# Patient Record
Sex: MALE | Race: WHITE | NOT HISPANIC OR LATINO | Employment: OTHER | URBAN - NONMETROPOLITAN AREA
[De-identification: names, ages, dates, MRNs, and addresses within clinical notes are randomized per-mention and may not be internally consistent; named-entity substitution may affect disease eponyms.]

---

## 2021-05-20 ENCOUNTER — TRANSCRIBE ORDERS (OUTPATIENT)
Dept: GENERAL RADIOLOGY | Facility: HOSPITAL | Age: 41
End: 2021-05-20

## 2021-05-20 ENCOUNTER — HOSPITAL ENCOUNTER (OUTPATIENT)
Dept: GENERAL RADIOLOGY | Facility: HOSPITAL | Age: 41
Discharge: HOME | End: 2021-05-20
Admitting: INTERNAL MEDICINE

## 2021-05-20 DIAGNOSIS — M25.559 HIP PAIN: Primary | ICD-10-CM

## 2021-05-20 DIAGNOSIS — M25.559 HIP PAIN: ICD-10-CM

## 2021-05-20 PROCEDURE — 73522 X-RAY EXAM HIPS BI 3-4 VIEWS: CPT

## 2021-06-14 ENCOUNTER — TRANSCRIBE ORDERS (OUTPATIENT)
Dept: ADMINISTRATIVE | Facility: HOSPITAL | Age: 41
End: 2021-06-14

## 2021-06-14 DIAGNOSIS — R10.9 ACUTE ABDOMINAL PAIN: Primary | ICD-10-CM

## 2021-06-14 DIAGNOSIS — R11.2 NAUSEA AND VOMITING, INTRACTABILITY OF VOMITING NOT SPECIFIED, UNSPECIFIED VOMITING TYPE: ICD-10-CM

## 2021-06-23 ENCOUNTER — HOSPITAL ENCOUNTER (OUTPATIENT)
Dept: ULTRASOUND IMAGING | Facility: HOSPITAL | Age: 41
Discharge: HOME | End: 2021-06-23
Admitting: INTERNAL MEDICINE

## 2021-06-23 DIAGNOSIS — R10.9 ACUTE ABDOMINAL PAIN: ICD-10-CM

## 2021-06-23 DIAGNOSIS — R11.2 NAUSEA AND VOMITING, INTRACTABILITY OF VOMITING NOT SPECIFIED, UNSPECIFIED VOMITING TYPE: ICD-10-CM

## 2021-06-23 PROCEDURE — 76700 US EXAM ABDOM COMPLETE: CPT

## 2021-07-13 NOTE — PROGRESS NOTES
Patient: Aroldo Cai    YOB: 1980    Date: 07/15/2021    Primary Care Provider: Gerald Dobson MD    Chief Complaint   Patient presents with   • Vomiting   • Nausea       SUBJECTIVE:    History of present illness: Patient with a 1 month history of of abdominal pain.  He complains of postprandial right upper quadrant sharp pain.  Associated nausea and vomiting.    The following portions of the patient's history were reviewed and updated as appropriate: allergies, current medications, past family history, past medical history, past social history, past surgical history and problem list.    Review of Systems   Constitutional: Positive for unexpected weight change (10 pounds in 2 weeks ). Negative for chills and fever.   HENT: Positive for trouble swallowing. Negative for voice change.    Eyes: Negative for visual disturbance.   Respiratory: Positive for cough (smoking). Negative for apnea, chest tightness, shortness of breath and wheezing.    Cardiovascular: Negative for chest pain, palpitations and leg swelling.   Gastrointestinal: Positive for abdominal pain, nausea and vomiting. Negative for abdominal distention, anal bleeding, blood in stool, constipation, diarrhea and rectal pain.   Endocrine: Negative for cold intolerance and heat intolerance.   Genitourinary: Negative for difficulty urinating, dysuria, flank pain, scrotal swelling and testicular pain.   Musculoskeletal: Negative for back pain, gait problem and joint swelling.   Skin: Negative for color change, rash and wound.   Neurological: Negative for dizziness, syncope, speech difficulty, weakness, numbness and headaches.   Hematological: Negative for adenopathy. Does not bruise/bleed easily.   Psychiatric/Behavioral: Negative for confusion. The patient is not nervous/anxious.        History:  History reviewed. No pertinent past medical history.    Past Surgical History:   Procedure Laterality Date   • BRAIN SURGERY     • HIP SURGERY  "Bilateral        Family History   Problem Relation Age of Onset   • Diabetes Mother    • Cancer Father         skin   • Diabetes Father    • Hypertension Father        Social History     Tobacco Use   • Smoking status: Current Every Day Smoker     Packs/day: 0.50     Types: Cigarettes   • Smokeless tobacco: Never Used   Vaping Use   • Vaping Use: Never used   Substance Use Topics   • Alcohol use: Never   • Drug use: Never       Allergies:  Allergies   Allergen Reactions   • Penicillins Anaphylaxis   • Dilantin [Phenytoin] Seizure   • Tegretol [Carbamazepine] Seizure   • Latex Rash       Medications:    Current Outpatient Medications:   •  diclofenac (VOLTAREN) 75 MG EC tablet, Take 75 mg by mouth 2 (Two) Times a Day., Disp: , Rfl:   •  levETIRAcetam (KEPPRA) 500 MG tablet, Take 500 mg by mouth Every Evening., Disp: , Rfl:   •  metoclopramide (REGLAN) 10 MG tablet, Take 10 mg by mouth 3 (Three) Times a Day As Needed., Disp: , Rfl:   •  OLANZapine (zyPREXA) 5 MG tablet, , Disp: , Rfl:   •  omeprazole (priLOSEC) 40 MG capsule, Take 40 mg by mouth Daily., Disp: , Rfl:   •  ondansetron ODT (ZOFRAN-ODT) 8 MG disintegrating tablet, DISSOLVE 1 TABLET IN MOUTH THREE TIMES DAILY, Disp: , Rfl:   •  oxyCODONE-acetaminophen (PERCOCET) 5-325 MG per tablet, Take 1 tablet by mouth Every 6 (Six) Hours As Needed. for pain, Disp: , Rfl:   •  promethazine (PHENERGAN) 12.5 MG tablet, Take 12.5 mg by mouth 3 (Three) Times a Day As Needed., Disp: , Rfl:   •  tiZANidine (ZANAFLEX) 4 MG tablet, Take 4 mg by mouth 2 (Two) Times a Day., Disp: , Rfl:   •  venlafaxine (EFFEXOR) 100 MG tablet, Take 100 mg by mouth Daily., Disp: , Rfl:     OBJECTIVE:    Vital Signs:   Vitals:    07/15/21 1408   BP: 140/80   Pulse: 98   Temp: 97.8 °F (36.6 °C)   TempSrc: Temporal   SpO2: 99%   Weight: 104 kg (229 lb 6.4 oz)   Height: 165.1 cm (65\")       Physical Exam:   General Appearance:    Alert, cooperative, in no acute distress   Head:    Normocephalic, " without obvious abnormality, atraumatic   Eyes:            Normal.  No scleral icterus.  PERRLA    Lungs:     Clear to auscultation,respirations regular, even and                  unlabored    Heart:    Regular rhythm and normal rate, normal S1 and S2, no            murmur   Abdomen:     Normal bowel sounds, no masses, no organomegaly, soft        mild right upper quadrant tenderness to deeper palpation, non-distended, no guarding,    Extremities:   Moves all extremities well, no edema, no cyanosis, no             redness   Skin:   No bleeding, bruising or rash   Neurologic:   Normal without gross deficits.   Psychiatric: No evidence of depression or anxiety        Results Review:   I reviewed the patient's new clinical results.  Ultrasound was reviewed.    Review of Systems was reviewed and confirmed as accurate as documented by the MA.    ASSESSMENT/PLAN:    1. Calculus of gallbladder without cholecystitis without obstruction      I offered the patient a laparoscopic cholecystectomy versus observation with low-fat diet however given his multiple symptoms I recommend a laparoscopic cholecystectomy.  I explained this procedure to them in detail and they understand the procedure.  They also understand the risks of bleeding, infection, bile leak, ductal injury, bowel injury, the possibility of converting to an open procedure etc. They understand all of these risks and I have answered all of their questions and they wish to proceed.    Electronically signed by Hardik Blancas MD  07/15/21 15:26 EDT

## 2021-07-15 ENCOUNTER — OFFICE VISIT (OUTPATIENT)
Dept: SURGERY | Facility: CLINIC | Age: 41
End: 2021-07-15

## 2021-07-15 VITALS
HEIGHT: 65 IN | BODY MASS INDEX: 38.22 KG/M2 | OXYGEN SATURATION: 99 % | WEIGHT: 229.4 LBS | TEMPERATURE: 97.8 F | SYSTOLIC BLOOD PRESSURE: 140 MMHG | DIASTOLIC BLOOD PRESSURE: 80 MMHG | HEART RATE: 98 BPM

## 2021-07-15 DIAGNOSIS — K80.20 CALCULUS OF GALLBLADDER WITHOUT CHOLECYSTITIS WITHOUT OBSTRUCTION: Primary | ICD-10-CM

## 2021-07-15 PROCEDURE — 99204 OFFICE O/P NEW MOD 45 MIN: CPT | Performed by: SURGERY

## 2021-07-15 RX ORDER — ONDANSETRON 8 MG/1
TABLET, ORALLY DISINTEGRATING ORAL EVERY 8 HOURS PRN
COMMUNITY
Start: 2021-06-30

## 2021-07-15 RX ORDER — OLANZAPINE 5 MG/1
5 TABLET ORAL DAILY
COMMUNITY
Start: 2021-07-14 | End: 2021-12-02 | Stop reason: SDUPTHER

## 2021-07-15 RX ORDER — METOCLOPRAMIDE 10 MG/1
10 TABLET ORAL 3 TIMES DAILY PRN
COMMUNITY
Start: 2021-07-12 | End: 2022-03-17

## 2021-07-15 RX ORDER — DICLOFENAC SODIUM 75 MG/1
75 TABLET, DELAYED RELEASE ORAL 2 TIMES DAILY
COMMUNITY
Start: 2021-05-27 | End: 2021-07-26

## 2021-07-15 RX ORDER — PROMETHAZINE HYDROCHLORIDE 12.5 MG/1
12.5 TABLET ORAL 3 TIMES DAILY PRN
COMMUNITY
Start: 2021-07-10 | End: 2022-04-18

## 2021-07-15 RX ORDER — VENLAFAXINE 100 MG/1
100 TABLET ORAL DAILY
COMMUNITY
Start: 2021-06-17 | End: 2021-12-02

## 2021-07-15 RX ORDER — OMEPRAZOLE 40 MG/1
40 CAPSULE, DELAYED RELEASE ORAL DAILY
COMMUNITY
Start: 2021-06-17 | End: 2022-03-17

## 2021-07-15 RX ORDER — SODIUM CHLORIDE 9 MG/ML
100 INJECTION, SOLUTION INTRAVENOUS CONTINUOUS
Status: CANCELLED | OUTPATIENT
Start: 2021-07-15

## 2021-07-15 RX ORDER — OXYCODONE HYDROCHLORIDE AND ACETAMINOPHEN 5; 325 MG/1; MG/1
1 TABLET ORAL EVERY 6 HOURS PRN
COMMUNITY
Start: 2021-07-09 | End: 2021-09-10 | Stop reason: DRUGHIGH

## 2021-07-15 RX ORDER — TIZANIDINE 4 MG/1
4 TABLET ORAL 2 TIMES DAILY
COMMUNITY
Start: 2021-06-26 | End: 2021-07-26

## 2021-07-15 RX ORDER — LEVETIRACETAM 500 MG/1
500 TABLET ORAL EVERY EVENING
COMMUNITY
Start: 2021-05-27 | End: 2021-11-29

## 2021-07-16 DIAGNOSIS — Z01.818 PREOP TESTING: Primary | ICD-10-CM

## 2021-07-16 PROBLEM — K80.20 CALCULUS OF GALLBLADDER WITHOUT CHOLECYSTITIS WITHOUT OBSTRUCTION: Status: ACTIVE | Noted: 2021-07-16

## 2021-07-26 ENCOUNTER — PRE-ADMISSION TESTING (OUTPATIENT)
Dept: PREADMISSION TESTING | Facility: HOSPITAL | Age: 41
End: 2021-07-26

## 2021-07-26 VITALS — WEIGHT: 231 LBS | BODY MASS INDEX: 38.49 KG/M2 | HEIGHT: 65 IN

## 2021-07-26 LAB
ANION GAP SERPL CALCULATED.3IONS-SCNC: 11.8 MMOL/L (ref 5–15)
BUN SERPL-MCNC: 8 MG/DL (ref 6–20)
BUN/CREAT SERPL: 9.6 (ref 7–25)
CALCIUM SPEC-SCNC: 9.6 MG/DL (ref 8.6–10.5)
CHLORIDE SERPL-SCNC: 104 MMOL/L (ref 98–107)
CO2 SERPL-SCNC: 26.2 MMOL/L (ref 22–29)
CREAT SERPL-MCNC: 0.83 MG/DL (ref 0.76–1.27)
DEPRECATED RDW RBC AUTO: 44.7 FL (ref 37–54)
ERYTHROCYTE [DISTWIDTH] IN BLOOD BY AUTOMATED COUNT: 13.3 % (ref 12.3–15.4)
GFR SERPL CREATININE-BSD FRML MDRD: 102 ML/MIN/1.73
GLUCOSE SERPL-MCNC: 78 MG/DL (ref 65–99)
HCT VFR BLD AUTO: 50.9 % (ref 37.5–51)
HGB BLD-MCNC: 16.9 G/DL (ref 13–17.7)
MCH RBC QN AUTO: 30.1 PG (ref 26.6–33)
MCHC RBC AUTO-ENTMCNC: 33.2 G/DL (ref 31.5–35.7)
MCV RBC AUTO: 90.7 FL (ref 79–97)
PLATELET # BLD AUTO: 236 10*3/MM3 (ref 140–450)
PMV BLD AUTO: 10.3 FL (ref 6–12)
POTASSIUM SERPL-SCNC: 3.9 MMOL/L (ref 3.5–5.2)
RBC # BLD AUTO: 5.61 10*6/MM3 (ref 4.14–5.8)
SARS-COV-2 RNA PNL SPEC NAA+PROBE: NOT DETECTED
SODIUM SERPL-SCNC: 142 MMOL/L (ref 136–145)
WBC # BLD AUTO: 7.99 10*3/MM3 (ref 3.4–10.8)

## 2021-07-26 PROCEDURE — 36415 COLL VENOUS BLD VENIPUNCTURE: CPT

## 2021-07-26 PROCEDURE — 85027 COMPLETE CBC AUTOMATED: CPT

## 2021-07-26 PROCEDURE — C9803 HOPD COVID-19 SPEC COLLECT: HCPCS

## 2021-07-26 PROCEDURE — 87635 SARS-COV-2 COVID-19 AMP PRB: CPT

## 2021-07-26 PROCEDURE — 80048 BASIC METABOLIC PNL TOTAL CA: CPT

## 2021-07-26 NOTE — DISCHARGE INSTRUCTIONS
Order placed for iron labs to be drawn. Patient prefers to have labs drawn with dialysis ([facility]).      Plan will be to repeat capsule endoscopy:    If iron normal - them may need 2 U packed red blood cells 5-7 days before capsule.     If low iron - can just do IV iron infusion 10-14 days prior to capsule.       Leandro Odell PA-C  Division of Gastroenterology, Hepatology and Nutrition  [facility]        PAT PASS GIVEN/REVIEWED WITH PT.  VERBALIZED UNDERSTANDING OF THE FOLLOWING:  DO NOT EAT, DRINK, SMOKE, USE SMOKELESS TOBACCO OR CHEW GUM AFTER MIDNIGHT THE NIGHT BEFORE SURGERY.  THIS ALSO INCLUDES HARD CANDIES AND MINTS.    DO NOT SHAVE THE AREA TO BE OPERATED ON AT LEAST 48 HOURS PRIOR TO THE PROCEDURE.  DO NOT WEAR MAKE UP OR NAIL POLISH.  DO NOT LEAVE IN ANY PIERCING OR WEAR JEWELRY THE DAY OF SURGERY.      DO NOT USE ADHESIVES IF YOU WEAR DENTURES.    DO NOT WEAR EYE CONTACTS; BRING IN YOUR GLASSES.    ONLY TAKE MEDICATION THE MORNING OF YOUR PROCEDURE IF INSTRUCTED BY YOUR SURGEON WITH ENOUGH WATER TO SWALLOW THE MEDICATION.  IF YOUR SURGEON DID NOT SPECIFY WHICH MEDICATIONS TO TAKE, YOU WILL NEED TO CALL THEIR OFFICE FOR FURTHER INSTRUCTIONS AND DO AS THEY INSTRUCT.    LEAVE ANYTHING YOU CONSIDER VALUABLE AT HOME.    YOU WILL NEED TO ARRANGE FOR SOMEONE TO DRIVE YOU HOME AFTER SURGERY.  IT IS RECOMMENDED THAT YOU DO NOT DRIVE, WORK, DRINK ALCOHOL OR MAKE MAJOR DECISIONS FOR AT LEAST 24 HOURS AFTER YOUR PROCEDURE IS COMPLETE.      THE DAY OF YOUR PROCEDURE, BRING IN THE FOLLOWING IF APPLICABLE:   PICTURE ID AND INSURANCE/MEDICARE OR MEDICAID CARDS/ANY CO-PAY THAT MAY BE DUE   COPY OF ADVANCED DIRECTIVE/LIVING WILL/POWER OR    CPAP/BIPAP/INHALERS   SKIN PREP SHEET   YOUR PREADMISSION TESTING PASS (IF NOT A PHONE HISTORY)        Chlorhexidine wipes along with instruction/verification sheet given to pt.  Instructed pt to date, time, and initial the verification sheet once skin prep has been  completed, and to return [facility] the day of the procedure.  Pt. Verbalizes understanding.      COVID self-quarantine instructions reviewed with the pt.  Verbalized understanding.

## 2021-07-27 ENCOUNTER — APPOINTMENT (OUTPATIENT)
Dept: GENERAL RADIOLOGY | Facility: HOSPITAL | Age: 41
End: 2021-07-27

## 2021-07-27 ENCOUNTER — ANESTHESIA EVENT (OUTPATIENT)
Dept: PERIOP | Facility: HOSPITAL | Age: 41
End: 2021-07-27

## 2021-07-27 ENCOUNTER — ANESTHESIA (OUTPATIENT)
Dept: PERIOP | Facility: HOSPITAL | Age: 41
End: 2021-07-27

## 2021-07-27 ENCOUNTER — HOSPITAL ENCOUNTER (OUTPATIENT)
Facility: HOSPITAL | Age: 41
Setting detail: HOSPITAL OUTPATIENT SURGERY
Discharge: HOME | End: 2021-07-27
Attending: SURGERY | Admitting: SURGERY

## 2021-07-27 VITALS
RESPIRATION RATE: 18 BRPM | SYSTOLIC BLOOD PRESSURE: 133 MMHG | DIASTOLIC BLOOD PRESSURE: 80 MMHG | TEMPERATURE: 98 F | HEART RATE: 75 BPM | OXYGEN SATURATION: 94 %

## 2021-07-27 DIAGNOSIS — K80.20 CALCULUS OF GALLBLADDER WITHOUT CHOLECYSTITIS WITHOUT OBSTRUCTION: ICD-10-CM

## 2021-07-27 PROCEDURE — 25010000002 DEXAMETHASONE PER 1 MG: Performed by: NURSE ANESTHETIST, CERTIFIED REGISTERED

## 2021-07-27 PROCEDURE — 74300 X-RAY BILE DUCTS/PANCREAS: CPT

## 2021-07-27 PROCEDURE — 25010000002 METOCLOPRAMIDE PER 10 MG: Performed by: NURSE ANESTHETIST, CERTIFIED REGISTERED

## 2021-07-27 PROCEDURE — C1889 IMPLANT/INSERT DEVICE, NOC: HCPCS | Performed by: SURGERY

## 2021-07-27 PROCEDURE — 25010000002 HYDROMORPHONE 1 MG/ML SOLUTION: Performed by: NURSE ANESTHETIST, CERTIFIED REGISTERED

## 2021-07-27 PROCEDURE — 25010000002 ONDANSETRON PER 1 MG: Performed by: NURSE ANESTHETIST, CERTIFIED REGISTERED

## 2021-07-27 PROCEDURE — 25010000002 IOPAMIDOL 61 % SOLUTION: Performed by: SURGERY

## 2021-07-27 PROCEDURE — 47563 LAPARO CHOLECYSTECTOMY/GRAPH: CPT | Performed by: SURGERY

## 2021-07-27 PROCEDURE — 25010000002 LEVOFLOXACIN PER 250 MG: Performed by: SURGERY

## 2021-07-27 PROCEDURE — 25010000002 MIDAZOLAM PER 1MG: Performed by: NURSE ANESTHETIST, CERTIFIED REGISTERED

## 2021-07-27 PROCEDURE — 94799 UNLISTED PULMONARY SVC/PX: CPT

## 2021-07-27 PROCEDURE — 25010000002 KETOROLAC TROMETHAMINE PER 15 MG: Performed by: NURSE ANESTHETIST, CERTIFIED REGISTERED

## 2021-07-27 PROCEDURE — 25010000002 PROPOFOL 200 MG/20ML EMULSION: Performed by: NURSE ANESTHETIST, CERTIFIED REGISTERED

## 2021-07-27 PROCEDURE — 25010000002 FENTANYL CITRATE (PF) 100 MCG/2ML SOLUTION: Performed by: NURSE ANESTHETIST, CERTIFIED REGISTERED

## 2021-07-27 DEVICE — LIGAMAX 5 MM ENDOSCOPIC MULTIPLE CLIP APPLIER
Type: IMPLANTABLE DEVICE | Site: ABDOMEN | Status: FUNCTIONAL
Brand: LIGAMAX

## 2021-07-27 RX ORDER — FENTANYL CITRATE 50 UG/ML
INJECTION, SOLUTION INTRAMUSCULAR; INTRAVENOUS AS NEEDED
Status: DISCONTINUED | OUTPATIENT
Start: 2021-07-27 | End: 2021-07-27 | Stop reason: SURG

## 2021-07-27 RX ORDER — KETOROLAC TROMETHAMINE 30 MG/ML
INJECTION, SOLUTION INTRAMUSCULAR; INTRAVENOUS AS NEEDED
Status: DISCONTINUED | OUTPATIENT
Start: 2021-07-27 | End: 2021-07-27 | Stop reason: SURG

## 2021-07-27 RX ORDER — IBUPROFEN 800 MG/1
800 TABLET ORAL EVERY 6 HOURS PRN
Qty: 20 TABLET | Refills: 0 | Status: SHIPPED | OUTPATIENT
Start: 2021-07-27 | End: 2021-12-02

## 2021-07-27 RX ORDER — MEPERIDINE HYDROCHLORIDE 25 MG/ML
50 INJECTION INTRAMUSCULAR; INTRAVENOUS; SUBCUTANEOUS
Status: DISCONTINUED | OUTPATIENT
Start: 2021-07-27 | End: 2021-07-27 | Stop reason: HOSPADM

## 2021-07-27 RX ORDER — ONDANSETRON 2 MG/ML
4 INJECTION INTRAMUSCULAR; INTRAVENOUS ONCE AS NEEDED
Status: COMPLETED | OUTPATIENT
Start: 2021-07-27 | End: 2021-07-27

## 2021-07-27 RX ORDER — PROPOFOL 10 MG/ML
INJECTION, EMULSION INTRAVENOUS AS NEEDED
Status: DISCONTINUED | OUTPATIENT
Start: 2021-07-27 | End: 2021-07-27 | Stop reason: SURG

## 2021-07-27 RX ORDER — MIDAZOLAM HYDROCHLORIDE 2 MG/2ML
INJECTION, SOLUTION INTRAMUSCULAR; INTRAVENOUS AS NEEDED
Status: DISCONTINUED | OUTPATIENT
Start: 2021-07-27 | End: 2021-07-27 | Stop reason: SURG

## 2021-07-27 RX ORDER — LEVOFLOXACIN 5 MG/ML
500 INJECTION, SOLUTION INTRAVENOUS
Status: DISCONTINUED | OUTPATIENT
Start: 2021-07-27 | End: 2021-07-27 | Stop reason: HOSPADM

## 2021-07-27 RX ORDER — SODIUM CHLORIDE 9 MG/ML
100 INJECTION, SOLUTION INTRAVENOUS CONTINUOUS
Status: DISCONTINUED | OUTPATIENT
Start: 2021-07-27 | End: 2021-07-27 | Stop reason: HOSPADM

## 2021-07-27 RX ORDER — LIDOCAINE HYDROCHLORIDE 20 MG/ML
INJECTION, SOLUTION INTRAVENOUS AS NEEDED
Status: DISCONTINUED | OUTPATIENT
Start: 2021-07-27 | End: 2021-07-27 | Stop reason: SURG

## 2021-07-27 RX ORDER — LORAZEPAM 2 MG/ML
1 INJECTION INTRAMUSCULAR EVERY 4 HOURS PRN
Status: DISCONTINUED | OUTPATIENT
Start: 2021-07-27 | End: 2021-07-27 | Stop reason: HOSPADM

## 2021-07-27 RX ORDER — KETAMINE HCL IN NACL, ISO-OSM 100MG/10ML
SYRINGE (ML) INJECTION AS NEEDED
Status: DISCONTINUED | OUTPATIENT
Start: 2021-07-27 | End: 2021-07-27 | Stop reason: SURG

## 2021-07-27 RX ORDER — ROCURONIUM BROMIDE 10 MG/ML
INJECTION, SOLUTION INTRAVENOUS AS NEEDED
Status: DISCONTINUED | OUTPATIENT
Start: 2021-07-27 | End: 2021-07-27 | Stop reason: SURG

## 2021-07-27 RX ORDER — DEXAMETHASONE SODIUM PHOSPHATE 4 MG/ML
INJECTION, SOLUTION INTRA-ARTICULAR; INTRALESIONAL; INTRAMUSCULAR; INTRAVENOUS; SOFT TISSUE AS NEEDED
Status: DISCONTINUED | OUTPATIENT
Start: 2021-07-27 | End: 2021-07-27 | Stop reason: SURG

## 2021-07-27 RX ORDER — BUPIVACAINE HYDROCHLORIDE AND EPINEPHRINE 2.5; 5 MG/ML; UG/ML
INJECTION, SOLUTION EPIDURAL; INFILTRATION; INTRACAUDAL; PERINEURAL AS NEEDED
Status: DISCONTINUED | OUTPATIENT
Start: 2021-07-27 | End: 2021-07-27 | Stop reason: HOSPADM

## 2021-07-27 RX ORDER — ONDANSETRON 2 MG/ML
INJECTION INTRAMUSCULAR; INTRAVENOUS AS NEEDED
Status: DISCONTINUED | OUTPATIENT
Start: 2021-07-27 | End: 2021-07-27 | Stop reason: SURG

## 2021-07-27 RX ORDER — METOCLOPRAMIDE HYDROCHLORIDE 5 MG/ML
INJECTION INTRAMUSCULAR; INTRAVENOUS AS NEEDED
Status: DISCONTINUED | OUTPATIENT
Start: 2021-07-27 | End: 2021-07-27 | Stop reason: SURG

## 2021-07-27 RX ORDER — IPRATROPIUM BROMIDE AND ALBUTEROL SULFATE 2.5; .5 MG/3ML; MG/3ML
3 SOLUTION RESPIRATORY (INHALATION) ONCE
Status: DISCONTINUED | OUTPATIENT
Start: 2021-07-27 | End: 2021-07-27 | Stop reason: HOSPADM

## 2021-07-27 RX ORDER — CLINDAMYCIN PHOSPHATE 900 MG/50ML
900 INJECTION, SOLUTION INTRAVENOUS
Status: DISCONTINUED | OUTPATIENT
Start: 2021-07-27 | End: 2021-07-27 | Stop reason: HOSPADM

## 2021-07-27 RX ADMIN — Medication 50 MG: at 13:26

## 2021-07-27 RX ADMIN — ROCURONIUM BROMIDE 50 MG: 10 INJECTION INTRAVENOUS at 13:26

## 2021-07-27 RX ADMIN — HYDROMORPHONE HYDROCHLORIDE 0.5 MG: 1 INJECTION, SOLUTION INTRAMUSCULAR; INTRAVENOUS; SUBCUTANEOUS at 15:49

## 2021-07-27 RX ADMIN — DEXAMETHASONE SODIUM PHOSPHATE 10 MG: 4 INJECTION, SOLUTION INTRAMUSCULAR; INTRAVENOUS at 13:26

## 2021-07-27 RX ADMIN — PROPOFOL 200 MG: 10 INJECTION, EMULSION INTRAVENOUS at 13:26

## 2021-07-27 RX ADMIN — SODIUM CHLORIDE 100 ML/HR: 9 INJECTION, SOLUTION INTRAVENOUS at 12:13

## 2021-07-27 RX ADMIN — CLINDAMYCIN PHOSPHATE 900 MG: 900 INJECTION, SOLUTION INTRAVENOUS at 13:20

## 2021-07-27 RX ADMIN — LEVOFLOXACIN 500 MG: 5 INJECTION, SOLUTION INTRAVENOUS at 13:30

## 2021-07-27 RX ADMIN — HYDROMORPHONE HYDROCHLORIDE 0.5 MG: 1 INJECTION, SOLUTION INTRAMUSCULAR; INTRAVENOUS; SUBCUTANEOUS at 15:07

## 2021-07-27 RX ADMIN — ONDANSETRON 4 MG: 2 INJECTION INTRAMUSCULAR; INTRAVENOUS at 15:20

## 2021-07-27 RX ADMIN — LIDOCAINE HYDROCHLORIDE 100 MG: 20 INJECTION, SOLUTION INTRAVENOUS at 13:26

## 2021-07-27 RX ADMIN — METOCLOPRAMIDE 10 MG: 5 INJECTION, SOLUTION INTRAMUSCULAR; INTRAVENOUS at 13:26

## 2021-07-27 RX ADMIN — KETOROLAC TROMETHAMINE 30 MG: 30 INJECTION, SOLUTION INTRAMUSCULAR at 13:26

## 2021-07-27 RX ADMIN — ONDANSETRON 4 MG: 2 INJECTION INTRAMUSCULAR; INTRAVENOUS at 13:26

## 2021-07-27 RX ADMIN — FENTANYL CITRATE 100 MCG: 50 INJECTION INTRAMUSCULAR; INTRAVENOUS at 13:26

## 2021-07-27 RX ADMIN — HYDROMORPHONE HYDROCHLORIDE 0.5 MG: 1 INJECTION, SOLUTION INTRAMUSCULAR; INTRAVENOUS; SUBCUTANEOUS at 15:20

## 2021-07-27 RX ADMIN — SODIUM CHLORIDE: 9 INJECTION, SOLUTION INTRAVENOUS at 13:52

## 2021-07-27 RX ADMIN — MIDAZOLAM HYDROCHLORIDE 2 MG: 1 INJECTION, SOLUTION INTRAMUSCULAR; INTRAVENOUS at 13:26

## 2021-07-27 NOTE — ANESTHESIA PROCEDURE NOTES
Airway  Urgency: elective    Date/Time: 7/27/2021 1:27 PM  Airway not difficult    General Information and Staff    Patient location during procedure: OR  CRNA: Kodi Noel CRNA    Indications and Patient Condition  Indications for airway management: airway protection    Preoxygenated: yes  Mask difficulty assessment: 1 - vent by mask    Final Airway Details  Final airway type: endotracheal airway      Successful airway: ETT  Cuffed: yes   Successful intubation technique: direct laryngoscopy  Facilitating devices/methods: intubating stylet  Endotracheal tube insertion site: oral  Blade: Bonilla  Blade size: 4  ETT size (mm): 7.5  Cormack-Lehane Classification: grade I - full view of glottis  Placement verified by: chest auscultation and capnometry   Measured from: lips  ETT/EBT  to lips (cm): 22  Number of attempts at approach: 1  Assessment: lips, teeth, and gum same as pre-op and atraumatic intubation    Additional Comments  Dentition and Lips as preoperative assessment. Airway placed without complication. ETT cuff inflated to minimal occlusive pressure.

## 2021-07-27 NOTE — ANESTHESIA PREPROCEDURE EVALUATION
Anesthesia Evaluation     Patient summary reviewed and Nursing notes reviewed   no history of anesthetic complications:  NPO Solid Status: > 8 hours  NPO Liquid Status: > 8 hours           Airway   Mallampati: II  TM distance: >3 FB  Neck ROM: full  no difficulty expected  Dental - normal exam     Pulmonary - normal exam   (+) pneumonia resolved , a smoker Current Smoked day of surgery,   Cardiovascular - negative cardio ROS and normal exam    Rhythm: regular  Rate: normal        Neuro/Psych  (+) seizures, psychiatric history Anxiety, Depression and Bipolar,     GI/Hepatic/Renal/Endo    (+)   hepatitis C, liver disease fatty liver disease, renal disease stones,     Musculoskeletal     Abdominal    Substance History - negative use     OB/GYN negative ob/gyn ROS         Other   arthritis,                      Anesthesia Plan    ASA 3     general   (Risks and benefits discussed including risk of aspiration, recall and dental damage. All patient questions answered.    Patient told that a breathing tube will be used to manage the airway.    Will continue with plan of care.)  intravenous induction     Anesthetic plan, all risks, benefits, and alternatives have been provided, discussed and informed consent has been obtained with: patient.

## 2021-07-27 NOTE — ANESTHESIA POSTPROCEDURE EVALUATION
Patient: Aroldo Cai    Procedure Summary     Date: 07/27/21 Room / Location: [facility] OR  /  [facility]    Anesthesia Start: 1320 Anesthesia Stop: 1450    Procedure: CHOLECYSTECTOMY LAPAROSCOPIC INTRAOPERATIVE CHOLANGIOGRAPHY (N/A Abdomen) Diagnosis:       Calculus of gallbladder without cholecystitis without obstruction      (Calculus of gallbladder without cholecystitis without obstruction [K80.20])    Surgeons: Hardik Blancas MD Provider: Kodi Noel CRNA    Anesthesia Type: general ASA Status: 3          Anesthesia Type: general    Vitals  Vitals Value Taken Time   /81 07/27/21 1622   Temp 97.5 °F (36.4 °C) 07/27/21 1622   Pulse 88 07/27/21 1628   Resp 18 07/27/21 1622   SpO2 95 % 07/27/21 1629   Vitals shown include unvalidated device data.          Post Anesthesia Care and Evaluation    Patient location during evaluation: PACU  Patient participation: complete - patient participated  Level of consciousness: awake  Pain score: 3  Pain management: adequate  Airway patency: patent  Anesthetic complications: No anesthetic complications  PONV Status: controlled  Cardiovascular status: acceptable and stable  Respiratory status: acceptable and face mask  Hydration status: acceptable

## 2021-07-31 LAB
LAB AP CASE REPORT: NORMAL
PATH REPORT.FINAL DX SPEC: NORMAL

## 2021-08-05 ENCOUNTER — OFFICE VISIT (OUTPATIENT)
Dept: SURGERY | Facility: CLINIC | Age: 41
End: 2021-08-05

## 2021-08-05 VITALS — HEIGHT: 65 IN | BODY MASS INDEX: 38.49 KG/M2 | WEIGHT: 231 LBS

## 2021-08-05 DIAGNOSIS — Z48.89 POSTOPERATIVE VISIT: Primary | ICD-10-CM

## 2021-08-05 PROCEDURE — 99024 POSTOP FOLLOW-UP VISIT: CPT | Performed by: SURGERY

## 2021-09-09 NOTE — PROGRESS NOTES
"Patient: Aroldo Cai    YOB: 1980    Date: 09/10/2021    Primary Care Provider: Gerald Dobson MD    Chief Complaint   Patient presents with   • Abdominal Pain     with nausea and vomiting.       History of present illness:  I saw the patient in the office today as a followup from their laparoscopic cholecystectomy done on 7-27-21.  They state that they have had right upper quadrant area pain with bouts of  intermittent vomiting and abdominal pain since the surgery.  These are the same symptoms that he had prior to surgery for 5 or 6 months.  His symptoms are escalating.    Vital Signs:   Vitals:    09/10/21 0909   BP: 120/82   Pulse: 120   Resp: 18   Temp: 97.8 °F (36.6 °C)   TempSrc: Temporal   SpO2: 98%   Weight: 99.8 kg (220 lb)   Height: 165.1 cm (65\")       Physical Exam:   General Appearance:    Alert, cooperative, in no acute distress   Abdomen:     no masses, no organomegaly, soft non-tender, non-distended, no guarding, wounds are well healed  Eyes: Sclera are anicteric  Heart: Regular rate and rhythm     Assessment / Plan :    1. Postoperative visit    2. Right upper quadrant abdominal pain    3. Projectile vomiting with nausea        Interesting that he continues to have the same symptoms as he had preoperatively despite a cholecystectomy secondary to gallstones.  I would like to repeat his laboratory studies.  As well I have recommended CT scan.  If these are normal and upper endoscopy would be indicated.  I will make any further recommendations based on the labs and CT.      Addendum: I spoke to the patient today on 10/7/2021 regarding his CT scan.  CT scan was normal.  I recommend an EGD.  He understands procedure as well as the risk of bleeding and infection and he wishes to proceed.  We will call him to schedule.    Electronically signed by Hardik Blancas MD  09/10/21                  "

## 2021-09-10 ENCOUNTER — LAB (OUTPATIENT)
Dept: LAB | Facility: HOSPITAL | Age: 41
End: 2021-09-10

## 2021-09-10 ENCOUNTER — OFFICE VISIT (OUTPATIENT)
Dept: SURGERY | Facility: CLINIC | Age: 41
End: 2021-09-10

## 2021-09-10 VITALS
BODY MASS INDEX: 36.65 KG/M2 | HEART RATE: 120 BPM | HEIGHT: 65 IN | DIASTOLIC BLOOD PRESSURE: 82 MMHG | OXYGEN SATURATION: 98 % | SYSTOLIC BLOOD PRESSURE: 120 MMHG | WEIGHT: 220 LBS | RESPIRATION RATE: 18 BRPM | TEMPERATURE: 97.8 F

## 2021-09-10 DIAGNOSIS — R11.12 PROJECTILE VOMITING WITH NAUSEA: ICD-10-CM

## 2021-09-10 DIAGNOSIS — R10.11 RIGHT UPPER QUADRANT ABDOMINAL PAIN: ICD-10-CM

## 2021-09-10 DIAGNOSIS — Z48.89 POSTOPERATIVE VISIT: Primary | ICD-10-CM

## 2021-09-10 LAB
ALBUMIN SERPL-MCNC: 4.5 G/DL (ref 3.5–5.2)
ALBUMIN/GLOB SERPL: 1.7 G/DL
ALP SERPL-CCNC: 116 U/L (ref 39–117)
ALT SERPL W P-5'-P-CCNC: 17 U/L (ref 1–41)
ANION GAP SERPL CALCULATED.3IONS-SCNC: 13.5 MMOL/L (ref 5–15)
AST SERPL-CCNC: 19 U/L (ref 1–40)
BILIRUB SERPL-MCNC: 0.4 MG/DL (ref 0–1.2)
BUN SERPL-MCNC: 11 MG/DL (ref 6–20)
BUN/CREAT SERPL: 10.8 (ref 7–25)
CALCIUM SPEC-SCNC: 9.6 MG/DL (ref 8.6–10.5)
CHLORIDE SERPL-SCNC: 103 MMOL/L (ref 98–107)
CO2 SERPL-SCNC: 23.5 MMOL/L (ref 22–29)
CREAT SERPL-MCNC: 1.02 MG/DL (ref 0.76–1.27)
DEPRECATED RDW RBC AUTO: 42.7 FL (ref 37–54)
ERYTHROCYTE [DISTWIDTH] IN BLOOD BY AUTOMATED COUNT: 13.2 % (ref 12.3–15.4)
GFR SERPL CREATININE-BSD FRML MDRD: 80 ML/MIN/1.73
GLOBULIN UR ELPH-MCNC: 2.7 GM/DL
GLUCOSE SERPL-MCNC: 116 MG/DL (ref 65–99)
HCT VFR BLD AUTO: 49.6 % (ref 37.5–51)
HGB BLD-MCNC: 17.1 G/DL (ref 13–17.7)
LIPASE SERPL-CCNC: 27 U/L (ref 13–60)
MCH RBC QN AUTO: 30.5 PG (ref 26.6–33)
MCHC RBC AUTO-ENTMCNC: 34.5 G/DL (ref 31.5–35.7)
MCV RBC AUTO: 88.4 FL (ref 79–97)
PLATELET # BLD AUTO: 217 10*3/MM3 (ref 140–450)
PMV BLD AUTO: 10.1 FL (ref 6–12)
POTASSIUM SERPL-SCNC: 3.5 MMOL/L (ref 3.5–5.2)
PROT SERPL-MCNC: 7.2 G/DL (ref 6–8.5)
RBC # BLD AUTO: 5.61 10*6/MM3 (ref 4.14–5.8)
SODIUM SERPL-SCNC: 140 MMOL/L (ref 136–145)
WBC # BLD AUTO: 8.48 10*3/MM3 (ref 3.4–10.8)

## 2021-09-10 PROCEDURE — 80053 COMPREHEN METABOLIC PANEL: CPT

## 2021-09-10 PROCEDURE — 36415 COLL VENOUS BLD VENIPUNCTURE: CPT

## 2021-09-10 PROCEDURE — 83690 ASSAY OF LIPASE: CPT

## 2021-09-10 PROCEDURE — 99024 POSTOP FOLLOW-UP VISIT: CPT | Performed by: SURGERY

## 2021-09-10 PROCEDURE — 85027 COMPLETE CBC AUTOMATED: CPT

## 2021-09-10 RX ORDER — TIZANIDINE 4 MG/1
4 TABLET ORAL 2 TIMES DAILY
COMMUNITY
Start: 2021-08-22 | End: 2021-11-29

## 2021-09-10 RX ORDER — ONDANSETRON 4 MG/1
TABLET, ORALLY DISINTEGRATING ORAL
COMMUNITY
Start: 2021-09-08 | End: 2021-09-10 | Stop reason: SDUPTHER

## 2021-09-10 RX ORDER — OXYCODONE AND ACETAMINOPHEN 7.5; 325 MG/1; MG/1
1 TABLET ORAL 2 TIMES DAILY PRN
COMMUNITY
Start: 2021-09-05 | End: 2021-10-22 | Stop reason: HOSPADM

## 2021-09-21 ENCOUNTER — HOSPITAL ENCOUNTER (OUTPATIENT)
Dept: CT IMAGING | Facility: HOSPITAL | Age: 41
Discharge: HOME | End: 2021-09-21

## 2021-09-21 DIAGNOSIS — R11.12 PROJECTILE VOMITING WITH NAUSEA: ICD-10-CM

## 2021-09-21 DIAGNOSIS — R10.11 RIGHT UPPER QUADRANT ABDOMINAL PAIN: ICD-10-CM

## 2021-09-22 ENCOUNTER — HOSPITAL ENCOUNTER (OUTPATIENT)
Dept: CT IMAGING | Facility: HOSPITAL | Age: 41
End: 2021-09-22

## 2021-09-23 ENCOUNTER — HOSPITAL ENCOUNTER (OUTPATIENT)
Dept: CT IMAGING | Facility: HOSPITAL | Age: 41
Discharge: HOME | End: 2021-09-23

## 2021-09-28 ENCOUNTER — APPOINTMENT (OUTPATIENT)
Dept: CT IMAGING | Facility: HOSPITAL | Age: 41
End: 2021-09-28

## 2021-10-04 ENCOUNTER — HOSPITAL ENCOUNTER (OUTPATIENT)
Dept: CT IMAGING | Facility: HOSPITAL | Age: 41
Discharge: HOME | End: 2021-10-04
Admitting: SURGERY

## 2021-10-04 PROCEDURE — 25010000002 IOPAMIDOL 61 % SOLUTION: Performed by: SURGERY

## 2021-10-04 PROCEDURE — 74177 CT ABD & PELVIS W/CONTRAST: CPT

## 2021-10-04 RX ADMIN — IOPAMIDOL 100 ML: 612 INJECTION, SOLUTION INTRAVENOUS at 14:50

## 2021-10-08 ENCOUNTER — PREP FOR SURGERY (OUTPATIENT)
Dept: OTHER | Facility: HOSPITAL | Age: 41
End: 2021-10-08

## 2021-10-08 DIAGNOSIS — R10.11 RIGHT UPPER QUADRANT ABDOMINAL PAIN: Primary | ICD-10-CM

## 2021-10-08 DIAGNOSIS — R11.2 NAUSEA AND VOMITING, INTRACTABILITY OF VOMITING NOT SPECIFIED, UNSPECIFIED VOMITING TYPE: ICD-10-CM

## 2021-10-08 DIAGNOSIS — Z01.818 PREOP TESTING: Primary | ICD-10-CM

## 2021-10-11 PROBLEM — R11.2 NAUSEA AND VOMITING: Status: ACTIVE | Noted: 2021-10-11

## 2021-10-11 PROBLEM — R10.11 RIGHT UPPER QUADRANT ABDOMINAL PAIN: Status: ACTIVE | Noted: 2021-10-11

## 2021-10-19 ENCOUNTER — TELEPHONE (OUTPATIENT)
Dept: SURGERY | Facility: CLINIC | Age: 41
End: 2021-10-19

## 2021-10-19 NOTE — TELEPHONE ENCOUNTER
Called patient, advised that he would have to have a Covid test, patient verbalized understanding. Will be going on 10/20/21. Patient aware that the hospital will call him on 10/21/21 for arrival time.

## 2021-10-20 ENCOUNTER — LAB (OUTPATIENT)
Dept: LAB | Facility: HOSPITAL | Age: 41
End: 2021-10-20

## 2021-10-20 DIAGNOSIS — Z01.818 PREOP TESTING: ICD-10-CM

## 2021-10-20 LAB — SARS-COV-2 RNA NOSE QL NAA+PROBE: NOT DETECTED

## 2021-10-20 PROCEDURE — C9803 HOPD COVID-19 SPEC COLLECT: HCPCS

## 2021-10-20 PROCEDURE — U0004 COV-19 TEST NON-CDC HGH THRU: HCPCS

## 2021-10-21 NOTE — PRE-PROCEDURE INSTRUCTIONS
PAT phone history completed with pt for upcoming procedure on 10/22/21, with Dr. Blancas.     PAT PASS GIVEN/REVIEWED WITH PT.  VERBALIZED UNDERSTANDING OF THE FOLLOWING:  DO NOT EAT, DRINK, SMOKE, USE SMOKELESS TOBACCO OR CHEW GUM AFTER MIDNIGHT THE NIGHT BEFORE SURGERY.  THIS ALSO INCLUDES HARD CANDIES AND MINTS.    DO NOT SHAVE THE AREA TO BE OPERATED ON AT LEAST 48 HOURS PRIOR TO THE PROCEDURE.  DO NOT WEAR MAKE UP OR NAIL POLISH.  DO NOT LEAVE IN ANY PIERCING OR WEAR JEWELRY THE DAY OF SURGERY.      DO NOT USE ADHESIVES IF YOU WEAR DENTURES.    DO NOT WEAR EYE CONTACTS; BRING IN YOUR GLASSES.    ONLY TAKE MEDICATION THE MORNING OF YOUR PROCEDURE IF INSTRUCTED BY YOUR SURGEON WITH ENOUGH WATER TO SWALLOW THE MEDICATION.  IF YOUR SURGEON DID NOT SPECIFY WHICH MEDICATIONS TO TAKE, YOU WILL NEED TO CALL THEIR OFFICE FOR FURTHER INSTRUCTIONS AND DO AS THEY INSTRUCT.    LEAVE ANYTHING YOU CONSIDER VALUABLE AT HOME.    YOU WILL NEED TO ARRANGE FOR SOMEONE TO DRIVE YOU HOME AFTER SURGERY.  IT IS RECOMMENDED THAT YOU DO NOT DRIVE, WORK, DRINK ALCOHOL OR MAKE MAJOR DECISIONS FOR AT LEAST 24 HOURS AFTER YOUR PROCEDURE IS COMPLETE.      THE DAY OF YOUR PROCEDURE, BRING IN THE FOLLOWING IF APPLICABLE:   PICTURE ID AND INSURANCE/MEDICARE OR MEDICAID CARDS/ANY CO-PAY THAT MAY BE DUE   COPY OF ADVANCED DIRECTIVE/LIVING WILL/POWER OR    CPAP/BIPAP/INHALERS   SKIN PREP SHEET   YOUR PREADMISSION TESTING PASS (IF NOT A PHONE HISTORY)           COVID self-quarantine instructions reviewed with the pt.  Verbalized understanding.

## 2021-10-22 ENCOUNTER — HOSPITAL ENCOUNTER (OUTPATIENT)
Facility: HOSPITAL | Age: 41
Setting detail: HOSPITAL OUTPATIENT SURGERY
Discharge: HOME | End: 2021-10-22
Attending: SURGERY | Admitting: SURGERY

## 2021-10-22 ENCOUNTER — ANESTHESIA EVENT (OUTPATIENT)
Dept: GASTROENTEROLOGY | Facility: HOSPITAL | Age: 41
End: 2021-10-22

## 2021-10-22 ENCOUNTER — ANESTHESIA (OUTPATIENT)
Dept: GASTROENTEROLOGY | Facility: HOSPITAL | Age: 41
End: 2021-10-22

## 2021-10-22 VITALS
HEIGHT: 65 IN | WEIGHT: 221 LBS | BODY MASS INDEX: 36.82 KG/M2 | OXYGEN SATURATION: 92 % | DIASTOLIC BLOOD PRESSURE: 87 MMHG | RESPIRATION RATE: 16 BRPM | TEMPERATURE: 97 F | HEART RATE: 86 BPM | SYSTOLIC BLOOD PRESSURE: 139 MMHG

## 2021-10-22 DIAGNOSIS — R11.2 NAUSEA AND VOMITING, INTRACTABILITY OF VOMITING NOT SPECIFIED, UNSPECIFIED VOMITING TYPE: ICD-10-CM

## 2021-10-22 DIAGNOSIS — R10.11 RIGHT UPPER QUADRANT ABDOMINAL PAIN: ICD-10-CM

## 2021-10-22 PROCEDURE — S0260 H&P FOR SURGERY: HCPCS | Performed by: SURGERY

## 2021-10-22 PROCEDURE — 25010000002 PROPOFOL 200 MG/20ML EMULSION: Performed by: NURSE ANESTHETIST, CERTIFIED REGISTERED

## 2021-10-22 PROCEDURE — 25010000002 ONDANSETRON PER 1 MG: Performed by: NURSE ANESTHETIST, CERTIFIED REGISTERED

## 2021-10-22 PROCEDURE — 43239 EGD BIOPSY SINGLE/MULTIPLE: CPT | Performed by: SURGERY

## 2021-10-22 RX ORDER — SODIUM CHLORIDE 0.9 % (FLUSH) 0.9 %
10 SYRINGE (ML) INJECTION AS NEEDED
Status: DISCONTINUED | OUTPATIENT
Start: 2021-10-22 | End: 2021-10-22 | Stop reason: HOSPADM

## 2021-10-22 RX ORDER — LIDOCAINE HYDROCHLORIDE 20 MG/ML
INJECTION, SOLUTION INTRAVENOUS AS NEEDED
Status: DISCONTINUED | OUTPATIENT
Start: 2021-10-22 | End: 2021-10-22 | Stop reason: SURG

## 2021-10-22 RX ORDER — SODIUM CHLORIDE, SODIUM LACTATE, POTASSIUM CHLORIDE, CALCIUM CHLORIDE 600; 310; 30; 20 MG/100ML; MG/100ML; MG/100ML; MG/100ML
1000 INJECTION, SOLUTION INTRAVENOUS CONTINUOUS
Status: DISCONTINUED | OUTPATIENT
Start: 2021-10-22 | End: 2021-10-22 | Stop reason: HOSPADM

## 2021-10-22 RX ORDER — ONDANSETRON 2 MG/ML
INJECTION INTRAMUSCULAR; INTRAVENOUS AS NEEDED
Status: DISCONTINUED | OUTPATIENT
Start: 2021-10-22 | End: 2021-10-22 | Stop reason: SURG

## 2021-10-22 RX ORDER — PROPOFOL 10 MG/ML
INJECTION, EMULSION INTRAVENOUS AS NEEDED
Status: DISCONTINUED | OUTPATIENT
Start: 2021-10-22 | End: 2021-10-22 | Stop reason: SURG

## 2021-10-22 RX ADMIN — ONDANSETRON 4 MG: 2 INJECTION INTRAMUSCULAR; INTRAVENOUS at 14:14

## 2021-10-22 RX ADMIN — LIDOCAINE HYDROCHLORIDE 60 MG: 20 INJECTION, SOLUTION INTRAVENOUS at 14:06

## 2021-10-22 RX ADMIN — PROPOFOL 100 MG: 10 INJECTION, EMULSION INTRAVENOUS at 14:06

## 2021-10-22 RX ADMIN — PROPOFOL 100 MG: 10 INJECTION, EMULSION INTRAVENOUS at 14:09

## 2021-10-22 RX ADMIN — SODIUM CHLORIDE, POTASSIUM CHLORIDE, SODIUM LACTATE AND CALCIUM CHLORIDE 1000 ML: 600; 310; 30; 20 INJECTION, SOLUTION INTRAVENOUS at 12:29

## 2021-10-22 NOTE — ANESTHESIA POSTPROCEDURE EVALUATION
Patient: Aroldo Cai    Procedure Summary     Date: 10/22/21 Room / Location: [facility] 2 / [facility]    Anesthesia Start: 1402 Anesthesia Stop: 1418    Procedure: ESOPHAGOGASTRODUODENOSCOPY WITH BIOPSY (N/A Esophagus) Diagnosis:       Right upper quadrant abdominal pain      Nausea and vomiting, intractability of vomiting not specified, unspecified vomiting type      (Right upper quadrant abdominal pain [R10.11])      (Nausea and vomiting, intractability of vomiting not specified, unspecified vomiting type [R11.2])    Surgeons: Hardik Blancas MD Provider: Sameera Love CRNA    Anesthesia Type: MAC ASA Status: 3          Anesthesia Type: MAC    Vitals  Vitals Value Taken Time   /87 10/22/21 1420   Temp 97 °F (36.1 °C) 10/22/21 1420   Pulse 100 10/22/21 1420   Resp 18 10/22/21 1420   SpO2 95 % 10/22/21 1420           Post Anesthesia Care and Evaluation    Patient location during evaluation: PHASE II  Patient participation: complete - patient participated  Level of consciousness: awake and alert  Pain score: 0  Pain management: satisfactory to patient  Airway patency: patent  Anesthetic complications: No anesthetic complications  PONV Status: none  Cardiovascular status: acceptable and stable  Respiratory status: acceptable  Hydration status: acceptable

## 2021-10-22 NOTE — H&P
[facility]   HISTORY AND PHYSICAL      Name:  Aroldo Cai   Age:  41 y.o.  Sex:  male  :  1980  MRN:  [id number]   Visit Number:  [id number]  Admission Date:  10/22/2021  Date Of Service:  10/22/21  Primary Care Physician:  Gerald Dobson MD    Chief Complaint:     I have vomiting    History Of Presenting Illness:      Patient here for an EGD due to his history of vomiting.  No new complaints    Review Of Systems:     General ROS: Patient denies any fevers, chills or loss of consciousness.  No complaints of generalized weakness  Psychological ROS: Denies any hallucinations and delusions.  Respiratory ROS: Denies cough or shortness of breath.   Cardiovascular ROS: Denies chest pain or palpitations. No history of exertional chest pain.   Gastrointestinal ROS: As per HPI.   Genito-Urinary ROS: Denies dysuria or hematuria.  Neurological ROS: Denies any focal weakness. No loss of consciousness. Denies any numbness.   Dermatological ROS: Denies any redness or pruritis.     Past Medical History:    Past Medical History:   Diagnosis Date   • Anesthesia complication     pt reports he is hard to wake after anesthesia   • Anxiety    • Arthritis    • Bipolar 1 disorder (HCC)    • COVID-19 2021   • Depression    • Dysphagia     food and liquids.   • Extensive tattoos    • Gout    • Hepatitis C     Patient took medication    • History of echocardiogram    • Kidney stones    • Pneumonia    • PTSD (post-traumatic stress disorder)    • Seizure (HCC)     last seizure years ago.   • Short-term memory loss    • Wears contact lenses    • Wears glasses        Past Surgical history:    Past Surgical History:   Procedure Laterality Date   • ABDOMINAL SURGERY     • BRAIN SURGERY      craniotomy,  right frontal lobe AVM   • CHOLECYSTECTOMY WITH INTRAOPERATIVE CHOLANGIOGRAM N/A 2021    Procedure: CHOLECYSTECTOMY LAPAROSCOPIC INTRAOPERATIVE CHOLANGIOGRAPHY;  Surgeon: Yonny  MD Hardik;  Location: [facility];  Service: General;  Laterality: N/A;   • COLONOSCOPY     • HERNIA REPAIR Right     inguinal   • HIP SURGERY Bilateral        Social History:    Social History     Socioeconomic History   • Marital status:    Tobacco Use   • Smoking status: Current Every Day Smoker     Packs/day: 1.00     Years: 30.00     Pack years: 30.00     Types: Cigarettes   • Smokeless tobacco: Never Used   Vaping Use   • Vaping Use: Never used   Substance and Sexual Activity   • Alcohol use: Never   • Drug use: Not Currently     Types: Cocaine(coke)     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   • Sexual activity: Defer       Family History:    Family History   Problem Relation Age of Onset   • Diabetes Mother    • Cancer Father         skin   • Diabetes Father    • Hypertension Father        Allergies:      Dilantin [phenytoin], Penicillins, Tegretol [carbamazepine], and Latex    Home Medications:    Prior to Admission Medications     Prescriptions Last Dose Informant Patient Reported? Taking?    ibuprofen (ADVIL,MOTRIN) 800 MG tablet Past Week Self No Yes    Take 1 tablet by mouth Every 6 (Six) Hours As Needed for Moderate Pain .    levETIRAcetam (KEPPRA) 500 MG tablet 10/21/2021 Self Yes Yes    Take 500 mg by mouth Every Evening.    metoclopramide (REGLAN) 10 MG tablet 10/21/2021 Self Yes Yes    Take 10 mg by mouth 3 (Three) Times a Day As Needed.    OLANZapine (zyPREXA) 5 MG tablet 10/21/2021 Self Yes Yes    Take 5 mg by mouth Daily.    omeprazole (priLOSEC) 40 MG capsule 10/21/2021 Self Yes Yes    Take 40 mg by mouth Daily.    ondansetron ODT (ZOFRAN-ODT) 8 MG disintegrating tablet 10/21/2021 Self Yes Yes    Every 8 (Eight) Hours As Needed.    oxyCODONE-acetaminophen (PERCOCET) 7.5-325 MG per tablet   Yes No    Take 1 tablet by mouth 2 (Two) Times a Day As Needed.    promethazine (PHENERGAN) 12.5 MG tablet 10/21/2021 Self Yes Yes    Take 12.5 mg by mouth 3 (Three) Times a Day As Needed.     tiZANidine (ZANAFLEX) 4 MG tablet 10/21/2021 Self Yes Yes    Take 4 mg by mouth 2 (Two) Times a Day. Takes 1 time a day    venlafaxine (EFFEXOR) 100 MG tablet 10/21/2021 Self Yes Yes    Take 100 mg by mouth Daily.             ED Medications:    Medications   sodium chloride 0.9 % flush 10 mL (has no administration in time range)   lactated ringers infusion 1,000 mL (1,000 mL Intravenous New Bag 10/22/21 1229)       Vital Signs:    Temp:  [98.4 °F (36.9 °C)] 98.4 °F (36.9 °C)  Heart Rate:  [90] 90  Resp:  [18] 18  BP: (150)/(98) 150/98        10/21/21  1016   Weight: 100 kg (221 lb)       Body mass index is 36.78 kg/m².    Physical Exam:      General Appearance:  Alert and cooperative, not in any acute distress.   Head:  Atraumatic and normocephalic, without obvious abnormality.   Eyes:          PERRLA, conjunctivae and sclerae normal, no Icterus. No pallor. Extra-occular movements are within normal limits.   Ears:  Ears appear intact with no abnormalities noted.   Respiratory/Lungs:   Breath sounds heard bilaterally equally.  No crackles or wheezing. No Pleural rub or bronchial breathing. Normal respiratory effort.    Cardiovascular/Heart:  Normal S1 and S2,    GI/Abdomen:   No masses, no hepatosplenomegaly. Soft, non-tender, non-distended, no hernia                 Musculoskeletal/ Extremities:   Moves all extremities well   Skin: No bleeding, bruising or rash, no induration   Psychiatric : Alert and oriented ×3.  No depression or anxiety    Neurologic: Cranial nerves 2 - 12 grossly intact, sensation intact, Motor power is normal and equal bilaterally.       EKG:          Labs:    Lab Results (last 24 hours)     ** No results found for the last 24 hours. **          Radiology:    Imaging Results (Last 72 Hours)     ** No results found for the last 72 hours. **          Assessment:    Vomiting    Plan:     I recommend an EGD to the patient.  The patient understands the procedure and the reason for the procedure.   The patient also understands the risks which include but are not limited to bleeding and perforation and they understand the ramifications of these potential complications including operative intervention and wish to proceed.    Hardik Blancas MD  10/22/21  13:59 EDT

## 2021-10-22 NOTE — ANESTHESIA PREPROCEDURE EVALUATION
Anesthesia Evaluation     Patient summary reviewed and Nursing notes reviewed   no history of anesthetic complications:  NPO Solid Status: > 8 hours  NPO Liquid Status: > 8 hours           Airway   Mallampati: II  TM distance: >3 FB  Neck ROM: full  no difficulty expected  Dental - normal exam     Pulmonary - normal exam   (+) pneumonia resolved , a smoker Current Smoked day of surgery,   Cardiovascular - negative cardio ROS and normal exam    Rhythm: regular  Rate: normal        Neuro/Psych  (+) seizures, psychiatric history Anxiety, Depression and Bipolar,     GI/Hepatic/Renal/Endo    (+)   hepatitis C, liver disease fatty liver disease, renal disease stones,     Musculoskeletal     Abdominal    Substance History - negative use     OB/GYN negative ob/gyn ROS         Other   arthritis,      ROS/Med Hx Other: 17.1/49.6  K 3.5                    Anesthesia Plan    ASA 3     MAC   (Risks and benefits discussed including risk of aspiration, recall and dental damage. All patient questions answered. Will continue with POC.)  intravenous induction     Anesthetic plan, all risks, benefits, and alternatives have been provided, discussed and informed consent has been obtained with: patient.

## 2021-10-22 NOTE — DISCHARGE INSTRUCTIONS
Rest today  No pushing,pulling,tugging,heavy lifting, or strenuous activity   No major decision making,driving,or drinking alcoholic beverages for 24 hours due to the sedation you received  Always use good hand hygiene/washing technique  No driving on pain medication.    -To assist you in voiding:  Drink plenty of fluids  Listen to running water while attempting to void.    If you are unable to urinate and you have an uncomfortable urge to void or it has been   6 hours since you were discharged, return to the Emergency Room.

## 2021-10-27 ENCOUNTER — TRANSCRIBE ORDERS (OUTPATIENT)
Dept: ADMINISTRATIVE | Facility: HOSPITAL | Age: 41
End: 2021-10-27

## 2021-10-27 DIAGNOSIS — C71.9 MALIGNANT NEOPLASM OF BRAIN, UNSPECIFIED LOCATION (HCC): Primary | ICD-10-CM

## 2021-10-27 LAB
LAB AP CASE REPORT: NORMAL
PATH REPORT.FINAL DX SPEC: NORMAL

## 2021-11-17 ENCOUNTER — HOSPITAL ENCOUNTER (OUTPATIENT)
Dept: MRI IMAGING | Facility: HOSPITAL | Age: 41
Discharge: HOME | End: 2021-11-17
Admitting: INTERNAL MEDICINE

## 2021-11-17 DIAGNOSIS — C71.9 MALIGNANT NEOPLASM OF BRAIN, UNSPECIFIED LOCATION (HCC): ICD-10-CM

## 2021-11-17 PROCEDURE — 70551 MRI BRAIN STEM W/O DYE: CPT

## 2021-11-23 ENCOUNTER — TELEPHONE (OUTPATIENT)
Dept: SURGERY | Facility: CLINIC | Age: 41
End: 2021-11-23

## 2021-11-23 NOTE — TELEPHONE ENCOUNTER
----- Message from Hardik Blancas MD sent at 11/19/2021  2:32 PM EST -----  If he is not improved he should follow-up with me.  No explanation on EGD to explain cause of vomiting.  ----- Message -----  From: Adele Davey LPN  Sent: 11/19/2021   1:10 PM EST  To: Hardik Blancas MD    Patient was not seen for follow up after EGD would you like to see him in office or would you rather we just call him with results???

## 2021-11-23 NOTE — TELEPHONE ENCOUNTER
Called patient, he advised that he has had no issue since surgery, he stated that he would follow up if he has any further questions he will follow up with our office if he has any issues.

## 2021-11-29 ENCOUNTER — OFFICE VISIT (OUTPATIENT)
Dept: NEUROLOGY | Facility: CLINIC | Age: 41
End: 2021-11-29

## 2021-11-29 ENCOUNTER — TELEPHONE (OUTPATIENT)
Dept: NEUROLOGY | Facility: CLINIC | Age: 41
End: 2021-11-29

## 2021-11-29 VITALS
SYSTOLIC BLOOD PRESSURE: 140 MMHG | BODY MASS INDEX: 38.79 KG/M2 | HEART RATE: 96 BPM | DIASTOLIC BLOOD PRESSURE: 90 MMHG | OXYGEN SATURATION: 96 % | WEIGHT: 232.8 LBS | TEMPERATURE: 97.3 F | HEIGHT: 65 IN

## 2021-11-29 DIAGNOSIS — G89.29 CHRONIC INTRACTABLE HEADACHE, UNSPECIFIED HEADACHE TYPE: Primary | ICD-10-CM

## 2021-11-29 DIAGNOSIS — Z98.890 HISTORY OF CRANIOTOMY: ICD-10-CM

## 2021-11-29 DIAGNOSIS — R51.9 CHRONIC INTRACTABLE HEADACHE, UNSPECIFIED HEADACHE TYPE: Primary | ICD-10-CM

## 2021-11-29 DIAGNOSIS — F39 MOOD DISORDER (HCC): ICD-10-CM

## 2021-11-29 DIAGNOSIS — G40.909 SEIZURE DISORDER (HCC): ICD-10-CM

## 2021-11-29 PROCEDURE — 99243 OFF/OP CNSLTJ NEW/EST LOW 30: CPT | Performed by: NURSE PRACTITIONER

## 2021-11-29 RX ORDER — PROPRANOLOL HYDROCHLORIDE 20 MG/1
20 TABLET ORAL 2 TIMES DAILY
Qty: 60 TABLET | Refills: 3 | Status: SHIPPED | OUTPATIENT
Start: 2021-11-29 | End: 2022-03-10

## 2021-11-29 RX ORDER — GABAPENTIN 400 MG/1
400 CAPSULE ORAL 2 TIMES DAILY
COMMUNITY
Start: 2021-11-12 | End: 2022-04-18

## 2021-11-29 RX ORDER — TRAZODONE HYDROCHLORIDE 100 MG/1
100 TABLET ORAL
COMMUNITY
Start: 2021-10-25 | End: 2021-12-02 | Stop reason: SDUPTHER

## 2021-11-29 RX ORDER — LEVETIRACETAM 500 MG/1
500 TABLET ORAL 2 TIMES DAILY
Qty: 60 TABLET | Refills: 1 | Status: SHIPPED | OUTPATIENT
Start: 2021-11-29 | End: 2022-05-31 | Stop reason: ALTCHOICE

## 2021-11-29 NOTE — PATIENT INSTRUCTIONS
Migraine Headache  A migraine headache is a very strong throbbing pain on one side or both sides of your head. This type of headache can also cause other symptoms. It can last from 4 hours to 3 days. Talk with your doctor about what things may bring on (trigger) this condition.  What are the causes?  The exact cause of this condition is not known. This condition may be triggered or caused by:  · Drinking alcohol.  · Smoking.  · Taking medicines, such as:  ? Medicine used to treat chest pain (nitroglycerin).  ? Birth control pills.  ? Estrogen.  ? Some blood pressure medicines.  · Eating or drinking certain products.  · Doing physical activity.  Other things that may trigger a migraine headache include:  · Having a menstrual period.  · Pregnancy.  · Hunger.  · Stress.  · Not getting enough sleep or getting too much sleep.  · Weather changes.  · Tiredness (fatigue).  What increases the risk?  · Being 25-55 years old.  · Being female.  · Having a family history of migraine headaches.  · Being .  · Having depression or anxiety.  · Being very overweight.  What are the signs or symptoms?  · A throbbing pain. This pain may:  ? Happen in any area of the head, such as on one side or both sides.  ? Make it hard to do daily activities.  ? Get worse with physical activity.  ? Get worse around bright lights or loud noises.  · Other symptoms may include:  ? Feeling sick to your stomach (nauseous).  ? Vomiting.  ? Dizziness.  ? Being sensitive to bright lights, loud noises, or smells.  · Before you get a migraine headache, you may get warning signs (an aura). An aura may include:  ? Seeing flashing lights or having blind spots.  ? Seeing bright spots, halos, or zigzag lines.  ? Having tunnel vision or blurred vision.  ? Having numbness or a tingling feeling.  ? Having trouble talking.  ? Having weak muscles.  · Some people have symptoms after a migraine headache (postdromal phase), such as:  ? Tiredness.  ? Trouble  thinking (concentrating).  How is this treated?  · Taking medicines that:  ? Relieve pain.  ? Relieve the feeling of being sick to your stomach.  ? Prevent migraine headaches.  · Treatment may also include:  ? Having acupuncture.  ? Avoiding foods that bring on migraine headaches.  ? Learning ways to control your body functions (biofeedback).  ? Therapy to help you know and deal with negative thoughts (cognitive behavioral therapy).  Follow these instructions at home:  Medicines  · Take over-the-counter and prescription medicines only as told by your doctor.  · Ask your doctor if the medicine prescribed to you:  ? Requires you to avoid driving or using heavy machinery.  ? Can cause trouble pooping (constipation). You may need to take these steps to prevent or treat trouble pooping:  § Drink enough fluid to keep your pee (urine) pale yellow.  § Take over-the-counter or prescription medicines.  § Eat foods that are high in fiber. These include beans, whole grains, and fresh fruits and vegetables.  § Limit foods that are high in fat and sugar. These include fried or sweet foods.  Lifestyle  · Do not drink alcohol.  · Do not use any products that contain nicotine or tobacco, such as cigarettes, e-cigarettes, and chewing tobacco. If you need help quitting, ask your doctor.  · Get at least 8 hours of sleep every night.  · Limit and deal with stress.  General instructions         · Keep a journal to find out what may bring on your migraine headaches. For example, write down:  ? What you eat and drink.  ? How much sleep you get.  ? Any change in what you eat or drink.  ? Any change in your medicines.  · If you have a migraine headache:  ? Avoid things that make your symptoms worse, such as bright lights.  ? It may help to lie down in a dark, quiet room.  ? Do not drive or use heavy machinery.  ? Ask your doctor what activities are safe for you.  · Keep all follow-up visits as told by your doctor. This is important.  Contact  a doctor if:  · You get a migraine headache that is different or worse than others you have had.  · You have more than 15 headache days in one month.  Get help right away if:  · Your migraine headache gets very bad.  · Your migraine headache lasts longer than 72 hours.  · You have a fever.  · You have a stiff neck.  · You have trouble seeing.  · Your muscles feel weak or like you cannot control them.  · You start to lose your balance a lot.  · You start to have trouble walking.  · You pass out (faint).  · You have a seizure.  Summary  · A migraine headache is a very strong throbbing pain on one side or both sides of your head. These headaches can also cause other symptoms.  · This condition may be treated with medicines and changes to your lifestyle.  · Keep a journal to find out what may bring on your migraine headaches.  · Contact a doctor if you get a migraine headache that is different or worse than others you have had.  · Contact your doctor if you have more than 15 headache days in a month.  This information is not intended to replace advice given to you by your health care provider. Make sure you discuss any questions you have with your health care provider.  Document Revised: 04/10/2020 Document Reviewed: 01/30/2020  [employer] Patient Education © 2021 [employer]    Sleep Apnea  Sleep apnea affects breathing during sleep. It causes breathing to stop for a short time or to become shallow. It can also increase the risk of:  Heart attack.  Stroke.  Being very overweight (obese).  Diabetes.  Heart failure.  Irregular heartbeat.  The goal of treatment is to help you breathe normally again.  What are the causes?  There are three kinds of sleep apnea:  Obstructive sleep apnea. This is caused by a blocked or collapsed airway.  Central sleep apnea. This happens when the brain does not send the right signals to the muscles that control breathing.  Mixed sleep apnea. This is a combination of obstructive and central  sleep apnea.  The most common cause of this condition is a collapsed or blocked airway. This can happen if:  Your throat muscles are too relaxed.  Your tongue and tonsils are too large.  You are overweight.  Your airway is too small.  What increases the risk?  Being overweight.  Smoking.  Having a small airway.  Being older.  Being male.  Drinking alcohol.  Taking medicines to calm yourself (sedatives or tranquilizers).  Having family members with the condition.  What are the signs or symptoms?  Trouble staying asleep.  Being sleepy or tired during the day.  Getting angry a lot.  Loud snoring.  Headaches in the morning.  Not being able to focus your mind (concentrate).  Forgetting things.  Less interest in sex.  Mood swings.  Personality changes.  Feelings of sadness (depression).  Waking up a lot during the night to pee (urinate).  Dry mouth.  Sore throat.  How is this diagnosed?  Your medical history.  A physical exam.  A test that is done when you are sleeping (sleep study). The test is most often done in a sleep lab but may also be done at home.  How is this treated?    Sleeping on your side.  Using a medicine to get rid of mucus in your nose (decongestant).  Avoiding the use of alcohol, medicines to help you relax, or certain pain medicines (narcotics).  Losing weight, if needed.  Changing your diet.  Not smoking.  Using a machine to open your airway while you sleep, such as:  An oral appliance. This is a mouthpiece that shifts your lower jaw forward.  A CPAP device. This device blows air through a mask when you breathe out (exhale).  An EPAP device. This has valves that you put in each nostril.  A BPAP device. This device blows air through a mask when you breathe in (inhale) and breathe out.  Having surgery if other treatments do not work.  It is important to get treatment for sleep apnea. Without treatment, it can lead to:  High blood pressure.  Coronary artery disease.  In men, not being able to have an  erection (impotence).  Reduced thinking ability.  Follow these instructions at home:  Lifestyle  Make changes that your doctor recommends.  Eat a healthy diet.  Lose weight if needed.  Avoid alcohol, medicines to help you relax, and some pain medicines.  Do not use any products that contain nicotine or tobacco, such as cigarettes, e-cigarettes, and chewing tobacco. If you need help quitting, ask your doctor.  General instructions  Take over-the-counter and prescription medicines only as told by your doctor.  If you were given a machine to use while you sleep, use it only as told by your doctor.  If you are having surgery, make sure to tell your doctor you have sleep apnea. You may need to bring your device with you.  Keep all follow-up visits as told by your doctor. This is important.  Contact a doctor if:  The machine that you were given to use during sleep bothers you or does not seem to be working.  You do not get better.  You get worse.  Get help right away if:  Your chest hurts.  You have trouble breathing in enough air.  You have an uncomfortable feeling in your back, arms, or stomach.  You have trouble talking.  One side of your body feels weak.  A part of your face is hanging down.  These symptoms may be an emergency. Do not wait to see if the symptoms will go away. Get medical help right away. Call your local emergency services (911 in the [place] Do not drive yourself to the hospital.  Summary  This condition affects breathing during sleep.  The most common cause is a collapsed or blocked airway.  The goal of treatment is to help you breathe normally while you sleep.  This information is not intended to replace advice given to you by your health care provider. Make sure you discuss any questions you have with your health care provider.  Document Revised: 10/04/2019 Document Reviewed: 08/13/2019  [employer] Patient Education © 2021 [employer]  Propranolol Tablets  What is this medicine?  PROPRANOLOL (proe  PRAN oh freddyle) is a beta blocker. It decreases the amount of work your heart has to do and helps your heart beat regularly. It treats high blood pressure and/or prevent chest pain (also called angina). It is also used after a heart attack to prevent a second one.  This medicine may be used for other purposes; ask your health care provider or pharmacist if you have questions.  COMMON BRAND NAME(S): Inderal  What should I tell my health care provider before I take this medicine?  They need to know if you have any of these conditions:  · circulation problems or blood vessel disease  · diabetes  · history of heart attack or heart disease, vasospastic angina  · kidney disease  · liver disease  · lung or breathing disease, like asthma or emphysema  · pheochromocytoma  · slow heart rate  · thyroid disease  · an unusual or allergic reaction to propranolol, other beta-blockers, medicines, foods, dyes, or preservatives  · pregnant or trying to get pregnant  · breast-feeding  How should I use this medicine?  Take this drug by mouth. Take it as directed on the prescription label at the same time every day. Keep taking it unless your health care provider tells you to stop.  Talk to your health care provider about the use of this drug in children. Special care may be needed.  Overdosage: If you think you have taken too much of this medicine contact a poison control center or emergency room at once.  NOTE: This medicine is only for you. Do not share this medicine with others.  What if I miss a dose?  If you miss a dose, take it as soon as you can. If it is almost time for your next dose, take only that dose. Do not take double or extra doses.  What may interact with this medicine?  Do not take this medicine with any of the following medications:  · feverfew  · phenothiazines like chlorpromazine, mesoridazine, prochlorperazine, thioridazine  This medicine may also interact with the following medications:  · aluminum hydroxide  gel  · antipyrine  · antiviral medicines for HIV or AIDS  · barbiturates like phenobarbital  · certain medicines for blood pressure, heart disease, irregular heart beat  · cimetidine  · ciprofloxacin  · diazepam  · fluconazole  · haloperidol  · isoniazid  · medicines for cholesterol like cholestyramine or colestipol  · medicines for mental depression  · medicines for migraine headache like almotriptan, eletriptan, frovatriptan, naratriptan, rizatriptan, sumatriptan, zolmitriptan  · NSAIDs, medicines for pain and inflammation, like ibuprofen or naproxen  · phenytoin  · rifampin  · teniposide  · theophylline  · thyroid medicines  · tolbutamide  · warfarin  · zileuton  This list may not describe all possible interactions. Give your health care provider a list of all the medicines, herbs, non-prescription drugs, or dietary supplements you use. Also tell them if you smoke, drink alcohol, or use illegal drugs. Some items may interact with your medicine.  What should I watch for while using this medicine?  Visit your doctor or health care professional for regular check ups. Check your blood pressure and pulse rate regularly. Ask your health care professional what your blood pressure and pulse rate should be, and when you should contact them.  You may get drowsy or dizzy. Do not drive, use machinery, or do anything that needs mental alertness until you know how this drug affects you. Do not stand or sit up quickly, especially if you are an older patient. This reduces the risk of dizzy or fainting spells. Alcohol can make you more drowsy and dizzy. Avoid alcoholic drinks.  This medicine may increase blood sugar. Ask your healthcare provider if changes in diet or medicines are needed if you have diabetes.  Do not treat yourself for coughs, colds, or pain while you are taking this medicine without asking your doctor or health care professional for advice. Some ingredients may increase your blood pressure.  What side effects  may I notice from receiving this medicine?  Side effects that you should report to your doctor or health care professional as soon as possible:  · allergic reactions like skin rash, itching or hives, swelling of the face, lips, or tongue  · breathing problems  · cold hands or feet  · difficulty sleeping, nightmares  · dry peeling skin  · hallucinations  · muscle cramps or weakness  · signs and symptoms of high blood sugar such as being more thirsty or hungry or having to urinate more than normal. You may also feel very tired or have blurry vision.  · slow heart rate  · swelling of the legs and ankles  · vomiting  Side effects that usually do not require medical attention (report to your doctor or health care professional if they continue or are bothersome):  · change in sex drive or performance  · diarrhea  · dry sore eyes  · hair loss  · nausea  · weak or tired  This list may not describe all possible side effects. Call your doctor for medical advice about side effects. You may report side effects to FDA at 1-881-EJN-2919.  Where should I keep my medicine?  Keep out of the reach of children and pets.  Store at room temperature between 20 and 25 degrees C (68 and 77 degrees F). Protect from light. Throw away any unused drug after the expiration date.  NOTE: This sheet is a summary. It may not cover all possible information. If you have questions about this medicine, talk to your doctor, pharmacist, or health care provider.  © 2021 [employer] (2020-07-24 19:25:51)

## 2021-11-29 NOTE — TELEPHONE ENCOUNTER
Provider: SARAH LANDA  Caller: PATIENT  Relationship to Patient: SELF  Pharmacy: NA  Phone Number: [phone]  Reason for Call: PATIENT IS ASKING IF HE WILL BE SEDATED FOR HIS EEG. HE IS ALSO ASKING IF A REFERRAL CAN BE PLACED TO  NEUROSURGERY AS THAT IS WHO HE USED TO SEE AND HE HAS MOVED TO [place]. PLEASE ADVISE.   When was the patient last seen: TODAY

## 2021-11-29 NOTE — PROGRESS NOTES
"     New Patient Office Visit      Patient Name: Aroldo Cai  : 1980   MRN: [id number]     Referring Physician: Gerald Dobson MD    Chief Complaint:    Chief Complaint   Patient presents with   • Consult     NP, in office to establish care for headaches.        History of Present Illness: Aroldo Cai is a 41 y.o. male who is here today to establish care with Neurology for abnormal MRI of the brain.  However, he mentions he is \"still having seizures\" despite being on medication and is having a daily headache.    Daily headaches- Frontal area.  Described as aching; accompanied by vomiting at times.  He is taking Iburofen and Tylenol PRN and feels they don't help.  He has never taken a preventative medication for his headaches.  He does not know when he last saw neurosurgery but was seen at  as a child.  Denies blurry vision.  Denies any severe, intractable headache.   Seizure disorder-   Taking Keppra 500mg daily and reports good toleration and compliance-has been on this medication for at least the past 5 years.  He feels he is having frequent seizures-wakes up during the night very disoriented, sweating, confused, and has an urge to urinate.  He denies any tongue bites but has had urine incontinence a couple of times.  He says he was diagnosed with seizure disorder after his brain surgery in .  Additional risk factors- BMI 38, history of craniotomy for AVM repair at 13 years old (), mood disorder, GERD, seizure disorder, taking Gabapentin for \"hip pain\"-says he is having hip surgery in 2022.     *MRI brain without contrast on 2021 showed area of encephalomalacia in the right frontal lobe with no acute intracranial abnormality.   *He has an appointment later this week with [facility] psychiatry.  He was recently released from [facility] and is having a lot of anxiety about integrating back into society.     Subjective      Review of Systems:   Review of Systems "   Constitutional: Negative for fatigue, fever, unexpected weight gain and unexpected weight loss.   HENT: Negative for hearing loss, sore throat, swollen glands, tinnitus and trouble swallowing.    Eyes: Negative for blurred vision, double vision, photophobia and visual disturbance.   Respiratory: Negative for cough, chest tightness and shortness of breath.    Cardiovascular: Negative for chest pain, palpitations and leg swelling.   Gastrointestinal: Negative for constipation, diarrhea and nausea.   Endocrine: Negative for cold intolerance and heat intolerance.   Musculoskeletal: Negative for gait problem, neck pain and neck stiffness.   Skin: Negative for color change and rash.   Allergic/Immunologic: Negative for environmental allergies and food allergies.   Neurological: Positive for seizures and headache. Negative for dizziness, syncope, facial asymmetry, speech difficulty, weakness, memory problem and confusion.   Psychiatric/Behavioral: Negative for agitation, behavioral problems and depressed mood. The patient is nervous/anxious.        Past Medical History:   Past Medical History:   Diagnosis Date   • Anesthesia complication     pt reports he is hard to wake after anesthesia   • Anxiety    • Arthritis    • Bipolar 1 disorder (HCC)    • COVID-19 02/2021   • Depression    • Dysphagia     food and liquids.   • Extensive tattoos    • Gout    • Hepatitis C 2018    Patient took medication    • History of echocardiogram    • Kidney stones    • Pneumonia    • PTSD (post-traumatic stress disorder)    • Seizure (HCC)     last seizure years ago.   • Seizures (HCC)    • Short-term memory loss    • Wears contact lenses    • Wears glasses        Past Surgical History:   Past Surgical History:   Procedure Laterality Date   • ABDOMINAL SURGERY     • BRAIN SURGERY  1994    craniotomy,  right frontal lobe AVM   • CHOLECYSTECTOMY WITH INTRAOPERATIVE CHOLANGIOGRAM N/A 7/27/2021    Procedure: CHOLECYSTECTOMY LAPAROSCOPIC  INTRAOPERATIVE CHOLANGIOGRAPHY;  Surgeon: Hardik Blancas MD;  Location: [facility] OR;  Service: General;  Laterality: N/A;   • COLONOSCOPY     • ENDOSCOPY N/A 10/22/2021    Procedure: ESOPHAGOGASTRODUODENOSCOPY WITH BIOPSY;  Surgeon: Hardik Blancas MD;  Location: [facility] ENDOSCOPY;  Service: Gastroenterology;  Laterality: N/A;   • HERNIA REPAIR Right     inguinal   • HIP SURGERY Bilateral        Family History:   Family History   Problem Relation Age of Onset   • Diabetes Mother    • Diabetes insipidus Mother    • Heart disease Mother    • Cancer Father         skin   • Diabetes Father    • Hypertension Father    • Arthritis Father    • Diabetes insipidus Father        Social History:   Social History     Socioeconomic History   • Marital status:    Tobacco Use   • Smoking status: Current Every Day Smoker     Packs/day: 1.00     Years: 30.00     Pack years: 30.00     Types: Cigarettes   • Smokeless tobacco: Never Used   Vaping Use   • Vaping Use: Never used   Substance and Sexual Activity   • Alcohol use: Never   • Drug use: Not Currently     Types: Cocaine(coke)     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   • Sexual activity: Defer       Medications:     Current Outpatient Medications:   •  gabapentin (NEURONTIN) 400 MG capsule, , Disp: , Rfl:   •  ibuprofen (ADVIL,MOTRIN) 800 MG tablet, Take 1 tablet by mouth Every 6 (Six) Hours As Needed for Moderate Pain ., Disp: 20 tablet, Rfl: 0  •  levETIRAcetam (KEPPRA) 500 MG tablet, Take 1 tablet by mouth 2 (Two) Times a Day., Disp: 60 tablet, Rfl: 1  •  metoclopramide (REGLAN) 10 MG tablet, Take 10 mg by mouth 3 (Three) Times a Day As Needed., Disp: , Rfl:   •  OLANZapine (zyPREXA) 5 MG tablet, Take 5 mg by mouth Daily., Disp: , Rfl:   •  omeprazole (priLOSEC) 40 MG capsule, Take 40 mg by mouth Daily., Disp: , Rfl:   •  ondansetron ODT (ZOFRAN-ODT) 8 MG disintegrating tablet, Every 8 (Eight) Hours As Needed., Disp: , Rfl:   •  promethazine (PHENERGAN) 12.5  "MG tablet, Take 12.5 mg by mouth 3 (Three) Times a Day As Needed., Disp: , Rfl:   •  traZODone (DESYREL) 100 MG tablet, 100 mg., Disp: , Rfl:   •  venlafaxine (EFFEXOR) 100 MG tablet, Take 100 mg by mouth Daily., Disp: , Rfl:   •  propranolol (INDERAL) 20 MG tablet, Take 1 tablet by mouth 2 (Two) Times a Day., Disp: 60 tablet, Rfl: 3    Allergies:   Allergies   Allergen Reactions   • Dilantin [Phenytoin] Seizure   • Penicillins Anaphylaxis   • Tegretol [Carbamazepine] Seizure   • Latex Rash       Objective     Physical Exam:  Vital Signs:   Vitals:    11/29/21 1005   BP: 140/90   BP Location: Left arm   Patient Position: Sitting   Cuff Size: Adult   Pulse: 96   Temp: 97.3 °F (36.3 °C)   SpO2: 96%   Weight: 106 kg (232 lb 12.8 oz)   Height: 165.1 cm (65\")   PainSc: 0-No pain     Body mass index is 38.74 kg/m².     Neck circumference- 21 inches    Physical Exam  Vitals and nursing note reviewed.   Constitutional:       General: He is not in acute distress.     Appearance: He is well-developed. He is obese. He is not diaphoretic.   HENT:      Head: Normocephalic and atraumatic.      Comments: Mallampati 4  Eyes:      Conjunctiva/sclera: Conjunctivae normal.      Pupils: Pupils are equal, round, and reactive to light.   Cardiovascular:      Rate and Rhythm: Normal rate and regular rhythm.      Heart sounds: Normal heart sounds. No murmur heard.  No friction rub. No gallop.    Pulmonary:      Effort: Pulmonary effort is normal. No respiratory distress.      Breath sounds: Normal breath sounds. No wheezing or rales.   Musculoskeletal:         General: Normal range of motion.   Skin:     General: Skin is warm and dry.      Findings: No rash.   Neurological:      General: No focal deficit present.      Mental Status: He is alert and oriented to person, place, and time.      Coordination: Finger-Nose-Finger Test normal.      Gait: Gait is intact. Tandem walk normal.   Psychiatric:         Mood and Affect: Mood normal.        "  Speech: Speech normal.         Behavior: Behavior normal.         Thought Content: Thought content normal.         Judgment: Judgment normal.         Neurologic Exam     Mental Status   Oriented to person, place, and time.   Attention: normal. Concentration: normal.   Speech: speech is normal   Level of consciousness: alert  Knowledge: good.     Cranial Nerves   Cranial nerves II through XII intact.     CN II   Visual fields full to confrontation.     CN III, IV, VI   Pupils are equal, round, and reactive to light.  Right pupil: Size: 3 mm. Shape: regular. Reactivity: brisk.   Left pupil: Size: 3 mm. Shape: regular. Reactivity: brisk.   CN III: no CN III palsy  CN VI: no CN VI palsy  Nystagmus: none     CN V   Facial sensation intact.     CN VII   Facial expression full, symmetric.     CN VIII   CN VIII normal.     CN IX, X   CN IX normal.   CN X normal.     CN XI   CN XI normal.     CN XII   CN XII normal.     Motor Exam   Muscle bulk: normal  Overall muscle tone: normal    Strength   Right biceps: 5/5  Left biceps: 5/5  Right triceps: 5/5  Left triceps: 5/5  Right quadriceps: 5/5  Left quadriceps: 5/5  Right hamstrin/5  Left hamstrin/5    Sensory Exam   Light touch normal.     Gait, Coordination, and Reflexes     Gait  Gait: normal    Coordination   Finger to nose coordination: normal  Tandem walking coordination: normal    Tremor   Resting tremor: absent  Intention tremor: absent  Action tremor: absent    Reflexes   Reflexes 2+ except as noted.       Assessment / Plan      Assessment/Plan:   Diagnoses and all orders for this visit:    1. Chronic intractable headache, unspecified headache type (Primary)  -     propranolol (INDERAL) 20 MG tablet; Take 1 tablet by mouth 2 (Two) Times a Day.  Dispense: 60 tablet; Refill: 3    2. Seizure disorder (HCC)  -     levETIRAcetam (KEPPRA) 500 MG tablet; Take 1 tablet by mouth 2 (Two) Times a Day.  Dispense: 60 tablet; Refill: 1  -     Ambulatory Referral to  Neurology  -     EEG; Future    3. History of craniotomy  -     Ambulatory Referral to Neurosurgery  -     levETIRAcetam (KEPPRA) 500 MG tablet; Take 1 tablet by mouth 2 (Two) Times a Day.  Dispense: 60 tablet; Refill: 1  -     Ambulatory Referral to Neurology    4. Mood disorder (HCC)    5. BMI 38.0-38.9,adult    *Patient requests [facility] referral.   *Indications and possible SEs of Propranolol discussed.   *Advised patient per [place] laws no driving for 90 days following a seizure.   *Education on migraines, MARTHA and seizures provided today. I have advised patient that he may need a sleep study to evaluate for MARTHA, as he is at increased risk due to large neck circumference, Mallampati 4 and obesity.     Follow Up:   Return in 2 months (on 1/29/2022) for Recheck. Will be following up with [facility].     PRISCILLA Garza, FNP-C  [facility]       Please note that portions of this note may have been completed with a voice recognition program. Efforts were made to edit the dictations, but occasionally words are mistranscribed.

## 2021-11-30 NOTE — TELEPHONE ENCOUNTER
CALLED PT, DR LAYTON IS NO LONGER IN [place]. HE IS GOING TO CHECK AND SEE IF THERE WAS ANYBODY ELSE THERE HE WANTS TO SEE.

## 2021-12-02 ENCOUNTER — OFFICE VISIT (OUTPATIENT)
Dept: PSYCHIATRY | Facility: CLINIC | Age: 41
End: 2021-12-02

## 2021-12-02 VITALS
WEIGHT: 228 LBS | SYSTOLIC BLOOD PRESSURE: 138 MMHG | HEIGHT: 65 IN | HEART RATE: 94 BPM | DIASTOLIC BLOOD PRESSURE: 88 MMHG | BODY MASS INDEX: 37.99 KG/M2

## 2021-12-02 DIAGNOSIS — F31.81 BIPOLAR II DISORDER (HCC): Primary | Chronic | ICD-10-CM

## 2021-12-02 DIAGNOSIS — F41.1 GENERALIZED ANXIETY DISORDER: Chronic | ICD-10-CM

## 2021-12-02 DIAGNOSIS — G47.00 INSOMNIA, UNSPECIFIED TYPE: Chronic | ICD-10-CM

## 2021-12-02 PROCEDURE — 90792 PSYCH DIAG EVAL W/MED SRVCS: CPT | Performed by: NURSE PRACTITIONER

## 2021-12-02 RX ORDER — TRAZODONE HYDROCHLORIDE 100 MG/1
100 TABLET ORAL NIGHTLY
Qty: 30 TABLET | Refills: 2 | Status: SHIPPED | OUTPATIENT
Start: 2021-12-02 | End: 2021-12-30

## 2021-12-02 RX ORDER — VENLAFAXINE 75 MG/1
75 TABLET ORAL 2 TIMES DAILY
Qty: 60 TABLET | Refills: 2 | Status: SHIPPED | OUTPATIENT
Start: 2021-12-02 | End: 2022-03-17 | Stop reason: SDUPTHER

## 2021-12-02 RX ORDER — OLANZAPINE 7.5 MG/1
7.5 TABLET ORAL DAILY
Qty: 30 TABLET | Refills: 2 | Status: SHIPPED | OUTPATIENT
Start: 2021-12-02 | End: 2022-03-17 | Stop reason: SDUPTHER
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
## 2021-12-10 ENCOUNTER — HOSPITAL ENCOUNTER (EMERGENCY)
Facility: HOSPITAL | Age: 41
Discharge: HOME | End: 2021-12-10
Attending: EMERGENCY MEDICINE | Admitting: FAMILY MEDICINE

## 2021-12-10 VITALS
HEART RATE: 98 BPM | HEIGHT: 65 IN | SYSTOLIC BLOOD PRESSURE: 141 MMHG | TEMPERATURE: 98.7 F | WEIGHT: 231 LBS | OXYGEN SATURATION: 95 % | DIASTOLIC BLOOD PRESSURE: 82 MMHG | BODY MASS INDEX: 38.49 KG/M2 | RESPIRATION RATE: 16 BRPM

## 2021-12-10 DIAGNOSIS — T50.901A ACCIDENTAL DRUG OVERDOSE, INITIAL ENCOUNTER: Primary | ICD-10-CM

## 2021-12-10 LAB
AMPHET+METHAMPHET UR QL: NEGATIVE
AMPHETAMINES UR QL: NEGATIVE
BARBITURATES UR QL SCN: NEGATIVE
BENZODIAZ UR QL SCN: NEGATIVE
BUPRENORPHINE SERPL-MCNC: NEGATIVE NG/ML
CANNABINOIDS SERPL QL: NEGATIVE
COCAINE UR QL: NEGATIVE
METHADONE UR QL SCN: NEGATIVE
OPIATES UR QL: POSITIVE
OXYCODONE UR QL SCN: NEGATIVE
PCP UR QL SCN: NEGATIVE
PROPOXYPH UR QL: NEGATIVE
TRICYCLICS UR QL SCN: NEGATIVE

## 2021-12-10 PROCEDURE — 99283 EMERGENCY DEPT VISIT LOW MDM: CPT

## 2021-12-10 PROCEDURE — 80306 DRUG TEST PRSMV INSTRMNT: CPT | Performed by: PHYSICIAN ASSISTANT

## 2021-12-10 PROCEDURE — 99284 EMERGENCY DEPT VISIT MOD MDM: CPT

## 2021-12-11 NOTE — ED PROVIDER NOTES
"Subjective   History of Present Illness   Patient is a 41-year-old male presenting to the ER with complaints of syncopal episode at [facility].  EMS reports that the patient was sitting down with his eyes rolled in the back of his head when they got to [facility].  They state that they got him in the ambulance and gave Narcan and the patient responded.  The patient denies alcohol and drug use today.  However, he states that he does still struggle with drug addiction.  He admits to relapses and when asked again if he did any drugs today, he states \"not that I remember\".  Patient denies any symptoms currently.  He states he is ready to be discharged.      Review of Systems   Neurological: Positive for syncope.   All other systems reviewed and are negative.      Past Medical History:   Diagnosis Date   • Anesthesia complication     pt reports he is hard to wake after anesthesia   • Anxiety    • Arthritis    • Bipolar 1 disorder (HCC)    • COVID-19 02/2021   • Depression    • Dysphagia     food and liquids.   • Extensive tattoos    • Gout    • Hepatitis C 2018    Patient took medication    • History of echocardiogram    • Kidney stones    • Pneumonia    • PTSD (post-traumatic stress disorder)    • Seizure (HCC)     last seizure years ago.   • Seizures (HCC)    • Short-term memory loss    • Wears contact lenses    • Wears glasses        Allergies   Allergen Reactions   • Dilantin [Phenytoin] Seizure   • Penicillins Anaphylaxis   • Tegretol [Carbamazepine] Seizure   • Phenobarbital Seizure   • Latex Rash       Past Surgical History:   Procedure Laterality Date   • ABDOMINAL SURGERY     • BRAIN SURGERY  1994    craniotomy,  right frontal lobe AVM   • CHOLECYSTECTOMY WITH INTRAOPERATIVE CHOLANGIOGRAM N/A 7/27/2021    Procedure: CHOLECYSTECTOMY LAPAROSCOPIC INTRAOPERATIVE CHOLANGIOGRAPHY;  Surgeon: Hardik Blancas MD;  Location: [facility];  Service: General;  Laterality: N/A;   • COLONOSCOPY     • ENDOSCOPY N/A 10/22/2021 "    Procedure: ESOPHAGOGASTRODUODENOSCOPY WITH BIOPSY;  Surgeon: Hardik Blancas MD;  Location: [facility] ENDOSCOPY;  Service: Gastroenterology;  Laterality: N/A;   • HERNIA REPAIR Right     inguinal   • HIP SURGERY Bilateral        Family History   Problem Relation Age of Onset   • Diabetes Mother    • Diabetes insipidus Mother    • Heart disease Mother    • Cancer Father         skin   • Diabetes Father    • Hypertension Father    • Arthritis Father    • Diabetes insipidus Father        Social History     Socioeconomic History   • Marital status:    Tobacco Use   • Smoking status: Current Every Day Smoker     Packs/day: 1.00     Years: 30.00     Pack years: 30.00     Types: Cigarettes   • Smokeless tobacco: Never Used   Vaping Use   • Vaping Use: Never used   Substance and Sexual Activity   • Alcohol use: Never   • Drug use: Not Currently     Types: Cocaine(coke)     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   • Sexual activity: Defer           Objective   Physical Exam  Vitals and nursing note reviewed.   Constitutional:       General: He is not in acute distress.     Appearance: He is not toxic-appearing.   HENT:      Head: Normocephalic and atraumatic.      Right Ear: External ear normal.      Left Ear: External ear normal.      Nose: Nose normal.   Eyes:      Extraocular Movements: Extraocular movements intact.      Conjunctiva/sclera: Conjunctivae normal.   Cardiovascular:      Rate and Rhythm: Normal rate.      Heart sounds: Normal heart sounds.   Pulmonary:      Effort: Pulmonary effort is normal.      Breath sounds: Normal breath sounds.   Abdominal:      General: There is no distension.      Palpations: Abdomen is soft.   Musculoskeletal:         General: Normal range of motion.      Cervical back: Normal range of motion and neck supple.   Skin:     General: Skin is warm and dry.   Neurological:      General: No focal deficit present.      Mental Status: He is alert and oriented to person, place,  and time.   Psychiatric:         Mood and Affect: Mood normal.         Behavior: Behavior normal.         Procedures           ED Course                                                 MDM   Patient was evaluated in the ER after a syncopal episode at [facility]. Patient reports he does not remember using opiates but EMS gave the patient Narcan and he responded, became alert and oriented x3.  Upon arrival to the ER, the patient is walking around the room, no focal neurologic deficits on exam. Patient is tolerating oral intake without any difficulty.  He states he is ready for discharge.  He states he feels fine and denies any symptoms.  Patient was advised to follow-up with his PCP for further evaluation.  Precautions were given for return to the ER for any new or worsening symptoms.    Final diagnoses:   Accidental drug overdose, initial encounter       ED Disposition  ED Disposition     ED Disposition Condition Comment    Discharge Stable           Gerald Dobson MD  [address] [place] [address] [place] [id number]  [phone]    Schedule an appointment as soon as possible for a visit       [facility] Emergency Department  [address] [address] [phone]  Go to   As needed, If symptoms worsen         Medication List      No changes were made to your prescriptions during this visit.          Debby Osborne PA-C  12/10/21 2004

## 2021-12-11 NOTE — ED NOTES
PT received discharge instructions and verbalized understanding; Breathing even and non labored with no signs of distress; AOx4; GCS 15; Pt ambulated off unit with steady gait to meet friend\ride.     Jemima Cabrera RN  12/10/21 2008

## 2021-12-14 ENCOUNTER — HOSPITAL ENCOUNTER (OUTPATIENT)
Dept: SLEEP MEDICINE | Facility: HOSPITAL | Age: 41
Discharge: HOME | End: 2021-12-14
Admitting: NURSE PRACTITIONER

## 2021-12-14 DIAGNOSIS — G40.909 SEIZURE DISORDER (HCC): ICD-10-CM

## 2021-12-14 PROCEDURE — 95816 EEG AWAKE AND DROWSY: CPT

## 2021-12-30 ENCOUNTER — OFFICE VISIT (OUTPATIENT)
Dept: PSYCHIATRY | Facility: CLINIC | Age: 41
End: 2021-12-30

## 2021-12-30 VITALS
HEART RATE: 84 BPM | WEIGHT: 231 LBS | BODY MASS INDEX: 38.49 KG/M2 | SYSTOLIC BLOOD PRESSURE: 132 MMHG | HEIGHT: 65 IN | DIASTOLIC BLOOD PRESSURE: 78 MMHG

## 2021-12-30 DIAGNOSIS — F31.81 BIPOLAR II DISORDER (HCC): Primary | Chronic | ICD-10-CM

## 2021-12-30 DIAGNOSIS — F41.1 GENERALIZED ANXIETY DISORDER: Chronic | ICD-10-CM

## 2021-12-30 DIAGNOSIS — G47.00 INSOMNIA, UNSPECIFIED TYPE: Chronic | ICD-10-CM

## 2021-12-30 PROCEDURE — 99214 OFFICE O/P EST MOD 30 MIN: CPT | Performed by: NURSE PRACTITIONER

## 2021-12-30 RX ORDER — TRAZODONE HYDROCHLORIDE 100 MG/1
200 TABLET ORAL NIGHTLY
Qty: 60 TABLET | Refills: 2 | Status: SHIPPED | OUTPATIENT
Start: 2021-12-30 | End: 2022-03-17 | Stop reason: SDUPTHER

## 2021-12-30 RX ORDER — VENLAFAXINE 50 MG/1
50 TABLET ORAL 2 TIMES DAILY
Qty: 60 TABLET | Refills: 2 | Status: SHIPPED | OUTPATIENT
Start: 2021-12-30 | End: 2022-03-17 | Stop reason: SDUPTHER

## 2021-12-30 NOTE — PROGRESS NOTES
"Chief Complaint  Anxiety, Depression, and Sleeping Problem    Subjective          Aroldo Cai presents to [facility] for medication management of his anxiety, depression, sleeping difficulties.    History of Present Illness: Patient presents today for follow-up appointment for last being seen for initial evaluation on 12/02/2021.  Patient reports he is still struggling, especially with his anxiety anytime he has to be around people.  Patient reports he had family around town for the holidays, but still struggled immensely with his anxiety.  \"And it is just my family, people I know.  But it was so hard\".  In addition to continuing struggles with his anxiety, patient endorses continued depressive symptoms.  He has little to no motivation and energy.  He is also still not sleeping well.  Patient reports the trazodone 100 mg has not helped at all with his sleep.  Patient denies any new or significant issues with appetite.  Patient denies any SI/HI, A/V hallucinations.    Current Medications:   Current Outpatient Medications   Medication Sig Dispense Refill   • gabapentin (NEURONTIN) 400 MG capsule      • levETIRAcetam (KEPPRA) 500 MG tablet Take 1 tablet by mouth 2 (Two) Times a Day. 60 tablet 1   • metoclopramide (REGLAN) 10 MG tablet Take 10 mg by mouth 3 (Three) Times a Day As Needed.     • OLANZapine (zyPREXA) 7.5 MG tablet Take 1 tablet by mouth Daily. 30 tablet 2   • omeprazole (priLOSEC) 40 MG capsule Take 40 mg by mouth Daily.     • ondansetron ODT (ZOFRAN-ODT) 8 MG disintegrating tablet Every 8 (Eight) Hours As Needed.     • promethazine (PHENERGAN) 12.5 MG tablet Take 12.5 mg by mouth 3 (Three) Times a Day As Needed.     • propranolol (INDERAL) 20 MG tablet Take 1 tablet by mouth 2 (Two) Times a Day. 60 tablet 3   • traZODone (DESYREL) 100 MG tablet Take 2 tablets by mouth Every Night. 60 tablet 2   • venlafaxine (EFFEXOR) 75 MG tablet Take 1 tablet by mouth 2 (Two) " "Times a Day. 60 tablet 2   • venlafaxine (EFFEXOR) 50 MG tablet Take 1 tablet by mouth 2 (Two) Times a Day. 60 tablet 2     No current facility-administered medications for this visit.       Mental Status Exam:   Hygiene:   good  Cooperation:  Cooperative  Eye Contact:  Good  Psychomotor Behavior:  Restless  Affect:  Appropriate  Mood: anxious  Speech:  Normal  Thought Process:  Goal directed  Thought Content:  Mood congruent  Suicidal:  None  Homicidal:  None  Hallucinations:  None  Delusion:  None  Memory:  Intact  Orientation:  Person, Place, Time and Situation  Reliability:  fair  Insight:  Fair  Judgement:  Fair  Impulse Control:  Fair  Physical/Medical Issues:  Yes Chronic hip pain.       Objective   Vital Signs:   /78   Pulse 84   Ht 165.1 cm (65\")   Wt 105 kg (231 lb)   BMI 38.44 kg/m²     Physical Exam  Neurological:      Mental Status: He is oriented to person, place, and time. Mental status is at baseline.      Coordination: Coordination is intact.      Gait: Gait is intact.   Psychiatric:         Behavior: Behavior is cooperative.        Result Review :     The following data was reviewed by: PRISCILLA Lawson on 12/30/2021:    Data reviewed: Previous note, medication history          Assessment and Plan    Problem List Items Addressed This Visit     None      Visit Diagnoses     Bipolar II disorder (HCC)  (Chronic)   -  Primary    Relevant Medications    traZODone (DESYREL) 100 MG tablet    venlafaxine (EFFEXOR) 50 MG tablet    Generalized anxiety disorder  (Chronic)       Relevant Medications    traZODone (DESYREL) 100 MG tablet    venlafaxine (EFFEXOR) 50 MG tablet    Insomnia, unspecified type  (Chronic)       Relevant Medications    traZODone (DESYREL) 100 MG tablet          PHQ-9 Score:   PHQ-9 Total Score: 21    Depression Screening:  Patient screened positive for depression based on a PHQ-9 score of 21 on 12/30/2021. Follow-up recommendations include: Prescribed antidepressant " "medication treatment and Suicide Risk Assessment performed.      Tobacco Cessation:  Aroldo Cai  reports that he has been smoking cigarettes. He has a 15.00 pack-year smoking history. He has never used smokeless tobacco.. I have educated him on the risk of diseases from using tobacco products such as cancer, COPD and heart disease.     I advised him to quit and he is not willing to quit.    I spent 3  minutes counseling the patient.      Impression/Plan:  -This my first follow-up appoint with patient.  Patient presents today reports he is continued to struggle with his mood, anxiety, and his sleep.  Patient reports he has not been able to tell a significant difference in his ability to manage his anxiety after his Effexor was increased at his last appointment.  Patient also reports he is continued to struggle heavily with sleep.  He says the 100 mg of trazodone has not helped in any significant way.  Discussed possibility of trying mirtazapine, the patient reports he had this when in prison,\" that caused me to get put in the hole.  I do want any part of that medication\".  Patient denies any adverse effects associated with current medication regimen.  -Maintain Zyprexa 7.5 mg daily.  -Increase trazodone to 200 mg nightly.  -Increase Effexor to 125 mg twice daily.  -Encourage patient to make sure he goes to upcoming therapy appointment on 01/20/2021.  -Schedule follow-up for 1 month or as needed.    MEDS ORDERED DURING VISIT:  New Medications Ordered This Visit   Medications   • traZODone (DESYREL) 100 MG tablet     Sig: Take 2 tablets by mouth Every Night.     Dispense:  60 tablet     Refill:  2   • venlafaxine (EFFEXOR) 50 MG tablet     Sig: Take 1 tablet by mouth 2 (Two) Times a Day.     Dispense:  60 tablet     Refill:  2         Follow Up   Return in about 1 month (around 1/30/2022), or if symptoms worsen or fail to improve, for Next scheduled follow up.  Patient was given instructions and counseling " regarding his condition or for health maintenance advice. Please see specific information pulled into the AVS if appropriate.       TREATMENT PLAN/GOALS: Continue supportive psychotherapy efforts and medications as indicated. Treatment and medication options discussed during today's visit. Patient acknowledged and verbally consented to continue with current treatment plan and was educated on the importance of compliance with treatment and follow-up appointments.    MEDICATION ISSUES:  Discussed medication options and treatment plan of prescribed medication as well as the risks, benefits, and side effects including potential falls, possible impaired driving and metabolic adversities among others. Patient is agreeable to call the office with any worsening of symptoms or onset of side effects. Patient is agreeable to call 911 or go to the nearest ER should he/she begin having SI/HI.          This document has been electronically signed by PRISCILLA Raphael, PMHNP-BC  December 30, 2021 14:40 EST      Part of this note may be an electronic transcription/translation of spoken language to printed text using the Dragon Dictation System.

## 2022-01-26 ENCOUNTER — OFFICE VISIT (OUTPATIENT)
Dept: NEUROLOGY | Facility: CLINIC | Age: 42
End: 2022-01-26

## 2022-01-26 VITALS
TEMPERATURE: 96.6 F | DIASTOLIC BLOOD PRESSURE: 80 MMHG | BODY MASS INDEX: 39.79 KG/M2 | WEIGHT: 238.8 LBS | HEART RATE: 75 BPM | OXYGEN SATURATION: 95 % | SYSTOLIC BLOOD PRESSURE: 130 MMHG | HEIGHT: 65 IN

## 2022-01-26 DIAGNOSIS — G40.909 SEIZURE DISORDER: Primary | ICD-10-CM

## 2022-01-26 PROCEDURE — 99213 OFFICE O/P EST LOW 20 MIN: CPT | Performed by: NURSE PRACTITIONER

## 2022-01-26 NOTE — PROGRESS NOTES
"     Follow Up Office Visit      Patient Name: Aroldo Cai  : 1980   MRN: [id number]     Chief Complaint:    Chief Complaint   Patient presents with   • Follow-up     patient in office to follow up on seizures.        History of Present Illness: Aroldo Cai is a 41 y.o. male who is here today to follow up with seizure disorder and was last seen in 2021.    *Summoned to the exam room by medical assistant, Gail Robledo.  Upon entering the room, the patient's eyes are rolled back and he appears to be unconscious; he is sitting in the chair; he is easily aroused to verbal simulation but still appears extremely drowsy with his eyes rolling back frequently.  There is no respiratory distress noted but when the provider was not in the room, another provider noticed snoring respirations but no respiratory distress.  When asked if he was on any illegal drugs he states, \"No, I am just really sleepy, I didn't sleep good last night\".  The patient's behavior is concerning because he had a visit to the ED for an accidental drug overdose on 12/10/2021.      Following taken from previous visit note:   Aroldo Cai is a 41 y.o. male who is here today to establish care with Neurology for abnormal MRI of the brain.  However, he mentions he is \"still having seizures\" despite being on medication and is having a daily headache.    Daily headaches- Frontal area.  Described as aching; accompanied by vomiting at times.  He is taking Iburofen and Tylenol PRN and feels they don't help.  He has never taken a preventative medication for his headaches.  He does not know when he last saw neurosurgery but was seen at  as a child.  Denies blurry vision.  Denies any severe, intractable headache.   Seizure disorder-   Taking Keppra 500mg daily and reports good toleration and compliance-has been on this medication for at least the past 5 years.  He feels he is having frequent seizures-wakes up during the night very " "disoriented, sweating, confused, and has an urge to urinate.  He denies any tongue bites but has had urine incontinence a couple of times.  He says he was diagnosed with seizure disorder after his brain surgery in 1994.  Additional risk factors- BMI 38, history of craniotomy for AVM repair at 13 years old (1994), mood disorder, GERD, seizure disorder, taking Gabapentin for \"hip pain\"-says he is having hip surgery in January 2022.      *MRI brain without contrast on 11/17/2021 showed area of encephalomalacia in the right frontal lobe with no acute intracranial abnormality.   *He has an appointment later this week with [facility] psychiatry.  He was recently released from half-way and is having a lot of anxiety about integrating back into society.     Subjective      Review of Systems:   Review of Systems   Constitutional: Negative for chills, fatigue and fever.   HENT: Negative for facial swelling, hearing loss, sore throat, tinnitus and trouble swallowing.    Eyes: Negative for blurred vision, double vision, photophobia and visual disturbance.   Respiratory: Negative for cough, chest tightness and shortness of breath.    Cardiovascular: Negative for chest pain, palpitations and leg swelling.   Gastrointestinal: Negative for abdominal pain, nausea and vomiting.   Endocrine: Negative for cold intolerance and heat intolerance.   Musculoskeletal: Negative for gait problem, neck pain and neck stiffness.   Skin: Negative for color change and rash.   Allergic/Immunologic: Negative for environmental allergies and food allergies.   Neurological: Positive for seizures and headache. Negative for dizziness, syncope, speech difficulty, weakness, light-headedness, numbness and memory problem.   Psychiatric/Behavioral: Negative for behavioral problems, sleep disturbance and depressed mood. The patient is not nervous/anxious.        I have reviewed and the following portions of the patient's history were updated as " "appropriate: past family history, past medical history, past social history, past surgical history and problem list.    Medications:     Current Outpatient Medications:   •  gabapentin (NEURONTIN) 400 MG capsule, , Disp: , Rfl:   •  levETIRAcetam (KEPPRA) 500 MG tablet, Take 1 tablet by mouth 2 (Two) Times a Day., Disp: 60 tablet, Rfl: 1  •  metoclopramide (REGLAN) 10 MG tablet, Take 10 mg by mouth 3 (Three) Times a Day As Needed., Disp: , Rfl:   •  OLANZapine (zyPREXA) 7.5 MG tablet, Take 1 tablet by mouth Daily., Disp: 30 tablet, Rfl: 2  •  omeprazole (priLOSEC) 40 MG capsule, Take 40 mg by mouth Daily., Disp: , Rfl:   •  ondansetron ODT (ZOFRAN-ODT) 8 MG disintegrating tablet, Every 8 (Eight) Hours As Needed., Disp: , Rfl:   •  promethazine (PHENERGAN) 12.5 MG tablet, Take 12.5 mg by mouth 3 (Three) Times a Day As Needed., Disp: , Rfl:   •  propranolol (INDERAL) 20 MG tablet, Take 1 tablet by mouth 2 (Two) Times a Day., Disp: 60 tablet, Rfl: 3  •  traZODone (DESYREL) 100 MG tablet, Take 2 tablets by mouth Every Night., Disp: 60 tablet, Rfl: 2  •  venlafaxine (EFFEXOR) 50 MG tablet, Take 1 tablet by mouth 2 (Two) Times a Day., Disp: 60 tablet, Rfl: 2  •  venlafaxine (EFFEXOR) 75 MG tablet, Take 1 tablet by mouth 2 (Two) Times a Day., Disp: 60 tablet, Rfl: 2    Allergies:   Allergies   Allergen Reactions   • Dilantin [Phenytoin] Seizure   • Penicillins Anaphylaxis   • Tegretol [Carbamazepine] Seizure   • Phenobarbital Seizure   • Latex Rash       Objective     Physical Exam:  Vital Signs:   Vitals:    01/26/22 1100   BP: 130/80   BP Location: Right arm   Patient Position: Sitting   Cuff Size: Adult   Pulse: 75   Temp: 96.6 °F (35.9 °C)   SpO2: 95%   Weight: 108 kg (238 lb 12.8 oz)   Height: 165.1 cm (65\")   PainSc: 0-No pain     Body mass index is 39.74 kg/m².    Physical Exam  Constitutional:       Appearance: He is obese.      Comments: Extremely drowsy   HENT:      Head: Normocephalic and atraumatic. "   Pulmonary:      Effort: Pulmonary effort is normal. No respiratory distress.   Skin:     General: Skin is warm and dry.   Neurological:      Mental Status: He is oriented to person, place, and time.   Psychiatric:      Comments: Patient is unable to stay awake for more than a few seconds. He remains upright in the chair and has no respiratory distress but his eyes keep rolling back and he stops talking.          Neurologic Exam     Mental Status   Oriented to person, place, and time.        Assessment / Plan      Assessment/Plan:   Diagnoses and all orders for this visit:    1. Seizure disorder (HCC) (Primary)    *I have contacted EMS for further evaluation. I am not comfortable with the patient driving home today. Patient was escorted from the office today ambulatory in the company of 3 local police officers.     Follow Up:   No follow-ups on file.    PRISCILLA Garza, FNP-C  [facility]       Please note that portions of this note may have been completed with a voice recognition program. Efforts were made to edit the dictations, but occasionally words are mistranscribed.

## 2022-03-10 ENCOUNTER — HOSPITAL ENCOUNTER (EMERGENCY)
Facility: HOSPITAL | Age: 42
Discharge: HOME | End: 2022-03-10
Attending: EMERGENCY MEDICINE | Admitting: EMERGENCY MEDICINE

## 2022-03-10 VITALS
RESPIRATION RATE: 20 BRPM | HEART RATE: 115 BPM | WEIGHT: 230 LBS | TEMPERATURE: 98.7 F | OXYGEN SATURATION: 91 % | BODY MASS INDEX: 38.32 KG/M2 | DIASTOLIC BLOOD PRESSURE: 87 MMHG | HEIGHT: 65 IN | SYSTOLIC BLOOD PRESSURE: 120 MMHG

## 2022-03-10 DIAGNOSIS — F11.90 OPIATE USE: ICD-10-CM
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
## 2022-03-10 DIAGNOSIS — R41.89 EPISODE OF UNRESPONSIVENESS: Primary | ICD-10-CM

## 2022-03-10 LAB
ALBUMIN SERPL-MCNC: 4.3 G/DL (ref 3.5–5.2)
ALBUMIN/GLOB SERPL: 1.6 G/DL
ALP SERPL-CCNC: 117 U/L (ref 39–117)
ALT SERPL W P-5'-P-CCNC: 22 U/L (ref 1–41)
AMPHET+METHAMPHET UR QL: NEGATIVE
AMPHETAMINES UR QL: NEGATIVE
ANION GAP SERPL CALCULATED.3IONS-SCNC: 12.4 MMOL/L (ref 5–15)
AST SERPL-CCNC: 23 U/L (ref 1–40)
BARBITURATES UR QL SCN: NEGATIVE
BASOPHILS # BLD AUTO: 0.03 10*3/MM3 (ref 0–0.2)
BASOPHILS NFR BLD AUTO: 0.3 % (ref 0–1.5)
BENZODIAZ UR QL SCN: NEGATIVE
BILIRUB SERPL-MCNC: 0.2 MG/DL (ref 0–1.2)
BUN SERPL-MCNC: 9 MG/DL (ref 6–20)
BUN/CREAT SERPL: 8.4 (ref 7–25)
BUPRENORPHINE SERPL-MCNC: NEGATIVE NG/ML
CALCIUM SPEC-SCNC: 9 MG/DL (ref 8.6–10.5)
CANNABINOIDS SERPL QL: NEGATIVE
CHLORIDE SERPL-SCNC: 101 MMOL/L (ref 98–107)
CO2 SERPL-SCNC: 26.6 MMOL/L (ref 22–29)
COCAINE UR QL: NEGATIVE
CREAT SERPL-MCNC: 1.07 MG/DL (ref 0.76–1.27)
DEPRECATED RDW RBC AUTO: 45.4 FL (ref 37–54)
EGFRCR SERPLBLD CKD-EPI 2021: 88.9 ML/MIN/1.73
EOSINOPHIL # BLD AUTO: 0.34 10*3/MM3 (ref 0–0.4)
EOSINOPHIL NFR BLD AUTO: 3.2 % (ref 0.3–6.2)
ERYTHROCYTE [DISTWIDTH] IN BLOOD BY AUTOMATED COUNT: 13.6 % (ref 12.3–15.4)
ETHANOL BLD-MCNC: <10 MG/DL (ref 0–10)
ETHANOL UR QL: <0.01 %
GLOBULIN UR ELPH-MCNC: 2.7 GM/DL
GLUCOSE SERPL-MCNC: 104 MG/DL (ref 65–99)
HCT VFR BLD AUTO: 45.5 % (ref 37.5–51)
HGB BLD-MCNC: 15.3 G/DL (ref 13–17.7)
IMM GRANULOCYTES # BLD AUTO: 0.04 10*3/MM3 (ref 0–0.05)
IMM GRANULOCYTES NFR BLD AUTO: 0.4 % (ref 0–0.5)
LYMPHOCYTES # BLD AUTO: 2.37 10*3/MM3 (ref 0.7–3.1)
LYMPHOCYTES NFR BLD AUTO: 22.1 % (ref 19.6–45.3)
MCH RBC QN AUTO: 30.4 PG (ref 26.6–33)
MCHC RBC AUTO-ENTMCNC: 33.6 G/DL (ref 31.5–35.7)
MCV RBC AUTO: 90.5 FL (ref 79–97)
METHADONE UR QL SCN: NEGATIVE
MONOCYTES # BLD AUTO: 0.64 10*3/MM3 (ref 0.1–0.9)
MONOCYTES NFR BLD AUTO: 6 % (ref 5–12)
NEUTROPHILS NFR BLD AUTO: 68 % (ref 42.7–76)
NEUTROPHILS NFR BLD AUTO: 7.32 10*3/MM3 (ref 1.7–7)
NRBC BLD AUTO-RTO: 0 /100 WBC (ref 0–0.2)
OPIATES UR QL: POSITIVE
OXYCODONE UR QL SCN: NEGATIVE
PCP UR QL SCN: NEGATIVE
PLATELET # BLD AUTO: 188 10*3/MM3 (ref 140–450)
PMV BLD AUTO: 10.1 FL (ref 6–12)
POTASSIUM SERPL-SCNC: 3.5 MMOL/L (ref 3.5–5.2)
PROPOXYPH UR QL: NEGATIVE
PROT SERPL-MCNC: 7 G/DL (ref 6–8.5)
RBC # BLD AUTO: 5.03 10*6/MM3 (ref 4.14–5.8)
RBC MORPH BLD: NORMAL
SMALL PLATELETS BLD QL SMEAR: ADEQUATE
SODIUM SERPL-SCNC: 140 MMOL/L (ref 136–145)
TRICYCLICS UR QL SCN: NEGATIVE
TROPONIN T SERPL-MCNC: <0.01 NG/ML (ref 0–0.03)
WBC MORPH BLD: NORMAL
WBC NRBC COR # BLD: 10.74 10*3/MM3 (ref 3.4–10.8)

## 2022-03-10 PROCEDURE — 85025 COMPLETE CBC W/AUTO DIFF WBC: CPT | Performed by: PHYSICIAN ASSISTANT

## 2022-03-10 PROCEDURE — 82077 ASSAY SPEC XCP UR&BREATH IA: CPT | Performed by: PHYSICIAN ASSISTANT

## 2022-03-10 PROCEDURE — 80306 DRUG TEST PRSMV INSTRMNT: CPT | Performed by: PHYSICIAN ASSISTANT

## 2022-03-10 PROCEDURE — 80053 COMPREHEN METABOLIC PANEL: CPT | Performed by: PHYSICIAN ASSISTANT

## 2022-03-10 PROCEDURE — 99284 EMERGENCY DEPT VISIT MOD MDM: CPT

## 2022-03-10 PROCEDURE — 93005 ELECTROCARDIOGRAM TRACING: CPT | Performed by: PHYSICIAN ASSISTANT

## 2022-03-10 PROCEDURE — 36415 COLL VENOUS BLD VENIPUNCTURE: CPT

## 2022-03-10 PROCEDURE — 84484 ASSAY OF TROPONIN QUANT: CPT | Performed by: PHYSICIAN ASSISTANT

## 2022-03-10 PROCEDURE — 85007 BL SMEAR W/DIFF WBC COUNT: CPT | Performed by: PHYSICIAN ASSISTANT

## 2022-03-10 NOTE — ED NOTES
Abdoulaye blanca at bedside with rn.  Abdoulaye asked patient if it is ok to talked with him with mother about going through his results. Verbalized consent received.
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
## 2022-03-10 NOTE — ED NOTES
Patient void for lab test at this time   Patient A&Ox4, crying in pain, complaining of pain to bilat knees. Presents with 3.5cm laceration to center of forehead. Patients daughter stated heard patient screaming from her room, found patient on floor, bleeding from head, negative LOC/negative blood thinner use.

## 2022-03-15 ENCOUNTER — OFFICE VISIT (OUTPATIENT)
Dept: PSYCHIATRY | Facility: CLINIC | Age: 42
End: 2022-03-15

## 2022-03-15 DIAGNOSIS — F31.81 BIPOLAR II DISORDER: Primary | ICD-10-CM

## 2022-03-15 DIAGNOSIS — F41.1 GENERALIZED ANXIETY DISORDER: ICD-10-CM

## 2022-03-15 PROCEDURE — 90834 PSYTX W PT 45 MINUTES: CPT | Performed by: SOCIAL WORKER

## 2022-03-15 NOTE — PROGRESS NOTES
"Date: 03/15/2022   Time In: 1100  Time Out: 1140    PROGRESS NOTE  Data:  Aroldo Cai is a 41 y.o. male who presents today for individual therapy session at [facility].     ICD-10-CM ICD-9-CM   1. Bipolar II disorder (HCC)  F31.81 296.89   2. Generalized anxiety disorder  F41.1 300.02     Patient reports concerns about re-orienting back into the community since serving 5 years in care home. Patient reports he was  released 1 year ago. Patient discussed feeling increased anxiety while in public and reports \"I hate people\".  Patient discussed having an opiate addiction since being released from care home but reports being sober for the past 5 days. Patient reports overdosing on 3/10/21 and reports feeling \"overwhelmed by the thought of being dead at that time.\" Patient reports he is having hip replacement surgery next week and reports he will be going to the [facility] for rehab once he is recovered. Patient reports he is attempting to maintain his sobriety by avoiding \"old friends\" and changing his phone number. Patient denies suicidal ideations and self harm behaviors. Patient reports current coping skills include \"breathing\". Goals for therapy \"get my head straight\".     Clinical Maneuvering/Intervention:    (Scales based on 0 - 10 with 10 being the worst)  Depression:   na Anxiety:  na       Assisted patient in processing above session content; acknowledged and normalized patient’s thoughts, feelings, and concerns. Prompted patient to identify triggers associated with patient's substance use. Patient reports \"old friends, being in town and anxiety\" as triggers for relapse. Discussed triggers associated with patient's anxiety.  Also discussed coping skills for patient to implement such as mindfulness meditation. Encouraged patient to follow up with rehab, AA/NA.     Allowed patient to freely discuss issues without interruption or judgment. Provided safe, confidential environment to facilitate " the development of positive therapeutic relationship and encourage open, honest communication. Assisted patient in identifying risk factors which would indicate the need for higher level of care including thoughts to harm self or others and/or self-harming behavior and encouraged patient to contact this office, call 911, or present to the nearest emergency room should any of these events occur. Discussed crisis intervention services and means to access. Patient adamantly and convincingly denies current suicidal or homicidal ideation or perceptual disturbance.    Assessment   Patient appears to maintain relative stability as compared to their baseline.  However, patient continues to struggle with mood instability which continues to cause impairment in important areas of functioning.  As a result, they can be reasonably expected to continue to benefit from treatment and would likely be at increased risk for decompensation otherwise.    Mental Status Exam:   Hygiene:   good  Cooperation:  Cooperative  Eye Contact:  Good  Psychomotor Behavior:  Appropriate  Affect:  Appropriate  Mood: anxious  Speech:  Normal  Thought Process:  Goal directed  Thought Content:  Normal  Suicidal:  None  Homicidal:  None  Hallucinations:  None  Delusion:  None  Memory:  Intact  Orientation:  Grossly intact  Reliability:  good  Insight:  Good  Judgement:  Poor  Impulse Control:  Poor  Physical/Medical Issues:  Yes patient reports hip replacement     Patient's Support Network Includes:  parents    Functional Status: Moderate impairment     Progress toward goal: Not at goal    Prognosis: Good with Ongoing Treatment          Plan     Patient will continue in individual outpatient therapy with focus on improved functioning and coping skills, maintaining stability, and avoiding decompensation and the need for higher level of care.    Patient will adhere to medication regimen as prescribed and report any side effects. Patient will contact this  office, call 911 or present to the nearest emergency room should suicidal or homicidal ideations occur. Provide Cognitive Behavioral Therapy and Solution Focused Therapy to improve functioning, maintain stability, and avoid decompensation and the need for higher level of care.     Return in about Return in about 2 weeks (around 3/29/2022). or earlier if symptoms worsen or fail to improve.            This document has been electronically signed by Leonor Lockett LCSW  March 15, 2022 10:57 EDT      Part of this note may be an electronic transcription/translation of spoken language to printed text using the Dragon Dictation System.

## 2022-03-17 ENCOUNTER — OFFICE VISIT (OUTPATIENT)
Dept: PSYCHIATRY | Facility: CLINIC | Age: 42
End: 2022-03-17

## 2022-03-17 VITALS
DIASTOLIC BLOOD PRESSURE: 88 MMHG | HEIGHT: 65 IN | WEIGHT: 239 LBS | SYSTOLIC BLOOD PRESSURE: 138 MMHG | HEART RATE: 95 BPM | BODY MASS INDEX: 39.82 KG/M2

## 2022-03-17 DIAGNOSIS — F41.1 GENERALIZED ANXIETY DISORDER: Chronic | ICD-10-CM

## 2022-03-17 DIAGNOSIS — F31.81 BIPOLAR II DISORDER: Primary | Chronic | ICD-10-CM

## 2022-03-17 DIAGNOSIS — G47.09 OTHER INSOMNIA: Chronic | ICD-10-CM

## 2022-03-17 DIAGNOSIS — F19.90 SUBSTANCE USE DISORDER: Primary | ICD-10-CM

## 2022-03-17 PROCEDURE — 99214 OFFICE O/P EST MOD 30 MIN: CPT | Performed by: NURSE PRACTITIONER

## 2022-03-17 RX ORDER — VENLAFAXINE 50 MG/1
50 TABLET ORAL 2 TIMES DAILY
Qty: 60 TABLET | Refills: 2 | Status: SHIPPED | OUTPATIENT
Start: 2022-03-17 | End: 2022-06-07 | Stop reason: ALTCHOICE

## 2022-03-17 RX ORDER — TIZANIDINE 4 MG/1
4 TABLET ORAL DAILY
COMMUNITY
Start: 2022-02-11 | End: 2022-04-18

## 2022-03-17 RX ORDER — IBUPROFEN 800 MG/1
800 TABLET ORAL 3 TIMES DAILY PRN
COMMUNITY
Start: 2022-02-23 | End: 2022-04-11 | Stop reason: SDUPTHER

## 2022-03-17 RX ORDER — OLANZAPINE 7.5 MG/1
7.5 TABLET ORAL DAILY
Qty: 30 TABLET | Refills: 2 | Status: SHIPPED | OUTPATIENT
Start: 2022-03-17 | End: 2022-04-28 | Stop reason: SDUPTHER

## 2022-03-17 RX ORDER — VENLAFAXINE 75 MG/1
75 TABLET ORAL 2 TIMES DAILY
Qty: 60 TABLET | Refills: 2 | Status: SHIPPED | OUTPATIENT
Start: 2022-03-17 | End: 2022-06-07 | Stop reason: ALTCHOICE

## 2022-03-17 RX ORDER — TRAZODONE HYDROCHLORIDE 100 MG/1
200 TABLET ORAL NIGHTLY
Qty: 60 TABLET | Refills: 2 | Status: SHIPPED | OUTPATIENT
Start: 2022-03-17 | End: 2022-03-17

## 2022-03-17 RX ORDER — QUETIAPINE FUMARATE 25 MG/1
25-50 TABLET, FILM COATED ORAL NIGHTLY
Qty: 60 TABLET | Refills: 1 | Status: SHIPPED | OUTPATIENT
Start: 2022-03-17 | End: 2022-04-11 | Stop reason: SINTOL

## 2022-03-17 NOTE — PROGRESS NOTES
"Chief Complaint  Anxiety, Depression, Manic Behavior, and Sleeping Problem    Subjective          Aroldo Cai presents to [facility] for medication management of his anxiety, bipolar disorder, sleeping difficulties.    History of Present Illness: Patient presents today for a follow-up appointment after last being seen on 12/30/2021.  Patient says \"it has been a pretty wild week\".  Patient says he is scheduled to have his hip replaced next week and is looking forward to that surgery.  Patient admits today that he has been using heroin \"on and off since have been home from prison\".  Patient reports last week he overdosed and ended up in the hospital.  \"That incident really scared me straight\".  Patient reports today that he would like a referral to [facility] to discuss starting Suboxone.  Patient admits his heroin use has become more of a problem then he realized it was, and he no longer wants to do this.  He is currently been taking Suboxone that is prescribed to his child's mother.  Patient reports he has been taking his medications, but is still struggling heavily with sleep.  He has been taking trazodone 200 mg nightly, but says it has not helped him sleep.  Patient denies any new or significant issues with appetite.  Patient denies any SI/HI, A/V hallucinations.    Current Medications:   Current Outpatient Medications   Medication Sig Dispense Refill   • gabapentin (NEURONTIN) 400 MG capsule      • ibuprofen (ADVIL,MOTRIN) 800 MG tablet Take 800 mg by mouth 3 (Three) Times a Day As Needed.     • levETIRAcetam (KEPPRA) 500 MG tablet Take 1 tablet by mouth 2 (Two) Times a Day. 60 tablet 1   • OLANZapine (zyPREXA) 7.5 MG tablet Take 1 tablet by mouth Daily. 30 tablet 2   • ondansetron ODT (ZOFRAN-ODT) 8 MG disintegrating tablet Every 8 (Eight) Hours As Needed.     • promethazine (PHENERGAN) 12.5 MG tablet Take 12.5 mg by mouth 3 (Three) Times a Day As Needed.     • " "tiZANidine (ZANAFLEX) 4 MG tablet Take 4 mg by mouth 2 (Two) Times a Day.     • venlafaxine (EFFEXOR) 50 MG tablet Take 1 tablet by mouth 2 (Two) Times a Day. 60 tablet 2   • venlafaxine (EFFEXOR) 75 MG tablet Take 1 tablet by mouth 2 (Two) Times a Day. 60 tablet 2   • QUEtiapine (SEROquel) 25 MG tablet Take 1-2 tablets by mouth Every Night. 60 tablet 1     No current facility-administered medications for this visit.       Mental Status Exam:   Hygiene:   good  Cooperation:  Cooperative  Eye Contact:  Good  Psychomotor Behavior:  Appropriate  Affect:  Appropriate  Mood: euthymic  Speech:  Normal  Thought Process:  Goal directed  Thought Content:  Mood congruent  Suicidal:  None  Homicidal:  None  Hallucinations:  None  Delusion:  None  Memory:  Intact  Orientation:  Person, Place, Time and Situation  Reliability:  fair  Insight:  Fair  Judgement:  Fair  Impulse Control:  Fair  Physical/Medical Issues:  Seizure disorder, AV malformation, chronic hip pain       Objective   Vital Signs:   /88   Pulse 95   Ht 165.1 cm (65\")   Wt 108 kg (239 lb)   BMI 39.77 kg/m²     Physical Exam  Neurological:      Mental Status: He is oriented to person, place, and time. Mental status is at baseline.      Coordination: Coordination is intact.      Gait: Gait is intact.   Psychiatric:         Behavior: Behavior is cooperative.        Result Review :     The following data was reviewed by: PRISCILLA Lawson on 03/17/2022:    Data reviewed: Previous note, medication history          Assessment and Plan    Problem List Items Addressed This Visit    None     Visit Diagnoses     Bipolar II disorder (HCC)  (Chronic)   -  Primary    Relevant Medications    OLANZapine (zyPREXA) 7.5 MG tablet    venlafaxine (EFFEXOR) 50 MG tablet    venlafaxine (EFFEXOR) 75 MG tablet    QUEtiapine (SEROquel) 25 MG tablet    Generalized anxiety disorder  (Chronic)       Relevant Medications    OLANZapine (zyPREXA) 7.5 MG tablet    venlafaxine " (EFFEXOR) 50 MG tablet    venlafaxine (EFFEXOR) 75 MG tablet    QUEtiapine (SEROquel) 25 MG tablet    Other insomnia  (Chronic)       Relevant Medications    OLANZapine (zyPREXA) 7.5 MG tablet    QUEtiapine (SEROquel) 25 MG tablet          PHQ-9 Score:   PHQ-9 Total Score: 15      Depression Screening:  Patient screened positive for depression based on a PHQ-9 score of 15 on 3/17/2022. Follow-up recommendations include: Prescribed antidepressant medication treatment and Suicide Risk Assessment performed.      Tobacco Cessation:  Aroldo Cai  reports that he has been smoking cigarettes. He has a 15.00 pack-year smoking history. He has never used smokeless tobacco.. I have educated him on the risk of diseases from using tobacco products such as cancer, COPD and heart disease.     I advised him to quit and he is not willing to quit.    I spent 3  minutes counseling the patient.      Impression/Plan:  -This is a follow-up appointment.  Patient presents today and reports he has been struggling.  He admits to overdosing on heroin last week, and says he would like to see someone about starting Suboxone.  Advised patient we could refer him to [facility] for an MAT evaluation tomorrow.  Patient reports he has been taking his medications as prescribed, but says he is continuing to struggle heavily with his sleep.  -Referral made to [facility].  -Maintain Zyprexa 7.5 mg daily.  -Maintain Effexor 125 mg twice daily.  -Discontinue trazodone 200 mg nightly.  -Start Seroquel 25 to 50 mg nightly.  -Schedule follow-up for 6 weeks or as needed.    MEDS ORDERED DURING VISIT:  New Medications Ordered This Visit   Medications   • OLANZapine (zyPREXA) 7.5 MG tablet     Sig: Take 1 tablet by mouth Daily.     Dispense:  30 tablet     Refill:  2   • venlafaxine (EFFEXOR) 50 MG tablet     Sig: Take 1 tablet by mouth 2 (Two) Times a Day.     Dispense:  60 tablet     Refill:  2   • venlafaxine (EFFEXOR) 75 MG tablet      Sig: Take 1 tablet by mouth 2 (Two) Times a Day.     Dispense:  60 tablet     Refill:  2   • QUEtiapine (SEROquel) 25 MG tablet     Sig: Take 1-2 tablets by mouth Every Night.     Dispense:  60 tablet     Refill:  1         Follow Up   Return in about 6 weeks (around 4/28/2022), or if symptoms worsen or fail to improve, for Next scheduled follow up.  Patient was given instructions and counseling regarding his condition or for health maintenance advice. Please see specific information pulled into the AVS if appropriate.       TREATMENT PLAN/GOALS: Continue supportive psychotherapy efforts and medications as indicated. Treatment and medication options discussed during today's visit. Patient acknowledged and verbally consented to continue with current treatment plan and was educated on the importance of compliance with treatment and follow-up appointments.    MEDICATION ISSUES:  Discussed medication options and treatment plan of prescribed medication as well as the risks, benefits, and side effects including potential falls, possible impaired driving and metabolic adversities among others. Patient is agreeable to call the office with any worsening of symptoms or onset of side effects. Patient is agreeable to call 911 or go to the nearest ER should he/she begin having SI/HI.          This document has been electronically signed by PRISCILLA Raphael, PMHNP-BC  March 17, 2022 16:46 EDT      Part of this note may be an electronic transcription/translation of spoken language to printed text using the Dragon Dictation System.

## 2022-03-17 NOTE — PROGRESS NOTES
"     New Patient Office Visit        Patient Name: Aroldo Cai  : 1980   MRN: [id number]     Referring Provider: Genesis Taylor    Chief Complaint: Substance use    History of Present Illness:   Aroldo Cai is a 41 y.o. male who is here today for initial evaluation with provider related to substance use. Started in  after bilateral hip surgery and was started on opioid pain medications.  Went to pain management for about a year post-op.  Was introduced to cocaine and discharged from pain management due to inappropriate drug screens.  Cocaine use was over a very brief amount of time.  Started buying pain pills off the street and did so for about a year.  In  went to [facility] for detox and transferred to [facility] for rehab.Was 8 months inpatient in 4 months outpatient.  Discharge home and had occasional use for 2 to 3 years.  In  went to custodial and served 5 years.  Released 2021.  Was home for about 3 to 4 weeks and was introduced to heroin by a friend.  Has used almost daily since.  Had overdose last week while driving and his prompted him to seek treatment.  The mother of his youngest child assisted him with home induction onto Suboxone from her personal prescription.  He has been taking buprenorphine/naloxone 8-2 mg 1 tablet daily.  Utilizes split dosing as it is helpful for chronic pain.  Feels\" really good\" on current dosing.  Has had 2-3 cravings over the last week.  Triggered to use by stress.  Reports increase in stressors due to upcoming surgery on hip next week. Increase in use patterns, tolerance, and w/d with cessation reported. Has had health, legal, social issues regarding use. Meets DSM-V criteria for opioid use disorder, severe.    Lives in [place] with his parents and is close friends with the mother of his youngest child. They all provide sober support. Recent OD has motivated him to change. Has 4 children (22, 19, 17, 6) and \"I don't want to die\". Wants " "to be around to see them all grow up. No currently working.     Currently seen by psych in our office for follow up and medication management. Last appt yesterday. Feels mood is \"pretty good\" overall. Does have hip replacement scheduled on 3/23/2022. This has increased anxiety and stress coupled with recent OD. He is meeting with surgeon (Dr. Gann) later today. Has not been forthright in the past about use issues and will discuss this along with current Suboxone use with Dr. Gann today. He has already planned to go back to [facility] after discharge from hospital. His mother has been in contact with them and they have bed available, per pt report.    Triggers: stress, exposure to heroin    Cravings: few per week    Relapse Prevention: Mason PINEDA    Urine Drug Screen (today's visit) discussed: Positive buprenorphine only    UDS Confirmation: 3/10/2022 +opiates    QIANA (PDMP) Reviewed for Current/Active Medications: gabapentin as expected    Past Surgical History:  Past Surgical History:   Procedure Laterality Date   • ABDOMINAL SURGERY     • BRAIN SURGERY  1994    craniotomy,  right frontal lobe AVM   • CHOLECYSTECTOMY WITH INTRAOPERATIVE CHOLANGIOGRAM N/A 7/27/2021    Procedure: CHOLECYSTECTOMY LAPAROSCOPIC INTRAOPERATIVE CHOLANGIOGRAPHY;  Surgeon: Hardik Blancas MD;  Location: [facility] OR;  Service: General;  Laterality: N/A;   • COLONOSCOPY     • ENDOSCOPY N/A 10/22/2021    Procedure: ESOPHAGOGASTRODUODENOSCOPY WITH BIOPSY;  Surgeon: Hardik Blancas MD;  Location: [facility] ENDOSCOPY;  Service: Gastroenterology;  Laterality: N/A;   • HERNIA REPAIR Right     inguinal   • HIP SURGERY Bilateral        Problem List:  Patient Active Problem List   Diagnosis   • Calculus of gallbladder without cholecystitis without obstruction   • Right upper quadrant abdominal pain   • Nausea and vomiting       Allergy:   Allergies   Allergen Reactions   • Dilantin [Phenytoin] Seizure   • Penicillins Anaphylaxis   • " Tegretol [Carbamazepine] Seizure   • Phenobarbital Seizure   • Latex Rash        Current Medications:   Current Outpatient Medications   Medication Sig Dispense Refill   • gabapentin (NEURONTIN) 400 MG capsule      • ibuprofen (ADVIL,MOTRIN) 800 MG tablet Take 800 mg by mouth 3 (Three) Times a Day As Needed.     • levETIRAcetam (KEPPRA) 500 MG tablet Take 1 tablet by mouth 2 (Two) Times a Day. 60 tablet 1   • OLANZapine (zyPREXA) 7.5 MG tablet Take 1 tablet by mouth Daily. 30 tablet 2   • ondansetron ODT (ZOFRAN-ODT) 8 MG disintegrating tablet Every 8 (Eight) Hours As Needed.     • promethazine (PHENERGAN) 12.5 MG tablet Take 12.5 mg by mouth 3 (Three) Times a Day As Needed.     • QUEtiapine (SEROquel) 25 MG tablet Take 1-2 tablets by mouth Every Night. 60 tablet 1   • tiZANidine (ZANAFLEX) 4 MG tablet Take 4 mg by mouth 2 (Two) Times a Day.     • venlafaxine (EFFEXOR) 50 MG tablet Take 1 tablet by mouth 2 (Two) Times a Day. 60 tablet 2   • venlafaxine (EFFEXOR) 75 MG tablet Take 1 tablet by mouth 2 (Two) Times a Day. 60 tablet 2   • buprenorphine-naloxone (SUBOXONE) 8-2 MG per SL tablet Place 1 tablet under the tongue Daily for 5 days. 5 tablet 0     No current facility-administered medications for this visit.       Past Medical History:  Past Medical History:   Diagnosis Date   • Anesthesia complication     pt reports he is hard to wake after anesthesia   • Anxiety    • Arthritis    • Bipolar 1 disorder (HCC)    • COVID-19 02/2021   • Depression    • Dysphagia     food and liquids.   • Extensive tattoos    • Gout    • Hepatitis C 2018    Patient took medication    • History of echocardiogram    • Kidney stones    • Pneumonia    • PTSD (post-traumatic stress disorder)    • Seizure (HCC)     last seizure years ago.   • Seizures (HCC)    • Short-term memory loss    • Wears contact lenses    • Wears glasses        Social History:  Social History     Socioeconomic History   • Marital status:    Tobacco Use   •  Smoking status: Current Every Day Smoker     Packs/day: 0.50     Years: 30.00     Pack years: 15.00     Types: Cigarettes   • Smokeless tobacco: Never Used   Vaping Use   • Vaping Use: Never used   Substance and Sexual Activity   • Alcohol use: Not Currently     Comment: Occcasionally   • Drug use: Not Currently     Types: Cocaine(coke)     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   • Sexual activity: Defer       Family History:  Family History   Problem Relation Age of Onset   • Diabetes Mother    • Diabetes insipidus Mother    • Heart disease Mother    • Cancer Father         skin   • Diabetes Father    • Hypertension Father    • Arthritis Father    • Diabetes insipidus Father          Subjective      Review of Systems:   Review of Systems   Constitutional: Positive for fatigue. Negative for chills and fever.   Respiratory: Negative for shortness of breath.    Cardiovascular: Negative for chest pain.   Gastrointestinal: Negative for abdominal pain.   Musculoskeletal: Positive for arthralgias, back pain and gait problem.   Skin: Negative for skin lesions.   Neurological: Positive for seizures. Negative for confusion.   Psychiatric/Behavioral: Positive for sleep disturbance, depressed mood and stress. Negative for hallucinations and suicidal ideas. The patient is nervous/anxious.        PHQ-9 Score:  15 on 3/17/2022    VINICIUS-7 Score: 15 on 3/17/2022       Patient History:   The following portions of the patient's history were reviewed and updated as appropriate: allergies, current medications, past family history, past medical history, past social history, past surgical history and problem list.     Social:  Social History     Socioeconomic History   • Marital status:    Tobacco Use   • Smoking status: Current Every Day Smoker     Packs/day: 0.50     Years: 30.00     Pack years: 15.00     Types: Cigarettes   • Smokeless tobacco: Never Used   Vaping Use   • Vaping Use: Never used   Substance and Sexual  "Activity   • Alcohol use: Not Currently     Comment: Occcasionally   • Drug use: Not Currently     Types: Cocaine(coke)     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   • Sexual activity: Defer       Medications:     Current Outpatient Medications:   •  gabapentin (NEURONTIN) 400 MG capsule, , Disp: , Rfl:   •  ibuprofen (ADVIL,MOTRIN) 800 MG tablet, Take 800 mg by mouth 3 (Three) Times a Day As Needed., Disp: , Rfl:   •  levETIRAcetam (KEPPRA) 500 MG tablet, Take 1 tablet by mouth 2 (Two) Times a Day., Disp: 60 tablet, Rfl: 1  •  OLANZapine (zyPREXA) 7.5 MG tablet, Take 1 tablet by mouth Daily., Disp: 30 tablet, Rfl: 2  •  ondansetron ODT (ZOFRAN-ODT) 8 MG disintegrating tablet, Every 8 (Eight) Hours As Needed., Disp: , Rfl:   •  promethazine (PHENERGAN) 12.5 MG tablet, Take 12.5 mg by mouth 3 (Three) Times a Day As Needed., Disp: , Rfl:   •  QUEtiapine (SEROquel) 25 MG tablet, Take 1-2 tablets by mouth Every Night., Disp: 60 tablet, Rfl: 1  •  tiZANidine (ZANAFLEX) 4 MG tablet, Take 4 mg by mouth 2 (Two) Times a Day., Disp: , Rfl:   •  venlafaxine (EFFEXOR) 50 MG tablet, Take 1 tablet by mouth 2 (Two) Times a Day., Disp: 60 tablet, Rfl: 2  •  venlafaxine (EFFEXOR) 75 MG tablet, Take 1 tablet by mouth 2 (Two) Times a Day., Disp: 60 tablet, Rfl: 2  •  buprenorphine-naloxone (SUBOXONE) 8-2 MG per SL tablet, Place 1 tablet under the tongue Daily for 5 days., Disp: 5 tablet, Rfl: 0    Objective     Physical Exam:  Physical Exam    Vital Signs:   Vitals:    03/18/22 0755   BP: 142/88   Pulse: 102   Weight: 110 kg (242 lb)   Height: 165.1 cm (65\")     Body mass index is 40.27 kg/m².     Mental Status Exam:   Hygiene:   good  Cooperation:  Cooperative  Eye Contact:  Good  Psychomotor Behavior:  Restless  Affect:  Full range  Mood: anxious  Speech:  Normal  Thought Process:  Goal directed  Thought Content:  Normal  Suicidal:  None  Homicidal:  None  Hallucinations:  None  Delusion:  None  Memory:  Intact  Orientation:  " Person, Place, Time and Situation  Reliability:  good  Insight:  Fair  Judgement:  Fair  Impulse Control:  Poor    Assessment / Plan      Assessment/Plan     Visit Diagnoses     Opioid use disorder, severe, dependence (HCC)    -  Primary    Relevant Medications    -Continue buprenorphine-naloxone (SUBOXONE) 8-2 MG per SL tablet 1 tablet daily  -Had 1/2 tab prior to appt today; will need 1/2 tab for this evening  -Advisement to take part in and remain active in 12 Step Recovery Meetings, IOP, and/or 1:1 therapy/counseling and to establish/maintain an active relationship with a recovery sponsor. -Continued monitoring for illicit substances for patient safety, medication compliance and future care.  -Controlled substance agreement signed  -Will refer for peer support  -Discussed MARCELINO treatment options here (IOP, counseling, MAT options)  -Will send meds until day of surgery. Patient is to discuss plan for MAT with Dr. Gann at appt later today. I have informed patient I can accommodate bridge dosing, if needed. His plan was to transition to opiate pain medications while inpatient and the go to [facility] at discharge. We have discussed treatment options here and let him know we will assist him with anything we can. Patient will call us early next week if we need to be involved in post op plan.  -Given Narcan sample yesterday and OD education provided today.  -He will call to make follow up PRN when he has decided how he wants to proceed with treatment       Chronic pain syndrome             -Keep appt with Dr. Gann.       Tobacco abuse             -Declines intervention today. Will consider at next follow up.      Diagnoses       Codes Comments    Opioid use disorder, severe, dependence (HCC)    -  Primary ICD-10-CM: F11.20  ICD-9-CM: 304.00     Chronic pain syndrome     ICD-10-CM: G89.4  ICD-9-CM: 338.4     Tobacco abuse     ICD-10-CM: Z72.0  ICD-9-CM: 305.1           Visit Diagnoses:    ICD-10-CM ICD-9-CM   1.  Opioid use disorder, severe, dependence (HCC)  F11.20 304.00   2. Chronic pain syndrome  G89.4 338.4   3. Tobacco abuse  Z72.0 305.1       PLAN:  1. Safety: No acute safety concerns  2. Risk Assessment: Risk of self-harm acutely is low. Risk of self-harm chronically is also low, but could be further elevated in the event of treatment noncompliance and/or AODA.    TREATMENT PLAN/GOALS: Continue supportive psychotherapy efforts and medications as indicated. Treatment and medication options discussed during today's visit. Patient acknowledged and verbally consented to continue with current treatment plan and was educated on the importance of compliance with treatment and follow-up appointments.      MEDICATION ISSUES:  QIANA reviewed as expected.  Discussed medication options and treatment plan of prescribed medication as well as the risks, benefits, and side effects including potential falls, possible impaired driving and metabolic adversities among others. Patient is agreeable to call the office with any worsening of symptoms or onset of side effects. Patient is agreeable to call 911 or go to the nearest ER should he/she begin having SI/HI. No medication side effects or related complaints today.       TOBACCO USE:  Current every day smoker 3-10 mintues spent counseling Not agreeable to stopping    I advised Aroldo Cai of the risks of tobacco use.     MEDS ORDERED DURING VISIT:  New Medications Ordered This Visit   Medications   • buprenorphine-naloxone (SUBOXONE) 8-2 MG per SL tablet     Sig: Place 1 tablet under the tongue Daily for 5 days.     Dispense:  5 tablet     Refill:  0       Return in about 1 week (around 3/25/2022), or if symptoms worsen or fail to improve.           This document has been electronically signed by PRISCILLA Wen  March 18, 2022 09:29 EDT      Part of this note may be an electronic transcription/translation of spoken language to printed text using the Dragon Dictation  System.

## 2022-03-18 ENCOUNTER — LAB (OUTPATIENT)
Dept: LAB | Facility: HOSPITAL | Age: 42
End: 2022-03-18

## 2022-03-18 ENCOUNTER — OFFICE VISIT (OUTPATIENT)
Dept: PSYCHIATRY | Facility: CLINIC | Age: 42
End: 2022-03-18

## 2022-03-18 VITALS
SYSTOLIC BLOOD PRESSURE: 142 MMHG | DIASTOLIC BLOOD PRESSURE: 88 MMHG | HEART RATE: 102 BPM | WEIGHT: 242 LBS | BODY MASS INDEX: 40.32 KG/M2 | HEIGHT: 65 IN

## 2022-03-18 DIAGNOSIS — F11.20 OPIOID USE DISORDER, SEVERE, DEPENDENCE: Primary | ICD-10-CM

## 2022-03-18 DIAGNOSIS — F19.90 SUBSTANCE USE DISORDER: ICD-10-CM

## 2022-03-18 DIAGNOSIS — Z72.0 TOBACCO ABUSE: ICD-10-CM

## 2022-03-18 DIAGNOSIS — G89.4 CHRONIC PAIN SYNDROME: ICD-10-CM

## 2022-03-18 LAB
ALBUMIN SERPL-MCNC: 4.4 G/DL (ref 3.5–5.2)
ALBUMIN/GLOB SERPL: 1.9 G/DL
ALP SERPL-CCNC: 108 U/L (ref 39–117)
ALT SERPL W P-5'-P-CCNC: 13 U/L (ref 1–41)
AMPHET+METHAMPHET UR QL: NEGATIVE
AMPHETAMINES UR QL: NEGATIVE
ANION GAP SERPL CALCULATED.3IONS-SCNC: 12.5 MMOL/L (ref 5–15)
AST SERPL-CCNC: 13 U/L (ref 1–40)
BARBITURATES UR QL SCN: NEGATIVE
BASOPHILS # BLD AUTO: 0.03 10*3/MM3 (ref 0–0.2)
BASOPHILS NFR BLD AUTO: 0.3 % (ref 0–1.5)
BENZODIAZ UR QL SCN: NEGATIVE
BILIRUB SERPL-MCNC: 0.4 MG/DL (ref 0–1.2)
BUN SERPL-MCNC: 11 MG/DL (ref 6–20)
BUN/CREAT SERPL: 10.8 (ref 7–25)
BUPRENORPHINE SERPL-MCNC: POSITIVE NG/ML
CALCIUM SPEC-SCNC: 9.5 MG/DL (ref 8.6–10.5)
CANNABINOIDS SERPL QL: NEGATIVE
CHLORIDE SERPL-SCNC: 106 MMOL/L (ref 98–107)
CO2 SERPL-SCNC: 23.5 MMOL/L (ref 22–29)
COCAINE UR QL: NEGATIVE
CREAT SERPL-MCNC: 1.02 MG/DL (ref 0.76–1.27)
DEPRECATED RDW RBC AUTO: 41.5 FL (ref 37–54)
EGFRCR SERPLBLD CKD-EPI 2021: 94.1 ML/MIN/1.73
EOSINOPHIL # BLD AUTO: 0.38 10*3/MM3 (ref 0–0.4)
EOSINOPHIL NFR BLD AUTO: 4.4 % (ref 0.3–6.2)
ERYTHROCYTE [DISTWIDTH] IN BLOOD BY AUTOMATED COUNT: 13 % (ref 12.3–15.4)
GLOBULIN UR ELPH-MCNC: 2.3 GM/DL
GLUCOSE SERPL-MCNC: 121 MG/DL (ref 65–99)
HAV IGM SERPL QL IA: ABNORMAL
HBV CORE IGM SERPL QL IA: ABNORMAL
HBV SURFACE AG SERPL QL IA: ABNORMAL
HCT VFR BLD AUTO: 46.8 % (ref 37.5–51)
HCV AB SER DONR QL: REACTIVE
HGB BLD-MCNC: 15.9 G/DL (ref 13–17.7)
HIV1 P24 AG SER QL: NORMAL
HIV1+2 AB SER QL: NORMAL
IMM GRANULOCYTES # BLD AUTO: 0.03 10*3/MM3 (ref 0–0.05)
IMM GRANULOCYTES NFR BLD AUTO: 0.3 % (ref 0–0.5)
LYMPHOCYTES # BLD AUTO: 3 10*3/MM3 (ref 0.7–3.1)
LYMPHOCYTES NFR BLD AUTO: 34.7 % (ref 19.6–45.3)
MCH RBC QN AUTO: 29.8 PG (ref 26.6–33)
MCHC RBC AUTO-ENTMCNC: 34 G/DL (ref 31.5–35.7)
MCV RBC AUTO: 87.6 FL (ref 79–97)
METHADONE UR QL SCN: NEGATIVE
MONOCYTES # BLD AUTO: 0.56 10*3/MM3 (ref 0.1–0.9)
MONOCYTES NFR BLD AUTO: 6.5 % (ref 5–12)
NEUTROPHILS NFR BLD AUTO: 4.64 10*3/MM3 (ref 1.7–7)
NEUTROPHILS NFR BLD AUTO: 53.8 % (ref 42.7–76)
NRBC BLD AUTO-RTO: 0 /100 WBC (ref 0–0.2)
OPIATES UR QL: NEGATIVE
OXYCODONE UR QL SCN: NEGATIVE
PCP UR QL SCN: NEGATIVE
PLATELET # BLD AUTO: 244 10*3/MM3 (ref 140–450)
PMV BLD AUTO: 10.5 FL (ref 6–12)
POTASSIUM SERPL-SCNC: 3.4 MMOL/L (ref 3.5–5.2)
PROPOXYPH UR QL: NEGATIVE
PROT SERPL-MCNC: 6.7 G/DL (ref 6–8.5)
RBC # BLD AUTO: 5.34 10*6/MM3 (ref 4.14–5.8)
SODIUM SERPL-SCNC: 142 MMOL/L (ref 136–145)
TRICYCLICS UR QL SCN: NEGATIVE
TSH SERPL DL<=0.05 MIU/L-ACNC: 4.22 UIU/ML (ref 0.27–4.2)
WBC NRBC COR # BLD: 8.64 10*3/MM3 (ref 3.4–10.8)

## 2022-03-18 PROCEDURE — 90792 PSYCH DIAG EVAL W/MED SRVCS: CPT | Performed by: NURSE PRACTITIONER

## 2022-03-18 PROCEDURE — 80307 DRUG TEST PRSMV CHEM ANLYZR: CPT

## 2022-03-18 PROCEDURE — 85025 COMPLETE CBC W/AUTO DIFF WBC: CPT

## 2022-03-18 PROCEDURE — 87899 AGENT NOS ASSAY W/OPTIC: CPT

## 2022-03-18 PROCEDURE — 84443 ASSAY THYROID STIM HORMONE: CPT

## 2022-03-18 PROCEDURE — 80074 ACUTE HEPATITIS PANEL: CPT

## 2022-03-18 PROCEDURE — 80306 DRUG TEST PRSMV INSTRMNT: CPT

## 2022-03-18 PROCEDURE — 80053 COMPREHEN METABOLIC PANEL: CPT

## 2022-03-18 PROCEDURE — G0432 EIA HIV-1/HIV-2 SCREEN: HCPCS

## 2022-03-18 PROCEDURE — 36415 COLL VENOUS BLD VENIPUNCTURE: CPT

## 2022-03-18 PROCEDURE — G0480 DRUG TEST DEF 1-7 CLASSES: HCPCS

## 2022-03-18 RX ORDER — BUPRENORPHINE HYDROCHLORIDE AND NALOXONE HYDROCHLORIDE DIHYDRATE 8; 2 MG/1; MG/1
1 TABLET SUBLINGUAL DAILY
Qty: 5 TABLET | Refills: 0 | Status: SHIPPED | OUTPATIENT
Start: 2022-03-18 | End: 2022-03-23 | Stop reason: SDUPTHER

## 2022-03-19 LAB — ETHANOL UR-MCNC: NEGATIVE %

## 2022-03-21 ENCOUNTER — TELEPHONE (OUTPATIENT)
Dept: PSYCHIATRY | Facility: CLINIC | Age: 42
End: 2022-03-21

## 2022-03-21 DIAGNOSIS — F11.20 OPIOID USE DISORDER, SEVERE, DEPENDENCE: ICD-10-CM

## 2022-03-23 LAB
ACCEPTABLE CREAT UR QL: 123.9 MG/DL
NALOXONE UR CFM-MCNC: 275 NG/ML
NORBUP/BUP RATIO: 4.96 RATIO
NORBUPRENORPHINE UR CFM-MCNC: 253 NG/MG CREAT
NORBUPRENORPHINE/CREAT UR: 51 NG/MG CREAT
TRP-LINK: NORMAL

## 2022-03-23 RX ORDER — BUPRENORPHINE HYDROCHLORIDE AND NALOXONE HYDROCHLORIDE DIHYDRATE 8; 2 MG/1; MG/1
1 TABLET SUBLINGUAL DAILY
Qty: 7 TABLET | Refills: 0 | Status: SHIPPED | OUTPATIENT
Start: 2022-03-23 | End: 2022-03-30 | Stop reason: SDUPTHER

## 2022-03-30 ENCOUNTER — TELEMEDICINE (OUTPATIENT)
Dept: PSYCHIATRY | Facility: CLINIC | Age: 42
End: 2022-03-30

## 2022-03-30 DIAGNOSIS — R76.8 HEPATITIS C ANTIBODY POSITIVE IN BLOOD: ICD-10-CM

## 2022-03-30 DIAGNOSIS — K59.03 CONSTIPATION DUE TO OPIOID THERAPY: ICD-10-CM

## 2022-03-30 DIAGNOSIS — Z72.0 TOBACCO ABUSE: ICD-10-CM

## 2022-03-30 DIAGNOSIS — T40.2X5A CONSTIPATION DUE TO OPIOID THERAPY: ICD-10-CM

## 2022-03-30 DIAGNOSIS — F11.20 OPIOID USE DISORDER, SEVERE, DEPENDENCE: Primary | ICD-10-CM

## 2022-03-30 PROCEDURE — 99213 OFFICE O/P EST LOW 20 MIN: CPT | Performed by: NURSE PRACTITIONER

## 2022-03-30 RX ORDER — POLYETHYLENE GLYCOL 3350 17 G/17G
17 POWDER, FOR SOLUTION ORAL DAILY
Qty: 510 G | Refills: 2 | Status: SHIPPED | OUTPATIENT
Start: 2022-03-30 | End: 2022-04-11

## 2022-03-30 RX ORDER — NICOTINE 21 MG/24HR
1 PATCH, TRANSDERMAL 24 HOURS TRANSDERMAL EVERY 24 HOURS
Qty: 30 PATCH | Refills: 2 | Status: SHIPPED | OUTPATIENT
Start: 2022-03-30 | End: 2022-04-11

## 2022-03-30 RX ORDER — BUPRENORPHINE HYDROCHLORIDE AND NALOXONE HYDROCHLORIDE DIHYDRATE 8; 2 MG/1; MG/1
1 TABLET SUBLINGUAL DAILY
Qty: 12 TABLET | Refills: 0 | Status: SHIPPED | OUTPATIENT
Start: 2022-03-30 | End: 2022-04-11 | Stop reason: SDUPTHER

## 2022-03-30 RX ORDER — OXYCODONE HYDROCHLORIDE AND ACETAMINOPHEN 5; 325 MG/1; MG/1
1 TABLET ORAL EVERY 4 HOURS PRN
COMMUNITY
Start: 2022-03-24 | End: 2022-04-28

## 2022-03-30 NOTE — PROGRESS NOTES
Telehealth Visit      This provider is located at [facility], [address] [place] [facility]"" in [place], [place],using a secure [employer] Video Visit through [employer]. Patient is being seen remotely via telehealth at their home address in [place], and stated they are in a secure environment for this session. The patient's condition being diagnosed/treated is appropriate for telemedicine. The provider identified herself as well as her credentials. The patient, and/or patients guardian, consent to be seen remotely, and when consent is given they understand that the consent allows for patient identifiable information to be sent to a third party as needed. They may refuse to be seen remotely at any time. The electronic data is encrypted and password protected, and the patient and/or guardian has been advised of the potential risks to privacy not withstanding such measures.    Patient Name: Aroldo Cai  : 1980   MRN: [id number]       Chief Complaint: Follow Up MOUD    Aroldo Cai is a 41 y.o. male who is here today for follow up on MOUD. Currently taking buprenorphine/naloxone 8-2mg daily. Utilizing split dosing. Cravings well controlled on current dosing. No recurrence of any illicit substance use. Had left hip replacement 3/23/2022 (Dr. Gann). Continued bup/nal throughout hospital stay with approval from Dr. Gann, per pt. Was given Percocet 5/325 mg and ibuprofen 800mg for additional pain control. Has been taking one Percocet 5/325mg tablet about every 4-6 hours. Trying to stretch it out as far as he can between dosing. Mother is dispensing medication. Pain in joint has resolved but has some residual incisional soreness and muscular pain. Doing well with current medication regimen. Agreeable he needs to taper off additional opiates as soon as he can. Having constipation since starting on Subxone. Not taking anything OTC. No other AE noted. Current smoker and did  well with patches while inpatient. Agreeable to try at home. +hx Hep C in the past. Has been treated and had appropriate SVR. Has engaged in high risk behaviors since SVR and is agreeable to retest RNA next time he comes to lab.    History: Started in 2011 after bilateral hip surgery and was started on opioid pain medications.  Went to pain management for about a year post-op. Started buying pain pills off the street and did so for about a year.  Has had several periods of sobriety in the past with rehab, incarceration. Released from shelter 4/2021 and was introduced to heroin by a friend.  Daily use until OD 3/2022 which prompted him to seek treatment.     Triggers: stress, exposure to heroin    Cravings: none recent    Relapse Prevention: MOUD, CBT, peer support    Urine Drug Screen (today's visit) discussed: deferred, telehealth visit    UDS Confirmation: 3/18/2022 +bup    QIANA (PDMP) Reviewed for Current/Active Medications: gabapentin, Suboxone, oxycodone/acetaminophen as expected    Past Surgical History:   Procedure Laterality Date   • ABDOMINAL SURGERY     • BRAIN SURGERY  1994    craniotomy,  right frontal lobe AVM   • CHOLECYSTECTOMY WITH INTRAOPERATIVE CHOLANGIOGRAM N/A 7/27/2021    Procedure: CHOLECYSTECTOMY LAPAROSCOPIC INTRAOPERATIVE CHOLANGIOGRAPHY;  Surgeon: Hardik Blancas MD;  Location: [facility] OR;  Service: General;  Laterality: N/A;   • COLONOSCOPY     • ENDOSCOPY N/A 10/22/2021    Procedure: ESOPHAGOGASTRODUODENOSCOPY WITH BIOPSY;  Surgeon: Hardik Blancas MD;  Location: [facility] ENDOSCOPY;  Service: Gastroenterology;  Laterality: N/A;   • HERNIA REPAIR Right     inguinal   • HIP SURGERY Bilateral        Problem List:  Patient Active Problem List   Diagnosis   • Calculus of gallbladder without cholecystitis without obstruction   • Right upper quadrant abdominal pain   • Nausea and vomiting       Allergy:   Allergies   Allergen Reactions   • Dilantin [Phenytoin] Seizure   • Penicillins  Anaphylaxis   • Tegretol [Carbamazepine] Seizure   • Phenobarbital Seizure   • Latex Rash        Current Medications:   Current Outpatient Medications   Medication Sig Dispense Refill   • buprenorphine-naloxone (SUBOXONE) 8-2 MG per SL tablet Place 1 tablet under the tongue Daily. 12 tablet 0   • tiZANidine (ZANAFLEX) 4 MG tablet Take 4 mg by mouth Daily.     • gabapentin (NEURONTIN) 400 MG capsule Take 400 mg by mouth 2 (Two) Times a Day.     • ibuprofen (ADVIL,MOTRIN) 800 MG tablet Take 800 mg by mouth 3 (Three) Times a Day As Needed.     • levETIRAcetam (KEPPRA) 500 MG tablet Take 1 tablet by mouth 2 (Two) Times a Day. 60 tablet 1   • nicotine (Nicoderm CQ) 21 MG/24HR patch Place 1 patch on the skin as directed by provider Daily. 30 patch 2   • OLANZapine (zyPREXA) 7.5 MG tablet Take 1 tablet by mouth Daily. 30 tablet 2   • ondansetron ODT (ZOFRAN-ODT) 8 MG disintegrating tablet Every 8 (Eight) Hours As Needed.     • oxyCODONE-acetaminophen (PERCOCET) 5-325 MG per tablet Take 1 tablet by mouth Every 4 (Four) Hours As Needed.     • polyethylene glycol (MiraLax) 17 GM/SCOOP powder Take 17 g by mouth Daily. 510 g 2   • promethazine (PHENERGAN) 12.5 MG tablet Take 12.5 mg by mouth 3 (Three) Times a Day As Needed.     • QUEtiapine (SEROquel) 25 MG tablet Take 1-2 tablets by mouth Every Night. 60 tablet 1   • venlafaxine (EFFEXOR) 50 MG tablet Take 1 tablet by mouth 2 (Two) Times a Day. 60 tablet 2   • venlafaxine (EFFEXOR) 75 MG tablet Take 1 tablet by mouth 2 (Two) Times a Day. 60 tablet 2     No current facility-administered medications for this visit.       Past Medical History:  Past Medical History:   Diagnosis Date   • Anesthesia complication     pt reports he is hard to wake after anesthesia   • Anxiety    • Arthritis    • Bipolar 1 disorder (HCC)    • COVID-19 02/2021   • Depression    • Dysphagia     food and liquids.   • Extensive tattoos    • Gout    • Hepatitis C 2018    Patient took medication    •  History of echocardiogram    • Kidney stones    • Pneumonia    • PTSD (post-traumatic stress disorder)    • Seizure (HCC)     last seizure years ago.   • Seizures (HCC)    • Short-term memory loss    • Wears contact lenses    • Wears glasses        Social History:  Social History     Socioeconomic History   • Marital status:    Tobacco Use   • Smoking status: Current Every Day Smoker     Packs/day: 1.00     Years: 30.00     Pack years: 30.00     Types: Cigarettes   • Smokeless tobacco: Never Used   Vaping Use   • Vaping Use: Never used   Substance and Sexual Activity   • Alcohol use: Not Currently     Comment: Occcasionally   • Drug use: Not Currently     Types: Cocaine(coke)     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   • Sexual activity: Defer       Family History:  Family History   Problem Relation Age of Onset   • Diabetes Mother    • Diabetes insipidus Mother    • Heart disease Mother    • Cancer Father         skin   • Diabetes Father    • Hypertension Father    • Arthritis Father    • Diabetes insipidus Father          Subjective      Review of Systems:   Review of Systems   Constitutional: Negative for chills, fatigue and fever.   Respiratory: Negative for shortness of breath.    Cardiovascular: Negative for chest pain.   Gastrointestinal: Positive for constipation. Negative for abdominal pain.   Musculoskeletal: Positive for arthralgias, back pain and gait problem.   Skin: Negative for skin lesions.   Neurological: Negative for seizures and confusion.   Psychiatric/Behavioral: Positive for stress. Negative for hallucinations, sleep disturbance, suicidal ideas and depressed mood. The patient is not nervous/anxious.        PHQ-9 Score:   0    VINICIUS-7 Score: deferred       Patient History:   The following portions of the patient's history were reviewed and updated as appropriate: allergies, current medications, past family history, past medical history, past social history, past surgical history and  problem list.     Social:  Social History     Socioeconomic History   • Marital status:    Tobacco Use   • Smoking status: Current Every Day Smoker     Packs/day: 1.00     Years: 30.00     Pack years: 30.00     Types: Cigarettes   • Smokeless tobacco: Never Used   Vaping Use   • Vaping Use: Never used   Substance and Sexual Activity   • Alcohol use: Not Currently     Comment: Occcasionally   • Drug use: Not Currently     Types: Cocaine(coke)     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   • Sexual activity: Defer       Medications:     Current Outpatient Medications:   •  buprenorphine-naloxone (SUBOXONE) 8-2 MG per SL tablet, Place 1 tablet under the tongue Daily., Disp: 12 tablet, Rfl: 0  •  tiZANidine (ZANAFLEX) 4 MG tablet, Take 4 mg by mouth Daily., Disp: , Rfl:   •  gabapentin (NEURONTIN) 400 MG capsule, Take 400 mg by mouth 2 (Two) Times a Day., Disp: , Rfl:   •  ibuprofen (ADVIL,MOTRIN) 800 MG tablet, Take 800 mg by mouth 3 (Three) Times a Day As Needed., Disp: , Rfl:   •  levETIRAcetam (KEPPRA) 500 MG tablet, Take 1 tablet by mouth 2 (Two) Times a Day., Disp: 60 tablet, Rfl: 1  •  nicotine (Nicoderm CQ) 21 MG/24HR patch, Place 1 patch on the skin as directed by provider Daily., Disp: 30 patch, Rfl: 2  •  OLANZapine (zyPREXA) 7.5 MG tablet, Take 1 tablet by mouth Daily., Disp: 30 tablet, Rfl: 2  •  ondansetron ODT (ZOFRAN-ODT) 8 MG disintegrating tablet, Every 8 (Eight) Hours As Needed., Disp: , Rfl:   •  oxyCODONE-acetaminophen (PERCOCET) 5-325 MG per tablet, Take 1 tablet by mouth Every 4 (Four) Hours As Needed., Disp: , Rfl:   •  polyethylene glycol (MiraLax) 17 GM/SCOOP powder, Take 17 g by mouth Daily., Disp: 510 g, Rfl: 2  •  promethazine (PHENERGAN) 12.5 MG tablet, Take 12.5 mg by mouth 3 (Three) Times a Day As Needed., Disp: , Rfl:   •  QUEtiapine (SEROquel) 25 MG tablet, Take 1-2 tablets by mouth Every Night., Disp: 60 tablet, Rfl: 1  •  venlafaxine (EFFEXOR) 50 MG tablet, Take 1 tablet by  mouth 2 (Two) Times a Day., Disp: 60 tablet, Rfl: 2  •  venlafaxine (EFFEXOR) 75 MG tablet, Take 1 tablet by mouth 2 (Two) Times a Day., Disp: 60 tablet, Rfl: 2    Objective     Physical Exam:  Physical Exam    Vital Signs: There were no vitals filed for this visit.  There is no height or weight on file to calculate BMI.     Mental Status Exam:   Hygiene:   good  Cooperation:  Cooperative  Eye Contact:  Good  Psychomotor Behavior:  Appropriate  Affect:  Full range  Mood: normal  Speech:  Normal  Thought Process:  Goal directed  Thought Content:  Normal  Suicidal:  None  Homicidal:  None  Hallucinations:  None  Delusion:  None  Memory:  Intact  Orientation:  Person, Place, Time and Situation  Reliability:  good  Insight:  Good  Judgement:  Good  Impulse Control:  Good    Assessment / Plan      Assessment/Plan      Visit Diagnoses     Opioid use disorder, severe, dependence (HCC)    -  Primary    Relevant Medications            -Continue buprenorphine/naloxone 8/2mg SL tablet daily           -Will have 1/2 RTC dose at next visit and will need remaining 1/2 dose for that day  -Advisement to take part in and remain active in 12 Step Recovery Meetings, IOP, and/or 1:1 therapy/counseling and to establish/maintain an active relationship with a recovery sponsor. Currently in CBT in our office  -Continued monitoring for illicit substances for patient safety, medication compliance and future care.    Constipation due to opioid therapy        Relevant Medications    -Start polyethylene glycol (MiraLax) 17 GM/SCOOP powder daily  -Adequate fluid and fiber intake encouraged      Tobacco abuse        Relevant Medications    -Start nicotine (Nicoderm CQ) 21 MG/24HR patch per day  -Tips for quitting tobacco made available on portal      Hepatitis C antibody positive in blood        Relevant Orders    HCV RNA By PCR, Qn Rfx Lisa      Diagnoses       Codes Comments    Opioid use disorder, severe, dependence (HCC)    -  Primary  ICD-10-CM: F11.20  ICD-9-CM: 304.00     Constipation due to opioid therapy     ICD-10-CM: K59.03, T40.2X5A  ICD-9-CM: 564.09, E935.2     Tobacco abuse     ICD-10-CM: Z72.0  ICD-9-CM: 305.1     Hepatitis C antibody positive in blood     ICD-10-CM: R76.8  ICD-9-CM: 795.79           Visit Diagnoses:    ICD-10-CM ICD-9-CM   1. Opioid use disorder, severe, dependence (HCC)  F11.20 304.00   2. Constipation due to opioid therapy  K59.03 564.09    T40.2X5A E935.2   3. Tobacco abuse  Z72.0 305.1   4. Hepatitis C antibody positive in blood  R76.8 795.79       PLAN:  1. Safety: No acute safety concerns  2. Risk Assessment: Risk of self-harm acutely is low. Risk of self-harm chronically is also low, but could be further elevated in the event of treatment noncompliance and/or AODA.      TREATMENT PLAN/GOALS: Continue supportive psychotherapy efforts and medications as indicated. Treatment and medication options discussed during today's visit. Patient acknowledged and verbally consented to continue with current treatment plan and was educated on the importance of compliance with treatment and follow-up appointments.      MEDICATION ISSUES:  QIANA reviewed as expected.  Discussed medication options and treatment plan of prescribed medication as well as the risks, benefits, and side effects including potential falls, possible impaired driving and metabolic adversities among others. Patient is agreeable to call the office with any worsening of symptoms or onset of side effects. Patient is agreeable to call 911 or go to the nearest ER should he/she begin having SI/HI. No medication side effects or related complaints today.     MEDS ORDERED DURING VISIT:  New Medications Ordered This Visit   Medications   • buprenorphine-naloxone (SUBOXONE) 8-2 MG per SL tablet     Sig: Place 1 tablet under the tongue Daily.     Dispense:  12 tablet     Refill:  0   • nicotine (Nicoderm CQ) 21 MG/24HR patch     Sig: Place 1 patch on the skin as  directed by provider Daily.     Dispense:  30 patch     Refill:  2   • polyethylene glycol (MiraLax) 17 GM/SCOOP powder     Sig: Take 17 g by mouth Daily.     Dispense:  510 g     Refill:  2       Return in 12 days (on 4/11/2022) for Follow Up Medication.           This document has been electronically signed by PRISCILLA Wen  March 30, 2022 14:14 EDT      Part of this note may be an electronic transcription/translation of spoken language to printed text using the Dragon Dictation System.

## 2022-04-11 ENCOUNTER — LAB (OUTPATIENT)
Dept: LAB | Facility: HOSPITAL | Age: 42
End: 2022-04-11

## 2022-04-11 ENCOUNTER — OFFICE VISIT (OUTPATIENT)
Dept: PSYCHIATRY | Facility: CLINIC | Age: 42
End: 2022-04-11

## 2022-04-11 VITALS
SYSTOLIC BLOOD PRESSURE: 124 MMHG | BODY MASS INDEX: 38.99 KG/M2 | DIASTOLIC BLOOD PRESSURE: 72 MMHG | HEIGHT: 65 IN | WEIGHT: 234 LBS | HEART RATE: 77 BPM

## 2022-04-11 DIAGNOSIS — G89.18 ACUTE POSTOPERATIVE PAIN OF LEFT HIP: ICD-10-CM

## 2022-04-11 DIAGNOSIS — R76.8 HEPATITIS C ANTIBODY POSITIVE IN BLOOD: ICD-10-CM

## 2022-04-11 DIAGNOSIS — F11.20 OPIOID USE DISORDER, SEVERE, DEPENDENCE: Primary | ICD-10-CM

## 2022-04-11 DIAGNOSIS — M25.552 ACUTE POSTOPERATIVE PAIN OF LEFT HIP: ICD-10-CM

## 2022-04-11 PROCEDURE — 87522 HEPATITIS C REVRS TRNSCRPJ: CPT

## 2022-04-11 PROCEDURE — 99213 OFFICE O/P EST LOW 20 MIN: CPT | Performed by: NURSE PRACTITIONER

## 2022-04-11 PROCEDURE — 36415 COLL VENOUS BLD VENIPUNCTURE: CPT

## 2022-04-11 RX ORDER — IBUPROFEN 800 MG/1
800 TABLET ORAL EVERY 8 HOURS PRN
Qty: 45 TABLET | Refills: 0 | Status: SHIPPED | OUTPATIENT
Start: 2022-04-11 | End: 2022-04-26

## 2022-04-11 RX ORDER — BUPRENORPHINE HYDROCHLORIDE AND NALOXONE HYDROCHLORIDE DIHYDRATE 8; 2 MG/1; MG/1
1 TABLET SUBLINGUAL DAILY
Qty: 7 TABLET | Refills: 0 | Status: SHIPPED | OUTPATIENT
Start: 2022-04-11 | End: 2022-04-18 | Stop reason: SDUPTHER

## 2022-04-11 NOTE — PROGRESS NOTES
"     Office  Follow Up Visit      Patient Name: Aroldo Cai  : 1980   MRN: [id number]     Referring Provider: Gerald Dobson MD    Chief Complaint: Substance use    History of Present Illness:   Aroldo Cai is a 41 y.o. male who is here today for follow up on MOUD. Continues taking buprenorphine/naloxone 8-2mg daily. Utilizing split dosing. Had about 2 cravings per week since last appt. Triggered by stress. No recurrence of any illicit substance use. Had left hip replacement 3/23/2022 (Dr. Gann). Continued bup/nal throughout hospital stay with approval from Dr. Gann, per pt. Was given Percocet 5/325 mg and ibuprofen 800mg for additional pain control. Has decreased Percocet use to one 5/325mg tablet daily. Reports he has \"quite a few\" left over from [facility] an discussed it is concerning from an addiction standpoint that he picked up another 60 tabs last week. Mother is dispensing medication. Pain in joint has resolved but has some residual incisional soreness and muscular pain. Was able to mow yard on riding mower this week. Agreeable he needs to taper off additional opiates as soon as he can. Has concerns that pain will increase when he starts PT. Was using heroin for pain mgmt prior to surgery. Would like to return to rehab or other more intense CBT once he no longer needs Percocet. Denies AE of medications. No sedation. Constipation resolved. Not sleeping well and will discuss with psych at upcoming appt. Denies SI/HI.    History: Started in  after bilateral hip surgery and was started on opioid pain medications.  Went to pain management for about a year post-op. Started buying pain pills off the street and did so for about a year.  Has had several periods of sobriety in the past with rehab, incarceration. Released from [facility] 2021 and was introduced to heroin by a friend.  Daily use until OD 3/2022 which prompted him to seek treatment.     Triggers: stress    Cravings: none " recent    Relapse Prevention: MOUD, CBT, peer support    Urine Drug Screen (today's visit) discussed: deferred    UDS Confirmation: 3/18/2022 +bup, +nor-bup    QIANA (PDMP) Reviewed for Current/Active Medications: gabapentin, Suboxone, oxycodone/acetaminophen as expected      Past Surgical History:  Past Surgical History:   Procedure Laterality Date   • ABDOMINAL SURGERY     • BRAIN SURGERY  1994    craniotomy,  right frontal lobe AVM   • CHOLECYSTECTOMY WITH INTRAOPERATIVE CHOLANGIOGRAM N/A 07/27/2021    Procedure: CHOLECYSTECTOMY LAPAROSCOPIC INTRAOPERATIVE CHOLANGIOGRAPHY;  Surgeon: Hardik Blancas MD;  Location: [facility] OR;  Service: General;  Laterality: N/A;   • COLONOSCOPY     • ENDOSCOPY N/A 10/22/2021    Procedure: ESOPHAGOGASTRODUODENOSCOPY WITH BIOPSY;  Surgeon: Hardik Blancas MD;  Location: [facility] ENDOSCOPY;  Service: Gastroenterology;  Laterality: N/A;   • HERNIA REPAIR Right     inguinal   • HIP SURGERY Bilateral    • HIP SURGERY Left        Problem List:  Patient Active Problem List   Diagnosis   • Calculus of gallbladder without cholecystitis without obstruction   • Right upper quadrant abdominal pain   • Nausea and vomiting       Allergy:   Allergies   Allergen Reactions   • Dilantin [Phenytoin] Seizure   • Penicillins Anaphylaxis   • Tegretol [Carbamazepine] Seizure   • Phenobarbital Seizure   • Latex Rash        Current Medications:   Current Outpatient Medications   Medication Sig Dispense Refill   • buprenorphine-naloxone (SUBOXONE) 8-2 MG per SL tablet Place 1 tablet under the tongue Daily for 7 days. 7 tablet 0   • gabapentin (NEURONTIN) 400 MG capsule Take 400 mg by mouth 2 (Two) Times a Day.     • ibuprofen (ADVIL,MOTRIN) 800 MG tablet Take 1 tablet by mouth Every 8 (Eight) Hours As Needed for Moderate Pain  for up to 15 days. 45 tablet 0   • levETIRAcetam (KEPPRA) 500 MG tablet Take 1 tablet by mouth 2 (Two) Times a Day. 60 tablet 1   • OLANZapine (zyPREXA) 7.5 MG tablet Take 1  tablet by mouth Daily. 30 tablet 2   • ondansetron ODT (ZOFRAN-ODT) 8 MG disintegrating tablet Every 8 (Eight) Hours As Needed.     • oxyCODONE-acetaminophen (PERCOCET) 5-325 MG per tablet Take 1 tablet by mouth Every 4 (Four) Hours As Needed.     • promethazine (PHENERGAN) 12.5 MG tablet Take 12.5 mg by mouth 3 (Three) Times a Day As Needed.     • tiZANidine (ZANAFLEX) 4 MG tablet Take 4 mg by mouth Daily.     • venlafaxine (EFFEXOR) 50 MG tablet Take 1 tablet by mouth 2 (Two) Times a Day. 60 tablet 2   • venlafaxine (EFFEXOR) 75 MG tablet Take 1 tablet by mouth 2 (Two) Times a Day. 60 tablet 2     No current facility-administered medications for this visit.       Past Medical History:  Past Medical History:   Diagnosis Date   • Anesthesia complication     pt reports he is hard to wake after anesthesia   • Anxiety    • Arthritis    • Bipolar 1 disorder (HCC)    • COVID-19 02/2021   • Depression    • Dysphagia     food and liquids.   • Extensive tattoos    • Gout    • Hepatitis C 2018    Patient took medication    • History of echocardiogram    • Kidney stones    • Pneumonia    • PTSD (post-traumatic stress disorder)    • Seizure (HCC)     last seizure years ago.   • Seizures (HCC)    • Short-term memory loss    • Wears contact lenses    • Wears glasses        Social History:  Social History     Socioeconomic History   • Marital status:    Tobacco Use   • Smoking status: Current Every Day Smoker     Packs/day: 1.00     Years: 30.00     Pack years: 30.00     Types: Cigarettes   • Smokeless tobacco: Never Used   Vaping Use   • Vaping Use: Never used   Substance and Sexual Activity   • Alcohol use: Not Currently     Comment: Occcasionally   • Drug use: Not Currently     Types: Cocaine(coke)     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   • Sexual activity: Defer       Family History:  Family History   Problem Relation Age of Onset   • Diabetes Mother    • Diabetes insipidus Mother    • Heart disease Mother     • Cancer Father         skin   • Diabetes Father    • Hypertension Father    • Arthritis Father    • Diabetes insipidus Father          Subjective      Review of Systems:   Review of Systems   Constitutional: Positive for fatigue. Negative for chills and fever.   Respiratory: Negative for shortness of breath.    Cardiovascular: Negative for chest pain.   Gastrointestinal: Negative for abdominal pain.   Musculoskeletal: Positive for arthralgias, back pain and gait problem.   Skin: Negative for skin lesions.   Neurological: Negative for seizures and confusion.   Psychiatric/Behavioral: Positive for sleep disturbance, depressed mood and stress. Negative for hallucinations and suicidal ideas. The patient is nervous/anxious.        PHQ-9 Score:  12     VINICIUS-7 Score:   Feeling nervous, anxious or on edge: Nearly every day  Not being able to stop or control worrying: More than half the days  Worrying too much about different things: Several days  Trouble Relaxing: Nearly every day  Being so restless that it is hard to sit still: More than half the days  Feeling afraid as if something awful might happen: Not at all  Becoming easily annoyed or irritable: Nearly every day  VINICIUS 7 Total Score: 14  If you checked any problems, how difficult have these problems made it for you to do your work, take care of things at home, or get along with other people: Somewhat difficult    Patient History:   The following portions of the patient's history were reviewed and updated as appropriate: allergies, current medications, past family history, past medical history, past social history, past surgical history and problem list.     Social:  Social History     Socioeconomic History   • Marital status:    Tobacco Use   • Smoking status: Current Every Day Smoker     Packs/day: 1.00     Years: 30.00     Pack years: 30.00     Types: Cigarettes   • Smokeless tobacco: Never Used   Vaping Use   • Vaping Use: Never used   Substance and Sexual  "Activity   • Alcohol use: Not Currently     Comment: Occcasionally   • Drug use: Not Currently     Types: Cocaine(coke)     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   • Sexual activity: Defer       Medications:     Current Outpatient Medications:   •  buprenorphine-naloxone (SUBOXONE) 8-2 MG per SL tablet, Place 1 tablet under the tongue Daily for 7 days., Disp: 7 tablet, Rfl: 0  •  gabapentin (NEURONTIN) 400 MG capsule, Take 400 mg by mouth 2 (Two) Times a Day., Disp: , Rfl:   •  ibuprofen (ADVIL,MOTRIN) 800 MG tablet, Take 1 tablet by mouth Every 8 (Eight) Hours As Needed for Moderate Pain  for up to 15 days., Disp: 45 tablet, Rfl: 0  •  levETIRAcetam (KEPPRA) 500 MG tablet, Take 1 tablet by mouth 2 (Two) Times a Day., Disp: 60 tablet, Rfl: 1  •  OLANZapine (zyPREXA) 7.5 MG tablet, Take 1 tablet by mouth Daily., Disp: 30 tablet, Rfl: 2  •  ondansetron ODT (ZOFRAN-ODT) 8 MG disintegrating tablet, Every 8 (Eight) Hours As Needed., Disp: , Rfl:   •  oxyCODONE-acetaminophen (PERCOCET) 5-325 MG per tablet, Take 1 tablet by mouth Every 4 (Four) Hours As Needed., Disp: , Rfl:   •  promethazine (PHENERGAN) 12.5 MG tablet, Take 12.5 mg by mouth 3 (Three) Times a Day As Needed., Disp: , Rfl:   •  tiZANidine (ZANAFLEX) 4 MG tablet, Take 4 mg by mouth Daily., Disp: , Rfl:   •  venlafaxine (EFFEXOR) 50 MG tablet, Take 1 tablet by mouth 2 (Two) Times a Day., Disp: 60 tablet, Rfl: 2  •  venlafaxine (EFFEXOR) 75 MG tablet, Take 1 tablet by mouth 2 (Two) Times a Day., Disp: 60 tablet, Rfl: 2    Objective     Physical Exam:  Physical Exam    Vital Signs:   Vitals:    04/11/22 1351   BP: 124/72   Pulse: 77   Weight: 106 kg (234 lb)   Height: 165.1 cm (65\")     Body mass index is 38.94 kg/m².     Mental Status Exam:   Hygiene:   good  Cooperation:  Cooperative  Eye Contact:  Good  Psychomotor Behavior:  Appropriate  Affect:  Full range  Mood: normal  Speech:  Normal  Thought Process:  Goal directed  Thought Content:  " Normal  Suicidal:  None  Homicidal:  None  Hallucinations:  None  Delusion:  None  Memory:  Intact  Orientation:  Grossly intact  Reliability:  good  Insight:  Good  Judgement:  Fair  Impulse Control:  Fair    Assessment / Plan      Assessment/Plan     Visit Diagnoses     Opioid use disorder, severe, dependence (HCC)    -  Primary    Relevant Medications    buprenorphine-naloxone (SUBOXONE) 8-2 MG per SL tablet           -Continue buprenorphine/naloxone 8/2mg mg daily.           -Will have RTC dose at next visit +0 remaining           -Advisement to take part in and remain active in 12 Step Recovery Meetings, IOP, and/or 1:1           therapy/counseling and to establish/maintain an active relationship with a recovery sponsor.            -Continued monitoring for illicit substances for patient safety, medication compliance and future care.           -Scheduled IOP screener      Acute postoperative pain of left hip        Relevant Medications    ibuprofen (ADVIL,MOTRIN) 800 MG tablet   -Discussed utilization of Motrin in place of Percocet. Due to reported symptom burden, it is my expectation that he should be tapering off Percocet within the next 1-2 weeks. Notified he can bring leftover medication in to pharmacy for disposal. Discussed only scenario he should be using them at this point is for severe pain after PT. Has not started PT due to insurance and has phone number to schedule.      Diagnoses       Codes Comments    Opioid use disorder, severe, dependence (HCC)    -  Primary ICD-10-CM: F11.20  ICD-9-CM: 304.00     Acute postoperative pain of left hip     ICD-10-CM: G89.18, M25.552  ICD-9-CM: 719.45, 338.18             PLAN:  1. Safety: No acute safety concerns  2. Risk Assessment: Risk of self-harm acutely is low. Risk of self-harm chronically is also low, but could be further elevated in the event of treatment noncompliance and/or AODA.      TREATMENT PLAN/GOALS: Continue supportive psychotherapy efforts and  medications as indicated. Treatment and medication options discussed during today's visit. Patient acknowledged and verbally consented to continue with current treatment plan and was educated on the importance of compliance with treatment and follow-up appointments.      MEDICATION ISSUES:  QIANA reviewed as expected.  Discussed medication options and treatment plan of prescribed medication as well as the risks, benefits, and side effects including potential falls, possible impaired driving and metabolic adversities among others. Patient is agreeable to call the office with any worsening of symptoms or onset of side effects. Patient is agreeable to call 911 or go to the nearest ER should he/she begin having SI/HI. No medication side effects or related complaints today.     MEDS ORDERED DURING VISIT:  New Medications Ordered This Visit   Medications   • buprenorphine-naloxone (SUBOXONE) 8-2 MG per SL tablet     Sig: Place 1 tablet under the tongue Daily for 7 days.     Dispense:  7 tablet     Refill:  0     DIAZ [id number], provider is aware he is on Percocet post op as well   • ibuprofen (ADVIL,MOTRIN) 800 MG tablet     Sig: Take 1 tablet by mouth Every 8 (Eight) Hours As Needed for Moderate Pain  for up to 15 days.     Dispense:  45 tablet     Refill:  0       Return in 1 week (on 4/18/2022) for Follow Up Medication.           This document has been electronically signed by PRISCILLA Wen  April 11, 2022 15:45 EDT      Part of this note may be an electronic transcription/translation of spoken language to printed text using the Dragon Dictation System.

## 2022-04-13 LAB
HCV GENTYP SERPL NAA+PROBE: NORMAL
HCV RNA SERPL NAA+PROBE-ACNC: NORMAL IU/ML
HCV RNA SERPL NAA+PROBE-LOG IU: NORMAL {LOG_IU}/ML
LABORATORY COMMENT REPORT: NORMAL

## 2022-04-18 ENCOUNTER — OFFICE VISIT (OUTPATIENT)
Dept: PSYCHIATRY | Facility: CLINIC | Age: 42
End: 2022-04-18

## 2022-04-18 ENCOUNTER — LAB (OUTPATIENT)
Dept: LAB | Facility: HOSPITAL | Age: 42
End: 2022-04-18

## 2022-04-18 VITALS
HEART RATE: 99 BPM | HEIGHT: 65 IN | BODY MASS INDEX: 41.15 KG/M2 | WEIGHT: 247 LBS | DIASTOLIC BLOOD PRESSURE: 84 MMHG | SYSTOLIC BLOOD PRESSURE: 142 MMHG

## 2022-04-18 DIAGNOSIS — F11.20 OPIOID USE DISORDER, SEVERE, DEPENDENCE: ICD-10-CM

## 2022-04-18 LAB
AMPHET+METHAMPHET UR QL: NEGATIVE
AMPHETAMINES UR QL: NEGATIVE
BARBITURATES UR QL SCN: NEGATIVE
BENZODIAZ UR QL SCN: NEGATIVE
BUPRENORPHINE SERPL-MCNC: POSITIVE NG/ML
CANNABINOIDS SERPL QL: NEGATIVE
COCAINE UR QL: NEGATIVE
METHADONE UR QL SCN: NEGATIVE
OPIATES UR QL: NEGATIVE
OXYCODONE UR QL SCN: POSITIVE
PCP UR QL SCN: NEGATIVE
PROPOXYPH UR QL: NEGATIVE
TRICYCLICS UR QL SCN: NEGATIVE

## 2022-04-18 PROCEDURE — 80307 DRUG TEST PRSMV CHEM ANLYZR: CPT

## 2022-04-18 PROCEDURE — G0480 DRUG TEST DEF 1-7 CLASSES: HCPCS

## 2022-04-18 PROCEDURE — 99213 OFFICE O/P EST LOW 20 MIN: CPT | Performed by: NURSE PRACTITIONER

## 2022-04-18 RX ORDER — ONDANSETRON HYDROCHLORIDE 8 MG/1
8 TABLET, FILM COATED ORAL 3 TIMES DAILY PRN
COMMUNITY
Start: 2022-04-11 | End: 2022-04-18

## 2022-04-18 RX ORDER — CYCLOBENZAPRINE HCL 10 MG
10 TABLET ORAL
COMMUNITY
Start: 2022-04-11

## 2022-04-18 RX ORDER — BUPRENORPHINE HYDROCHLORIDE AND NALOXONE HYDROCHLORIDE DIHYDRATE 8; 2 MG/1; MG/1
1 TABLET SUBLINGUAL DAILY
Qty: 7 TABLET | Refills: 0 | Status: SHIPPED | OUTPATIENT
Start: 2022-04-18 | End: 2022-04-26 | Stop reason: SDUPTHER

## 2022-04-18 NOTE — PROGRESS NOTES
Office  Follow Up Visit      Patient Name: Aroldo Cai  : 1980   MRN: [id number]     Referring Provider: Gerald Dobson MD    Chief Complaint: Substance use    History of Present Illness:   Aroldo Cai is a 41 y.o. male who is here today for one week follow up on MOUD. Continues taking buprenorphine/naloxone 8-2mg daily. Utilizing split dosing. Having intermittent cravings, triggered by stress and pain. No recurrence of any illicit substance use. Had left hip replacement 3/23/2022 (Dr. Gann). Continued bup/nal throughout hospital stay with approval from Dr. Gann, per pt. Was given Percocet 5/325 mg and ibuprofen 800mg for additional pain control. Has decreased Percocet use to one 5/325mg tablet daily 2-3 times this last week. Previously discussed it is concerning from an addiction standpoint that he picked up another 60 tabs of Percocet when he had not used first bottle. Mother continues dispensing medication. Pain in joint has resolved but has some residual incisional soreness and muscular pain. Starts PT this week. Denies AE of medications. No sedation. Has follow up with psych next week.    History: Started in  after bilateral hip surgery and was started on opioid pain medications.  Went to pain management for about a year post-op. Started buying pain pills off the street and did so for about a year.  Has had several periods of sobriety in the past with rehab, incarceration. Released from halfway 2021 and was introduced to heroin by a friend.  Daily use until OD 3/2022 which prompted him to seek treatment.     Triggers: stress, pain    Cravings: intermittent    Relapse Prevention: MOUD, CBT, peer support, IOP screener scheduled    Urine Drug Screen (today's visit) discussed: will obtain after appt    UDS Confirmation: 3/18/2022 +bup, +nor-bup    QIANA (PDMP) Reviewed for Current/Active Medications: gabapentin, Suboxone, oxycodone/acetaminophen as expected. No new Percocet rx since  last follow up.      Past Surgical History:  Past Surgical History:   Procedure Laterality Date   • ABDOMINAL SURGERY     • BRAIN SURGERY  1994    craniotomy,  right frontal lobe AVM   • CHOLECYSTECTOMY WITH INTRAOPERATIVE CHOLANGIOGRAM N/A 07/27/2021    Procedure: CHOLECYSTECTOMY LAPAROSCOPIC INTRAOPERATIVE CHOLANGIOGRAPHY;  Surgeon: Hardik Blancas MD;  Location: [facility] OR;  Service: General;  Laterality: N/A;   • COLONOSCOPY     • ENDOSCOPY N/A 10/22/2021    Procedure: ESOPHAGOGASTRODUODENOSCOPY WITH BIOPSY;  Surgeon: Hardik Blancas MD;  Location: [facility] ENDOSCOPY;  Service: Gastroenterology;  Laterality: N/A;   • HERNIA REPAIR Right     inguinal   • HIP SURGERY Bilateral    • HIP SURGERY Left        Problem List:  Patient Active Problem List   Diagnosis   • Calculus of gallbladder without cholecystitis without obstruction   • Right upper quadrant abdominal pain   • Nausea and vomiting       Allergy:   Allergies   Allergen Reactions   • Dilantin [Phenytoin] Seizure   • Penicillins Anaphylaxis   • Phenobarbital Seizure   • Tegretol [Carbamazepine] Seizure   • Latex Rash        Current Medications:   Current Outpatient Medications   Medication Sig Dispense Refill   • buprenorphine-naloxone (SUBOXONE) 8-2 MG per SL tablet Place 1 tablet under the tongue Daily for 7 days. 7 tablet 0   • cyclobenzaprine (FLEXERIL) 10 MG tablet Take 10 mg by mouth every night at bedtime.     • ibuprofen (ADVIL,MOTRIN) 800 MG tablet Take 1 tablet by mouth Every 8 (Eight) Hours As Needed for Moderate Pain  for up to 15 days. 45 tablet 0   • levETIRAcetam (KEPPRA) 500 MG tablet Take 1 tablet by mouth 2 (Two) Times a Day. 60 tablet 1   • OLANZapine (zyPREXA) 7.5 MG tablet Take 1 tablet by mouth Daily. 30 tablet 2   • ondansetron ODT (ZOFRAN-ODT) 8 MG disintegrating tablet Every 8 (Eight) Hours As Needed.     • oxyCODONE-acetaminophen (PERCOCET) 5-325 MG per tablet Take 1 tablet by mouth Every 4 (Four) Hours As Needed.     •  venlafaxine (EFFEXOR) 50 MG tablet Take 1 tablet by mouth 2 (Two) Times a Day. 60 tablet 2   • venlafaxine (EFFEXOR) 75 MG tablet Take 1 tablet by mouth 2 (Two) Times a Day. 60 tablet 2     No current facility-administered medications for this visit.       Past Medical History:  Past Medical History:   Diagnosis Date   • Anesthesia complication     pt reports he is hard to wake after anesthesia   • Anxiety    • Arthritis    • Bipolar 1 disorder (HCC)    • COVID-19 02/2021   • Depression    • Dysphagia     food and liquids.   • Extensive tattoos    • Gout    • Hepatitis C 2018    Patient took medication    • History of echocardiogram    • Kidney stones    • Pneumonia    • PTSD (post-traumatic stress disorder)    • Seizure (HCC)     last seizure years ago.   • Seizures (HCC)    • Short-term memory loss    • Wears contact lenses    • Wears glasses        Social History:  Social History     Socioeconomic History   • Marital status:    Tobacco Use   • Smoking status: Current Every Day Smoker     Packs/day: 0.50     Years: 30.00     Pack years: 15.00     Types: Cigarettes   • Smokeless tobacco: Never Used   Vaping Use   • Vaping Use: Never used   Substance and Sexual Activity   • Alcohol use: Not Currently     Comment: Occcasionally   • Drug use: Not Currently     Types: Cocaine(coke)     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   • Sexual activity: Defer       Family History:  Family History   Problem Relation Age of Onset   • Diabetes Mother    • Diabetes insipidus Mother    • Heart disease Mother    • Cancer Father         skin   • Diabetes Father    • Hypertension Father    • Arthritis Father    • Diabetes insipidus Father          Subjective      Review of Systems:   Review of Systems   Constitutional: Positive for fatigue. Negative for chills and fever.   Respiratory: Negative for shortness of breath.    Cardiovascular: Negative for chest pain.   Gastrointestinal: Negative for abdominal pain.    Musculoskeletal: Positive for arthralgias, back pain and gait problem.   Skin: Negative for skin lesions.   Neurological: Negative for seizures and confusion.   Psychiatric/Behavioral: Positive for sleep disturbance and stress. Negative for hallucinations, suicidal ideas and depressed mood. The patient is not nervous/anxious.        PHQ-9 Score:  16    VINICIUS-7 Score:   Feeling nervous, anxious or on edge: Nearly every day  Not being able to stop or control worrying: Nearly every day  Worrying too much about different things: Nearly every day  Trouble Relaxing: More than half the days  Being so restless that it is hard to sit still: Nearly every day  Feeling afraid as if something awful might happen: More than half the days  Becoming easily annoyed or irritable: More than half the days  VINICIUS 7 Total Score: 18  If you checked any problems, how difficult have these problems made it for you to do your work, take care of things at home, or get along with other people: Somewhat difficult    Patient History:   The following portions of the patient's history were reviewed and updated as appropriate: allergies, current medications, past family history, past medical history, past social history, past surgical history and problem list.     Social:  Social History     Socioeconomic History   • Marital status:    Tobacco Use   • Smoking status: Current Every Day Smoker     Packs/day: 0.50     Years: 30.00     Pack years: 15.00     Types: Cigarettes   • Smokeless tobacco: Never Used   Vaping Use   • Vaping Use: Never used   Substance and Sexual Activity   • Alcohol use: Not Currently     Comment: Occcasionally   • Drug use: Not Currently     Types: Cocaine(coke)     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   • Sexual activity: Defer       Medications:     Current Outpatient Medications:   •  buprenorphine-naloxone (SUBOXONE) 8-2 MG per SL tablet, Place 1 tablet under the tongue Daily for 7 days., Disp: 7 tablet, Rfl:  "0  •  cyclobenzaprine (FLEXERIL) 10 MG tablet, Take 10 mg by mouth every night at bedtime., Disp: , Rfl:   •  ibuprofen (ADVIL,MOTRIN) 800 MG tablet, Take 1 tablet by mouth Every 8 (Eight) Hours As Needed for Moderate Pain  for up to 15 days., Disp: 45 tablet, Rfl: 0  •  levETIRAcetam (KEPPRA) 500 MG tablet, Take 1 tablet by mouth 2 (Two) Times a Day., Disp: 60 tablet, Rfl: 1  •  OLANZapine (zyPREXA) 7.5 MG tablet, Take 1 tablet by mouth Daily., Disp: 30 tablet, Rfl: 2  •  ondansetron ODT (ZOFRAN-ODT) 8 MG disintegrating tablet, Every 8 (Eight) Hours As Needed., Disp: , Rfl:   •  oxyCODONE-acetaminophen (PERCOCET) 5-325 MG per tablet, Take 1 tablet by mouth Every 4 (Four) Hours As Needed., Disp: , Rfl:   •  venlafaxine (EFFEXOR) 50 MG tablet, Take 1 tablet by mouth 2 (Two) Times a Day., Disp: 60 tablet, Rfl: 2  •  venlafaxine (EFFEXOR) 75 MG tablet, Take 1 tablet by mouth 2 (Two) Times a Day., Disp: 60 tablet, Rfl: 2    Objective     Physical Exam:  Physical Exam    Vital Signs:   Vitals:    04/18/22 1325   BP: 142/84   Pulse: 99   Weight: 112 kg (247 lb)   Height: 165.1 cm (65\")     Body mass index is 41.1 kg/m².     Mental Status Exam:   Hygiene:   good  Cooperation:  Cooperative  Eye Contact:  Good  Psychomotor Behavior:  Appropriate  Affect:  Full range  Mood: normal  Speech:  Normal  Thought Process:  Goal directed  Thought Content:  Normal  Suicidal:  None  Homicidal:  None  Hallucinations:  None  Delusion:  None  Memory:  Intact  Orientation:  Grossly intact  Reliability:  good  Insight:  Good  Judgement:  Fair  Impulse Control:  Good    Assessment / Plan      Assessment/Plan \  -Continue buprenorphine/naloxone 8-2 mg daily.  -Will have RTC dose at next visit +0 remaining  -Advisement to take part in and remain active in 12 Step Recovery Meetings, IOP, and/or 1:1 therapy/counseling and to establish/maintain an active relationship with a recovery sponsor.   -High risk of relapse at this time. Continued " monitoring for illicit substances for patient safety, medication compliance and future care.  -Goal of being completely off Percocet in 2 weeks as he is starting physical therapy this week.  -He is agreeable to taking leftover opiate pain medication to pharmacy and having it disposed of when it is time. Will have his mother accompany him.    Visit Diagnoses     Opioid use disorder, severe, dependence (HCC)        Relevant Medications    buprenorphine-naloxone (SUBOXONE) 8-2 MG per SL tablet    Other Relevant Orders    Ethanol, Urine - Urine, Clean Catch    Urine Drug Screen - Urine, Clean Catch    Medication Assisted Treatment (MAT) Buprenorphine, Norbuprenorphine, and Naloxone MS Confirmation, Urine - Urine, Clean Catch      Diagnoses       Codes Comments    Opioid use disorder, severe, dependence (HCC)     ICD-10-CM: F11.20  ICD-9-CM: 304.00             PLAN:  1. Safety: No acute safety concerns  2. Risk Assessment: Risk of self-harm acutely is low. Risk of self-harm chronically is also low, but could be further elevated in the event of treatment noncompliance and/or AODA.      TREATMENT PLAN/GOALS: Continue supportive psychotherapy efforts and medications as indicated. Treatment and medication options discussed during today's visit. Patient acknowledged and verbally consented to continue with current treatment plan and was educated on the importance of compliance with treatment and follow-up appointments.      MEDICATION ISSUES:  QIANA reviewed as expected.  Discussed medication options and treatment plan of prescribed medication as well as the risks, benefits, and side effects including potential falls, possible impaired driving and metabolic adversities among others. Patient is agreeable to call the office with any worsening of symptoms or onset of side effects. Patient is agreeable to call 911 or go to the nearest ER should he/she begin having SI/HI. No medication side effects or related complaints today.      MEDS ORDERED DURING VISIT:  New Medications Ordered This Visit   Medications   • buprenorphine-naloxone (SUBOXONE) 8-2 MG per SL tablet     Sig: Place 1 tablet under the tongue Daily for 7 days.     Dispense:  7 tablet     Refill:  0     DIAZ [id number]       Return in 1 week (on 4/25/2022) for Follow Up Medication.           This document has been electronically signed by PRISCILLA Wen  April 18, 2022 13:58 EDT      Part of this note may be an electronic transcription/translation of spoken language to printed text using the Dragon Dictation System.

## 2022-04-19 LAB — ETHANOL UR-MCNC: NEGATIVE %

## 2022-04-20 ENCOUNTER — TREATMENT (OUTPATIENT)
Dept: PHYSICAL THERAPY | Facility: CLINIC | Age: 42
End: 2022-04-20

## 2022-04-20 DIAGNOSIS — Z96.642 STATUS POST TOTAL REPLACEMENT OF LEFT HIP: Primary | ICD-10-CM

## 2022-04-20 PROCEDURE — 97161 PT EVAL LOW COMPLEX 20 MIN: CPT | Performed by: PHYSICAL THERAPIST
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
## 2022-04-23 LAB
ACCEPTABLE CREAT UR QL: 170 MG/DL
LEVEL OF DETECTION:: NORMAL
NALOXONE UR CFM-MCNC: 134 NG/ML
NORBUP/BUP RATIO: 3.42 RATIO
NORBUPRENORPHINE UR CFM-MCNC: 164 NG/MG CREAT
NORBUPRENORPHINE/CREAT UR: 48 NG/MG CREAT
NOROXYCODONE/CREAT UR: 576 NG/MG CREAT
NOROXYMORPHONE/CREAT UR CFM: 112 NG/MG CREAT
OXYCODONE UR QL CFM: NORMAL
OXYCODONE/CREAT UR: 942 NG/MG CREAT
OXYMORPHONE/CREAT UR: 490 NG/MG CREAT
TRP-LINK: NORMAL

## 2022-04-25 ENCOUNTER — OFFICE VISIT (OUTPATIENT)
Dept: PSYCHIATRY | Facility: CLINIC | Age: 42
End: 2022-04-25

## 2022-04-25 DIAGNOSIS — F14.21 COCAINE USE DISORDER, SEVERE, IN SUSTAINED REMISSION: ICD-10-CM

## 2022-04-25 DIAGNOSIS — F11.21 OPIOID USE DISORDER, SEVERE, IN EARLY REMISSION, ON MAINTENANCE THERAPY, DEPENDENCE (HCC): Primary | ICD-10-CM
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
## 2022-04-26 ENCOUNTER — OFFICE VISIT (OUTPATIENT)
Dept: PSYCHIATRY | Facility: CLINIC | Age: 42
End: 2022-04-26

## 2022-04-26 VITALS
DIASTOLIC BLOOD PRESSURE: 90 MMHG | WEIGHT: 250 LBS | HEIGHT: 65 IN | SYSTOLIC BLOOD PRESSURE: 138 MMHG | BODY MASS INDEX: 41.65 KG/M2

## 2022-04-26 DIAGNOSIS — F11.21 OPIOID USE DISORDER, SEVERE, IN EARLY REMISSION, ON MAINTENANCE THERAPY, DEPENDENCE (HCC): Primary | ICD-10-CM

## 2022-04-26 PROBLEM — F11.20 OPIOID USE DISORDER, SEVERE, DEPENDENCE (HCC): Status: ACTIVE | Noted: 2022-04-26

## 2022-04-26 PROCEDURE — 99212 OFFICE O/P EST SF 10 MIN: CPT | Performed by: NURSE PRACTITIONER

## 2022-04-26 RX ORDER — BUPRENORPHINE HYDROCHLORIDE AND NALOXONE HYDROCHLORIDE DIHYDRATE 8; 2 MG/1; MG/1
1 TABLET SUBLINGUAL DAILY
Qty: 7 TABLET | Refills: 0 | Status: SHIPPED | OUTPATIENT
Start: 2022-04-26 | End: 2022-05-03 | Stop reason: SDUPTHER

## 2022-04-26 NOTE — PROGRESS NOTES
Office  Follow Up Visit      Patient Name: Aroldo Cai  : 1980   MRN: [id number]     Referring Provider: Gerald Dobson MD    Chief Complaint: Substance use    History of Present Illness:   Aroldo Cai is a 41 y.o. male who is here today for one week follow up on MOUD. Continues taking buprenorphine/naloxone 8-2mg daily. Utilizing split dosing. Having intermittent cravings, triggered by stress and pain. No recurrence of any illicit substance use. Had left hip replacement 3/23/2022 (Dr. Gann). Continued bup/nal throughout hospital stay with approval from Dr. Gann, per pt. Was given Percocet 5/325 mg and ibuprofen 800mg for additional pain control. Last Percocet use 2022 after mowing the yard. We have mutual goal to have no further use after his visit next week. Does start PT this Friday but is moving well. Mother continues dispensing medication and will contact our office when remaining Percocet have been disposed of. Denies AE of medications. No sedation. Has follow up with psych this week. Had IOP screener and likely starts next week.    History: Started in  after bilateral hip surgery and was started on opioid pain medications.  Went to pain management for about a year post-op. Started buying pain pills off the street and did so for about a year.  Has had several periods of sobriety in the past with rehab, incarceration. Released from care home 2021 and was introduced to heroin by a friend.  Daily use until OD 3/2022 which prompted him to seek treatment.     Triggers: stress, pain    Cravings: intermittent    Relapse Prevention: MOUD, CBT, peer support, IOP     Urine Drug Screen (today's visit) discussed: will obtain after appt    UDS Confirmation: 2022 +bup, +nor-bup, +oxycodone as expected    QIANA (PDMP) Reviewed for Current/Active Medications: gabapentin, Suboxone, oxycodone/acetaminophen as expected. No new Percocet rx since last follow up. (QIANA [id number])    Past  Surgical History:  Past Surgical History:   Procedure Laterality Date   • ABDOMINAL SURGERY     • BRAIN SURGERY  1994    craniotomy,  right frontal lobe AVM   • CHOLECYSTECTOMY WITH INTRAOPERATIVE CHOLANGIOGRAM N/A 07/27/2021    Procedure: CHOLECYSTECTOMY LAPAROSCOPIC INTRAOPERATIVE CHOLANGIOGRAPHY;  Surgeon: Hardik Blancas MD;  Location: [facility];  Service: General;  Laterality: N/A;   • COLONOSCOPY     • ENDOSCOPY N/A 10/22/2021    Procedure: ESOPHAGOGASTRODUODENOSCOPY WITH BIOPSY;  Surgeon: Hardik Blancas MD;  Location: [facility];  Service: Gastroenterology;  Laterality: N/A;   • HERNIA REPAIR Right     inguinal   • HIP SURGERY Bilateral    • HIP SURGERY Left        Problem List:  Patient Active Problem List   Diagnosis   • Calculus of gallbladder without cholecystitis without obstruction   • Right upper quadrant abdominal pain   • Nausea and vomiting   • Opioid use disorder, severe, dependence (HCC)       Allergy:   Allergies   Allergen Reactions   • Dilantin [Phenytoin] Seizure   • Penicillins Anaphylaxis   • Phenobarbital Seizure   • Tegretol [Carbamazepine] Seizure   • Latex Rash        Current Medications:   Current Outpatient Medications   Medication Sig Dispense Refill   • buprenorphine-naloxone (SUBOXONE) 8-2 MG per SL tablet Place 1 tablet under the tongue Daily for 7 days. 7 tablet 0   • cyclobenzaprine (FLEXERIL) 10 MG tablet Take 10 mg by mouth every night at bedtime.     • ibuprofen (ADVIL,MOTRIN) 800 MG tablet Take 1 tablet by mouth Every 8 (Eight) Hours As Needed for Moderate Pain  for up to 15 days. 45 tablet 0   • levETIRAcetam (KEPPRA) 500 MG tablet Take 1 tablet by mouth 2 (Two) Times a Day. 60 tablet 1   • OLANZapine (zyPREXA) 7.5 MG tablet Take 1 tablet by mouth Daily. 30 tablet 2   • ondansetron ODT (ZOFRAN-ODT) 8 MG disintegrating tablet Every 8 (Eight) Hours As Needed.     • oxyCODONE-acetaminophen (PERCOCET) 5-325 MG per tablet Take 1 tablet by mouth Every 4 (Four) Hours  As Needed.     • venlafaxine (EFFEXOR) 50 MG tablet Take 1 tablet by mouth 2 (Two) Times a Day. 60 tablet 2   • venlafaxine (EFFEXOR) 75 MG tablet Take 1 tablet by mouth 2 (Two) Times a Day. 60 tablet 2     No current facility-administered medications for this visit.       Past Medical History:  Past Medical History:   Diagnosis Date   • Anesthesia complication     pt reports he is hard to wake after anesthesia   • Anxiety    • Arthritis    • Bipolar 1 disorder (HCC)    • COVID-19 02/2021   • Depression    • Dysphagia     food and liquids.   • Extensive tattoos    • Gout    • Hepatitis C 2018    Patient took medication    • History of echocardiogram    • Kidney stones    • Pneumonia    • PTSD (post-traumatic stress disorder)    • Seizure (HCC)     last seizure years ago.   • Seizures (HCC)    • Short-term memory loss    • Wears contact lenses    • Wears glasses        Social History:  Social History     Socioeconomic History   • Marital status:    Tobacco Use   • Smoking status: Current Every Day Smoker     Packs/day: 0.50     Years: 30.00     Pack years: 15.00     Types: Cigarettes   • Smokeless tobacco: Never Used   Vaping Use   • Vaping Use: Never used   Substance and Sexual Activity   • Alcohol use: Not Currently     Comment: Occcasionally   • Drug use: Not Currently     Types: Cocaine(coke)     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   • Sexual activity: Defer       Family History:  Family History   Problem Relation Age of Onset   • Diabetes Mother    • Diabetes insipidus Mother    • Heart disease Mother    • Cancer Father         skin   • Diabetes Father    • Hypertension Father    • Arthritis Father    • Diabetes insipidus Father          Subjective      Review of Systems:   Review of Systems   Constitutional: Positive for fatigue. Negative for chills and fever.   Respiratory: Negative for shortness of breath.    Cardiovascular: Negative for chest pain.   Gastrointestinal: Negative for abdominal  pain.   Musculoskeletal: Positive for arthralgias, back pain and gait problem.   Skin: Negative for skin lesions.   Neurological: Negative for seizures and confusion.   Psychiatric/Behavioral: Positive for sleep disturbance, depressed mood and stress. Negative for hallucinations and suicidal ideas. The patient is not nervous/anxious.        PHQ-9 Score:   deferred    VINICIUS-7 Score: deferred       Patient History:   The following portions of the patient's history were reviewed and updated as appropriate: allergies, current medications, past family history, past medical history, past social history, past surgical history and problem list.     Social:  Social History     Socioeconomic History   • Marital status:    Tobacco Use   • Smoking status: Current Every Day Smoker     Packs/day: 0.50     Years: 30.00     Pack years: 15.00     Types: Cigarettes   • Smokeless tobacco: Never Used   Vaping Use   • Vaping Use: Never used   Substance and Sexual Activity   • Alcohol use: Not Currently     Comment: Occcasionally   • Drug use: Not Currently     Types: Cocaine(coke)     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   • Sexual activity: Defer       Medications:     Current Outpatient Medications:   •  buprenorphine-naloxone (SUBOXONE) 8-2 MG per SL tablet, Place 1 tablet under the tongue Daily for 7 days., Disp: 7 tablet, Rfl: 0  •  cyclobenzaprine (FLEXERIL) 10 MG tablet, Take 10 mg by mouth every night at bedtime., Disp: , Rfl:   •  ibuprofen (ADVIL,MOTRIN) 800 MG tablet, Take 1 tablet by mouth Every 8 (Eight) Hours As Needed for Moderate Pain  for up to 15 days., Disp: 45 tablet, Rfl: 0  •  levETIRAcetam (KEPPRA) 500 MG tablet, Take 1 tablet by mouth 2 (Two) Times a Day., Disp: 60 tablet, Rfl: 1  •  OLANZapine (zyPREXA) 7.5 MG tablet, Take 1 tablet by mouth Daily., Disp: 30 tablet, Rfl: 2  •  ondansetron ODT (ZOFRAN-ODT) 8 MG disintegrating tablet, Every 8 (Eight) Hours As Needed., Disp: , Rfl:   •   "oxyCODONE-acetaminophen (PERCOCET) 5-325 MG per tablet, Take 1 tablet by mouth Every 4 (Four) Hours As Needed., Disp: , Rfl:   •  venlafaxine (EFFEXOR) 50 MG tablet, Take 1 tablet by mouth 2 (Two) Times a Day., Disp: 60 tablet, Rfl: 2  •  venlafaxine (EFFEXOR) 75 MG tablet, Take 1 tablet by mouth 2 (Two) Times a Day., Disp: 60 tablet, Rfl: 2    Objective     Physical Exam:  Physical Exam    Vital Signs:   Vitals:    04/26/22 1232   BP: 138/90   Weight: 113 kg (250 lb)   Height: 165.1 cm (65\")     Body mass index is 41.6 kg/m².     Mental Status Exam:   Hygiene:   good  Cooperation:  Cooperative  Eye Contact:  Good  Psychomotor Behavior:  Appropriate  Affect:  Full range  Mood: normal  Speech:  Normal  Thought Process:  Goal directed  Thought Content:  Normal  Suicidal:  None  Homicidal:  None  Hallucinations:  None  Delusion:  None  Memory:  Intact  Orientation:  Grossly intact  Reliability:  good  Insight:  Good  Judgement:  Fair  Impulse Control:  Fair    Assessment / Plan      Assessment/Plan     -Continue buprenorphine/naloxone 8-2 mg daily.  -Will have 1/2 RTC dose at next visit +0 remaining  -Advisement to take part in and remain active in 12 Step Recovery Meetings, IOP, and/or 1:1 therapy/counseling and to establish/maintain an active relationship with a recovery sponsor.   -High risk of relapse at this time. Continued monitoring for illicit substances for patient safety, medication compliance and future care. Encouraged to start IOP.  -Goal of being completely off Percocet in 1 week   -He is agreeable to taking leftover opiate pain medication to pharmacy and having it disposed of when it is time. Will have his mother accompany him.     Visit Diagnoses     Opioid use disorder, severe, in early remission, on maintenance therapy, dependence (HCC)    -  Primary    Relevant Medications    buprenorphine-naloxone (SUBOXONE) 8-2 MG per SL tablet      Diagnoses       Codes Comments    Opioid use disorder, severe, in " early remission, on maintenance therapy, dependence (HCC)    -  Primary ICD-10-CM: F11.21  ICD-9-CM: 304.03             PLAN:  1. Safety: No acute safety concerns  2. Risk Assessment: Risk of self-harm acutely is low. Risk of self-harm chronically is also low, but could be further elevated in the event of treatment noncompliance and/or AODA.      TREATMENT PLAN/GOALS: Continue supportive psychotherapy efforts and medications as indicated. Treatment and medication options discussed during today's visit. Patient acknowledged and verbally consented to continue with current treatment plan and was educated on the importance of compliance with treatment and follow-up appointments.      MEDICATION ISSUES:  QIANA reviewed as expected.  Discussed medication options and treatment plan of prescribed medication as well as the risks, benefits, and side effects including potential falls, possible impaired driving and metabolic adversities among others. Patient is agreeable to call the office with any worsening of symptoms or onset of side effects. Patient is agreeable to call 911 or go to the nearest ER should he/she begin having SI/HI. No medication side effects or related complaints today.     MEDS ORDERED DURING VISIT:  New Medications Ordered This Visit   Medications   • buprenorphine-naloxone (SUBOXONE) 8-2 MG per SL tablet     Sig: Place 1 tablet under the tongue Daily for 7 days.     Dispense:  7 tablet     Refill:  0     DIAZ [id number]       Return in 1 week (on 5/3/2022) for Follow Up Medication.           This document has been electronically signed by PRISCILLA Wen  April 26, 2022 12:56 EDT      Part of this note may be an electronic transcription/translation of spoken language to printed text using the Dragon Dictation System.

## 2022-04-28 ENCOUNTER — DOCUMENTATION (OUTPATIENT)
Dept: PSYCHIATRY | Facility: HOSPITAL | Age: 42
End: 2022-04-28

## 2022-04-28 ENCOUNTER — OFFICE VISIT (OUTPATIENT)
Dept: PSYCHIATRY | Facility: CLINIC | Age: 42
End: 2022-04-28

## 2022-04-28 VITALS
BODY MASS INDEX: 24.99 KG/M2 | SYSTOLIC BLOOD PRESSURE: 128 MMHG | HEIGHT: 65 IN | WEIGHT: 150 LBS | HEART RATE: 86 BPM | DIASTOLIC BLOOD PRESSURE: 80 MMHG

## 2022-04-28 DIAGNOSIS — F31.81 BIPOLAR II DISORDER: Primary | Chronic | ICD-10-CM

## 2022-04-28 DIAGNOSIS — F41.1 GENERALIZED ANXIETY DISORDER: Chronic | ICD-10-CM

## 2022-04-28 DIAGNOSIS — G47.09 OTHER INSOMNIA: Chronic | ICD-10-CM

## 2022-04-28 PROCEDURE — 99214 OFFICE O/P EST MOD 30 MIN: CPT | Performed by: NURSE PRACTITIONER

## 2022-04-28 RX ORDER — CLONIDINE HYDROCHLORIDE 0.1 MG/1
0.1 TABLET ORAL NIGHTLY
Qty: 30 TABLET | Refills: 2 | Status: SHIPPED | OUTPATIENT
Start: 2022-04-28 | End: 2022-06-28 | Stop reason: SDUPTHER

## 2022-04-28 RX ORDER — OLANZAPINE 5 MG/1
5 TABLET ORAL NIGHTLY
Qty: 30 TABLET | Refills: 2 | Status: SHIPPED | OUTPATIENT
Start: 2022-04-28 | End: 2022-08-29 | Stop reason: SDUPTHER

## 2022-04-28 NOTE — PROGRESS NOTES
Clinician this date (4/28/2022) received message that patient had called and is starting CD-IOP on Monday. Clinician this date called patient ([phone]) at 01:02 PM and he stated he would be starting CD-IOP on Monday.     -Duy Garcia, POW, CSW

## 2022-04-28 NOTE — PROGRESS NOTES
"Chief Complaint  Anxiety, Depression, Manic Behavior, and Sleeping Problem    Subjective          Aroldo Cai presents to [facility] for medication management of his anxiety, bipolar disorder, sleeping difficulties.    History of Present Illness: Patient presents today for follow-up appointment after last being seen on 03/17/2022.  His hip surgery went well, and he is going to start physical therapy 2 times a week.  \"It is unreal how much better it feels.  I wish I had not years ago\".  At his last appointment, he was started on Seroquel 25 to 50 mg nightly.  He says today he did not start that medication because he was afraid of experiencing adverse effects like he experienced when he took it in the past.  He has been struggling since his last appointment still, especially with impulsivity.  He says he struggles with doing things that he knows he should not do, but still doing them anyway.  He is also struggling with his weight, having gained a little over 20 pounds so far this year.  Patient denies any new or significant issues with sleep.  Patient denies any SI/HI, A/V hallucinations.    Current Medications:   Current Outpatient Medications   Medication Sig Dispense Refill   • buprenorphine-naloxone (SUBOXONE) 8-2 MG per SL tablet Place 1 tablet under the tongue Daily for 7 days. 7 tablet 0   • cyclobenzaprine (FLEXERIL) 10 MG tablet Take 10 mg by mouth every night at bedtime.     • levETIRAcetam (KEPPRA) 500 MG tablet Take 1 tablet by mouth 2 (Two) Times a Day. 60 tablet 1   • OLANZapine (zyPREXA) 5 MG tablet Take 1 tablet by mouth Every Night. 30 tablet 2   • ondansetron ODT (ZOFRAN-ODT) 8 MG disintegrating tablet Every 8 (Eight) Hours As Needed.     • venlafaxine (EFFEXOR) 50 MG tablet Take 1 tablet by mouth 2 (Two) Times a Day. 60 tablet 2   • venlafaxine (EFFEXOR) 75 MG tablet Take 1 tablet by mouth 2 (Two) Times a Day. 60 tablet 2   • cloNIDine (Catapres) 0.1 MG " "tablet Take 1 tablet by mouth Every Night. 30 tablet 2     No current facility-administered medications for this visit.       Mental Status Exam:   Hygiene:   good  Cooperation:  Cooperative  Eye Contact:  Good  Psychomotor Behavior:  Restless  Affect:  Appropriate  Mood: anxious  Speech:  Normal  Thought Process:  Goal directed  Thought Content:  Mood congruent  Suicidal:  None  Homicidal:  None  Hallucinations:  None  Delusion:  None  Memory:  Intact  Orientation:  Person, Place, Time and Situation  Reliability:  fair  Insight:  Fair  Judgement:  Fair  Impulse Control:  Fair  Physical/Medical Issues:  Seizure disorder       Objective   Vital Signs:   /80   Pulse 86   Ht 165.1 cm (65\")   Wt 68 kg (150 lb)   BMI 24.96 kg/m²     Physical Exam  Neurological:      Mental Status: He is oriented to person, place, and time. Mental status is at baseline.      Coordination: Coordination is intact.      Gait: Gait is intact.   Psychiatric:         Behavior: Behavior is cooperative.        Result Review :     The following data was reviewed by: PRISCILLA Lawson on 04/28/2022:    Data reviewed: Previous notes, medication history          Assessment and Plan    Problem List Items Addressed This Visit    None     Visit Diagnoses     Bipolar II disorder (HCC)  (Chronic)   -  Primary    Relevant Medications    cloNIDine (Catapres) 0.1 MG tablet    OLANZapine (zyPREXA) 5 MG tablet    Generalized anxiety disorder  (Chronic)       Relevant Medications    cloNIDine (Catapres) 0.1 MG tablet    OLANZapine (zyPREXA) 5 MG tablet    Other insomnia  (Chronic)       Relevant Medications    cloNIDine (Catapres) 0.1 MG tablet    OLANZapine (zyPREXA) 5 MG tablet          PHQ-9 Score:   PHQ-9 Total Score: 15      Depression Screening:  Patient screened positive for depression based on a PHQ-9 score of 15 on 4/28/2022. Follow-up recommendations include: Prescribed antidepressant medication treatment and Suicide Risk Assessment " "performed.        Tobacco Cessation:  Aroldo Cai  reports that he has been smoking cigarettes. He has a 15.00 pack-year smoking history. He has never used smokeless tobacco.. I have educated him on the risk of diseases from using tobacco products such as cancer, COPD and heart disease.     I advised him to quit and he is not willing to quit.    I spent 3  minutes counseling the patient.      Impression/Plan:  -This is a follow-up appointment.  Patient presents today reports he has been struggling over the last month, especially with impulsivity.  He says he is unable to stop himself from doing things, despite knowing he should not do them.  He has been clean now, and participating in MAT with PRISCILLA Wen for approximately 6 weeks.  Patient has been eating excessively, which I advised could be related to his Zyprexa, but also could be \"replacement addiction\" after stopping heroin.  Advised patient we could try reducing his Zyprexa, as well as adding clonidine to see if this provides him some relief.  Patient is open to making medication changes.  -Discontinue Seroquel 25 to 50 mg nightly.  Patient never started this medication.  -Maintain Effexor 125 mg twice daily.  -Decrease Zyprexa to 5 mg nightly.  -Start clonidine 0.1 mg nightly.  -Schedule follow-up appointment for 1 month or as needed.    MEDS ORDERED DURING VISIT:  New Medications Ordered This Visit   Medications   • cloNIDine (Catapres) 0.1 MG tablet     Sig: Take 1 tablet by mouth Every Night.     Dispense:  30 tablet     Refill:  2   • OLANZapine (zyPREXA) 5 MG tablet     Sig: Take 1 tablet by mouth Every Night.     Dispense:  30 tablet     Refill:  2         Follow Up   Return in about 1 month (around 5/28/2022), or if symptoms worsen or fail to improve, for Next scheduled follow up.  Patient was given instructions and counseling regarding his condition or for health maintenance advice. Please see specific information pulled into the AVS if " appropriate.       TREATMENT PLAN/GOALS: Continue supportive psychotherapy efforts and medications as indicated. Treatment and medication options discussed during today's visit. Patient acknowledged and verbally consented to continue with current treatment plan and was educated on the importance of compliance with treatment and follow-up appointments.    MEDICATION ISSUES:  Discussed medication options and treatment plan of prescribed medication as well as the risks, benefits, and side effects including potential falls, possible impaired driving and metabolic adversities among others. Patient is agreeable to call the office with any worsening of symptoms or onset of side effects. Patient is agreeable to call 911 or go to the nearest ER should he/she begin having SI/HI.          This document has been electronically signed by PRISCILLA Raphael, PMHNP-BC  April 28, 2022 12:44 EDT      Part of this note may be an electronic transcription/translation of spoken language to printed text using the Dragon Dictation System.

## 2022-05-03 ENCOUNTER — OFFICE VISIT (OUTPATIENT)
Dept: PSYCHIATRY | Facility: CLINIC | Age: 42
End: 2022-05-03

## 2022-05-03 ENCOUNTER — LAB (OUTPATIENT)
Dept: LAB | Facility: HOSPITAL | Age: 42
End: 2022-05-03

## 2022-05-03 VITALS
WEIGHT: 246 LBS | BODY MASS INDEX: 40.98 KG/M2 | DIASTOLIC BLOOD PRESSURE: 88 MMHG | HEART RATE: 91 BPM | HEIGHT: 65 IN | SYSTOLIC BLOOD PRESSURE: 132 MMHG

## 2022-05-03 DIAGNOSIS — F11.20 OPIOID USE DISORDER, SEVERE, DEPENDENCE: ICD-10-CM

## 2022-05-03 DIAGNOSIS — Z72.0 TOBACCO ABUSE: ICD-10-CM

## 2022-05-03 DIAGNOSIS — F11.21 OPIOID USE DISORDER, SEVERE, IN EARLY REMISSION, ON MAINTENANCE THERAPY, DEPENDENCE (HCC): Primary | ICD-10-CM

## 2022-05-03 LAB
AMPHET+METHAMPHET UR QL: NEGATIVE
AMPHETAMINES UR QL: NEGATIVE
BARBITURATES UR QL SCN: NEGATIVE
BENZODIAZ UR QL SCN: NEGATIVE
BUPRENORPHINE SERPL-MCNC: POSITIVE NG/ML
CANNABINOIDS SERPL QL: NEGATIVE
COCAINE UR QL: NEGATIVE
METHADONE UR QL SCN: NEGATIVE
OPIATES UR QL: NEGATIVE
OXYCODONE UR QL SCN: NEGATIVE
PCP UR QL SCN: NEGATIVE
PROPOXYPH UR QL: NEGATIVE
TRICYCLICS UR QL SCN: POSITIVE

## 2022-05-03 PROCEDURE — 99212 OFFICE O/P EST SF 10 MIN: CPT | Performed by: NURSE PRACTITIONER

## 2022-05-03 PROCEDURE — G0480 DRUG TEST DEF 1-7 CLASSES: HCPCS

## 2022-05-03 PROCEDURE — 80307 DRUG TEST PRSMV CHEM ANLYZR: CPT

## 2022-05-03 RX ORDER — BUPRENORPHINE HYDROCHLORIDE AND NALOXONE HYDROCHLORIDE DIHYDRATE 8; 2 MG/1; MG/1
1.5 TABLET SUBLINGUAL DAILY
Qty: 11 TABLET | Refills: 0 | Status: SHIPPED | OUTPATIENT
Start: 2022-05-03 | End: 2022-05-10 | Stop reason: SDUPTHER

## 2022-05-03 NOTE — PROGRESS NOTES
Office  Follow Up Visit      Patient Name: Aroldo Cai  : 1980   MRN: [id number]     Referring Provider: Gerald Dobson MD    Chief Complaint: Substance use    History of Present Illness:   Aroldo Cai is a 41 y.o. male who is here today for one week follow up on MOUD. Continues taking buprenorphine/naloxone 8-2mg daily. Utilizing split dosing. Having 3-4 cravings daily. Triggered by stress and pain. No recurrence of any illicit substance use. Had left hip replacement 3/23/2022 (Dr. Gann). Continued bup/nal throughout hospital stay with approval from Dr. Gann, per pt. Was given Percocet 5/325 mg and ibuprofen 800mg for additional pain control.Had mutual goal to have no further use after his visit this week. Last use 5-6 days ago and UDS negative for oxycodone today. Still has not started PT. Taking ibuprofen PRN. Sp[facility]. She has been dispensing medication and has gotten rid of any remaining Percocet doses. Unable to start IOP last night as he had to take his father to appt. Will start tomorrow night. Denies AE of medications. Continues smoking. Patches not covered by insurance. Agreeable to try and taper.    History: Started in  after bilateral hip surgery and was started on opioid pain medications.  Went to pain management for about a year post-op. Started buying pain pills off the street and did so for about a year.  Has had several periods of sobriety in the past with rehab, incarceration. Released from correction 2021 and was introduced to heroin by a friend.  Daily use until OD 3/2022 which prompted him to seek treatment.     Triggers: stress, pain    Cravings: 3-4 per day    Relapse Prevention: MOUD, CBT, peer support, IOP     Urine Drug Screen (today's visit) discussed: +TCA, +BUP    UDS Confirmation: 2022 +bup, +nor-bup, +oxycodone as expected    QIANA (PDMP) Reviewed for Current/Active Medications: gabapentin, Suboxone,  oxycodone/acetaminophen (last picked-up 4/8/2022) as expected.     Past Surgical History:  Past Surgical History:   Procedure Laterality Date   • ABDOMINAL SURGERY     • BRAIN SURGERY  1994    craniotomy,  right frontal lobe AVM   • CHOLECYSTECTOMY WITH INTRAOPERATIVE CHOLANGIOGRAM N/A 07/27/2021    Procedure: CHOLECYSTECTOMY LAPAROSCOPIC INTRAOPERATIVE CHOLANGIOGRAPHY;  Surgeon: Hardik Blancas MD;  Location: [facility] OR;  Service: General;  Laterality: N/A;   • COLONOSCOPY     • ENDOSCOPY N/A 10/22/2021    Procedure: ESOPHAGOGASTRODUODENOSCOPY WITH BIOPSY;  Surgeon: Hardik Blancas MD;  Location: [facility] ENDOSCOPY;  Service: Gastroenterology;  Laterality: N/A;   • HERNIA REPAIR Right     inguinal   • HIP SURGERY Bilateral    • HIP SURGERY Left        Problem List:  Patient Active Problem List   Diagnosis   • Calculus of gallbladder without cholecystitis without obstruction   • Right upper quadrant abdominal pain   • Nausea and vomiting   • Opioid use disorder, severe, dependence (HCC)       Allergy:   Allergies   Allergen Reactions   • Dilantin [Phenytoin] Seizure   • Penicillins Anaphylaxis   • Phenobarbital Seizure   • Tegretol [Carbamazepine] Seizure   • Latex Rash        Current Medications:   Current Outpatient Medications   Medication Sig Dispense Refill   • buprenorphine-naloxone (SUBOXONE) 8-2 MG per SL tablet Place 1.5 tablets under the tongue Daily for 7 days. 11 tablet 0   • cloNIDine (Catapres) 0.1 MG tablet Take 1 tablet by mouth Every Night. 30 tablet 2   • cyclobenzaprine (FLEXERIL) 10 MG tablet Take 10 mg by mouth every night at bedtime.     • levETIRAcetam (KEPPRA) 500 MG tablet Take 1 tablet by mouth 2 (Two) Times a Day. 60 tablet 1   • OLANZapine (zyPREXA) 5 MG tablet Take 1 tablet by mouth Every Night. 30 tablet 2   • ondansetron ODT (ZOFRAN-ODT) 8 MG disintegrating tablet Every 8 (Eight) Hours As Needed.     • venlafaxine (EFFEXOR) 50 MG tablet Take 1 tablet by mouth 2 (Two) Times a Day. 60  tablet 2   • venlafaxine (EFFEXOR) 75 MG tablet Take 1 tablet by mouth 2 (Two) Times a Day. 60 tablet 2     No current facility-administered medications for this visit.       Past Medical History:  Past Medical History:   Diagnosis Date   • Anesthesia complication     pt reports he is hard to wake after anesthesia   • Anxiety    • Arthritis    • Bipolar 1 disorder (HCC)    • COVID-19 02/2021   • Depression    • Dysphagia     food and liquids.   • Extensive tattoos    • Gout    • Hepatitis C 2018    Patient took medication    • History of echocardiogram    • Kidney stones    • Pneumonia    • PTSD (post-traumatic stress disorder)    • Seizure (HCC)     last seizure years ago.   • Seizures (HCC)    • Short-term memory loss    • Wears contact lenses    • Wears glasses        Social History:  Social History     Socioeconomic History   • Marital status:    Tobacco Use   • Smoking status: Current Every Day Smoker     Packs/day: 0.50     Years: 30.00     Pack years: 15.00     Types: Cigarettes   • Smokeless tobacco: Never Used   Vaping Use   • Vaping Use: Never used   Substance and Sexual Activity   • Alcohol use: Not Currently   • Drug use: Not Currently     Types: Cocaine(coke)     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   • Sexual activity: Defer       Family History:  Family History   Problem Relation Age of Onset   • Diabetes Mother    • Diabetes insipidus Mother    • Heart disease Mother    • Cancer Father         skin   • Diabetes Father    • Hypertension Father    • Arthritis Father    • Diabetes insipidus Father          Subjective      Review of Systems:   Review of Systems   Constitutional: Positive for fatigue. Negative for chills and fever.   Respiratory: Negative for shortness of breath.    Cardiovascular: Negative for chest pain.   Gastrointestinal: Negative for abdominal pain.   Musculoskeletal: Positive for arthralgias, back pain and gait problem.   Skin: Negative for skin lesions.    Neurological: Negative for seizures and confusion.   Psychiatric/Behavioral: Positive for depressed mood and stress. Negative for hallucinations, sleep disturbance and suicidal ideas. The patient is not nervous/anxious.        PHQ-9 Score:   21    VINICIUS-7 Score:   Feeling nervous, anxious or on edge: Nearly every day  Not being able to stop or control worrying: More than half the days  Worrying too much about different things: More than half the days  Trouble Relaxing: More than half the days  Being so restless that it is hard to sit still: Not at all  Feeling afraid as if something awful might happen: Not at all  Becoming easily annoyed or irritable: Several days  VINICIUS 7 Total Score: 10  If you checked any problems, how difficult have these problems made it for you to do your work, take care of things at home, or get along with other people: Somewhat difficult    Patient History:   The following portions of the patient's history were reviewed and updated as appropriate: allergies, current medications, past family history, past medical history, past social history, past surgical history and problem list.     Social:  Social History     Socioeconomic History   • Marital status:    Tobacco Use   • Smoking status: Current Every Day Smoker     Packs/day: 0.50     Years: 30.00     Pack years: 15.00     Types: Cigarettes   • Smokeless tobacco: Never Used   Vaping Use   • Vaping Use: Never used   Substance and Sexual Activity   • Alcohol use: Not Currently   • Drug use: Not Currently     Types: Cocaine(coke)     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   • Sexual activity: Defer       Medications:     Current Outpatient Medications:   •  buprenorphine-naloxone (SUBOXONE) 8-2 MG per SL tablet, Place 1.5 tablets under the tongue Daily for 7 days., Disp: 11 tablet, Rfl: 0  •  cloNIDine (Catapres) 0.1 MG tablet, Take 1 tablet by mouth Every Night., Disp: 30 tablet, Rfl: 2  •  cyclobenzaprine (FLEXERIL) 10 MG tablet,  "Take 10 mg by mouth every night at bedtime., Disp: , Rfl:   •  levETIRAcetam (KEPPRA) 500 MG tablet, Take 1 tablet by mouth 2 (Two) Times a Day., Disp: 60 tablet, Rfl: 1  •  OLANZapine (zyPREXA) 5 MG tablet, Take 1 tablet by mouth Every Night., Disp: 30 tablet, Rfl: 2  •  ondansetron ODT (ZOFRAN-ODT) 8 MG disintegrating tablet, Every 8 (Eight) Hours As Needed., Disp: , Rfl:   •  venlafaxine (EFFEXOR) 50 MG tablet, Take 1 tablet by mouth 2 (Two) Times a Day., Disp: 60 tablet, Rfl: 2  •  venlafaxine (EFFEXOR) 75 MG tablet, Take 1 tablet by mouth 2 (Two) Times a Day., Disp: 60 tablet, Rfl: 2    Objective     Physical Exam:  Physical Exam    Vital Signs:   Vitals:    05/03/22 1238   BP: 132/88   Pulse: 91   Weight: 112 kg (246 lb)   Height: 165.1 cm (65\")     Body mass index is 40.94 kg/m².     Mental Status Exam:   Hygiene:   good  Cooperation:  Cooperative  Eye Contact:  Good  Psychomotor Behavior:  Appropriate  Affect:  Full range  Mood: normal  Speech:  Normal  Thought Process:  Goal directed  Thought Content:  Normal  Suicidal:  None  Homicidal:  None  Hallucinations:  None  Delusion:  None  Memory:  Intact  Orientation:  Person, Place, Time and Situation  Reliability:  good  Insight:  Good  Judgement:  Fair  Impulse Control:  Fair    Assessment / Plan      Assessment/Plan     -Increase buprenorphine/naloxone 12-3 mg daily.  -Will have full RTC dose at next visit +0 remaining  -Advisement to take part in and remain active in 12 Step Recovery Meetings, IOP, and/or 1:1 therapy/counseling and to establish/maintain an active relationship with a recovery sponsor.   -High risk of relapse at this time. Continued monitoring for illicit substances for patient safety, medication compliance and future care.   -Will start IOP tomorow     Visit Diagnoses     Opioid use disorder, severe, in early remission, on maintenance therapy, dependence (HCC)    -  Primary    Relevant Medications    buprenorphine-naloxone (SUBOXONE) 8-2 MG " per SL tablet    Tobacco abuse          Diagnoses       Codes Comments    Opioid use disorder, severe, in early remission, on maintenance therapy, dependence (HCC)    -  Primary ICD-10-CM: F11.21  ICD-9-CM: 304.03     Tobacco abuse     ICD-10-CM: Z72.0  ICD-9-CM: 305.1             PLAN:  1. Safety: No acute safety concerns  2. Risk Assessment: Risk of self-harm acutely is low. Risk of self-harm chronically is also low, but could be further elevated in the event of treatment noncompliance and/or AODA.      TREATMENT PLAN/GOALS: Continue supportive psychotherapy efforts and medications as indicated. Treatment and medication options discussed during today's visit. Patient acknowledged and verbally consented to continue with current treatment plan and was educated on the importance of compliance with treatment and follow-up appointments.      MEDICATION ISSUES:  QIANA reviewed as expected.  Discussed medication options and treatment plan of prescribed medication as well as the risks, benefits, and side effects including potential falls, possible impaired driving and metabolic adversities among others. Patient is agreeable to call the office with any worsening of symptoms or onset of side effects. Patient is agreeable to call 911 or go to the nearest ER should he/she begin having SI/HI. No medication side effects or related complaints today.     MEDS ORDERED DURING VISIT:  New Medications Ordered This Visit   Medications   • buprenorphine-naloxone (SUBOXONE) 8-2 MG per SL tablet     Sig: Place 1.5 tablets under the tongue Daily for 7 days.     Dispense:  11 tablet     Refill:  0     DIAZ [id number]       Return in 1 week (on 5/10/2022) for Follow Up Medication.           This document has been electronically signed by PRISCILLA Wen  May 3, 2022 13:17 EDT      Part of this note may be an electronic transcription/translation of spoken language to printed text using the Dragon Dictation System.

## 2022-05-04 ENCOUNTER — TREATMENT (OUTPATIENT)
Dept: PHYSICAL THERAPY | Facility: CLINIC | Age: 42
End: 2022-05-04

## 2022-05-04 ENCOUNTER — OFFICE VISIT (OUTPATIENT)
Dept: PSYCHIATRY | Facility: HOSPITAL | Age: 42
End: 2022-05-04

## 2022-05-04 DIAGNOSIS — F11.20 OPIOID USE DISORDER, SEVERE, DEPENDENCE: Primary | ICD-10-CM

## 2022-05-04 DIAGNOSIS — F14.21 COCAINE USE DISORDER, SEVERE, IN SUSTAINED REMISSION: ICD-10-CM

## 2022-05-04 DIAGNOSIS — Z96.642 STATUS POST TOTAL REPLACEMENT OF LEFT HIP: Primary | ICD-10-CM

## 2022-05-04 LAB
ETHANOL UR-MCNC: NEGATIVE %
SPECIMEN STATUS: NORMAL

## 2022-05-04 PROCEDURE — 97140 MANUAL THERAPY 1/> REGIONS: CPT | Performed by: PHYSICAL THERAPIST

## 2022-05-04 PROCEDURE — 97110 THERAPEUTIC EXERCISES: CPT | Performed by: PHYSICAL THERAPIST

## 2022-05-04 PROCEDURE — H0015 ALCOHOL AND/OR DRUG SERVICES: HCPCS | Performed by: NURSE PRACTITIONER

## 2022-05-04 NOTE — PROGRESS NOTES
Physical Therapy Daily Progress Note      Visit #: 2    Aroldo Cai reports 4/10 pain today at rest.  Pt reports that his L hip has been very sore and painful. Pt reports that he has been walking a lot and mowing. Pt reports that he thought he would be doing better at this time.         Objective Pt present to PT today with no distress at rest.     Pt educated on surgery and prognosis/healing time.     Pt with no increased pain in the L hip today with activities.     Pt with tenderness over the L GT and down the ITB.     Pt educated to continue to be mobile but to reduce activity if needed to help reduce pain in the hip.       See Exercise, Manual, and Modality Logs for complete treatment.     Assessment/Plan  Pt continues to have good mobility in the L hip and good function although his pain is worse due to increased activity. Pt to follow up later this week to help improve L hip function, strength, and pain.       Progress per Plan of Care      Visit Diagnosis:    ICD-10-CM ICD-9-CM   1. Status post total replacement of left hip  Z96.642 V43.64            Manual Therapy:    16     mins  73452;  Therapeutic Exercise:    13     mins  30829;     Neuromuscular Eliezer:        mins  59655;    Therapeutic Activity:          mins  69251;     Gait Training:           mins  80700;     Ultrasound:          mins  39392;    Electrical Stimulation:         mins  36991 ( );  Dry Needling          mins self-pay  Iontophoresis          mins [id number]      Timed Treatment:   29   mins   Total Treatment:     56   mins    Dileep Rose, PT  Physical Therapist

## 2022-05-05 ENCOUNTER — DOCUMENTATION (OUTPATIENT)
Dept: PSYCHIATRY | Facility: HOSPITAL | Age: 42
End: 2022-05-05

## 2022-05-05 ENCOUNTER — OFFICE VISIT (OUTPATIENT)
Dept: PSYCHIATRY | Facility: HOSPITAL | Age: 42
End: 2022-05-05

## 2022-05-05 DIAGNOSIS — F11.20 OPIOID USE DISORDER, SEVERE, DEPENDENCE: Primary | ICD-10-CM

## 2022-05-05 PROCEDURE — H0015 ALCOHOL AND/OR DRUG SERVICES: HCPCS | Performed by: NURSE PRACTITIONER

## 2022-05-05 NOTE — PROGRESS NOTES
Note that on initial CD-IOP intake completed on 4/25/2022, diagnoses should be Opioid Use Disorder, severe, on maintenance therapy, dependence (F11.20) and Cocaine Use Disorder, severe, in sustained remission (F14.21).     -Duy Garcia, POW, CSW

## 2022-05-07 LAB
ACCEPTABLE CREAT UR QL: 259 MG/DL
NALOXONE UR CFM-MCNC: 310 NG/ML
NORBUP/BUP RATIO: 2.91 RATIO
NORBUPRENORPHINE UR CFM-MCNC: 137 NG/MG CREAT
NORBUPRENORPHINE/CREAT UR: 47 NG/MG CREAT
TRP-LINK: NORMAL

## 2022-05-09 ENCOUNTER — OFFICE VISIT (OUTPATIENT)
Dept: PSYCHIATRY | Facility: HOSPITAL | Age: 42
End: 2022-05-09

## 2022-05-09 DIAGNOSIS — F11.20 OPIOID USE DISORDER, SEVERE, DEPENDENCE: Primary | ICD-10-CM

## 2022-05-09 PROCEDURE — H0015 ALCOHOL AND/OR DRUG SERVICES: HCPCS | Performed by: NURSE PRACTITIONER

## 2022-05-10 ENCOUNTER — TREATMENT (OUTPATIENT)
Dept: PHYSICAL THERAPY | Facility: CLINIC | Age: 42
End: 2022-05-10

## 2022-05-10 ENCOUNTER — OFFICE VISIT (OUTPATIENT)
Dept: PSYCHIATRY | Facility: CLINIC | Age: 42
End: 2022-05-10

## 2022-05-10 ENCOUNTER — LAB (OUTPATIENT)
Dept: LAB | Facility: HOSPITAL | Age: 42
End: 2022-05-10

## 2022-05-10 VITALS
HEIGHT: 65 IN | SYSTOLIC BLOOD PRESSURE: 140 MMHG | HEART RATE: 86 BPM | DIASTOLIC BLOOD PRESSURE: 88 MMHG | WEIGHT: 248 LBS | BODY MASS INDEX: 41.32 KG/M2

## 2022-05-10 DIAGNOSIS — Z96.642 STATUS POST TOTAL REPLACEMENT OF LEFT HIP: Primary | ICD-10-CM

## 2022-05-10 DIAGNOSIS — F11.21 OPIOID USE DISORDER, SEVERE, IN EARLY REMISSION, ON MAINTENANCE THERAPY, DEPENDENCE (HCC): Primary | ICD-10-CM

## 2022-05-10 DIAGNOSIS — F11.20 OPIOID USE DISORDER, SEVERE, DEPENDENCE: ICD-10-CM

## 2022-05-10 PROCEDURE — 97110 THERAPEUTIC EXERCISES: CPT | Performed by: PHYSICAL THERAPIST

## 2022-05-10 PROCEDURE — 80307 DRUG TEST PRSMV CHEM ANLYZR: CPT

## 2022-05-10 PROCEDURE — 99212 OFFICE O/P EST SF 10 MIN: CPT | Performed by: NURSE PRACTITIONER

## 2022-05-10 PROCEDURE — G0480 DRUG TEST DEF 1-7 CLASSES: HCPCS

## 2022-05-10 PROCEDURE — 97140 MANUAL THERAPY 1/> REGIONS: CPT | Performed by: PHYSICAL THERAPIST

## 2022-05-10 RX ORDER — IBUPROFEN 800 MG/1
TABLET ORAL
COMMUNITY
Start: 2022-05-09

## 2022-05-10 RX ORDER — OMEPRAZOLE 40 MG/1
CAPSULE, DELAYED RELEASE ORAL
COMMUNITY
Start: 2022-05-09

## 2022-05-10 RX ORDER — TRAZODONE HYDROCHLORIDE 100 MG/1
TABLET ORAL
COMMUNITY
Start: 2022-05-09 | End: 2022-08-29 | Stop reason: SDUPTHER

## 2022-05-10 RX ORDER — BUPRENORPHINE HYDROCHLORIDE AND NALOXONE HYDROCHLORIDE DIHYDRATE 8; 2 MG/1; MG/1
2 TABLET SUBLINGUAL DAILY
Qty: 14 TABLET | Refills: 0 | Status: SHIPPED | OUTPATIENT
Start: 2022-05-10 | End: 2022-05-17 | Stop reason: SDUPTHER

## 2022-05-10 NOTE — PROGRESS NOTES
Physical Therapy Daily Progress Note      Visit #: 3    Aroldo Cai reports 5-6/10 pain today at rest.  Pt reports that he still has pain in the L hip on the outside. Pt states that he is having to take care of everything around the house ane his parents so he is walking and doing a lot of chores. Pt         Objective Pt present to PT today with no distress at rest.     Pt educated about infection of the hip and symptoms that may indicate infection. Pt not showing symptoms at this time.     Pt with no redness over the incision. Pt has pain and tenderness and swelling just posterior to the incision.     Pt limited to table exercises today due to pain in the L hip.       See Exercise, Manual, and Modality Logs for complete treatment.     Assessment/Plan  Pt continues to have improved motion in the L hip and is able to cross his legs now although still has a lot of pain. Pt to continue with PT to help improve L hip pain, strength, and motion to increase function and activity tolerance.       Progress per Plan of Care      Visit Diagnosis:    ICD-10-CM ICD-9-CM   1. Status post total replacement of left hip  Z96.642 V43.64            Manual Therapy:    12     mins  04797;  Therapeutic Exercise:    16     mins  02664;     Neuromuscular Eliezer:        mins  08642;    Therapeutic Activity:          mins  24349;     Gait Training:           mins  59171;     Ultrasound:          mins  45812;    Electrical Stimulation:         mins  04665 ( );  Dry Needling          mins self-pay  Iontophoresis          mins 70085      Timed Treatment:   28   mins   Total Treatment:     43   mins    Dileep Rose, PT  Physical Therapist

## 2022-05-10 NOTE — PROGRESS NOTES
Office  Follow Up Visit      Patient Name: Aroldo Cai  : 1980   MRN: [id number]     Referring Provider: Gerald Dobson MD    Chief Complaint: Substance use    History of Present Illness:   Aroldo Cai is a 41 y.o. male who is here today for one week follow up on MOUD. Buprenorphine/naloxone increased to 12-3mg daily. Utilizing split dosing and following 15/15/15 administration rule. Continues having about 3 cravings per day. Triggered by stress and pain. No recurrence of any illicit substance use since last visit. Started IOP and has been active participant. Finds content very useful. Continues in PT for hip and has appt today. No other complaints.    History:   Substance use started in  after bilateral hip surgery and was started on opioid pain medications.  Went to pain management for about a year post-op. Started buying pain pills off the street and did so for about a year.  Has had several periods of sobriety in the past with rehab, incarceration. Released from assisted 2021 and was introduced to heroin by a friend.  Daily use until OD 3/2022 which prompted him to seek treatment. Had left hip replacement 3/23/2022 (Dr. Gann). Continued bup/nal throughout hospital stay with approval from Dr. Gann, per pt. Was given Percocet 5/325 mg and ibuprofen 800mg for additional pain control. Percocet was tapered off and he has had not further use. Mother disposed of remaining medication.     Triggers: stress, pain    Cravings: 3-4 per day    Relapse Prevention: MOUD, CBT, peer support, IOP     Urine Drug Screen (today's visit) discussed: +bup, +TCA    UDS Confirmation: 5/3/2022 +bup, +nor-bup, +TCA    QIANA (PDMP) Reviewed for Current/Active Medications: gabapentin, Suboxone, oxycodone/acetaminophen (last picked-up 2022) as expected.    Past Surgical History:  Past Surgical History:   Procedure Laterality Date   • ABDOMINAL SURGERY     • BRAIN SURGERY      craniotomy,  right frontal  lobe AVM   • CHOLECYSTECTOMY WITH INTRAOPERATIVE CHOLANGIOGRAM N/A 07/27/2021    Procedure: CHOLECYSTECTOMY LAPAROSCOPIC INTRAOPERATIVE CHOLANGIOGRAPHY;  Surgeon: Hardik Blancas MD;  Location: [facility] OR;  Service: General;  Laterality: N/A;   • COLONOSCOPY     • ENDOSCOPY N/A 10/22/2021    Procedure: ESOPHAGOGASTRODUODENOSCOPY WITH BIOPSY;  Surgeon: Hardik Blancas MD;  Location: [facility] ENDOSCOPY;  Service: Gastroenterology;  Laterality: N/A;   • HERNIA REPAIR Right     inguinal   • HIP SURGERY Bilateral    • HIP SURGERY Left        Problem List:  Patient Active Problem List   Diagnosis   • Calculus of gallbladder without cholecystitis without obstruction   • Right upper quadrant abdominal pain   • Nausea and vomiting   • Opioid use disorder, severe, dependence (HCC)       Allergy:   Allergies   Allergen Reactions   • Dilantin [Phenytoin] Seizure   • Penicillins Anaphylaxis   • Phenobarbital Seizure   • Tegretol [Carbamazepine] Seizure   • Latex Rash        Current Medications:   Current Outpatient Medications   Medication Sig Dispense Refill   • buprenorphine-naloxone (SUBOXONE) 8-2 MG per SL tablet Place 2 tablets under the tongue Daily for 7 days. 14 tablet 0   • cloNIDine (Catapres) 0.1 MG tablet Take 1 tablet by mouth Every Night. 30 tablet 2   • cyclobenzaprine (FLEXERIL) 10 MG tablet Take 10 mg by mouth every night at bedtime.     • ibuprofen (ADVIL,MOTRIN) 800 MG tablet      • levETIRAcetam (KEPPRA) 500 MG tablet Take 1 tablet by mouth 2 (Two) Times a Day. 60 tablet 1   • OLANZapine (zyPREXA) 5 MG tablet Take 1 tablet by mouth Every Night. 30 tablet 2   • omeprazole (priLOSEC) 40 MG capsule      • ondansetron ODT (ZOFRAN-ODT) 8 MG disintegrating tablet Every 8 (Eight) Hours As Needed.     • traZODone (DESYREL) 100 MG tablet      • venlafaxine (EFFEXOR) 50 MG tablet Take 1 tablet by mouth 2 (Two) Times a Day. 60 tablet 2   • venlafaxine (EFFEXOR) 75 MG tablet Take 1 tablet by mouth 2 (Two) Times a Day.  60 tablet 2     No current facility-administered medications for this visit.       Past Medical History:  Past Medical History:   Diagnosis Date   • Anesthesia complication     pt reports he is hard to wake after anesthesia   • Anxiety    • Arthritis    • Bipolar 1 disorder (HCC)    • COVID-19 02/2021   • Depression    • Dysphagia     food and liquids.   • Extensive tattoos    • Gout    • Hepatitis C 2018    Patient took medication    • History of echocardiogram    • Kidney stones    • Pneumonia    • PTSD (post-traumatic stress disorder)    • Seizure (HCC)     last seizure years ago.   • Seizures (HCC)    • Short-term memory loss    • Wears contact lenses    • Wears glasses        Social History:  Social History     Socioeconomic History   • Marital status:    Tobacco Use   • Smoking status: Current Every Day Smoker     Packs/day: 0.50     Years: 30.00     Pack years: 15.00     Types: Cigarettes   • Smokeless tobacco: Never Used   Vaping Use   • Vaping Use: Never used   Substance and Sexual Activity   • Alcohol use: Not Currently   • Drug use: Not Currently     Types: Cocaine(coke)     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   • Sexual activity: Defer       Family History:  Family History   Problem Relation Age of Onset   • Diabetes Mother    • Diabetes insipidus Mother    • Heart disease Mother    • Cancer Father         skin   • Diabetes Father    • Hypertension Father    • Arthritis Father    • Diabetes insipidus Father          Subjective      Review of Systems:   Review of Systems   Constitutional: Positive for fatigue. Negative for chills and fever.   Respiratory: Negative for shortness of breath.    Cardiovascular: Negative for chest pain.   Gastrointestinal: Negative for abdominal pain.   Musculoskeletal: Positive for arthralgias, back pain and gait problem.   Skin: Negative for skin lesions.   Neurological: Negative for seizures and confusion.   Psychiatric/Behavioral: Positive for depressed  mood and stress. Negative for hallucinations, sleep disturbance and suicidal ideas. The patient is nervous/anxious.        PHQ-9 Score:       VINICIUS-7 Score:   Feeling nervous, anxious or on edge: More than half the days  Not being able to stop or control worrying: Several days  Worrying too much about different things: More than half the days  Trouble Relaxing: Nearly every day  Being so restless that it is hard to sit still: More than half the days  Feeling afraid as if something awful might happen: Not at all  Becoming easily annoyed or irritable: More than half the days  VINICIUS 7 Total Score: 12  If you checked any problems, how difficult have these problems made it for you to do your work, take care of things at home, or get along with other people: Somewhat difficult    Patient History:   The following portions of the patient's history were reviewed and updated as appropriate: allergies, current medications, past family history, past medical history, past social history, past surgical history and problem list.     Social:  Social History     Socioeconomic History   • Marital status:    Tobacco Use   • Smoking status: Current Every Day Smoker     Packs/day: 0.50     Years: 30.00     Pack years: 15.00     Types: Cigarettes   • Smokeless tobacco: Never Used   Vaping Use   • Vaping Use: Never used   Substance and Sexual Activity   • Alcohol use: Not Currently   • Drug use: Not Currently     Types: Cocaine(coke)     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   • Sexual activity: Defer       Medications:     Current Outpatient Medications:   •  buprenorphine-naloxone (SUBOXONE) 8-2 MG per SL tablet, Place 2 tablets under the tongue Daily for 7 days., Disp: 14 tablet, Rfl: 0  •  cloNIDine (Catapres) 0.1 MG tablet, Take 1 tablet by mouth Every Night., Disp: 30 tablet, Rfl: 2  •  cyclobenzaprine (FLEXERIL) 10 MG tablet, Take 10 mg by mouth every night at bedtime., Disp: , Rfl:   •  ibuprofen (ADVIL,MOTRIN) 800 MG  "tablet, , Disp: , Rfl:   •  levETIRAcetam (KEPPRA) 500 MG tablet, Take 1 tablet by mouth 2 (Two) Times a Day., Disp: 60 tablet, Rfl: 1  •  OLANZapine (zyPREXA) 5 MG tablet, Take 1 tablet by mouth Every Night., Disp: 30 tablet, Rfl: 2  •  omeprazole (priLOSEC) 40 MG capsule, , Disp: , Rfl:   •  ondansetron ODT (ZOFRAN-ODT) 8 MG disintegrating tablet, Every 8 (Eight) Hours As Needed., Disp: , Rfl:   •  traZODone (DESYREL) 100 MG tablet, , Disp: , Rfl:   •  venlafaxine (EFFEXOR) 50 MG tablet, Take 1 tablet by mouth 2 (Two) Times a Day., Disp: 60 tablet, Rfl: 2  •  venlafaxine (EFFEXOR) 75 MG tablet, Take 1 tablet by mouth 2 (Two) Times a Day., Disp: 60 tablet, Rfl: 2    Objective     Physical Exam:  Physical Exam    Vital Signs:   Vitals:    05/10/22 1227   BP: 140/88   Pulse: 86   Weight: 112 kg (248 lb)   Height: 165.1 cm (65\")     Body mass index is 41.27 kg/m².     Mental Status Exam:   Hygiene:   good  Cooperation:  Cooperative  Eye Contact:  Good  Psychomotor Behavior:  Appropriate  Affect:  Full range  Mood: normal  Speech:  Normal  Thought Process:  Goal directed  Thought Content:  Normal  Suicidal:  None  Homicidal:  None  Hallucinations:  None  Delusion:  None  Memory:  Intact  Orientation:  Person, Place, Time and Situation  Reliability:  good  Insight:  Good  Judgement:  Good  Impulse Control:  Good    Assessment / Plan      ASSESSMENT/PLAN  -Increase buprenorphine/naloxone to 16-4 mg daily.  -Will have 1/2 RTC dose at next visit   -Advisement to take part in and remain active in 12 Step Recovery Meetings, IOP, and/or 1:1 therapy/counseling and to establish/maintain an active relationship with a recovery sponsor.   -High risk of relapse at this time. Continued monitoring for illicit substances for patient safety, medication compliance and future care.   -Continue IOP, peer support  -Utilization of coping mechanisms to control cravings encouraged     Visit Diagnoses     Opioid use disorder, severe, in early " remission, on maintenance therapy, dependence (HCC)    -  Primary    Relevant Medications    buprenorphine-naloxone (SUBOXONE) 8-2 MG per SL tablet      Diagnoses       Codes Comments    Opioid use disorder, severe, in early remission, on maintenance therapy, dependence (HCC)    -  Primary ICD-10-CM: F11.21  ICD-9-CM: 304.03           PLAN:  1. Safety: No acute safety concerns  2. Risk Assessment: Risk of self-harm acutely is low. Risk of self-harm chronically is also low, but could be further elevated in the event of treatment noncompliance and/or AODA.      TREATMENT PLAN/GOALS: Continue supportive psychotherapy efforts and medications as indicated. Treatment and medication options discussed during today's visit. Patient acknowledged and verbally consented to continue with current treatment plan and was educated on the importance of compliance with treatment and follow-up appointments.      MEDICATION ISSUES:  QIANA reviewed as expected.  Discussed medication options and treatment plan of prescribed medication as well as the risks, benefits, and side effects including potential falls, possible impaired driving and metabolic adversities among others. Patient is agreeable to call the office with any worsening of symptoms or onset of side effects. Patient is agreeable to call 911 or go to the nearest ER should he/she begin having SI/HI. No medication side effects or related complaints today.     MEDS ORDERED DURING VISIT:  New Medications Ordered This Visit   Medications   • buprenorphine-naloxone (SUBOXONE) 8-2 MG per SL tablet     Sig: Place 2 tablets under the tongue Daily for 7 days.     Dispense:  14 tablet     Refill:  0     DIAZ [id number]       Return in 1 week (on 5/17/2022) for Follow Up Medication.           This document has been electronically signed by PRISCILLA Wen  May 10, 2022 12:55 EDT      Part of this note may be an electronic transcription/translation of spoken language to printed text  using the Dragon Dictation System.

## 2022-05-11 ENCOUNTER — OFFICE VISIT (OUTPATIENT)
Dept: PSYCHIATRY | Facility: HOSPITAL | Age: 42
End: 2022-05-11

## 2022-05-11 DIAGNOSIS — F11.20 OPIOID USE DISORDER, SEVERE, DEPENDENCE: Primary | ICD-10-CM

## 2022-05-11 DIAGNOSIS — F14.21 COCAINE USE DISORDER, SEVERE, IN SUSTAINED REMISSION: ICD-10-CM

## 2022-05-11 LAB — ETHANOL UR-MCNC: NEGATIVE %

## 2022-05-11 PROCEDURE — H0015 ALCOHOL AND/OR DRUG SERVICES: HCPCS | Performed by: NURSE PRACTITIONER

## 2022-05-12 ENCOUNTER — OFFICE VISIT (OUTPATIENT)
Dept: PSYCHIATRY | Facility: HOSPITAL | Age: 42
End: 2022-05-12

## 2022-05-12 ENCOUNTER — TREATMENT (OUTPATIENT)
Dept: PHYSICAL THERAPY | Facility: CLINIC | Age: 42
End: 2022-05-12

## 2022-05-12 DIAGNOSIS — F11.20 OPIOID USE DISORDER, SEVERE, DEPENDENCE: Primary | ICD-10-CM

## 2022-05-12 DIAGNOSIS — Z96.642 STATUS POST TOTAL REPLACEMENT OF LEFT HIP: Primary | ICD-10-CM

## 2022-05-12 DIAGNOSIS — F14.21 COCAINE USE DISORDER, SEVERE, IN SUSTAINED REMISSION: ICD-10-CM

## 2022-05-12 PROCEDURE — H0015 ALCOHOL AND/OR DRUG SERVICES: HCPCS | Performed by: NURSE PRACTITIONER

## 2022-05-12 PROCEDURE — 97110 THERAPEUTIC EXERCISES: CPT | Performed by: PHYSICAL THERAPIST

## 2022-05-12 PROCEDURE — 97140 MANUAL THERAPY 1/> REGIONS: CPT | Performed by: PHYSICAL THERAPIST

## 2022-05-12 NOTE — PROGRESS NOTES
Physical Therapy Daily Progress Note      Visit #: 4    Aroldo Cai reports 5-6/10 pain today at rest.  Pt reports that he got into his surgeon for an appointment this afternoon so will see him right after today's session. Pt reports that he still has pain on the outside of the L hip that is keeping him from performing pain free activities. Pt reports that unfortunately, he does not have the luxury of resting right now as he is helping take care of his parents.         Objective Pt present to PT today with no distress at rest.     Pt with tenderness and swelling posterior to the incision although no redness noted.     Pt with good motion in the L hip with PROM.     Pt with no increased pain in the L hip following activities today.       See Exercise, Manual, and Modality Logs for complete treatment.     Assessment/Plan  Pt continues to have a lot of pain in the L hip. Pt continues to have limited function due to pain and weakness. Pt is unable to perform a lot of strengthening activities due to pain at this time. Pt will follow up and continue as tolerated.       Progress per Plan of Care      Visit Diagnosis:    ICD-10-CM ICD-9-CM   1. Status post total replacement of left hip  Z96.642 V43.64            Manual Therapy:    12     mins  90301;  Therapeutic Exercise:    16     mins  80149;     Neuromuscular Eliezer:        mins  87829;    Therapeutic Activity:          mins  83078;     Gait Training:           mins  16845;     Ultrasound:          mins  78119;    Electrical Stimulation:         mins  59278 ( );  Dry Needling          mins self-pay  Iontophoresis          mins 80812      Timed Treatment:   28   mins   Total Treatment:     42   mins    Dileep Rose, PT  Physical Therapist

## 2022-05-12 NOTE — PROGRESS NOTES
NAME: Aroldo Cai  DATE: 05/04/2022    [facility]   [id number]    Number of participants:   IOP GROUP NOTE     DATA:     3 hour IOP group therapy session (Check-ins, Coping Skills, Relapse Prevention)     Check Ins: Therapist continued facilitation of rapport building strategies between group members. Therapist asked that each patient check in with home life and recovery efforts and identify triggers, cravings, and high risk situations that arise between group sessions. Therapist provided empathy and support during group session.     Session Content/Coping Skills: Check ins completed by group members. [employer] Recovery article “powerful affirmations for addiction recovery” reviewed (UpTo). Clinician educated group members on the use of affirmations in recovery. Micheline “Morley” psychoeducational material reviewed with group members.     Response: Client attended class in person. Client participated in completion of check in form.   Client on check in form denied suicidal thoughts or plan or intent to hurt self currently or in the past 7 days. Client on check in form denied homicidal thoughts or plan or intent to hurt self or others currently or in the past 7 days. Client participated in group discussions and review of psychoeducational material. Following group, clinician observed patient make report #[id number]. Patient on check in form marked yes to utilizing new supports, concerns with sleep, concerns with appetite, and medication concerns in the past 7 days.     Personal Assessment 0-10 Scale (10 worst)    Anxiety:  10   Depression:  7   Cravings: 8     ASSESSMENT:     ..  Lab on 05/03/2022   Component Date Value Ref Range Status   • Ethanol, Urine 05/03/2022 Negative  Cutoff=0.020 % Final   • THC, Screen, Urine 05/03/2022 Negative  Negative Final   • Phencyclidine (PCP), Urine 05/03/2022 Negative  Negative Final   • Cocaine Screen, Urine 05/03/2022 Negative  Negative Final    • Methamphetamine, Ur 05/03/2022 Negative  Negative Final   • Opiate Screen 05/03/2022 Negative  Negative Final   • Amphetamine Screen, Urine 05/03/2022 Negative  Negative Final   • Benzodiazepine Screen, Urine 05/03/2022 Negative  Negative Final   • Tricyclic Antidepressants Screen 05/03/2022 Positive (A) Negative Final   • Methadone Screen, Urine 05/03/2022 Negative  Negative Final   • Barbiturates Screen, Urine 05/03/2022 Negative  Negative Final   • Oxycodone Screen, Urine 05/03/2022 Negative  Negative Final   • Propoxyphene Screen 05/03/2022 Negative  Negative Final   • Buprenorphine, Screen, Urine 05/03/2022 Positive (A) Negative Final   • Creatinine, Urine 05/03/2022 259.0  >=20 mg/dL Final   • Buprenorphine/CR 05/03/2022 47  ng/mg creat Final   • NORBUPRENORPHINE/CR 05/03/2022 137  ng/mg creat Final   • NORBUP/BUP RATIO 05/03/2022 2.91  RATIO Final    This test was developed and its performance characteristics  determined by [facility].  It has not been cleared or approved  by the Food and Drug Administration.   • Naloxone 05/03/2022 310  Cutoff=25 ng/ml Final   • TRP-LINK 05/03/2022 Comment   Final    These test results were not transmitted to [facility].  If you would like your patient's urine drug  test results to transmit to [facility], please  contact your [facility]  to complete  a physician authorization form.   • Specimen Status 05/03/2022 Comment   Final    Sent to Reference Lab  Written Authorization  Written Authorization  Written Authorization Received.  Authorization received from original order  [id number] 05-  Logged by Apurva Dunne on 04/18/2022   Component Date Value Ref Range Status   • Ethanol, Urine 04/18/2022 Negative  Cutoff=0.020 % Final   • THC, Screen, Urine 04/18/2022 Negative  Negative Final   • Phencyclidine (PCP), Urine 04/18/2022 Negative  Negative Final   • Cocaine Screen, Urine 04/18/2022 Negative  Negative Final   •  Methamphetamine, Ur 04/18/2022 Negative  Negative Final   • Opiate Screen 04/18/2022 Negative  Negative Final   • Amphetamine Screen, Urine 04/18/2022 Negative  Negative Final   • Benzodiazepine Screen, Urine 04/18/2022 Negative  Negative Final   • Tricyclic Antidepressants Screen 04/18/2022 Negative  Negative Final   • Methadone Screen, Urine 04/18/2022 Negative  Negative Final   • Barbiturates Screen, Urine 04/18/2022 Negative  Negative Final   • Oxycodone Screen, Urine 04/18/2022 Positive (A) Negative Final   • Propoxyphene Screen 04/18/2022 Negative  Negative Final   • Buprenorphine, Screen, Urine 04/18/2022 Positive (A) Negative Final   • Creatinine, Urine 04/18/2022 170.0  >=20 mg/dL Final   • Buprenorphine/CR 04/18/2022 48  ng/mg creat Final   • NORBUPRENORPHINE/CR 04/18/2022 164  ng/mg creat Final   • NORBUP/BUP RATIO 04/18/2022 3.42  RATIO Final    This test was developed and its performance characteristics  determined by [facility]  It has not been cleared or approved  by the Food and Drug Administration.   • Naloxone 04/18/2022 134  Cutoff=25 ng/ml Final   • TRP-LINK 04/18/2022 Comment   Final    These test results were not transmitted to [facility].  If you would like your patient's urine drug  test results to transmit to [facility], please  contact your [employer]  to complete  a physician authorization form.   • Oxycodone Class Ur 04/18/2022 +POSITIVE+   Final   • Oxycodone, Confirmation, Urine 04/18/2022 942  ng/mg creat Final   • Oxymorphone UR 04/18/2022 490  ng/mg creat Final   • Opiates, Noroxycodone, Urine 04/18/2022 576  ng/mg creat Final   • Noroxymorphone 04/18/2022 112  ng/mg creat Final    Expected metabolism of oxycodone class drugs:   Parent Drug       Detected Metabolites   -----------       --------------------   Oxycodone:        Oxymorphone, Noroxycodone, Noroxymorphone   Oxymorphone:      Noroxymorphone   • Level of Detection: 04/18/2022 Comment    Final    Testing Threshold:  50 ng/mL                                                                       '  This test was developed and its performance characteristics  determined by [facility].  It has not been cleared or approved  by the Food and Drug Administration.       Mental Status Exam  Hygiene:  good  Dress: casual  Attitude: cooperative and agreeable   Motor Activity: appropriate  Eye Contact:  good  Speech: regular rate and rhythm   Mood:  calm and cooperative  Affect:  Appropriate  Thought Processes:  Linear  Thought Content:  Normal  Suicidal Thoughts:  denies  Homicidal Thoughts:  denies  Crisis Safety Plan: yes, to come to the emergency room.  Hallucinations:  denies  Reliability: fair  Insight: fair  Judgement: fair  Impulse Control: fair    Family issues related to recovery:  No change, see previous encounters    Recovery/spiritual support group attendance: No     Sponsor: No     Motivation for treatment: During initial assessment, patient reported “my kids”.     Patient's Support Network Includes: During initial assessment, patient reported sober support system is “my family and youngest son’s mom”.     Progress toward goal: Not at goal    Prognosis: Fair with Ongoing Treatment     Self-reported number of days sober: Patient appears to have written March 16th on check in form.     ASAM Dimensions:  I.    Intoxication/Withdrawal:  0  (Patient during initial assessment reported no recent substance use).  II.   Medical Conditions/Complications:  1 (Due to medical conditions and history of seizures discussed by patient during initial assessment).  III.  Behavioral/Emotional/Cognitive: 1 (Due to patient's reported depression, anxiety, and feelings of hopelessness during initial assessment).  IV.  Readiness to Change: 1 (Patient appeared to be ready to change based on his self-report during initial assessment).  V.   Relapse Risk: 1 (Patient during initial assessment reported he has had one relapse in  the past).  VI:  Recovery Environment: 1 (Patient during initial assessment discussed wanting to find a place of his own).  Total ASAM Score = 05      BASELINE SCORES 04/25/2022       VINICIUS-7   (17)                  PHQ-9   (13)         Patient agreeable to adhere to medication regimen as prescribed and report any side effects. Patient will contact this office, call 911 or present to the nearest emergency room should suicidal or homicidal ideations occur.    Impression/Formulation:    ICD-10-CM ICD-9-CM   1. Opioid use disorder, severe, dependence (HCC)  F11.20 304.00   2. Cocaine use disorder, severe, in sustained remission (HCC)  F14.21 304.23       CLINICAL MANUVERING/INTERVENTIONS: Therapist utilized a person-centered approach to build rapport with group member.  Therapist implemented motivational interviewing techniques to assist client with exploring and resolving ambivalence associated with commitment to change behaviors related to substance use and addiction.  Therapist applied cognitive behavioral strategies to facilitate identification of maladaptive patterns of thinking and behavior that contribute to client’s risk for continued substance use and relapse.  Therapist employed group interaction activities to build rapport among group members, promote sobriety, and emphasize relapse prevention.  Therapist promoted safe nonjudgmental environment by providing group members with unconditional positive regard and encouraging group members to comply with group rules and guidelines. Therapist assisted group member with identifying and implementing healthier coping strategies.      PLAN:  Continue [facility] IOP Phase I   Aftercare:  [facility] Phase II  Program Assignments:  Personal recovery plan, relapse prevention plan, attendance of recovery support group meetings, exploration of sponsorship, drug/alcohol screens.     Please note that portions of this note  were completed with a voice recognition program. Efforts were made to edit dictation, but occasionally words are mistranscribed.       This document signed by Duy Garcia, May 12, 2022, 16:12 EDT

## 2022-05-12 NOTE — PROGRESS NOTES
NAME: Aroldo Cai  DATE: 05/05/2022    [facility] GROUP   [id number]    Number of participants: 6  IOP GROUP NOTE     DATA:     3 hour IOP group therapy session (Check-ins, Coping Skills, Relapse Prevention)     Check Ins: Therapist continued facilitation of rapport building strategies between group members. Therapist asked that each patient check in with home life and recovery efforts and identify triggers, cravings, and high risk situations that arise between group sessions. Therapist provided empathy and support during group session.     Session Content/Coping Skills: Check ins completed by group members. Clinician explored potential triggers with group members going into the weekend. Clinician discussed with group members music being both a coping skill and a trigger. , Keila, joined for first part of meeting and shared just for today reading. Taking the [employer] article “the great lie of anxiety” reviewed with group members. [employer] Week Bingo Activity completed by group members ([employer] [place] on Drug Abuse).      Response: Client attended class in person. Client participated in completion of check in form.   Client on check in form denied suicidal thoughts or plan or intent to hurt self currently or since last group meeting. Client on check in form denied homicidal thoughts or plan or intent to hurt self or others currently or since last group meeting. Client participated in group discussions and review of psychoeducational material. Client participated in [employer] Week Bingo Activity. Patient on check in form reported medication concerns since last group meeting.     Personal Assessment 0-10 Scale (10 worst)    Anxiety:  8   Depression:  9   Cravings: 6     ASSESSMENT:     ..  Lab on 05/03/2022   Component Date Value Ref Range Status   • Ethanol, Urine 05/03/2022 Negative  Cutoff=0.020 % Final   • THC, Screen, Urine 05/03/2022  Negative  Negative Final   • Phencyclidine (PCP), Urine 05/03/2022 Negative  Negative Final   • Cocaine Screen, Urine 05/03/2022 Negative  Negative Final   • Methamphetamine, Ur 05/03/2022 Negative  Negative Final   • Opiate Screen 05/03/2022 Negative  Negative Final   • Amphetamine Screen, Urine 05/03/2022 Negative  Negative Final   • Benzodiazepine Screen, Urine 05/03/2022 Negative  Negative Final   • Tricyclic Antidepressants Screen 05/03/2022 Positive (A) Negative Final   • Methadone Screen, Urine 05/03/2022 Negative  Negative Final   • Barbiturates Screen, Urine 05/03/2022 Negative  Negative Final   • Oxycodone Screen, Urine 05/03/2022 Negative  Negative Final   • Propoxyphene Screen 05/03/2022 Negative  Negative Final   • Buprenorphine, Screen, Urine 05/03/2022 Positive (A) Negative Final   • Creatinine, Urine 05/03/2022 259.0  >=20 mg/dL Final   • Buprenorphine/CR 05/03/2022 47  ng/mg creat Final   • NORBUPRENORPHINE/CR 05/03/2022 137  ng/mg creat Final   • NORBUP/BUP RATIO 05/03/2022 2.91  RATIO Final    This test was developed and its performance characteristics  determined by [facility].  It has not been cleared or approved  by the Food and Drug Administration.   • Naloxone 05/03/2022 310  Cutoff=25 ng/ml Final   • TRP-LINK 05/03/2022 Comment   Final    These test results were not transmitted to [facility].  If you would like your patient's urine drug  test results to transmit to [facility], please  contact your [facility]  to complete  a physician authorization form.   • Specimen Status 05/03/2022 Comment   Final    Sent to [facility]  Written Authorization  Written Authorization  Written Authorization Received.  Authorization received from original order  [id number] 05-  Logged by Apurva Dunne on 04/18/2022   Component Date Value Ref Range Status   • Ethanol, Urine 04/18/2022 Negative  Cutoff=0.020 % Final   • THC, Screen, Urine 04/18/2022  Negative  Negative Final   • Phencyclidine (PCP), Urine 04/18/2022 Negative  Negative Final   • Cocaine Screen, Urine 04/18/2022 Negative  Negative Final   • Methamphetamine, Ur 04/18/2022 Negative  Negative Final   • Opiate Screen 04/18/2022 Negative  Negative Final   • Amphetamine Screen, Urine 04/18/2022 Negative  Negative Final   • Benzodiazepine Screen, Urine 04/18/2022 Negative  Negative Final   • Tricyclic Antidepressants Screen 04/18/2022 Negative  Negative Final   • Methadone Screen, Urine 04/18/2022 Negative  Negative Final   • Barbiturates Screen, Urine 04/18/2022 Negative  Negative Final   • Oxycodone Screen, Urine 04/18/2022 Positive (A) Negative Final   • Propoxyphene Screen 04/18/2022 Negative  Negative Final   • Buprenorphine, Screen, Urine 04/18/2022 Positive (A) Negative Final   • Creatinine, Urine 04/18/2022 170.0  >=20 mg/dL Final   • Buprenorphine/CR 04/18/2022 48  ng/mg creat Final   • NORBUPRENORPHINE/CR 04/18/2022 164  ng/mg creat Final   • NORBUP/BUP RATIO 04/18/2022 3.42  RATIO Final    This test was developed and its performance characteristics  determined by [facility].  It has not been cleared or approved  by the Food and Drug Administration.   • Naloxone 04/18/2022 134  Cutoff=25 ng/ml Final   • TRP-LINK 04/18/2022 Comment   Final    These test results were not transmitted to [facility].  If you would like your patient's urine drug  test results to transmit to [facility], please  contact your [address]  to complete  a physician authorization form.   • Oxycodone Class Ur 04/18/2022 +POSITIVE+   Final   • Oxycodone, Confirmation, Urine 04/18/2022 942  ng/mg creat Final   • Oxymorphone UR 04/18/2022 490  ng/mg creat Final   • Opiates, Noroxycodone, Urine 04/18/2022 576  ng/mg creat Final   • Noroxymorphone 04/18/2022 112  ng/mg creat Final    Expected metabolism of oxycodone class drugs:   Parent Drug       Detected Metabolites   -----------        --------------------   Oxycodone:        Oxymorphone, Noroxycodone, Noroxymorphone   Oxymorphone:      Noroxymorphone   • Level of Detection: 04/18/2022 Comment   Final    Testing Threshold:  50 ng/mL                                                                       '  This test was developed and its performance characteristics  determined by [facility].  It has not been cleared or approved  by the Food and Drug Administration.       Mental Status Exam  Hygiene:  good  Dress: casual  Attitude: cooperative and agreeable   Motor Activity: appropriate  Eye Contact:  good  Speech: regular rate and rhythm   Mood:  calm and cooperative  Affect:  Appropriate  Thought Processes:  Linear  Thought Content:  Normal  Suicidal Thoughts:  denies  Homicidal Thoughts:  denies  Crisis Safety Plan: yes, to come to the emergency room.  Hallucinations:  denies  Reliability: fair  Insight: fair  Judgement: fair  Impulse Control: fair    Family issues related to recovery:  No change, see previous encounters    Recovery/spiritual support group attendance: No     Sponsor: No     Motivation for treatment: During initial assessment, patient reported “my kids”.     Patient's Support Network Includes: During initial assessment, patient reported sober support system is “my family and youngest son’s mom”.     Progress toward goal: Not at goal    Prognosis: Fair with Ongoing Treatment     Self-reported number of days sober: Patient reported 47 days on check in form.     ASAM Dimensions:  I.    Intoxication/Withdrawal:  0  (Patient during initial assessment reported no recent substance use).  II.   Medical Conditions/Complications:  1 (Due to medical conditions and history of seizures discussed by patient during initial assessment).  III.  Behavioral/Emotional/Cognitive: 1 (Due to patient's reported depression, anxiety, and feelings of hopelessness during initial assessment).  IV.  Readiness to Change: 1 (Patient appeared to be ready to change  based on his self-report during initial assessment).  V.   Relapse Risk: 1 (Patient during initial assessment reported he has had one relapse in the past).  VI:  Recovery Environment: 1 (Patient during initial assessment discussed wanting to find a place of his own).  Total ASAM Score = 05      BASELINE SCORES 04/25/2022       VINICIUS-7   (17)                  PHQ-9   (13)         Patient agreeable to adhere to medication regimen as prescribed and report any side effects. Patient will contact this office, call 911 or present to the nearest emergency room should suicidal or homicidal ideations occur.    Impression/Formulation:    ICD-10-CM ICD-9-CM   1. Opioid use disorder, severe, dependence (HCC)  F11.20 304.00       CLINICAL MANUVERING/INTERVENTIONS: Therapist utilized a person-centered approach to build rapport with group member.  Therapist implemented motivational interviewing techniques to assist client with exploring and resolving ambivalence associated with commitment to change behaviors related to substance use and addiction.  Therapist applied cognitive behavioral strategies to facilitate identification of maladaptive patterns of thinking and behavior that contribute to client’s risk for continued substance use and relapse.  Therapist employed group interaction activities to build rapport among group members, promote sobriety, and emphasize relapse prevention.  Therapist promoted safe nonjudgmental environment by providing group members with unconditional positive regard and encouraging group members to comply with group rules and guidelines. Therapist assisted group member with identifying and implementing healthier coping strategies.      PLAN:  Continue [facility] IOP Phase I   Aftercare:  [facility] Phase II  Program Assignments:  Personal recovery plan, relapse prevention plan, attendance of recovery support group meetings, exploration of sponsorship,  drug/alcohol screens.     Please note that portions of this note were completed with a voice recognition program. Efforts were made to edit dictation, but occasionally words are mistranscribed.       This document signed by Duy Garcia, May 12, 2022, 17:07 EDT

## 2022-05-14 LAB
ACCEPTABLE CREAT UR QL: 175.1 MG/DL
NALOXONE UR CFM-MCNC: 176 NG/ML
NORBUP/BUP RATIO: 5.62 RATIO
NORBUPRENORPHINE UR CFM-MCNC: 292 NG/MG CREAT
NORBUPRENORPHINE/CREAT UR: 52 NG/MG CREAT
TRP-LINK: NORMAL

## 2022-05-15 LAB
AMITRIP UR QL CFM: NOT DETECTED
ANTIDEPRESSANTS UR QL: NEGATIVE
CLOMIPRAMINE UR QL: NOT DETECTED
DESIPRAMINE UR QL CFM: NOT DETECTED
DOXEPIN UR QL CFM: NOT DETECTED
IMIPRAMINE UR QL CFM: NOT DETECTED
LEVEL OF DETECTION:: NORMAL
NORCLOMIPRAMINE UR QL: NOT DETECTED
NORDOXEPIN UR QL: NOT DETECTED
NORTRIP UR QL CFM: NOT DETECTED
PROTRIP UR QL: NOT DETECTED
TRIMIPRAMINE UR QL: NOT DETECTED

## 2022-05-16 ENCOUNTER — OFFICE VISIT (OUTPATIENT)
Dept: PSYCHIATRY | Facility: HOSPITAL | Age: 42
End: 2022-05-16

## 2022-05-16 DIAGNOSIS — F14.21 COCAINE USE DISORDER, SEVERE, IN SUSTAINED REMISSION: ICD-10-CM

## 2022-05-16 DIAGNOSIS — F11.20 OPIOID USE DISORDER, SEVERE, DEPENDENCE: Primary | ICD-10-CM

## 2022-05-16 PROCEDURE — H0015 ALCOHOL AND/OR DRUG SERVICES: HCPCS | Performed by: NURSE PRACTITIONER

## 2022-05-17 ENCOUNTER — OFFICE VISIT (OUTPATIENT)
Dept: PSYCHIATRY | Facility: CLINIC | Age: 42
End: 2022-05-17

## 2022-05-17 VITALS
HEART RATE: 82 BPM | WEIGHT: 246 LBS | DIASTOLIC BLOOD PRESSURE: 80 MMHG | SYSTOLIC BLOOD PRESSURE: 124 MMHG | BODY MASS INDEX: 40.98 KG/M2 | HEIGHT: 65 IN

## 2022-05-17 DIAGNOSIS — F11.21 OPIOID USE DISORDER, SEVERE, IN EARLY REMISSION, ON MAINTENANCE THERAPY, DEPENDENCE (HCC): Primary | ICD-10-CM

## 2022-05-17 PROCEDURE — 99212 OFFICE O/P EST SF 10 MIN: CPT | Performed by: NURSE PRACTITIONER

## 2022-05-17 RX ORDER — BUPRENORPHINE HYDROCHLORIDE AND NALOXONE HYDROCHLORIDE DIHYDRATE 8; 2 MG/1; MG/1
2 TABLET SUBLINGUAL DAILY
Qty: 29 TABLET | Refills: 0 | Status: SHIPPED | OUTPATIENT
Start: 2022-05-17 | End: 2022-05-31 | Stop reason: SDUPTHER

## 2022-05-17 NOTE — PROGRESS NOTES
Office  Follow Up Visit      Patient Name: Aroldo Cai  : 1980   MRN: [id number]     Referring Provider: Gerald Dobson MD    Chief Complaint: Substance use    History of Present Illness:   Aroldo Cai is a 41 y.o. male here today for 1 week follow up on MAT for OUD.  Buprenorphine/naloxone increased to 16-4 mg daily due to continued cravings at last follow-up.  Cravings have resolved.  No recurrence of any illicit substance use since last follow-up.  Has added benefit of it reducing pain due to recent hip replacement.  Continues to recover well.  Had some concerns that muscle may have detached from bone.  Followed up with surgeon and was told it was a pocket of blood and will resolve on his own.  He was offered opioid pain medication at that time and declined it.  He has been congratulated on this today.  Continues to actively participate in phase 1 of IOP.  Finds the information useful.  No AE of medications reported or other complaints today.    History:   Substance use started in  after bilateral hip surgery and was started on opioid pain medications.  Went to pain management for about a year post-op. Started buying pain pills off the street and did so for about a year.  Has had several periods of sobriety in the past with rehab, incarceration. Released from detention 2021 and was introduced to heroin by a friend.  Daily use until OD 3/2022 which prompted him to seek treatment. Had left hip replacement 3/23/2022 (Dr. Gann). Continued bup/nal throughout hospital stay with approval from Dr. Gann, per pt. Was given Percocet 5/325 mg and ibuprofen 800mg for additional pain control. Percocet was tapered off and he has had not further use. Mother disposed of remaining medication.     Triggers: Stress, pain    Cravings: none currently    Relapse Prevention: MOUD, peer support, continue IOP phase 1    Urine Drug Screen (today's visit) discussed: Will obtain after appointment    UDS  Confirmation: 5/10/2022 +bup, +norbup, +TCA    QIANA (PDMP) Reviewed for Current/Active Medications: gabapentin as expected, last buprenorphine/naloxone prescription from 5/10 not reflected    Past Surgical History:  Past Surgical History:   Procedure Laterality Date   • ABDOMINAL SURGERY     • BRAIN SURGERY  1994    craniotomy,  right frontal lobe AVM   • CHOLECYSTECTOMY WITH INTRAOPERATIVE CHOLANGIOGRAM N/A 07/27/2021    Procedure: CHOLECYSTECTOMY LAPAROSCOPIC INTRAOPERATIVE CHOLANGIOGRAPHY;  Surgeon: Hardik Blancas MD;  Location: [facility] OR;  Service: General;  Laterality: N/A;   • COLONOSCOPY     • ENDOSCOPY N/A 10/22/2021    Procedure: ESOPHAGOGASTRODUODENOSCOPY WITH BIOPSY;  Surgeon: Hardik Blancas MD;  Location: [facility] ENDOSCOPY;  Service: Gastroenterology;  Laterality: N/A;   • HERNIA REPAIR Right     inguinal   • HIP SURGERY Bilateral    • HIP SURGERY Left        Problem List:  Patient Active Problem List   Diagnosis   • Calculus of gallbladder without cholecystitis without obstruction   • Right upper quadrant abdominal pain   • Nausea and vomiting   • Opioid use disorder, severe, dependence (HCC)       Allergy:   Allergies   Allergen Reactions   • Dilantin [Phenytoin] Seizure   • Penicillins Anaphylaxis   • Phenobarbital Seizure   • Tegretol [Carbamazepine] Seizure   • Latex Rash        Current Medications:   Current Outpatient Medications   Medication Sig Dispense Refill   • buprenorphine-naloxone (SUBOXONE) 8-2 MG per SL tablet Place 2 tablets under the tongue Daily for 15 days. 29 tablet 0   • cloNIDine (Catapres) 0.1 MG tablet Take 1 tablet by mouth Every Night. 30 tablet 2   • cyclobenzaprine (FLEXERIL) 10 MG tablet Take 10 mg by mouth every night at bedtime.     • ibuprofen (ADVIL,MOTRIN) 800 MG tablet      • levETIRAcetam (KEPPRA) 500 MG tablet Take 1 tablet by mouth 2 (Two) Times a Day. 60 tablet 1   • OLANZapine (zyPREXA) 5 MG tablet Take 1 tablet by mouth Every Night. 30 tablet 2   •  omeprazole (priLOSEC) 40 MG capsule      • ondansetron ODT (ZOFRAN-ODT) 8 MG disintegrating tablet Every 8 (Eight) Hours As Needed.     • traZODone (DESYREL) 100 MG tablet      • venlafaxine (EFFEXOR) 50 MG tablet Take 1 tablet by mouth 2 (Two) Times a Day. 60 tablet 2   • venlafaxine (EFFEXOR) 75 MG tablet Take 1 tablet by mouth 2 (Two) Times a Day. 60 tablet 2     No current facility-administered medications for this visit.       Past Medical History:  Past Medical History:   Diagnosis Date   • Anesthesia complication     pt reports he is hard to wake after anesthesia   • Anxiety    • Arthritis    • Bipolar 1 disorder (HCC)    • COVID-19 02/2021   • Depression    • Dysphagia     food and liquids.   • Extensive tattoos    • Gout    • Hepatitis C 2018    Patient took medication    • History of echocardiogram    • Kidney stones    • Pneumonia    • PTSD (post-traumatic stress disorder)    • Seizure (HCC)     last seizure years ago.   • Seizures (HCC)    • Short-term memory loss    • Wears contact lenses    • Wears glasses        Social History:  Social History     Socioeconomic History   • Marital status:    Tobacco Use   • Smoking status: Current Every Day Smoker     Packs/day: 0.50     Years: 30.00     Pack years: 15.00     Types: Cigarettes   • Smokeless tobacco: Never Used   Vaping Use   • Vaping Use: Never used   Substance and Sexual Activity   • Alcohol use: Not Currently   • Drug use: Not Currently     Types: Cocaine(coke)     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   • Sexual activity: Defer       Family History:  Family History   Problem Relation Age of Onset   • Diabetes Mother    • Diabetes insipidus Mother    • Heart disease Mother    • Cancer Father         skin   • Diabetes Father    • Hypertension Father    • Arthritis Father    • Diabetes insipidus Father          Subjective      Review of Systems:   Review of Systems   Constitutional: Positive for fatigue. Negative for chills and fever.    Respiratory: Negative for shortness of breath.    Cardiovascular: Negative for chest pain.   Gastrointestinal: Negative for abdominal pain.   Musculoskeletal: Positive for arthralgias, back pain and gait problem.   Skin: Negative for skin lesions.   Neurological: Negative for seizures and confusion.   Psychiatric/Behavioral: Positive for depressed mood and stress. Negative for hallucinations, sleep disturbance and suicidal ideas. The patient is nervous/anxious.        PHQ-9 Score:  14    VINICIUS-7 Score:   Feeling nervous, anxious or on edge: Nearly every day  Not being able to stop or control worrying: Nearly every day  Worrying too much about different things: Nearly every day  Trouble Relaxing: Nearly every day  Being so restless that it is hard to sit still: Nearly every day  Feeling afraid as if something awful might happen: Several days  Becoming easily annoyed or irritable: Nearly every day  VINICIUS 7 Total Score: 19  If you checked any problems, how difficult have these problems made it for you to do your work, take care of things at home, or get along with other people: Not difficult at all    Patient History:   The following portions of the patient's history were reviewed and updated as appropriate: allergies, current medications, past family history, past medical history, past social history, past surgical history and problem list.     Social:  Social History     Socioeconomic History   • Marital status:    Tobacco Use   • Smoking status: Current Every Day Smoker     Packs/day: 0.50     Years: 30.00     Pack years: 15.00     Types: Cigarettes   • Smokeless tobacco: Never Used   Vaping Use   • Vaping Use: Never used   Substance and Sexual Activity   • Alcohol use: Not Currently   • Drug use: Not Currently     Types: Cocaine(coke)     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   • Sexual activity: Defer       Medications:     Current Outpatient Medications:   •  buprenorphine-naloxone (SUBOXONE) 8-2 MG  "per SL tablet, Place 2 tablets under the tongue Daily for 15 days., Disp: 29 tablet, Rfl: 0  •  cloNIDine (Catapres) 0.1 MG tablet, Take 1 tablet by mouth Every Night., Disp: 30 tablet, Rfl: 2  •  cyclobenzaprine (FLEXERIL) 10 MG tablet, Take 10 mg by mouth every night at bedtime., Disp: , Rfl:   •  ibuprofen (ADVIL,MOTRIN) 800 MG tablet, , Disp: , Rfl:   •  levETIRAcetam (KEPPRA) 500 MG tablet, Take 1 tablet by mouth 2 (Two) Times a Day., Disp: 60 tablet, Rfl: 1  •  OLANZapine (zyPREXA) 5 MG tablet, Take 1 tablet by mouth Every Night., Disp: 30 tablet, Rfl: 2  •  omeprazole (priLOSEC) 40 MG capsule, , Disp: , Rfl:   •  ondansetron ODT (ZOFRAN-ODT) 8 MG disintegrating tablet, Every 8 (Eight) Hours As Needed., Disp: , Rfl:   •  traZODone (DESYREL) 100 MG tablet, , Disp: , Rfl:   •  venlafaxine (EFFEXOR) 50 MG tablet, Take 1 tablet by mouth 2 (Two) Times a Day., Disp: 60 tablet, Rfl: 2  •  venlafaxine (EFFEXOR) 75 MG tablet, Take 1 tablet by mouth 2 (Two) Times a Day., Disp: 60 tablet, Rfl: 2    Objective     Physical Exam:  Physical Exam    Vital Signs:   Vitals:    05/17/22 1418   BP: 124/80   Pulse: 82   Weight: 112 kg (246 lb)   Height: 165.1 cm (65\")     Body mass index is 40.94 kg/m².     Mental Status Exam:   Hygiene:   good  Cooperation:  Cooperative  Eye Contact:  Good  Psychomotor Behavior:  Appropriate  Affect:  Full range  Mood: normal  Speech:  Normal  Thought Process:  Goal directed  Thought Content:  Normal  Suicidal:  None  Homicidal:  None  Hallucinations:  None  Delusion:  None  Memory:  Intact  Orientation:  Grossly intact  Reliability:  good  Insight:  Good  Judgement:  Good  Impulse Control:  Good    Assessment / Plan      Assessment & Plan   -Continue buprenorphine/naloxone 16-4 mg daily.  -Will accommodate 2-week supply as he has been compliant with all aspects of care.  Sending 14.5 days so he can have full RTC dose for follow-up day +0 medication remaining.  -Continue phase 1 of IOP  -Will " meet with peer support today in office  -Continue to utilize coping mechanisms to control cravings, triggers should they arise.  -Keep follow-up with psych  -Continued monitoring for illicit substances for patient safety, medication compliance, and future care.     Visit Diagnoses     Opioid use disorder, severe, in early remission, on maintenance therapy, dependence (HCC)    -  Primary    Relevant Medications    buprenorphine-naloxone (SUBOXONE) 8-2 MG per SL tablet      Diagnoses       Codes Comments    Opioid use disorder, severe, in early remission, on maintenance therapy, dependence (HCC)    -  Primary ICD-10-CM: F11.21  ICD-9-CM: 304.03         PLAN:  1. Safety: No acute safety concerns  2. Risk Assessment: Risk of self-harm acutely is low. Risk of self-harm chronically is also low, but could be further elevated in the event of treatment noncompliance and/or AODA.      TREATMENT PLAN/GOALS: Continue supportive psychotherapy efforts and medications as indicated. Treatment and medication options discussed during today's visit. Patient acknowledged and verbally consented to continue with current treatment plan and was educated on the importance of compliance with treatment and follow-up appointments.      MEDICATION ISSUES:  QIANA reviewed as expected.  Discussed medication options and treatment plan of prescribed medication as well as the risks, benefits, and side effects including potential falls, possible impaired driving and metabolic adversities among others. Patient is agreeable to call the office with any worsening of symptoms or onset of side effects. Patient is agreeable to call 911 or go to the nearest ER should he/she begin having SI/HI. No medication side effects or related complaints today.     MEDS ORDERED DURING VISIT:  New Medications Ordered This Visit   Medications   • buprenorphine-naloxone (SUBOXONE) 8-2 MG per SL tablet     Sig: Place 2 tablets under the tongue Daily for 15 days.      Dispense:  29 tablet     Refill:  0     [id number] had 1/2 dose today; fill 14.5 days (29 tabs)       Return in 2 weeks (on 5/31/2022) for Follow Up Medication.           This document has been electronically signed by PRISCILLA Wen  May 17, 2022 14:41 EDT      Part of this note may be an electronic transcription/translation of spoken language to printed text using the Dragon Dictation System.

## 2022-05-18 ENCOUNTER — OFFICE VISIT (OUTPATIENT)
Dept: PSYCHIATRY | Facility: HOSPITAL | Age: 42
End: 2022-05-18

## 2022-05-18 ENCOUNTER — LAB (OUTPATIENT)
Dept: LAB | Facility: HOSPITAL | Age: 42
End: 2022-05-18

## 2022-05-18 DIAGNOSIS — F14.21 COCAINE USE DISORDER, SEVERE, IN SUSTAINED REMISSION: ICD-10-CM

## 2022-05-18 DIAGNOSIS — F11.20 OPIOID USE DISORDER, SEVERE, DEPENDENCE: Primary | ICD-10-CM

## 2022-05-18 DIAGNOSIS — F11.20 OPIOID USE DISORDER, SEVERE, DEPENDENCE: ICD-10-CM

## 2022-05-18 LAB
AMPHET+METHAMPHET UR QL: NEGATIVE
AMPHETAMINES UR QL: NEGATIVE
BARBITURATES UR QL SCN: NEGATIVE
BENZODIAZ UR QL SCN: NEGATIVE
BUPRENORPHINE SERPL-MCNC: POSITIVE NG/ML
CANNABINOIDS SERPL QL: NEGATIVE
COCAINE UR QL: NEGATIVE
METHADONE UR QL SCN: NEGATIVE
OPIATES UR QL: NEGATIVE
OXYCODONE UR QL SCN: NEGATIVE
PCP UR QL SCN: NEGATIVE
PROPOXYPH UR QL: NEGATIVE
TRICYCLICS UR QL SCN: NEGATIVE

## 2022-05-18 PROCEDURE — 80307 DRUG TEST PRSMV CHEM ANLYZR: CPT

## 2022-05-18 PROCEDURE — H0015 ALCOHOL AND/OR DRUG SERVICES: HCPCS | Performed by: NURSE PRACTITIONER

## 2022-05-18 PROCEDURE — G0480 DRUG TEST DEF 1-7 CLASSES: HCPCS

## 2022-05-19 ENCOUNTER — TREATMENT (OUTPATIENT)
Dept: PHYSICAL THERAPY | Facility: CLINIC | Age: 42
End: 2022-05-19

## 2022-05-19 ENCOUNTER — DOCUMENTATION (OUTPATIENT)
Dept: PSYCHIATRY | Facility: HOSPITAL | Age: 42
End: 2022-05-19

## 2022-05-19 ENCOUNTER — OFFICE VISIT (OUTPATIENT)
Dept: PSYCHIATRY | Facility: HOSPITAL | Age: 42
End: 2022-05-19

## 2022-05-19 DIAGNOSIS — Z96.642 STATUS POST TOTAL REPLACEMENT OF LEFT HIP: Primary | ICD-10-CM

## 2022-05-19 DIAGNOSIS — F11.20 OPIOID USE DISORDER, SEVERE, DEPENDENCE: Primary | ICD-10-CM

## 2022-05-19 DIAGNOSIS — F14.21 COCAINE USE DISORDER, SEVERE, IN SUSTAINED REMISSION: ICD-10-CM

## 2022-05-19 PROCEDURE — H0015 ALCOHOL AND/OR DRUG SERVICES: HCPCS | Performed by: NURSE PRACTITIONER

## 2022-05-19 PROCEDURE — 97110 THERAPEUTIC EXERCISES: CPT | Performed by: PHYSICAL THERAPIST

## 2022-05-19 PROCEDURE — 97112 NEUROMUSCULAR REEDUCATION: CPT | Performed by: PHYSICAL THERAPIST

## 2022-05-19 NOTE — PROGRESS NOTES
Physical Therapy Daily Progress Note      Visit #: 5    Aroldo Cai reports 5-6/10 pain today at rest.  Pt states that he went to his surgeon who said that his pain in the L hip is coming from a bone bleed. Pt reports that he is safe to do things but to keep his hip comfortable. Pt states that they offered him pain medication that he refused.         Objective Pt present to PT today with no distress at rest.     Pt with tenderness over the L GT and over the whole side of the L hip down the leg.     Pt with no increased pain in the L hip following activities today.     Pt with more noticeable antalgic gait today.       See Exercise, Manual, and Modality Logs for complete treatment.     Assessment/Plan  Pt continues to have pain in the lateral L hip although the front of the hip is better. Pt is still limited with activities in the clinic due to pain but will continue as tolerated to progress exercise. Pt to continue with PT to help improve L hip strength, mobility, and function.       Progress per Plan of Care      Visit Diagnosis:    ICD-10-CM ICD-9-CM   1. Status post total replacement of left hip  Z96.642 V43.64            Manual Therapy:         mins  00864;  Therapeutic Exercise:    16     mins  43209;     Neuromuscular Eliezer:    9    mins  29232;    Therapeutic Activity:          mins  68862;     Gait Training:           mins  24796;     Ultrasound:          mins  58746;    Electrical Stimulation:         mins  11887 ( );  Dry Needling          mins self-pay  Iontophoresis          mins 11306      Timed Treatment:   25   mins   Total Treatment:     46   mins    Dileep Rose, PT  Physical Therapist

## 2022-05-19 NOTE — PROGRESS NOTES
[facility]  [address], [address]  [place] [address]    Intensive Outpatient Program for Chemical Dependence (CD IOP)      Individualized Treatment Plan    Long Term Goal: Aroldo Cai will establish a sustained recovery and will maintain total abstinence from mind-altering substances other than what is prescribed and/or approved by the attending physician. Pt will learn, practice, and utilize behavioral and cognitive coping skills to help maintain sobriety.    Patient Care Needs:  Aroldo Cai presents with Opioid use disorder, severe, in early remission, on maintenance therapy, dependence and Cocaine use disorder, severe, in sustained remission and is at high risk for relapse without continued treatment.  Pt has a history of using drugs/alcohol until intoxicated or passed out and has been unable to stop or cut down use of alcohol/drugs once starting, despite verbalized desire to do so, and the negative consequences from continued use.  Pt's use has negatively impacted social, occupational, and/or physical functioning.    Objectives:      1.  Patient will participate in a medical evaluation to assess the effects of chemical dependence and will cooperate with an evaluation by the attending psychiatrist or psychiatric nurse practitioner for psychotropic medication if appropriate.    2.  Patient will adhere to the IOP group rules and guidelines.    3.  Patient will attend group sessions as scheduled.  Patient will notify therapist and support staff of any absences or tardies.  Patient will provide a valid and verifiable excuse for absences to be considered excused.    4.  Patient will participate in random drug and alcohol screenings and will exhibit negative results on said screenings.    5.  Patient will identify high risk situations, people, and places to avoid or manage in order to maintain sobriety.    6.  Patient will participate in group discussions by  giving and receiving feedback appropriately.    7.  Patient will develop a positive network of support by attending recovery groups or other spiritual fellowships each week outside of IOP group and by exploring/obtaining/maintaining sponsorship.    8.  Patient will identify, practice, and implement at least 5 healthy coping strategies to manage difficult emotions while remaining abstinent.    9.  Patient will develop relapse prevention skills and be able to identify and share them with the group in the form of a relapse prevention plan.    10.  Patient will develop a personal recovery plan and share it with the group prior to discharge.    11.  Patient will offer family participation in family education groups, if appropriate.    12.  Patient will exhibit increased motivation to change as assessed by observable behaviors in conjunction with the ASAM assessment tool.    13.  Patient will utilize healthy coping skills to manage depressive and anxious symptoms without using alcohol or other mind-altering substances and will exhibit decreased score on the PHQ-9 and VINICIUS-7.    Interventions:  Patient will attend biweekly appointments with the attending psychiatrist or psychiatric nurse practitioner for medication evaluation and management unless scheduled otherwise.  Patient will be referred to a medical provider for a physical examination if he/she is not already established with a medical provider.  Patient will comply with recommendations and appointments with his/her medical treatment team, including medication management.  Therapist will introduce and review IOP group rules, and patient will be provided a hard copy of the group rules upon entering the program.  IOP group sessions will be offered 3-4 times per week, Mondays, Tuesdays, Wednesdays and Thursdays during Phase I of treatment, with the exception of some federal holidays. Patient will be provided with random and regular drug and alcohol screenings. Therapist  will utilize motivational interviewing techniques to assist patient with exploring and resolving ambivalence associated with commitment to change behaviors related to substance use and addiction.  Therapist will utilize behavioral and cognitive techniques to assist patient with identifying and recognizing patterns of irrational beliefs that contribute to patient's risk for continued substance abuse and relapse.  Therapist will provide psychoeducation to assist patient in increasing knowledge on the disease concept and the effects of chemical dependence.  Therapist will provide and utilize evidenced-based recovery literature to assist patient in identifying healthier coping strategies.  Therapist will assist patient in developing a relapse prevention plan and a personal recovery plan.  Therapist will assist patient with exploring self-help strategies and beginning work on the 12 Steps.  Therapist will encourage patient to explore, obtain, or maintain sponsorship and/or interpersonal connection with [facility].  Therapist will offer family education groups, if appropriate, and will encourage patient to invite family member(s) to participate. Treatment team members will provide patient with progress reports as needed.    Team Members:  Patient - Verbally Agreed on 5/18/2022  Medical Provider - PRISCILLA Wen  [facility] Licensed Therapist - Duy Garcia [facility], [facility] Director - Marie Alexander, [facility]  Support Staff

## 2022-05-20 LAB — ETHANOL UR-MCNC: NEGATIVE %

## 2022-05-23 ENCOUNTER — OFFICE VISIT (OUTPATIENT)
Dept: PSYCHIATRY | Facility: HOSPITAL | Age: 42
End: 2022-05-23

## 2022-05-23 DIAGNOSIS — F14.21 COCAINE USE DISORDER, SEVERE, IN SUSTAINED REMISSION: ICD-10-CM

## 2022-05-23 DIAGNOSIS — F11.20 OPIOID USE DISORDER, SEVERE, DEPENDENCE: Primary | ICD-10-CM

## 2022-05-23 PROCEDURE — H0015 ALCOHOL AND/OR DRUG SERVICES: HCPCS | Performed by: NURSE PRACTITIONER

## 2022-05-23 NOTE — PROGRESS NOTES
NAME: Aroldo Cai  DATE: 05/12/2022    [facility] GROUP   [id number]    Number of participants: 8  IOP GROUP NOTE     DATA:     3 hour IOP group therapy session (Check-ins, Coping Skills, Relapse Prevention)     Check Ins: Therapist continued facilitation of rapport building strategies between group members. Therapist asked that each patient check in with home life and recovery efforts and identify triggers, cravings, and high risk situations that arise between group sessions. Therapist provided empathy and support during group session.     Session Content/Coping Skills: Introductions completed. Check ins completed by group members. Clinician began review of Living in Balance, Stress and Emotional Health. Group members participated in [facility] psychoeducational activity. Clinician provided Narcan’s to group members.     Response: Client attended class in person. Client participated in completion of check in form.   Client on check in form denied suicidal thoughts or plan or intent to hurt self currently or since last group meeting. Client on check in form denied homicidal thoughts or plan or intent to hurt self or others currently or since last group meeting. Client participated in group discussions and viewing and discussing [employer] video Addiction Heroin: Mac’s Story.     Personal Assessment 0-10 Scale (10 worst)    Anxiety:  7   Depression:  4   Cravings: 2     ASSESSMENT:     ..  Lab on 05/10/2022   Component Date Value Ref Range Status   • Ethanol, Urine 05/10/2022 Negative  Cutoff=0.020 % Final   • THC, Screen, Urine 05/10/2022 Negative  Negative Final   • Phencyclidine (PCP), Urine 05/10/2022 Negative  Negative Final   • Cocaine Screen, Urine 05/10/2022 Negative  Negative Final   • Methamphetamine, Ur 05/10/2022 Negative  Negative Final   • Opiate Screen 05/10/2022 Negative  Negative Final   • Amphetamine Screen, Urine 05/10/2022 Negative  Negative Final   • Benzodiazepine Screen, Urine 05/10/2022  Negative  Negative Final   • Tricyclic Antidepressants Screen 05/10/2022 Positive (A) Negative Final   • Methadone Screen, Urine 05/10/2022 Negative  Negative Final   • Barbiturates Screen, Urine 05/10/2022 Negative  Negative Final   • Oxycodone Screen, Urine 05/10/2022 Negative  Negative Final   • Propoxyphene Screen 05/10/2022 Negative  Negative Final   • Buprenorphine, Screen, Urine 05/10/2022 Positive (A) Negative Final   • Creatinine, Urine 05/10/2022 175.1  >=20 mg/dL Final   • Buprenorphine/CR 05/10/2022 52  ng/mg creat Final   • NORBUPRENORPHINE/CR 05/10/2022 292  ng/mg creat Final   • NORBUP/BUP RATIO 05/10/2022 5.62  RATIO Final    This test was developed and its performance characteristics  determined by [facility].  It has not been cleared or approved  by the Food and Drug Administration.   • Naloxone 05/10/2022 176  Cutoff=25 ng/ml Final   • TRP-LINK 05/10/2022 Comment   Final    These test results were not transmitted to [facility].  If you would like your patient's urine drug  test results to transmit to [facility], please  contact your [employer]  to complete  a physician authorization form.   • Antidepressants 05/10/2022 Negative   Final   • AMITRIPTYLINE, UR 05/10/2022 Not Detected   Final   • Clomipramine, Ur 05/10/2022 Not Detected   Final   • Desemethylclomipramine 05/10/2022 Not Detected   Final   • Desipramine 05/10/2022 Not Detected   Final   • Doxepin 05/10/2022 Not Detected   Final   • Desmethyldoxepin, Ur 05/10/2022 Not Detected   Final   • Imipramine 05/10/2022 Not Detected   Final   • Nortriptyline 05/10/2022 Not Detected   Final   • Protriptyline 05/10/2022 Not Detected   Final   • Trimipramine 05/10/2022 Not Detected   Final   • Level of Detection: 05/10/2022 Comment   Final    Testing Threshold:  50 or 100 ng/mL                                                                       '  This test was developed and its performance  characteristics  determined by [facility].  It has not been cleared or approved  by the Food and Drug Administration.   Lab on 05/03/2022   Component Date Value Ref Range Status   • Ethanol, Urine 05/03/2022 Negative  Cutoff=0.020 % Final   • THC, Screen, Urine 05/03/2022 Negative  Negative Final   • Phencyclidine (PCP), Urine 05/03/2022 Negative  Negative Final   • Cocaine Screen, Urine 05/03/2022 Negative  Negative Final   • Methamphetamine, Ur 05/03/2022 Negative  Negative Final   • Opiate Screen 05/03/2022 Negative  Negative Final   • Amphetamine Screen, Urine 05/03/2022 Negative  Negative Final   • Benzodiazepine Screen, Urine 05/03/2022 Negative  Negative Final   • Tricyclic Antidepressants Screen 05/03/2022 Positive (A) Negative Final   • Methadone Screen, Urine 05/03/2022 Negative  Negative Final   • Barbiturates Screen, Urine 05/03/2022 Negative  Negative Final   • Oxycodone Screen, Urine 05/03/2022 Negative  Negative Final   • Propoxyphene Screen 05/03/2022 Negative  Negative Final   • Buprenorphine, Screen, Urine 05/03/2022 Positive (A) Negative Final   • Creatinine, Urine 05/03/2022 259.0  >=20 mg/dL Final   • Buprenorphine/CR 05/03/2022 47  ng/mg creat Final   • NORBUPRENORPHINE/CR 05/03/2022 137  ng/mg creat Final   • NORBUP/BUP RATIO 05/03/2022 2.91  RATIO Final    This test was developed and its performance characteristics  determined by [facility].  It has not been cleared or approved  by the Food and Drug Administration.   • Naloxone 05/03/2022 310  Cutoff=25 ng/ml Final   • TRP-LINK 05/03/2022 Comment   Final    These test results were not transmitted to [facility].  If you would like your patient's urine drug  test results to transmit to [facility], please  contact your [employer]  to complete  a physician authorization form.   • Antidepressants 05/03/2022 Negative   Final   • AMITRIPTYLINE, UR 05/03/2022 Not Detected   Final   • Clomipramine, Ur 05/03/2022 Not  Detected   Final   • Desemethylclomipramine 05/03/2022 Not Detected   Final   • Desipramine 05/03/2022 Not Detected   Final   • Doxepin 05/03/2022 Not Detected   Final   • Desmethyldoxepin, Ur 05/03/2022 Not Detected   Final   • Imipramine 05/03/2022 Not Detected   Final   • Nortriptyline 05/03/2022 Not Detected   Final   • Protriptyline 05/03/2022 Not Detected   Final   • Trimipramine 05/03/2022 Not Detected   Final   • Level of Detection: 05/03/2022 Comment   Final    Testing Threshold:  50 or 100 ng/mL                                                                       '  This test was developed and its performance characteristics  determined by [facility] [employer].  It has not been cleared or approved  by the Food and Drug Administration.   • Specimen Status 05/03/2022 Comment   Final    Sent to Reference Lab  Written Authorization  Written Authorization  Written Authorization Received.  Authorization received from original order  [id number] 05-  Logged by Apurva Pendleton       Mental Status Exam  Hygiene:  good  Dress: casual  Attitude: cooperative and agreeable   Motor Activity: appropriate  Eye Contact:  good  Speech: regular rate and rhythm   Mood:  calm and cooperative  Affect:  Appropriate  Thought Processes:  Linear  Thought Content:  Normal  Suicidal Thoughts:  denies  Homicidal Thoughts:  denies  Crisis Safety Plan: yes, to come to the emergency room.  Hallucinations:  denies  Reliability: fair  Insight: fair  Judgement: fair  Impulse Control: fair    Family issues related to recovery:  No change, see previous encounters    Recovery/spiritual support group attendance: No     Sponsor: No     Motivation for treatment: During initial assessment, patient reported “my kids”.     Patient's Support Network Includes: During initial assessment, patient reported sober support system is “my family and youngest son’s mom”.     Progress toward goal: Not at goal    Prognosis: Fair with Ongoing Treatment      Self-reported number of days sober: Patient reported March 16th on check in form.     ASAM Dimensions:  I.    Intoxication/Withdrawal:  0  (Patient during initial assessment reported no recent substance use).  II.   Medical Conditions/Complications:  1 (Due to medical conditions and history of seizures discussed by patient during initial assessment).  III.  Behavioral/Emotional/Cognitive: 1 (Due to patient's reported depression, anxiety, and feelings of hopelessness during initial assessment).  IV.  Readiness to Change: 1 (Patient appeared to be ready to change based on his self-report during initial assessment).  V.   Relapse Risk: 1 (Patient during initial assessment reported he has had one relapse in the past).  VI:  Recovery Environment: 1 (Patient during initial assessment discussed wanting to find a place of his own).  Total ASAM Score = 05      BASELINE SCORES 04/25/2022       VINICIUS-7   (17)                  PHQ-9   (13)      Patient agreeable to adhere to medication regimen as prescribed and report any side effects. Patient will contact this office, call 911 or present to the nearest emergency room should suicidal or homicidal ideations occur.    Impression/Formulation:    ICD-10-CM ICD-9-CM   1. Opioid use disorder, severe, dependence (HCC)  F11.20 304.00   2. Cocaine use disorder, severe, in sustained remission (HCC)  F14.21 304.23       CLINICAL MANUVERING/INTERVENTIONS: Therapist utilized a person-centered approach to build rapport with group member.  Therapist implemented motivational interviewing techniques to assist client with exploring and resolving ambivalence associated with commitment to change behaviors related to substance use and addiction.  Therapist applied cognitive behavioral strategies to facilitate identification of maladaptive patterns of thinking and behavior that contribute to client’s risk for continued substance use and relapse.  Therapist employed group interaction activities to  build rapport among group members, promote sobriety, and emphasize relapse prevention.  Therapist promoted safe nonjudgmental environment by providing group members with unconditional positive regard and encouraging group members to comply with group rules and guidelines. Therapist assisted group member with identifying and implementing healthier coping strategies.      PLAN:  Continue [facility] IOP Phase I   Aftercare:  [facility] Phase II  Program Assignments:  Personal recovery plan, relapse prevention plan, attendance of recovery support group meetings, exploration of sponsorship, drug/alcohol screens.     Please note that portions of this note were completed with a voice recognition program. Efforts were made to edit dictation, but occasionally words are mistranscribed.       This document signed by Duy Garcia, May 23, 2022, 16:44 EDT

## 2022-05-23 NOTE — PROGRESS NOTES
NAME: Aroldo Cai  DATE: 05/09/2022     IOP GROUP   [id number]    Number of participants: 6  IOP GROUP NOTE     DATA:     3 hour IOP group therapy session (Check-ins, Coping Skills, Relapse Prevention)     Check Ins: Therapist continued facilitation of rapport building strategies between group members. Therapist asked that each patient check in with home life and recovery efforts and identify triggers, cravings, and high risk situations that arise between group sessions. Therapist provided empathy and support during group session.     Session Content/Coping Skills: Clinician processed group stressors with group members. Check ins completed by group members. Early Recovery Strategies and coping skills (Margot, 2013) article reviewed. Self-Care assessment completed and discussed by group members. Stress management therapist aid psychoeducational material began. Outdoor visualization technique using 5 senses completed by group members with deep breathing exercise practiced by group members.     Response: Client attended class in person. Client participated in completion of check in form.   Client on check in form denied suicidal thoughts or plan or intent to hurt self currently or since last group meeting. Client on check in form denied homicidal thoughts or plan or intent to hurt self or others currently or since last group meeting. Client reported medication concerns on check in form. Client participated in group activities, discussions, and review of psychoeducational material.     Personal Assessment 0-10 Scale (10 worst)    Anxiety:  8   Depression:  5   Cravings: 6     ASSESSMENT:     ..  Lab on 05/03/2022   Component Date Value Ref Range Status   • Ethanol, Urine 05/03/2022 Negative  Cutoff=0.020 % Final   • THC, Screen, Urine 05/03/2022 Negative  Negative Final   • Phencyclidine (PCP), Urine 05/03/2022 Negative  Negative Final   • Cocaine Screen, Urine 05/03/2022 Negative  Negative Final   •  Methamphetamine, Ur 05/03/2022 Negative  Negative Final   • Opiate Screen 05/03/2022 Negative  Negative Final   • Amphetamine Screen, Urine 05/03/2022 Negative  Negative Final   • Benzodiazepine Screen, Urine 05/03/2022 Negative  Negative Final   • Tricyclic Antidepressants Screen 05/03/2022 Positive (A) Negative Final   • Methadone Screen, Urine 05/03/2022 Negative  Negative Final   • Barbiturates Screen, Urine 05/03/2022 Negative  Negative Final   • Oxycodone Screen, Urine 05/03/2022 Negative  Negative Final   • Propoxyphene Screen 05/03/2022 Negative  Negative Final   • Buprenorphine, Screen, Urine 05/03/2022 Positive (A) Negative Final   • Creatinine, Urine 05/03/2022 259.0  >=20 mg/dL Final   • Buprenorphine/CR 05/03/2022 47  ng/mg creat Final   • NORBUPRENORPHINE/CR 05/03/2022 137  ng/mg creat Final   • NORBUP/BUP RATIO 05/03/2022 2.91  RATIO Final    This test was developed and its performance characteristics  determined by [facility].  It has not been cleared or approved  by the Food and Drug Administration.   • Naloxone 05/03/2022 310  Cutoff=25 ng/ml Final   • TRP-LINK 05/03/2022 Comment   Final    These test results were not transmitted to [facility].  If you would like your patient's urine drug  test results to transmit to [facility], please  contact your [employer]  to complete  a physician authorization form.   • Antidepressants 05/03/2022 Negative   Final   • AMITRIPTYLINE, UR 05/03/2022 Not Detected   Final   • Clomipramine, Ur 05/03/2022 Not Detected   Final   • Desemethylclomipramine 05/03/2022 Not Detected   Final   • Desipramine 05/03/2022 Not Detected   Final   • Doxepin 05/03/2022 Not Detected   Final   • Desmethyldoxepin, Ur 05/03/2022 Not Detected   Final   • Imipramine 05/03/2022 Not Detected   Final   • Nortriptyline 05/03/2022 Not Detected   Final   • Protriptyline 05/03/2022 Not Detected   Final   • Trimipramine 05/03/2022 Not Detected   Final   •  Level of Detection: 05/03/2022 Comment   Final    Testing Threshold:  50 or 100 ng/mL                                                                       '  This test was developed and its performance characteristics  determined by [facility].  It has not been cleared or approved  by the Food and Drug Administration.   • Specimen Status 05/03/2022 Comment   Final    Sent to [facility]  Written Authorization  Written Authorization  Written Authorization Received.  Authorization received from original order  [id number] 05-  Logged by Apurva Pendleton       Mental Status Exam  Hygiene:  good  Dress: casual  Attitude: cooperative and agreeable   Motor Activity: appropriate  Eye Contact:  good  Speech: regular rate and rhythm   Mood:  calm and cooperative  Affect:  Appropriate  Thought Processes:  Linear  Thought Content:  Normal  Suicidal Thoughts:  denies  Homicidal Thoughts:  denies  Crisis Safety Plan: yes, to come to the emergency room.  Hallucinations:  denies  Reliability: fair  Insight: fair  Judgement: fair  Impulse Control: fair    Family issues related to recovery:  No change, see previous encounters    Recovery/spiritual support group attendance: No     Sponsor: No     Motivation for treatment: During initial assessment, patient reported “my kids”.     Patient's Support Network Includes: During initial assessment, patient reported sober support system is “my family and youngest son’s mom”.     Progress toward goal: Not at goal    Prognosis: Fair with Ongoing Treatment     Self-reported number of days sober: Patient reported 50 on check in form.     ASAM Dimensions:  I.    Intoxication/Withdrawal:  0  (Patient during initial assessment reported no recent substance use).  II.   Medical Conditions/Complications:  1 (Due to medical conditions and history of seizures discussed by patient during initial assessment).  III.  Behavioral/Emotional/Cognitive: 1 (Due to patient's reported depression, anxiety,  and feelings of hopelessness during initial assessment).  IV.  Readiness to Change: 1 (Patient appeared to be ready to change based on his self-report during initial assessment).  V.   Relapse Risk: 1 (Patient during initial assessment reported he has had one relapse in the past).  VI:  Recovery Environment: 1 (Patient during initial assessment discussed wanting to find a place of his own).  Total ASAM Score = 05      BASELINE SCORES 04/25/2022       VINICIUS-7   (17)                  PHQ-9   (13)        Patient agreeable to adhere to medication regimen as prescribed and report any side effects. Patient will contact this office, call 911 or present to the nearest emergency room should suicidal or homicidal ideations occur.    Impression/Formulation:    ICD-10-CM ICD-9-CM   1. Opioid use disorder, severe, dependence (HCC)  F11.20 304.00       CLINICAL MANUVERING/INTERVENTIONS: Therapist utilized a person-centered approach to build rapport with group member.  Therapist implemented motivational interviewing techniques to assist client with exploring and resolving ambivalence associated with commitment to change behaviors related to substance use and addiction.  Therapist applied cognitive behavioral strategies to facilitate identification of maladaptive patterns of thinking and behavior that contribute to client’s risk for continued substance use and relapse.  Therapist employed group interaction activities to build rapport among group members, promote sobriety, and emphasize relapse prevention.  Therapist promoted safe nonjudgmental environment by providing group members with unconditional positive regard and encouraging group members to comply with group rules and guidelines. Therapist assisted group member with identifying and implementing healthier coping strategies.      PLAN:  Continue [facility] IOP Phase I   Aftercare:  [facility] Phase II  Program Assignments:   Personal recovery plan, relapse prevention plan, attendance of recovery support group meetings, exploration of sponsorship, drug/alcohol screens.     Please note that portions of this note were completed with a voice recognition program. Efforts were made to edit dictation, but occasionally words are mistranscribed.       This document signed by Duy Garcia, May 23, 2022, 14:59 EDT

## 2022-05-23 NOTE — PROGRESS NOTES
NAME: Aroldo Cai  DATE: 05/11/2022     IOP GROUP   [id number]    Number of participants: 7  IOP GROUP NOTE     DATA:     3 hour IOP group therapy session (Check-ins, Coping Skills, Relapse Prevention)     Check Ins: Therapist continued facilitation of rapport building strategies between group members. Therapist asked that each patient check in with home life and recovery efforts and identify triggers, cravings, and high risk situations that arise between group sessions. Therapist provided empathy and support during group session.     Session Content/Coping Skills: Check in forms completed and shared by group members. [employer].nih.gov website Naloxone drug facts reviewed. Clinician discussed with group members the [employer] law in [place]. [employer] video Addiction Heroin: Mac’s Story viewed and discussed.     Response: Client attended class in person. Client participated in completion of check in form.   Client on check in form denied suicidal thoughts or plan or intent to hurt self currently or since last group meeting. Client on check in form denied homicidal thoughts or plan or intent to hurt self or others currently or since last group meeting. Client participated in group discussions and viewing and discussing YouTube video Addiction Heroin: Mac’s Story.     Personal Assessment 0-10 Scale (10 worst)    Anxiety:  7   Depression:  4   Cravings: 5     ASSESSMENT:     ..  Lab on 05/10/2022   Component Date Value Ref Range Status   • Ethanol, Urine 05/10/2022 Negative  Cutoff=0.020 % Final   • THC, Screen, Urine 05/10/2022 Negative  Negative Final   • Phencyclidine (PCP), Urine 05/10/2022 Negative  Negative Final   • Cocaine Screen, Urine 05/10/2022 Negative  Negative Final   • Methamphetamine, Ur 05/10/2022 Negative  Negative Final   • Opiate Screen 05/10/2022 Negative  Negative Final   • Amphetamine Screen, Urine 05/10/2022 Negative  Negative Final   • Benzodiazepine Screen, Urine 05/10/2022 Negative   Negative Final   • Tricyclic Antidepressants Screen 05/10/2022 Positive (A) Negative Final   • Methadone Screen, Urine 05/10/2022 Negative  Negative Final   • Barbiturates Screen, Urine 05/10/2022 Negative  Negative Final   • Oxycodone Screen, Urine 05/10/2022 Negative  Negative Final   • Propoxyphene Screen 05/10/2022 Negative  Negative Final   • Buprenorphine, Screen, Urine 05/10/2022 Positive (A) Negative Final   • Creatinine, Urine 05/10/2022 175.1  >=20 mg/dL Final   • Buprenorphine/CR 05/10/2022 52  ng/mg creat Final   • NORBUPRENORPHINE/CR 05/10/2022 292  ng/mg creat Final   • NORBUP/BUP RATIO 05/10/2022 5.62  RATIO Final    This test was developed and its performance characteristics  determined by [employer].  It has not been cleared or approved  by the Food and Drug Administration.   • Naloxone 05/10/2022 176  Cutoff=25 ng/ml Final   • TRP-LINK 05/10/2022 Comment   Final    These test results were not transmitted to [facility].  If you would like your patient's urine drug  test results to transmit to [facility], please  contact your [employer]  to complete  a physician authorization form.   • Antidepressants 05/10/2022 Negative   Final   • AMITRIPTYLINE, UR 05/10/2022 Not Detected   Final   • Clomipramine, Ur 05/10/2022 Not Detected   Final   • Desemethylclomipramine 05/10/2022 Not Detected   Final   • Desipramine 05/10/2022 Not Detected   Final   • Doxepin 05/10/2022 Not Detected   Final   • Desmethyldoxepin, Ur 05/10/2022 Not Detected   Final   • Imipramine 05/10/2022 Not Detected   Final   • Nortriptyline 05/10/2022 Not Detected   Final   • Protriptyline 05/10/2022 Not Detected   Final   • Trimipramine 05/10/2022 Not Detected   Final   • Level of Detection: 05/10/2022 Comment   Final    Testing Threshold:  50 or 100 ng/mL                                                                       '  This test was developed and its performance characteristics  determined by  [facility].  It has not been cleared or approved  by the Food and Drug Administration.   Lab on 05/03/2022   Component Date Value Ref Range Status   • Ethanol, Urine 05/03/2022 Negative  Cutoff=0.020 % Final   • THC, Screen, Urine 05/03/2022 Negative  Negative Final   • Phencyclidine (PCP), Urine 05/03/2022 Negative  Negative Final   • Cocaine Screen, Urine 05/03/2022 Negative  Negative Final   • Methamphetamine, Ur 05/03/2022 Negative  Negative Final   • Opiate Screen 05/03/2022 Negative  Negative Final   • Amphetamine Screen, Urine 05/03/2022 Negative  Negative Final   • Benzodiazepine Screen, Urine 05/03/2022 Negative  Negative Final   • Tricyclic Antidepressants Screen 05/03/2022 Positive (A) Negative Final   • Methadone Screen, Urine 05/03/2022 Negative  Negative Final   • Barbiturates Screen, Urine 05/03/2022 Negative  Negative Final   • Oxycodone Screen, Urine 05/03/2022 Negative  Negative Final   • Propoxyphene Screen 05/03/2022 Negative  Negative Final   • Buprenorphine, Screen, Urine 05/03/2022 Positive (A) Negative Final   • Creatinine, Urine 05/03/2022 259.0  >=20 mg/dL Final   • Buprenorphine/CR 05/03/2022 47  ng/mg creat Final   • NORBUPRENORPHINE/CR 05/03/2022 137  ng/mg creat Final   • NORBUP/BUP RATIO 05/03/2022 2.91  RATIO Final    This test was developed and its performance characteristics  determined by [facility].  It has not been cleared or approved  by the Food and Drug Administration.   • Naloxone 05/03/2022 310  Cutoff=25 ng/ml Final   • TRP-LINK 05/03/2022 Comment   Final    These test results were not transmitted to [facility].  If you would like your patient's urine drug  test results to transmit to [facility], please  contact your [facility]  to complete  a physician authorization form.   • Antidepressants 05/03/2022 Negative   Final   • AMITRIPTYLINE, UR 05/03/2022 Not Detected   Final   • Clomipramine, Ur 05/03/2022 Not Detected   Final   •  Desemethylclomipramine 05/03/2022 Not Detected   Final   • Desipramine 05/03/2022 Not Detected   Final   • Doxepin 05/03/2022 Not Detected   Final   • Desmethyldoxepin, Ur 05/03/2022 Not Detected   Final   • Imipramine 05/03/2022 Not Detected   Final   • Nortriptyline 05/03/2022 Not Detected   Final   • Protriptyline 05/03/2022 Not Detected   Final   • Trimipramine 05/03/2022 Not Detected   Final   • Level of Detection: 05/03/2022 Comment   Final    Testing Threshold:  50 or 100 ng/mL                                                                       '  This test was developed and its performance characteristics  determined by [employer].  It has not been cleared or approved  by the Food and Drug Administration.   • Specimen Status 05/03/2022 Comment   Final    Sent to Reference Lab  Written Authorization  Written Authorization  Written Authorization Received.  Authorization received from original order  [id number] 05-  Logged by Apurva Pendleton       Mental Status Exam  Hygiene:  good  Dress: casual  Attitude: cooperative and agreeable   Motor Activity: appropriate  Eye Contact:  good  Speech: regular rate and rhythm   Mood:  calm and cooperative  Affect:  Appropriate  Thought Processes:  Linear  Thought Content:  Normal  Suicidal Thoughts:  denies  Homicidal Thoughts:  denies  Crisis Safety Plan: yes, to come to the emergency room.  Hallucinations:  denies  Reliability: fair  Insight: fair  Judgement: fair  Impulse Control: fair    Family issues related to recovery:  No change, see previous encounters    Recovery/spiritual support group attendance: No     Sponsor: No     Motivation for treatment: During initial assessment, patient reported “my kids”.     Patient's Support Network Includes: During initial assessment, patient reported sober support system is “my family and youngest son’s mom”.     Progress toward goal: Not at goal    Prognosis: Fair with Ongoing Treatment     Self-reported number of days  sober: Patient reported March 16 on check in form.     ASAM Dimensions:  I.    Intoxication/Withdrawal:  0  (Patient during initial assessment reported no recent substance use).  II.   Medical Conditions/Complications:  1 (Due to medical conditions and history of seizures discussed by patient during initial assessment).  III.  Behavioral/Emotional/Cognitive: 1 (Due to patient's reported depression, anxiety, and feelings of hopelessness during initial assessment).  IV.  Readiness to Change: 1 (Patient appeared to be ready to change based on his self-report during initial assessment).  V.   Relapse Risk: 1 (Patient during initial assessment reported he has had one relapse in the past).  VI:  Recovery Environment: 1 (Patient during initial assessment discussed wanting to find a place of his own).  Total ASAM Score = 05      BASELINE SCORES 04/25/2022       VINICIUS-7   (17)                  PHQ-9   (13)         Patient agreeable to adhere to medication regimen as prescribed and report any side effects. Patient will contact this office, call 911 or present to the nearest emergency room should suicidal or homicidal ideations occur.    Impression/Formulation:    ICD-10-CM ICD-9-CM   1. Opioid use disorder, severe, dependence (HCC)  F11.20 304.00   2. Cocaine use disorder, severe, in sustained remission (HCC)  F14.21 304.23       CLINICAL MANUVERING/INTERVENTIONS: Therapist utilized a person-centered approach to build rapport with group member.  Therapist implemented motivational interviewing techniques to assist client with exploring and resolving ambivalence associated with commitment to change behaviors related to substance use and addiction.  Therapist applied cognitive behavioral strategies to facilitate identification of maladaptive patterns of thinking and behavior that contribute to client’s risk for continued substance use and relapse.  Therapist employed group interaction activities to build rapport among group  members, promote sobriety, and emphasize relapse prevention.  Therapist promoted safe nonjudgmental environment by providing group members with unconditional positive regard and encouraging group members to comply with group rules and guidelines. Therapist assisted group member with identifying and implementing healthier coping strategies.      PLAN:  Continue [facility] IOP Phase I   Aftercare:  [facility] Phase II  Program Assignments:  Personal recovery plan, relapse prevention plan, attendance of recovery support group meetings, exploration of sponsorship, drug/alcohol screens.     Please note that portions of this note were completed with a voice recognition program. Efforts were made to edit dictation, but occasionally words are mistranscribed.       This document signed by Duy Garcia, May 23, 2022, 15:37 EDT

## 2022-05-24 LAB
ACCEPTABLE CREAT UR QL: 135.5 MG/DL
NALOXONE UR CFM-MCNC: 1201 NG/ML
NORBUP/BUP RATIO: 2.4 RATIO
NORBUPRENORPHINE UR CFM-MCNC: 836 NG/MG CREAT
NORBUPRENORPHINE/CREAT UR: 349 NG/MG CREAT
TRP-LINK: NORMAL

## 2022-05-25 ENCOUNTER — LAB (OUTPATIENT)
Dept: LAB | Facility: HOSPITAL | Age: 42
End: 2022-05-25

## 2022-05-25 ENCOUNTER — OFFICE VISIT (OUTPATIENT)
Dept: PSYCHIATRY | Facility: HOSPITAL | Age: 42
End: 2022-05-25

## 2022-05-25 DIAGNOSIS — F11.20 OPIOID USE DISORDER, SEVERE, DEPENDENCE: ICD-10-CM

## 2022-05-25 DIAGNOSIS — F11.20 OPIOID USE DISORDER, SEVERE, DEPENDENCE: Primary | ICD-10-CM

## 2022-05-25 DIAGNOSIS — F14.21 COCAINE USE DISORDER, SEVERE, IN SUSTAINED REMISSION: ICD-10-CM

## 2022-05-25 LAB
AMPHET+METHAMPHET UR QL: NEGATIVE
AMPHETAMINES UR QL: NEGATIVE
BARBITURATES UR QL SCN: NEGATIVE
BENZODIAZ UR QL SCN: POSITIVE
BUPRENORPHINE SERPL-MCNC: POSITIVE NG/ML
CANNABINOIDS SERPL QL: NEGATIVE
COCAINE UR QL: NEGATIVE
METHADONE UR QL SCN: NEGATIVE
OPIATES UR QL: NEGATIVE
OXYCODONE UR QL SCN: NEGATIVE
PCP UR QL SCN: NEGATIVE
PROPOXYPH UR QL: NEGATIVE
TRICYCLICS UR QL SCN: POSITIVE

## 2022-05-25 PROCEDURE — G0480 DRUG TEST DEF 1-7 CLASSES: HCPCS

## 2022-05-25 PROCEDURE — H0015 ALCOHOL AND/OR DRUG SERVICES: HCPCS | Performed by: NURSE PRACTITIONER

## 2022-05-25 PROCEDURE — 80307 DRUG TEST PRSMV CHEM ANLYZR: CPT

## 2022-05-25 NOTE — PROGRESS NOTES
NAME: Aroldo Cai  DATE: 05/16/2022     IOP GROUP   [id number]    Number of participants: 6  IOP GROUP NOTE     DATA:     3 hour IOP group therapy session (Check-ins, Coping Skills, Relapse Prevention)     Check Ins: Therapist continued facilitation of rapport building strategies between group members. Therapist asked that each patient check in with home life and recovery efforts and identify triggers, cravings, and high risk situations that arise between group sessions. Therapist provided empathy and support during group session.     Session Content/Coping Skills: Check ins completed by group members. Clinician processed stressors with group members. [facility] psychoeducational material reviewed. Clinician educated group members on internal and external triggers and group members explored and identified their triggers.      Response: Patient attended CD-IOP in person. Patient participated in completion of check in form.   Patient on check in form denied suicidal or homicidal thoughts or plan or intent to hurt self or others currently or since last group meeting. Patient participated in group discussions and review of psychoeducational material.     Personal Assessment 0-10 Scale (10 worst)    Anxiety: 6     Depression: 2     Cravings: 1-2      ASSESSMENT:     ..  Lab on 05/10/2022   Component Date Value Ref Range Status   • Ethanol, Urine 05/10/2022 Negative  Cutoff=0.020 % Final   • THC, Screen, Urine 05/10/2022 Negative  Negative Final   • Phencyclidine (PCP), Urine 05/10/2022 Negative  Negative Final   • Cocaine Screen, Urine 05/10/2022 Negative  Negative Final   • Methamphetamine, Ur 05/10/2022 Negative  Negative Final   • Opiate Screen 05/10/2022 Negative  Negative Final   • Amphetamine Screen, Urine 05/10/2022 Negative  Negative Final   • Benzodiazepine Screen, Urine 05/10/2022 Negative  Negative Final   • Tricyclic Antidepressants Screen 05/10/2022 Positive (A) Negative Final   • Methadone  Screen, Urine 05/10/2022 Negative  Negative Final   • Barbiturates Screen, Urine 05/10/2022 Negative  Negative Final   • Oxycodone Screen, Urine 05/10/2022 Negative  Negative Final   • Propoxyphene Screen 05/10/2022 Negative  Negative Final   • Buprenorphine, Screen, Urine 05/10/2022 Positive (A) Negative Final   • Creatinine, Urine 05/10/2022 175.1  >=20 mg/dL Final   • Buprenorphine/CR 05/10/2022 52  ng/mg creat Final   • NORBUPRENORPHINE/CR 05/10/2022 292  ng/mg creat Final   • NORBUP/BUP RATIO 05/10/2022 5.62  RATIO Final    This test was developed and its performance characteristics  determined by [employer].  It has not been cleared or approved  by the Food and Drug Administration.   • Naloxone 05/10/2022 176  Cutoff=25 ng/ml Final   • TRP-LINK 05/10/2022 Comment   Final    These test results were not transmitted to [facility].  If you would like your patient's urine drug  test results to transmit to [facility], please  contact your [employer]  to complete  a physician authorization form.   • Antidepressants 05/10/2022 Negative   Final   • AMITRIPTYLINE, UR 05/10/2022 Not Detected   Final   • Clomipramine, Ur 05/10/2022 Not Detected   Final   • Desemethylclomipramine 05/10/2022 Not Detected   Final   • Desipramine 05/10/2022 Not Detected   Final   • Doxepin 05/10/2022 Not Detected   Final   • Desmethyldoxepin, Ur 05/10/2022 Not Detected   Final   • Imipramine 05/10/2022 Not Detected   Final   • Nortriptyline 05/10/2022 Not Detected   Final   • Protriptyline 05/10/2022 Not Detected   Final   • Trimipramine 05/10/2022 Not Detected   Final   • Level of Detection: 05/10/2022 Comment   Final    Testing Threshold:  50 or 100 ng/mL                                                                       '  This test was developed and its performance characteristics  determined by [employer].  It has not been cleared or approved  by the Food and Drug Administration.   Lab on 05/03/2022    Component Date Value Ref Range Status   • Ethanol, Urine 05/03/2022 Negative  Cutoff=0.020 % Final   • THC, Screen, Urine 05/03/2022 Negative  Negative Final   • Phencyclidine (PCP), Urine 05/03/2022 Negative  Negative Final   • Cocaine Screen, Urine 05/03/2022 Negative  Negative Final   • Methamphetamine, Ur 05/03/2022 Negative  Negative Final   • Opiate Screen 05/03/2022 Negative  Negative Final   • Amphetamine Screen, Urine 05/03/2022 Negative  Negative Final   • Benzodiazepine Screen, Urine 05/03/2022 Negative  Negative Final   • Tricyclic Antidepressants Screen 05/03/2022 Positive (A) Negative Final   • Methadone Screen, Urine 05/03/2022 Negative  Negative Final   • Barbiturates Screen, Urine 05/03/2022 Negative  Negative Final   • Oxycodone Screen, Urine 05/03/2022 Negative  Negative Final   • Propoxyphene Screen 05/03/2022 Negative  Negative Final   • Buprenorphine, Screen, Urine 05/03/2022 Positive (A) Negative Final   • Creatinine, Urine 05/03/2022 259.0  >=20 mg/dL Final   • Buprenorphine/CR 05/03/2022 47  ng/mg creat Final   • NORBUPRENORPHINE/CR 05/03/2022 137  ng/mg creat Final   • NORBUP/BUP RATIO 05/03/2022 2.91  RATIO Final    This test was developed and its performance characteristics  determined by [employer].  It has not been cleared or approved  by the Food and Drug Administration.   • Naloxone 05/03/2022 310  Cutoff=25 ng/ml Final   • TRP-LINK 05/03/2022 Comment   Final    These test results were not transmitted to [facility].  If you would like your patient's urine drug  test results to transmit to [facility], please  contact your [employer]  to complete  a physician authorization form.   • Antidepressants 05/03/2022 Negative   Final   • AMITRIPTYLINE, UR 05/03/2022 Not Detected   Final   • Clomipramine, Ur 05/03/2022 Not Detected   Final   • Desemethylclomipramine 05/03/2022 Not Detected   Final   • Desipramine 05/03/2022 Not Detected   Final   • Doxepin  "05/03/2022 Not Detected   Final   • Desmethyldoxepin, Ur 05/03/2022 Not Detected   Final   • Imipramine 05/03/2022 Not Detected   Final   • Nortriptyline 05/03/2022 Not Detected   Final   • Protriptyline 05/03/2022 Not Detected   Final   • Trimipramine 05/03/2022 Not Detected   Final   • Level of Detection: 05/03/2022 Comment   Final    Testing Threshold:  50 or 100 ng/mL                                                                       '  This test was developed and its performance characteristics  determined by [employer].  It has not been cleared or approved  by the Food and Drug Administration.   • Specimen Status 05/03/2022 Comment   Final    Sent to [facility]  Written Authorization  Written Authorization  Written Authorization Received.  Authorization received from original order  [id number] 05-  Logged by Apurva Pendleton       Mental Status Exam  Hygiene:  good  Dress: casual  Attitude: cooperative and agreeable   Motor Activity: appropriate  Eye Contact:  good  Speech: regular rate and rhythm   Mood:  calm and cooperative  Affect:  Appropriate  Thought Processes:  Linear  Thought Content:  Normal  Suicidal Thoughts:  denies  Homicidal Thoughts:  denies  Crisis Safety Plan: yes, to come to the emergency room.  Hallucinations:  denies  Reliability: fair  Insight: fair  Judgement: fair  Impulse Control: fair    Family issues related to recovery:  No change, see previous encounters    Recovery/spiritual support group attendance: No     Sponsor: No       Motivation for treatment: During initial assessment, patient reported “my kids”.     Patient's Support Network Includes: During initial assessment, patient reported sober support system is “my family and youngest son’s mom”.     Progress toward goal: Not at goal    Prognosis: Fair with Ongoing Treatment     Self-reported number of days sober: Patient reported \"2 mths 62\" on check in form.     ASAM Dimensions:  I.    Intoxication/Withdrawal:  0  " (Patient during initial assessment reported no recent substance use).  II.   Medical Conditions/Complications:  1 (Due to medical conditions and history of seizures discussed by patient during initial assessment).  III.  Behavioral/Emotional/Cognitive: 1 (Due to patient's reported depression, anxiety, and feelings of hopelessness during initial assessment).  IV.  Readiness to Change: 1 (Patient appeared to be ready to change based on his self-report during initial assessment).  V.   Relapse Risk: 1 (Patient during initial assessment reported he has had one relapse in the past).  VI:  Recovery Environment: 1 (Patient during initial assessment discussed wanting to find a place of his own).  Total ASAM Score = 05      BASELINE SCORES 04/25/2022       VINICIUS-7   (17)                  PHQ-9   (13)        Patient agreeable to adhere to medication regimen as prescribed and report any side effects. Patient will contact this office, call 911 or present to the nearest emergency room should suicidal or homicidal ideations occur.    Impression/Formulation:    ICD-10-CM ICD-9-CM   1. Opioid use disorder, severe, dependence ([facility])  F11.20 304.00   2. Cocaine use disorder, severe, in sustained remission ([facility])  F14.21 304.23       CLINICAL MANUVERING/INTERVENTIONS: Therapist utilized a person-centered approach to build rapport with group member.  Therapist implemented motivational interviewing techniques to assist client with exploring and resolving ambivalence associated with commitment to change behaviors related to substance use and addiction.  Therapist applied cognitive behavioral strategies to facilitate identification of maladaptive patterns of thinking and behavior that contribute to client’s risk for continued substance use and relapse.  Therapist employed group interaction activities to build rapport among group members, promote sobriety, and emphasize relapse prevention.  Therapist promoted safe nonjudgmental environment by  providing group members with unconditional positive regard and encouraging group members to comply with group rules and guidelines. Therapist assisted group member with identifying and implementing healthier coping strategies.      PLAN:  Continue [facility] IOP Phase I   Aftercare:  [facility] Phase II  Program Assignments:  Personal recovery plan, relapse prevention plan, attendance of recovery support group meetings, exploration of sponsorship, drug/alcohol screens.     Please note that portions of this note were completed with a voice recognition program. Efforts were made to edit dictation, but occasionally words are mistranscribed.       This document signed by Duy Garcia, May 25, 2022, 12:49 EDT

## 2022-05-25 NOTE — PROGRESS NOTES
NAME: Aroldo Cai  DATE: 05/18/2022     IOP GROUP   [id number]    Number of participants: 6  IOP GROUP NOTE     DATA:     3 hour IOP group therapy session (Check-ins, Coping Skills, Relapse Prevention)     Check Ins: Therapist continued facilitation of rapport building strategies between group members. Therapist asked that each patient check in with home life and recovery efforts and identify triggers, cravings, and high risk situations that arise between group sessions. Therapist provided empathy and support during group session.     Session Content/Coping Skills: Check in’s completed by group members. Clinician educated group members on Masoliver’s hierarchy of needs and how this applies to addiction recovery. Group members explored how lower level of needs were not being fulfilled while in active addiction. Resource packet was reviewed with group members and clinician educated group members on the many resources available in the community. Clinician discussed with group members the importance of parallel treatment.    Response: Patient attended class in person. Patient participated in completion of check in form. Patient on check in form denied suicidal or homicidal thoughts or plan or intent to hurt self or others currently or since last group meeting. Patient participated in group discussion and review of Maslow’s hierarchy of needs and discussion of resources.     Personal Assessment 0-10 Scale (10 worst)    Anxiety:  2   Depression: 2   Cravings: 1     ASSESSMENT:     ..  Lab on 05/18/2022   Component Date Value Ref Range Status   • Ethanol, Urine 05/18/2022 Negative  Cutoff=0.020 % Final   • THC, Screen, Urine 05/18/2022 Negative  Negative Final   • Phencyclidine (PCP), Urine 05/18/2022 Negative  Negative Final   • Cocaine Screen, Urine 05/18/2022 Negative  Negative Final   • Methamphetamine, Ur 05/18/2022 Negative  Negative Final   • Opiate Screen 05/18/2022 Negative  Negative Final   • Amphetamine  Screen, Urine 05/18/2022 Negative  Negative Final   • Benzodiazepine Screen, Urine 05/18/2022 Negative  Negative Final   • Tricyclic Antidepressants Screen 05/18/2022 Negative  Negative Final   • Methadone Screen, Urine 05/18/2022 Negative  Negative Final   • Barbiturates Screen, Urine 05/18/2022 Negative  Negative Final   • Oxycodone Screen, Urine 05/18/2022 Negative  Negative Final   • Propoxyphene Screen 05/18/2022 Negative  Negative Final   • Buprenorphine, Screen, Urine 05/18/2022 Positive (A) Negative Final   • Creatinine, Urine 05/18/2022 135.5  >=20 mg/dL Final   • Buprenorphine/CR 05/18/2022 349  ng/mg creat Final   • NORBUPRENORPHINE/CR 05/18/2022 836  ng/mg creat Final   • NORBUP/BUP RATIO 05/18/2022 2.40  RATIO Final    This test was developed and its performance characteristics  determined by [employer].  It has not been cleared or approved  by the Food and Drug Administration.   • Naloxone 05/18/2022 1201  Cutoff=25 ng/ml Final   • TRP-LINK 05/18/2022 Comment   Final    These test results were not transmitted to [facility].  If you would like your patient's urine drug  test results to transmit to [facility], please  contact your [employer]  to complete  a physician authorization form.   Lab on 05/10/2022   Component Date Value Ref Range Status   • Ethanol, Urine 05/10/2022 Negative  Cutoff=0.020 % Final   • THC, Screen, Urine 05/10/2022 Negative  Negative Final   • Phencyclidine (PCP), Urine 05/10/2022 Negative  Negative Final   • Cocaine Screen, Urine 05/10/2022 Negative  Negative Final   • Methamphetamine, Ur 05/10/2022 Negative  Negative Final   • Opiate Screen 05/10/2022 Negative  Negative Final   • Amphetamine Screen, Urine 05/10/2022 Negative  Negative Final   • Benzodiazepine Screen, Urine 05/10/2022 Negative  Negative Final   • Tricyclic Antidepressants Screen 05/10/2022 Positive (A) Negative Final   • Methadone Screen, Urine 05/10/2022 Negative  Negative  Final   • Barbiturates Screen, Urine 05/10/2022 Negative  Negative Final   • Oxycodone Screen, Urine 05/10/2022 Negative  Negative Final   • Propoxyphene Screen 05/10/2022 Negative  Negative Final   • Buprenorphine, Screen, Urine 05/10/2022 Positive (A) Negative Final   • Creatinine, Urine 05/10/2022 175.1  >=20 mg/dL Final   • Buprenorphine/CR 05/10/2022 52  ng/mg creat Final   • NORBUPRENORPHINE/CR 05/10/2022 292  ng/mg creat Final   • NORBUP/BUP RATIO 05/10/2022 5.62  RATIO Final    This test was developed and its performance characteristics  determined by [employer].  It has not been cleared or approved  by the Food and Drug Administration.   • Naloxone 05/10/2022 176  Cutoff=25 ng/ml Final   • TRP-LINK 05/10/2022 Comment   Final    These test results were not transmitted to [facility].  If you would like your patient's urine drug  test results to transmit to [facility], please  contact your [employer]  to complete  a physician authorization form.   • Antidepressants 05/10/2022 Negative   Final   • AMITRIPTYLINE, UR 05/10/2022 Not Detected   Final   • Clomipramine, Ur 05/10/2022 Not Detected   Final   • Desemethylclomipramine 05/10/2022 Not Detected   Final   • Desipramine 05/10/2022 Not Detected   Final   • Doxepin 05/10/2022 Not Detected   Final   • Desmethyldoxepin, Ur 05/10/2022 Not Detected   Final   • Imipramine 05/10/2022 Not Detected   Final   • Nortriptyline 05/10/2022 Not Detected   Final   • Protriptyline 05/10/2022 Not Detected   Final   • Trimipramine 05/10/2022 Not Detected   Final   • Level of Detection: 05/10/2022 Comment   Final    Testing Threshold:  50 or 100 ng/mL                                                                       '  This test was developed and its performance characteristics  determined by [employer].  It has not been cleared or approved  by the Food and Drug Administration.   Lab on 05/03/2022   Component Date Value Ref Range Status   •  Ethanol, Urine 05/03/2022 Negative  Cutoff=0.020 % Final   • THC, Screen, Urine 05/03/2022 Negative  Negative Final   • Phencyclidine (PCP), Urine 05/03/2022 Negative  Negative Final   • Cocaine Screen, Urine 05/03/2022 Negative  Negative Final   • Methamphetamine, Ur 05/03/2022 Negative  Negative Final   • Opiate Screen 05/03/2022 Negative  Negative Final   • Amphetamine Screen, Urine 05/03/2022 Negative  Negative Final   • Benzodiazepine Screen, Urine 05/03/2022 Negative  Negative Final   • Tricyclic Antidepressants Screen 05/03/2022 Positive (A) Negative Final   • Methadone Screen, Urine 05/03/2022 Negative  Negative Final   • Barbiturates Screen, Urine 05/03/2022 Negative  Negative Final   • Oxycodone Screen, Urine 05/03/2022 Negative  Negative Final   • Propoxyphene Screen 05/03/2022 Negative  Negative Final   • Buprenorphine, Screen, Urine 05/03/2022 Positive (A) Negative Final   • Creatinine, Urine 05/03/2022 259.0  >=20 mg/dL Final   • Buprenorphine/CR 05/03/2022 47  ng/mg creat Final   • NORBUPRENORPHINE/CR 05/03/2022 137  ng/mg creat Final   • NORBUP/BUP RATIO 05/03/2022 2.91  RATIO Final    This test was developed and its performance characteristics  determined by [employer].  It has not been cleared or approved  by the Food and Drug Administration.   • Naloxone 05/03/2022 310  Cutoff=25 ng/ml Final   • TRP-LINK 05/03/2022 Comment   Final    These test results were not transmitted to [facility].  If you would like your patient's urine drug  test results to transmit to [facility], please  contact your "[employer]  to complete  a physician authorization form.   • Antidepressants 05/03/2022 Negative   Final   • AMITRIPTYLINE, UR 05/03/2022 Not Detected   Final   • Clomipramine, Ur 05/03/2022 Not Detected   Final   • Desemethylclomipramine 05/03/2022 Not Detected   Final   • Desipramine 05/03/2022 Not Detected   Final   • Doxepin 05/03/2022 Not Detected   Final   •  Desmethyldoxepin, Ur 05/03/2022 Not Detected   Final   • Imipramine 05/03/2022 Not Detected   Final   • Nortriptyline 05/03/2022 Not Detected   Final   • Protriptyline 05/03/2022 Not Detected   Final   • Trimipramine 05/03/2022 Not Detected   Final   • Level of Detection: 05/03/2022 Comment   Final    Testing Threshold:  50 or 100 ng/mL                                                                       '  This test was developed and its performance characteristics  determined by [employer].  It has not been cleared or approved  by the Food and Drug Administration.   • Specimen Status 05/03/2022 Comment   Final    Sent to Reference Lab  Written Authorization  Written Authorization  Written Authorization Received.  Authorization received from original order  [id number] 05-  Logged by Apurva Pendleton       Mental Status Exam  Hygiene:  good  Dress: casual  Attitude: cooperative and agreeable   Motor Activity: appropriate  Eye Contact:  good  Speech: regular rate and rhythm   Mood:  calm and cooperative  Affect:  Appropriate  Thought Processes:  Linear  Thought Content:  Normal  Suicidal Thoughts:  denies  Homicidal Thoughts:  denies  Crisis Safety Plan: yes, to come to the emergency room.  Hallucinations:  denies  Reliability: fair  Insight: fair  Judgement: fair  Impulse Control: fair    Family issues related to recovery:  No change, see previous encounters    Recovery/spiritual support group attendance: No     Sponsor: No     Motivation for treatment: During initial assessment, patient reported “my kids”.     Patient's Support Network Includes: During initial assessment, patient reported sober support system is “my family and youngest son’s mom”.     Progress toward goal: Not at goal    Prognosis: Fair with Ongoing Treatment     Self-reported number of days sober: Patient reported 64 or more on check in form.     ASAM Dimensions:  I.    Intoxication/Withdrawal:  0  (Patient during initial assessment reported  no recent substance use).  II.   Medical Conditions/Complications:  1 (Due to medical conditions and history of seizures discussed by patient during initial assessment).  III.  Behavioral/Emotional/Cognitive: 1 (Due to patient's reported depression, anxiety, and feelings of hopelessness during initial assessment).  IV.  Readiness to Change: 1 (Patient appeared to be ready to change based on his self-report during initial assessment).  V.   Relapse Risk: 1 (Patient during initial assessment reported he has had one relapse in the past).  VI:  Recovery Environment: 1 (Patient during initial assessment discussed wanting to find a place of his own).  Total ASAM Score = 05      BASELINE SCORES 04/25/2022       VINICIUS-7   (17)                  PHQ-9   (13)        Patient agreeable to adhere to medication regimen as prescribed and report any side effects. Patient will contact this office, call 911 or present to the nearest emergency room should suicidal or homicidal ideations occur.    Impression/Formulation:    ICD-10-CM ICD-9-CM   1. Opioid use disorder, severe, dependence ([facility])  F11.20 304.00   2. Cocaine use disorder, severe, in sustained remission ([facility])  F14.21 304.23       CLINICAL MANUVERING/INTERVENTIONS: Therapist utilized a person-centered approach to build rapport with group member.  Therapist implemented motivational interviewing techniques to assist client with exploring and resolving ambivalence associated with commitment to change behaviors related to substance use and addiction.  Therapist applied cognitive behavioral strategies to facilitate identification of maladaptive patterns of thinking and behavior that contribute to client’s risk for continued substance use and relapse.  Therapist employed group interaction activities to build rapport among group members, promote sobriety, and emphasize relapse prevention.  Therapist promoted safe nonjudgmental environment by providing group members with unconditional  positive regard and encouraging group members to comply with group rules and guidelines. Therapist assisted group member with identifying and implementing healthier coping strategies.      PLAN:  Continue [facility] IOP Phase I   Aftercare:  [facility] Phase II  Program Assignments:  Personal recovery plan, relapse prevention plan, attendance of recovery support group meetings, exploration of sponsorship, drug/alcohol screens.     Please note that portions of this note were completed with a voice recognition program. Efforts were made to edit dictation, but occasionally words are mistranscribed.       This document signed by Duy Garcia, May 25, 2022, 16:31 EDT

## 2022-05-26 ENCOUNTER — OFFICE VISIT (OUTPATIENT)
Dept: PSYCHIATRY | Facility: HOSPITAL | Age: 42
End: 2022-05-26

## 2022-05-26 DIAGNOSIS — F14.21 COCAINE USE DISORDER, SEVERE, IN SUSTAINED REMISSION: ICD-10-CM

## 2022-05-26 DIAGNOSIS — F11.20 OPIOID USE DISORDER, SEVERE, DEPENDENCE: Primary | ICD-10-CM

## 2022-05-26 LAB — ETHANOL UR-MCNC: NEGATIVE %

## 2022-05-26 PROCEDURE — H0015 ALCOHOL AND/OR DRUG SERVICES: HCPCS | Performed by: NURSE PRACTITIONER

## 2022-05-27 ENCOUNTER — OFFICE VISIT (OUTPATIENT)
Dept: PSYCHIATRY | Facility: CLINIC | Age: 42
End: 2022-05-27

## 2022-05-27 DIAGNOSIS — F31.81 BIPOLAR II DISORDER: Primary | ICD-10-CM

## 2022-05-27 DIAGNOSIS — F41.1 GENERALIZED ANXIETY DISORDER: ICD-10-CM

## 2022-05-27 PROCEDURE — 90834 PSYTX W PT 45 MINUTES: CPT | Performed by: SOCIAL WORKER

## 2022-05-27 NOTE — PROGRESS NOTES
"Date: 05/27/2022   Time In: 1100  Time Out: 1144    PROGRESS NOTE  Data:  Aroldo Cai is a 42 y.o. male who presents today for individual therapy session at [facility].     ICD-10-CM ICD-9-CM   1. Bipolar II disorder (HCC)  F31.81 296.89   2. Generalized anxiety disorder  F41.1 300.02     Patient reports he is actively participating in [facility] IOP and reports it is going well. Patient discussed feeling \"im still in long-term\". Patient reports disagreements with father about \"addiction being a disease\". Patient reports continued attempts to help father see this perspective and reports they cannot agree on this. Patient reports he stays \"very busy\" and is unable to attend NA meetings. Patient reports father is constantly \"telling him what to do\" at home. Patient reports feeling frustrated that his family does not recognize his accomplishments with his sobriety and they continue to bring up old behaviors. Patient reports he is looking froward to healing from his surgery so he can get a job and get his own place again.       Clinical Maneuvering/Intervention:    (Scales based on 0 - 10 with 10 being the worst)  Depression:   na Anxiety:  na       Assisted patient in processing above session content; acknowledged and normalized patient’s thoughts, feelings, and concerns. Discussed healthy communication when expressing our feelings such as using \"I statements\" describing the behaviors we do not like and suggesting a compromise if possible. Discussed practicing acceptance when people and situations are outside of our control.   Encouraged patient to attend AA/NA meetings and obtain sponsor.    Allowed patient to freely discuss issues without interruption or judgment. Provided safe, confidential environment to facilitate the development of positive therapeutic relationship and encourage open, honest communication. Assisted patient in identifying risk factors which would indicate the need for higher level of " care including thoughts to harm self or others and/or self-harming behavior and encouraged patient to contact this office, call 911, or present to the nearest emergency room should any of these events occur. Discussed crisis intervention services and means to access. Patient adamantly and convincingly denies current suicidal or homicidal ideation or perceptual disturbance.    Assessment   Patient appears to maintain relative stability as compared to their baseline.  However, patient continues to struggle with anxiety and mood instability which continues to cause impairment in important areas of functioning.  As a result, they can be reasonably expected to continue to benefit from treatment and would likely be at increased risk for decompensation otherwise.    Mental Status Exam:   Hygiene:   good  Cooperation:  Cooperative  Eye Contact:  Good  Psychomotor Behavior:  Appropriate  Affect:  Appropriate  Mood: anxious  Speech:  Normal  Thought Process:  Goal directed and Linear  Thought Content:  Normal  Suicidal:  None  Homicidal:  None  Hallucinations:  None  Delusion:  None  Memory:  Intact  Orientation:  Person, Place, Time and Situation  Reliability:  good  Insight:  Fair  Judgement:  Fair  Impulse Control:  Good  Physical/Medical Issues:  No      Patient's Support Network Includes:  parents and peer supports    Functional Status: Moderate impairment     Progress toward goal: Not at goal    Prognosis: Good with Ongoing Treatment          Plan     Patient will continue in individual outpatient therapy with focus on improved functioning and coping skills, maintaining stability, and avoiding decompensation and the need for higher level of care.    Patient will adhere to medication regimen as prescribed and report any side effects. Patient will contact this office, call 911 or present to the nearest emergency room should suicidal or homicidal ideations occur. Provide Cognitive Behavioral Therapy and Solution Focused  Therapy to improve functioning, maintain stability, and avoid decompensation and the need for higher level of care.     Return in about Return in about 4 weeks (around 6/24/2022). or earlier if symptoms worsen or fail to improve.            This document has been electronically signed by Leonor Lockett LCSW  May 27, 2022 11:02 EDT      Part of this note may be an electronic transcription/translation of spoken language to printed text using the Dragon Dictation System.

## 2022-05-29 LAB
ACCEPTABLE CREAT UR QL: 211.2 MG/DL
NALOXONE UR CFM-MCNC: 2366 NG/ML
NORBUP/BUP RATIO: 1.1 RATIO
NORBUPRENORPHINE UR CFM-MCNC: 384 NG/MG CREAT
NORBUPRENORPHINE/CREAT UR: 349 NG/MG CREAT
TRP-LINK: NORMAL

## 2022-05-30 NOTE — PROGRESS NOTES
NAME: Aroldo Cai  DATE: 05/25/2022     IOP GROUP   [id number]    Number of participants: 7  IOP GROUP NOTE     DATA:     3 hour IOP group therapy session (Check-ins, Coping Skills, Relapse Prevention)     Check Ins: Therapist continued facilitation of rapport building strategies between group members. Therapist asked that each patient check in with home life and recovery efforts and identify triggers, cravings, and high risk situations that arise between group sessions. Therapist provided empathy and support during group session.     Session Content/Coping Skills: Check in forms completed by group members. Clinician processed stressors with group members. Clinician discussed sleep hygiene with group members and sleep hygiene tips were explored.  joined for first part of group and shared Just for Today reading (ANA, 1991). “The Neurobiology of Addiction 101 in [employer]” [employer] video reviewed and discussed with group members. Clinician discussed internal and external triggers with group members.     Response: Patient attended class in person. Patient participated in completion of check in form.   Patient on check in form denied suicidal or homicidal thoughts or plan or intent to hurt self or others currently or since last group meeting. Patient participated in group discussions and viewing” The Neurobiology of Addiction 101 in [employer]” YouTube video”.     Personal Assessment 0-10 Scale (10 worst)    Anxiety:  5   Depression:  5   Cravings: 4     ASSESSMENT:     ..  Lab on 05/25/2022   Component Date Value Ref Range Status   • Ethanol, Urine 05/25/2022 Negative  Cutoff=0.020 % Final   • THC, Screen, Urine 05/25/2022 Negative  Negative Final   • Phencyclidine (PCP), Urine 05/25/2022 Negative  Negative Final   • Cocaine Screen, Urine 05/25/2022 Negative  Negative Final   • Methamphetamine, Ur 05/25/2022 Negative  Negative Final   • Opiate Screen 05/25/2022 Negative  Negative Final   • Amphetamine  Screen, Urine 05/25/2022 Negative  Negative Final   • Benzodiazepine Screen, Urine 05/25/2022 Positive (A) Negative Final   • Tricyclic Antidepressants Screen 05/25/2022 Positive (A) Negative Final   • Methadone Screen, Urine 05/25/2022 Negative  Negative Final   • Barbiturates Screen, Urine 05/25/2022 Negative  Negative Final   • Oxycodone Screen, Urine 05/25/2022 Negative  Negative Final   • Propoxyphene Screen 05/25/2022 Negative  Negative Final   • Buprenorphine, Screen, Urine 05/25/2022 Positive (A) Negative Final   • Creatinine, Urine 05/25/2022 211.2  >=20 mg/dL Final   • Buprenorphine/CR 05/25/2022 349  ng/mg creat Final   • NORBUPRENORPHINE/CR 05/25/2022 384  ng/mg creat Final   • NORBUP/BUP RATIO 05/25/2022 1.10  RATIO Final    This test was developed and its performance characteristics  determined by [facility].  It has not been cleared or approved  by the Food and Drug Administration.   • Naloxone 05/25/2022 2366  Cutoff=25 ng/ml Final   • TRP-LINK 05/25/2022 Comment   Final    These test results were not transmitted to [facility].  If you would like your patient's urine drug  test results to transmit to [facility], please  contact your [facility]  to complete  a physician authorization form.   Lab on 05/18/2022   Component Date Value Ref Range Status   • Ethanol, Urine 05/18/2022 Negative  Cutoff=0.020 % Final   • THC, Screen, Urine 05/18/2022 Negative  Negative Final   • Phencyclidine (PCP), Urine 05/18/2022 Negative  Negative Final   • Cocaine Screen, Urine 05/18/2022 Negative  Negative Final   • Methamphetamine, Ur 05/18/2022 Negative  Negative Final   • Opiate Screen 05/18/2022 Negative  Negative Final   • Amphetamine Screen, Urine 05/18/2022 Negative  Negative Final   • Benzodiazepine Screen, Urine 05/18/2022 Negative  Negative Final   • Tricyclic Antidepressants Screen 05/18/2022 Negative  Negative Final   • Methadone Screen, Urine 05/18/2022 Negative  Negative  Final   • Barbiturates Screen, Urine 05/18/2022 Negative  Negative Final   • Oxycodone Screen, Urine 05/18/2022 Negative  Negative Final   • Propoxyphene Screen 05/18/2022 Negative  Negative Final   • Buprenorphine, Screen, Urine 05/18/2022 Positive (A) Negative Final   • Creatinine, Urine 05/18/2022 135.5  >=20 mg/dL Final   • Buprenorphine/CR 05/18/2022 349  ng/mg creat Final   • NORBUPRENORPHINE/CR 05/18/2022 836  ng/mg creat Final   • NORBUP/BUP RATIO 05/18/2022 2.40  RATIO Final    This test was developed and its performance characteristics  determined by [facility].  It has not been cleared or approved  by the Food and Drug Administration.   • Naloxone 05/18/2022 1201  Cutoff=25 ng/ml Final   • TRP-LINK 05/18/2022 Comment   Final    These test results were not transmitted to [facility].  If you would like your patient's urine drug  test results to transmit to [facility], please  contact your [facility]  to complete  a physician authorization form.   Lab on 05/10/2022   Component Date Value Ref Range Status   • Ethanol, Urine 05/10/2022 Negative  Cutoff=0.020 % Final   • THC, Screen, Urine 05/10/2022 Negative  Negative Final   • Phencyclidine (PCP), Urine 05/10/2022 Negative  Negative Final   • Cocaine Screen, Urine 05/10/2022 Negative  Negative Final   • Methamphetamine, Ur 05/10/2022 Negative  Negative Final   • Opiate Screen 05/10/2022 Negative  Negative Final   • Amphetamine Screen, Urine 05/10/2022 Negative  Negative Final   • Benzodiazepine Screen, Urine 05/10/2022 Negative  Negative Final   • Tricyclic Antidepressants Screen 05/10/2022 Positive (A) Negative Final   • Methadone Screen, Urine 05/10/2022 Negative  Negative Final   • Barbiturates Screen, Urine 05/10/2022 Negative  Negative Final   • Oxycodone Screen, Urine 05/10/2022 Negative  Negative Final   • Propoxyphene Screen 05/10/2022 Negative  Negative Final   • Buprenorphine, Screen, Urine 05/10/2022 Positive  (A) Negative Final   • Creatinine, Urine 05/10/2022 175.1  >=20 mg/dL Final   • Buprenorphine/CR 05/10/2022 52  ng/mg creat Final   • NORBUPRENORPHINE/CR 05/10/2022 292  ng/mg creat Final   • NORBUP/BUP RATIO 05/10/2022 5.62  RATIO Final    This test was developed and its performance characteristics  determined by [facility].  It has not been cleared or approved  by the Food and Drug Administration.   • Naloxone 05/10/2022 176  Cutoff=25 ng/ml Final   • TRP-LINK 05/10/2022 Comment   Final    These test results were not transmitted to [facility].  If you would like your patient's urine drug  test results to transmit to [facility], please  contact your [facility]  to complete  a physician authorization form.   • Antidepressants 05/10/2022 Negative   Final   • AMITRIPTYLINE, UR 05/10/2022 Not Detected   Final   • Clomipramine, Ur 05/10/2022 Not Detected   Final   • Desemethylclomipramine 05/10/2022 Not Detected   Final   • Desipramine 05/10/2022 Not Detected   Final   • Doxepin 05/10/2022 Not Detected   Final   • Desmethyldoxepin, Ur 05/10/2022 Not Detected   Final   • Imipramine 05/10/2022 Not Detected   Final   • Nortriptyline 05/10/2022 Not Detected   Final   • Protriptyline 05/10/2022 Not Detected   Final   • Trimipramine 05/10/2022 Not Detected   Final   • Level of Detection: 05/10/2022 Comment   Final    Testing Threshold:  50 or 100 ng/mL                                                                       '  This test was developed and its performance characteristics  determined by [facility].  It has not been cleared or approved  by the Food and Drug Administration.       Mental Status Exam  Hygiene:  good  Dress: casual  Attitude: cooperative and agreeable   Motor Activity: appropriate  Eye Contact:  good  Speech: regular rate and rhythm   Mood:  calm and cooperative  Affect:  Appropriate  Thought Processes:  Linear  Thought Content:  Normal  Suicidal Thoughts:   denies  Homicidal Thoughts:  denies  Crisis Safety Plan: yes, to come to the emergency room.  Hallucinations:  denies  Reliability: fair  Insight: fair  Judgement: fair  Impulse Control: fair    Family issues related to recovery:  No change, see previous encounters    Recovery/spiritual support group attendance: No     Sponsor: No     Motivation for treatment: During initial assessment, patient reported “my kids”.     Patient's Support Network Includes: During initial assessment, patient reported sober support system is “my family and youngest son’s mom”.     Progress toward goal: Not at goal    Prognosis: Fair with Ongoing Treatment     Self-reported number of days sober: Patient reported March 16 on check in form.     ASAM Dimensions:  I.    Intoxication/Withdrawal:  0  (Patient during initial assessment reported no recent substance use).  II.   Medical Conditions/Complications:  1 (Due to medical conditions and history of seizures discussed by patient during initial assessment).  III.  Behavioral/Emotional/Cognitive: 1 (Due to patient's reported depression, anxiety, and feelings of hopelessness during initial assessment).  IV.  Readiness to Change: 1 (Patient appeared to be ready to change based on his self-report during initial assessment).  V.   Relapse Risk: 1 (Patient during initial assessment reported he has had one relapse in the past).  VI:  Recovery Environment: 1 (Patient during initial assessment discussed wanting to find a place of his own).  Total ASAM Score = 05      BASELINE SCORES 04/25/2022       VINICIUS-7   (17)                  PHQ-9   (13)        Patient agreeable to adhere to medication regimen as prescribed and report any side effects. Patient will contact this office, call 911 or present to the nearest emergency room should suicidal or homicidal ideations occur.    Impression/Formulation:    ICD-10-CM ICD-9-CM   1. Opioid use disorder, severe, dependence (HCC)  F11.20 304.00   2. Cocaine use  disorder, severe, in sustained remission ([facility])  F14.21 304.23       CLINICAL MANUVERING/INTERVENTIONS: Therapist utilized a person-centered approach to build rapport with group member.  Therapist implemented motivational interviewing techniques to assist client with exploring and resolving ambivalence associated with commitment to change behaviors related to substance use and addiction.  Therapist applied cognitive behavioral strategies to facilitate identification of maladaptive patterns of thinking and behavior that contribute to client’s risk for continued substance use and relapse.  Therapist employed group interaction activities to build rapport among group members, promote sobriety, and emphasize relapse prevention.  Therapist promoted safe nonjudgmental environment by providing group members with unconditional positive regard and encouraging group members to comply with group rules and guidelines. Therapist assisted group member with identifying and implementing healthier coping strategies.      PLAN:  Continue [facility] IOP Phase I   Aftercare:  [facility] Phase II  Program Assignments:  Personal recovery plan, relapse prevention plan, attendance of recovery support group meetings, exploration of sponsorship, drug/alcohol screens.     Please note that portions of this note were completed with a voice recognition program. Efforts were made to edit dictation, but occasionally words are mistranscribed.       This document signed by Duy Garcia, May 30, 2022, 13:42 EDT

## 2022-05-30 NOTE — PROGRESS NOTES
NAME: Aroldo Cai  DATE: 05/19/2022     IOP GROUP   [id number]    Number of participants: 8  IOP GROUP NOTE     DATA:     3 hour IOP group therapy session (Check-ins, Coping Skills, Relapse Prevention)     Check Ins: Therapist continued facilitation of rapport building strategies between group members. Therapist asked that each patient check in with home life and recovery efforts and identify triggers, cravings, and high risk situations that arise between group sessions. Therapist provided empathy and support during group session.     Session Content/Coping Skills: Check in’s completed by group members. Introductions completed. Clinician discussed gratitudes with group members and the importance of recognizing gratitudes. [place] group activity completed by group members. Clinician processed activity with group members. Clinician explored experiences of working as a team during the activity. Clinician explored with group members passive, aggressive, and assertive communication.      Response: Patient attended class in person. Patient participated in completion of check in form.   Patient on check in form denied suicidal or homicidal thoughts or plan or intent to hurt self or others currently or since last group meeting. Patient participated in introductions. Patient participated in group activity and group discussions.     Personal Assessment 0-10 Scale (10 worst)    Anxiety:  2   Depression:  1   Cravings: 0     ASSESSMENT:     ..  Lab on 05/18/2022   Component Date Value Ref Range Status   • Ethanol, Urine 05/18/2022 Negative  Cutoff=0.020 % Final   • THC, Screen, Urine 05/18/2022 Negative  Negative Final   • Phencyclidine (PCP), Urine 05/18/2022 Negative  Negative Final   • Cocaine Screen, Urine 05/18/2022 Negative  Negative Final   • Methamphetamine, Ur 05/18/2022 Negative  Negative Final   • Opiate Screen 05/18/2022 Negative  Negative Final   • Amphetamine Screen, Urine 05/18/2022 Negative  Negative  Final   • Benzodiazepine Screen, Urine 05/18/2022 Negative  Negative Final   • Tricyclic Antidepressants Screen 05/18/2022 Negative  Negative Final   • Methadone Screen, Urine 05/18/2022 Negative  Negative Final   • Barbiturates Screen, Urine 05/18/2022 Negative  Negative Final   • Oxycodone Screen, Urine 05/18/2022 Negative  Negative Final   • Propoxyphene Screen 05/18/2022 Negative  Negative Final   • Buprenorphine, Screen, Urine 05/18/2022 Positive (A) Negative Final   • Creatinine, Urine 05/18/2022 135.5  >=20 mg/dL Final   • Buprenorphine/CR 05/18/2022 349  ng/mg creat Final   • NORBUPRENORPHINE/CR 05/18/2022 836  ng/mg creat Final   • NORBUP/BUP RATIO 05/18/2022 2.40  RATIO Final    This test was developed and its performance characteristics  determined by [employer].  It has not been cleared or approved  by the Food and Drug Administration.   • Naloxone 05/18/2022 1201  Cutoff=25 ng/ml Final   • TRP-LINK 05/18/2022 Comment   Final    These test results were not transmitted to [facility].  If you would like your patient's urine drug  test results to transmit to [facility], please  contact your [employer]  to complete  a physician authorization form.   Lab on 05/10/2022   Component Date Value Ref Range Status   • Ethanol, Urine 05/10/2022 Negative  Cutoff=0.020 % Final   • THC, Screen, Urine 05/10/2022 Negative  Negative Final   • Phencyclidine (PCP), Urine 05/10/2022 Negative  Negative Final   • Cocaine Screen, Urine 05/10/2022 Negative  Negative Final   • Methamphetamine, Ur 05/10/2022 Negative  Negative Final   • Opiate Screen 05/10/2022 Negative  Negative Final   • Amphetamine Screen, Urine 05/10/2022 Negative  Negative Final   • Benzodiazepine Screen, Urine 05/10/2022 Negative  Negative Final   • Tricyclic Antidepressants Screen 05/10/2022 Positive (A) Negative Final   • Methadone Screen, Urine 05/10/2022 Negative  Negative Final   • Barbiturates Screen, Urine  05/10/2022 Negative  Negative Final   • Oxycodone Screen, Urine 05/10/2022 Negative  Negative Final   • Propoxyphene Screen 05/10/2022 Negative  Negative Final   • Buprenorphine, Screen, Urine 05/10/2022 Positive (A) Negative Final   • Creatinine, Urine 05/10/2022 175.1  >=20 mg/dL Final   • Buprenorphine/CR 05/10/2022 52  ng/mg creat Final   • NORBUPRENORPHINE/CR 05/10/2022 292  ng/mg creat Final   • NORBUP/BUP RATIO 05/10/2022 5.62  RATIO Final    This test was developed and its performance characteristics  determined by [employer].  It has not been cleared or approved  by the Food and Drug Administration.   • Naloxone 05/10/2022 176  Cutoff=25 ng/ml Final   • TRP-LINK 05/10/2022 Comment   Final    These test results were not transmitted to [facility].  If you would like your patient's urine drug  test results to transmit to [facility], please  contact your [employer]  to complete  a physician authorization form.   • Antidepressants 05/10/2022 Negative   Final   • AMITRIPTYLINE, UR 05/10/2022 Not Detected   Final   • Clomipramine, Ur 05/10/2022 Not Detected   Final   • Desemethylclomipramine 05/10/2022 Not Detected   Final   • Desipramine 05/10/2022 Not Detected   Final   • Doxepin 05/10/2022 Not Detected   Final   • Desmethyldoxepin, Ur 05/10/2022 Not Detected   Final   • Imipramine 05/10/2022 Not Detected   Final   • Nortriptyline 05/10/2022 Not Detected   Final   • Protriptyline 05/10/2022 Not Detected   Final   • Trimipramine 05/10/2022 Not Detected   Final   • Level of Detection: 05/10/2022 Comment   Final    Testing Threshold:  50 or 100 ng/mL                                                                       '  This test was developed and its performance characteristics  determined by [employer].  It has not been cleared or approved  by the Food and Drug Administration.   Lab on 05/03/2022   Component Date Value Ref Range Status   • Ethanol, Urine 05/03/2022 Negative   Cutoff=0.020 % Final   • THC, Screen, Urine 05/03/2022 Negative  Negative Final   • Phencyclidine (PCP), Urine 05/03/2022 Negative  Negative Final   • Cocaine Screen, Urine 05/03/2022 Negative  Negative Final   • Methamphetamine, Ur 05/03/2022 Negative  Negative Final   • Opiate Screen 05/03/2022 Negative  Negative Final   • Amphetamine Screen, Urine 05/03/2022 Negative  Negative Final   • Benzodiazepine Screen, Urine 05/03/2022 Negative  Negative Final   • Tricyclic Antidepressants Screen 05/03/2022 Positive (A) Negative Final   • Methadone Screen, Urine 05/03/2022 Negative  Negative Final   • Barbiturates Screen, Urine 05/03/2022 Negative  Negative Final   • Oxycodone Screen, Urine 05/03/2022 Negative  Negative Final   • Propoxyphene Screen 05/03/2022 Negative  Negative Final   • Buprenorphine, Screen, Urine 05/03/2022 Positive (A) Negative Final   • Creatinine, Urine 05/03/2022 259.0  >=20 mg/dL Final   • Buprenorphine/CR 05/03/2022 47  ng/mg creat Final   • NORBUPRENORPHINE/CR 05/03/2022 137  ng/mg creat Final   • NORBUP/BUP RATIO 05/03/2022 2.91  RATIO Final    This test was developed and its performance characteristics  determined by [employer].  It has not been cleared or approved  by the Food and Drug Administration.   • Naloxone 05/03/2022 310  Cutoff=25 ng/ml Final   • TRP-LINK 05/03/2022 Comment   Final    These test results were not transmitted to [facility].  If you would like your patient's urine drug  test results to transmit to [facility], please  contact your [employer]  to complete  a physician authorization form.   • Antidepressants 05/03/2022 Negative   Final   • AMITRIPTYLINE, UR 05/03/2022 Not Detected   Final   • Clomipramine, Ur 05/03/2022 Not Detected   Final   • Desemethylclomipramine 05/03/2022 Not Detected   Final   • Desipramine 05/03/2022 Not Detected   Final   • Doxepin 05/03/2022 Not Detected   Final   • Desmethyldoxepin, Ur 05/03/2022 Not Detected    Final   • Imipramine 05/03/2022 Not Detected   Final   • Nortriptyline 05/03/2022 Not Detected   Final   • Protriptyline 05/03/2022 Not Detected   Final   • Trimipramine 05/03/2022 Not Detected   Final   • Level of Detection: 05/03/2022 Comment   Final    Testing Threshold:  50 or 100 ng/mL                                                                       '  This test was developed and its performance characteristics  determined by [email or web address].  It has not been cleared or approved  by the Food and Drug Administration.   • Specimen Status 05/03/2022 Comment   Final    Sent to [facility]  Written Authorization  Written Authorization  Written Authorization Received.  Authorization received from original order  [id number] 05-  Logged by Apurva Pendleton       Mental Status Exam  Hygiene:  good  Dress: casual  Attitude: cooperative and agreeable   Motor Activity: appropriate  Eye Contact:  good  Speech: regular rate and rhythm   Mood:  calm and cooperative  Affect:  Appropriate  Thought Processes:  Linear  Thought Content:  Normal  Suicidal Thoughts:  denies  Homicidal Thoughts:  denies  Crisis Safety Plan: yes, to come to the emergency room.  Hallucinations:  denies  Reliability: fair  Insight: fair  Judgement: fair  Impulse Control: fair    Family issues related to recovery:  No change, see previous encounters    Recovery/spiritual support group attendance: No     Sponsor: No     Motivation for treatment: During initial assessment, patient reported “my kids”.     Patient's Support Network Includes: During initial assessment, patient reported sober support system is “my family and youngest son’s mom”.     Progress toward goal: Not at goal    Prognosis: Fair with Ongoing Treatment     Self-reported number of days sober: Patient reported 65 on check in form.     ASAM Dimensions:  I.    Intoxication/Withdrawal:  0  (Patient during initial assessment reported no recent substance use).  II.   Medical  Conditions/Complications:  1 (Due to medical conditions and history of seizures discussed by patient during initial assessment).  III.  Behavioral/Emotional/Cognitive: 1 (Due to patient's reported depression, anxiety, and feelings of hopelessness during initial assessment).  IV.  Readiness to Change: 1 (Patient appeared to be ready to change based on his self-report during initial assessment).  V.   Relapse Risk: 1 (Patient during initial assessment reported he has had one relapse in the past).  VI:  Recovery Environment: 1 (Patient during initial assessment discussed wanting to find a place of his own).  Total ASAM Score = 05      BASELINE SCORES 04/25/2022       VINICIUS-7   (17)                  PHQ-9   (13)        Patient agreeable to adhere to medication regimen as prescribed and report any side effects. Patient will contact this office, call 911 or present to the nearest emergency room should suicidal or homicidal ideations occur.    Impression/Formulation:    ICD-10-CM ICD-9-CM   1. Opioid use disorder, severe, dependence (HCC)  F11.20 304.00   2. Cocaine use disorder, severe, in sustained remission (HCC)  F14.21 304.23       CLINICAL MANUVERING/INTERVENTIONS: Therapist utilized a person-centered approach to build rapport with group member.  Therapist implemented motivational interviewing techniques to assist client with exploring and resolving ambivalence associated with commitment to change behaviors related to substance use and addiction.  Therapist applied cognitive behavioral strategies to facilitate identification of maladaptive patterns of thinking and behavior that contribute to client’s risk for continued substance use and relapse.  Therapist employed group interaction activities to build rapport among group members, promote sobriety, and emphasize relapse prevention.  Therapist promoted safe nonjudgmental environment by providing group members with unconditional positive regard and encouraging group  members to comply with group rules and guidelines. Therapist assisted group member with identifying and implementing healthier coping strategies.      PLAN:  Continue [facility] IOP Phase I   Aftercare:  [facility] Phase II  Program Assignments:  Personal recovery plan, relapse prevention plan, attendance of recovery support group meetings, exploration of sponsorship, drug/alcohol screens.     Please note that portions of this note were completed with a voice recognition program. Efforts were made to edit dictation, but occasionally words are mistranscribed.       This document signed by Duy Garcia, May 30, 2022, 09:46 EDT

## 2022-05-30 NOTE — PROGRESS NOTES
NAME: Aroldo Cai  DATE: 05/26/2022    [facility] GROUP   [id number]    Number of participants: 7  IOP GROUP NOTE     DATA:     3 hour IOP group therapy session (Check-ins, Coping Skills, Relapse Prevention)     Check Ins: Therapist continued facilitation of rapport building strategies between group members. Therapist asked that each patient check in with home life and recovery efforts and identify triggers, cravings, and high risk situations that arise between group sessions. Therapist provided empathy and support during group session.     Session Content/Coping Skills: Check in forms completed by group members. Clinician discussed with group members stigmas related to addiction and barriers these create. Clinician discussed assertive communication and example discussed as a group. Clinician discussed with group members setting boundaries. Clinician reviewed coping skills toolbox, “50 ways to take a break” and “99 coping skills” psychoeducational material with group members (retrieved from [employer]). Therapist Aid “coping skills addiction” psychoeducational material reviewed with group members.     Response: Patient attended class in person. Patient participated in completion of check in form.   Patient on check in form denied suicidal or homicidal thoughts or plan or intent to hurt self or others currently or since last group meeting. Patient participated in group discussions and review of psychoeducational material.     Personal Assessment 0-10 Scale (10 worst)    Anxiety:  3   Depression:  1   Cravings: 1     ASSESSMENT:     ..  Lab on 05/25/2022   Component Date Value Ref Range Status   • Ethanol, Urine 05/25/2022 Negative  Cutoff=0.020 % Final   • THC, Screen, Urine 05/25/2022 Negative  Negative Final   • Phencyclidine (PCP), Urine 05/25/2022 Negative  Negative Final   • Cocaine Screen, Urine 05/25/2022 Negative  Negative Final   • Methamphetamine, Ur 05/25/2022 Negative  Negative Final   •  Opiate Screen 05/25/2022 Negative  Negative Final   • Amphetamine Screen, Urine 05/25/2022 Negative  Negative Final   • Benzodiazepine Screen, Urine 05/25/2022 Positive (A) Negative Final   • Tricyclic Antidepressants Screen 05/25/2022 Positive (A) Negative Final   • Methadone Screen, Urine 05/25/2022 Negative  Negative Final   • Barbiturates Screen, Urine 05/25/2022 Negative  Negative Final   • Oxycodone Screen, Urine 05/25/2022 Negative  Negative Final   • Propoxyphene Screen 05/25/2022 Negative  Negative Final   • Buprenorphine, Screen, Urine 05/25/2022 Positive (A) Negative Final   • Creatinine, Urine 05/25/2022 211.2  >=20 mg/dL Final   • Buprenorphine/CR 05/25/2022 349  ng/mg creat Final   • NORBUPRENORPHINE/CR 05/25/2022 384  ng/mg creat Final   • NORBUP/BUP RATIO 05/25/2022 1.10  RATIO Final    This test was developed and its performance characteristics  determined by [employer].  It has not been cleared or approved  by the Food and Drug Administration.   • Naloxone 05/25/2022 2366  Cutoff=25 ng/ml Final   • TRP-LINK 05/25/2022 Comment   Final    These test results were not transmitted to [facility].  If you would like your patient's urine drug  test results to transmit to [facility], please  contact your [employer]  to complete  a physician authorization form.   Lab on 05/18/2022   Component Date Value Ref Range Status   • Ethanol, Urine 05/18/2022 Negative  Cutoff=0.020 % Final   • THC, Screen, Urine 05/18/2022 Negative  Negative Final   • Phencyclidine (PCP), Urine 05/18/2022 Negative  Negative Final   • Cocaine Screen, Urine 05/18/2022 Negative  Negative Final   • Methamphetamine, Ur 05/18/2022 Negative  Negative Final   • Opiate Screen 05/18/2022 Negative  Negative Final   • Amphetamine Screen, Urine 05/18/2022 Negative  Negative Final   • Benzodiazepine Screen, Urine 05/18/2022 Negative  Negative Final   • Tricyclic Antidepressants Screen 05/18/2022 Negative   Negative Final   • Methadone Screen, Urine 05/18/2022 Negative  Negative Final   • Barbiturates Screen, Urine 05/18/2022 Negative  Negative Final   • Oxycodone Screen, Urine 05/18/2022 Negative  Negative Final   • Propoxyphene Screen 05/18/2022 Negative  Negative Final   • Buprenorphine, Screen, Urine 05/18/2022 Positive (A) Negative Final   • Creatinine, Urine 05/18/2022 135.5  >=20 mg/dL Final   • Buprenorphine/CR 05/18/2022 349  ng/mg creat Final   • NORBUPRENORPHINE/CR 05/18/2022 836  ng/mg creat Final   • NORBUP/BUP RATIO 05/18/2022 2.40  RATIO Final    This test was developed and its performance characteristics  determined by [employer].  It has not been cleared or approved  by the Food and Drug Administration.   • Naloxone 05/18/2022 1201  Cutoff=25 ng/ml Final   • TRP-LINK 05/18/2022 Comment   Final    These test results were not transmitted to [facility].  If you would like your patient's urine drug  test results to transmit to [facility], please  contact your [employer]  to complete  a physician authorization form.   Lab on 05/10/2022   Component Date Value Ref Range Status   • Ethanol, Urine 05/10/2022 Negative  Cutoff=0.020 % Final   • THC, Screen, Urine 05/10/2022 Negative  Negative Final   • Phencyclidine (PCP), Urine 05/10/2022 Negative  Negative Final   • Cocaine Screen, Urine 05/10/2022 Negative  Negative Final   • Methamphetamine, Ur 05/10/2022 Negative  Negative Final   • Opiate Screen 05/10/2022 Negative  Negative Final   • Amphetamine Screen, Urine 05/10/2022 Negative  Negative Final   • Benzodiazepine Screen, Urine 05/10/2022 Negative  Negative Final   • Tricyclic Antidepressants Screen 05/10/2022 Positive (A) Negative Final   • Methadone Screen, Urine 05/10/2022 Negative  Negative Final   • Barbiturates Screen, Urine 05/10/2022 Negative  Negative Final   • Oxycodone Screen, Urine 05/10/2022 Negative  Negative Final   • Propoxyphene Screen 05/10/2022  Negative  Negative Final   • Buprenorphine, Screen, Urine 05/10/2022 Positive (A) Negative Final   • Creatinine, Urine 05/10/2022 175.1  >=20 mg/dL Final   • Buprenorphine/CR 05/10/2022 52  ng/mg creat Final   • NORBUPRENORPHINE/CR 05/10/2022 292  ng/mg creat Final   • NORBUP/BUP RATIO 05/10/2022 5.62  RATIO Final    This test was developed and its performance characteristics  determined by [employer].  It has not been cleared or approved  by the Food and Drug Administration.   • Naloxone 05/10/2022 176  Cutoff=25 ng/ml Final   • TRP-LINK 05/10/2022 Comment   Final    These test results were not transmitted to [facility].  If you would like your patient's urine drug  test results to transmit to [facility], please  contact your [employer]  to complete  a physician authorization form.   • Antidepressants 05/10/2022 Negative   Final   • AMITRIPTYLINE, UR 05/10/2022 Not Detected   Final   • Clomipramine, Ur 05/10/2022 Not Detected   Final   • Desemethylclomipramine 05/10/2022 Not Detected   Final   • Desipramine 05/10/2022 Not Detected   Final   • Doxepin 05/10/2022 Not Detected   Final   • Desmethyldoxepin, Ur 05/10/2022 Not Detected   Final   • Imipramine 05/10/2022 Not Detected   Final   • Nortriptyline 05/10/2022 Not Detected   Final   • Protriptyline 05/10/2022 Not Detected   Final   • Trimipramine 05/10/2022 Not Detected   Final   • Level of Detection: 05/10/2022 Comment   Final    Testing Threshold:  50 or 100 ng/mL                                                                       '  This test was developed and its performance characteristics  determined by [employer].  It has not been cleared or approved  by the Food and Drug Administration.       Mental Status Exam  Hygiene:  good  Dress: casual  Attitude: cooperative and agreeable   Motor Activity: appropriate  Eye Contact:  good  Speech: regular rate and rhythm   Mood:  calm and cooperative  Affect:  Appropriate  Thought  Processes:  Linear  Thought Content:  Normal  Suicidal Thoughts:  denies  Homicidal Thoughts:  denies  Crisis Safety Plan: yes, to come to the emergency room.  Hallucinations:  denies  Reliability: fair  Insight: fair  Judgement: fair  Impulse Control: fair    Family issues related to recovery:  No change, see previous encounters    Recovery/spiritual support group attendance: No     Sponsor: No     Motivation for treatment: During initial assessment, patient reported “my kids”.     Patient's Support Network Includes: During initial assessment, patient reported sober support system is “my family and youngest son’s mom”.     Progress toward goal: Not at goal    Prognosis: Fair with Ongoing Treatment     Self-reported number of days sober: Patient reported March 16th on check in form.     ASAM Dimensions:  I.    Intoxication/Withdrawal:  0  (Patient during initial assessment reported no recent substance use).  II.   Medical Conditions/Complications:  1 (Due to medical conditions and history of seizures discussed by patient during initial assessment).  III.  Behavioral/Emotional/Cognitive: 1 (Due to patient's reported depression, anxiety, and feelings of hopelessness during initial assessment).  IV.  Readiness to Change: 1 (Patient appeared to be ready to change based on his self-report during initial assessment).  V.   Relapse Risk: 1 (Patient during initial assessment reported he has had one relapse in the past).  VI:  Recovery Environment: 1 (Patient during initial assessment discussed wanting to find a place of his own).  Total ASAM Score = 05      BASELINE SCORES 04/25/2022       VINICIUS-7   (17)                  PHQ-9   (13)        Patient agreeable to adhere to medication regimen as prescribed and report any side effects. Patient will contact this office, call 911 or present to the nearest emergency room should suicidal or homicidal ideations occur.    Impression/Formulation:    ICD-10-CM ICD-9-CM   1. Opioid use  disorder, severe, dependence ([facility])  F11.20 304.00   2. Cocaine use disorder, severe, in sustained remission ([facility])  F14.21 304.23       CLINICAL MANUVERING/INTERVENTIONS: Therapist utilized a person-centered approach to build rapport with group member.  Therapist implemented motivational interviewing techniques to assist client with exploring and resolving ambivalence associated with commitment to change behaviors related to substance use and addiction.  Therapist applied cognitive behavioral strategies to facilitate identification of maladaptive patterns of thinking and behavior that contribute to client’s risk for continued substance use and relapse.  Therapist employed group interaction activities to build rapport among group members, promote sobriety, and emphasize relapse prevention.  Therapist promoted safe nonjudgmental environment by providing group members with unconditional positive regard and encouraging group members to comply with group rules and guidelines. Therapist assisted group member with identifying and implementing healthier coping strategies.      PLAN:  Continue [facility] IOP Phase I   Aftercare:  [facility] Phase II  Program Assignments:  Personal recovery plan, relapse prevention plan, attendance of recovery support group meetings, exploration of sponsorship, drug/alcohol screens.     Please note that portions of this note were completed with a voice recognition program. Efforts were made to edit dictation, but occasionally words are mistranscribed.       This document signed by Duy Garcia, May 30, 2022, 14:35 EDT

## 2022-05-30 NOTE — PROGRESS NOTES
NAME: Aroldo Cai  DATE: 05/23/2022    [facility] GROUP   [id number]    Number of participants: 7  IOP GROUP NOTE     DATA:     3 hour IOP group therapy session (Check-ins, Coping Skills, Relapse Prevention)     Check Ins: Therapist continued facilitation of rapport building strategies between group members. Therapist asked that each patient check in with home life and recovery efforts and identify triggers, cravings, and high risk situations that arise between group sessions. Therapist provided empathy and support during group session.     Session Content/Coping Skills: Check in forms completed by group members.  joined for first part of meeting and shared Just for Today reading (ANA, 1991). Clinician discussed with group members making amends and the 8th and 9th step of the 12 steps. -IOP introductory PowerPoint reviewed with group members. Safety plan of Report to police or hospital, or call 911 if feeling unsafe, if having suicidal or homicidal thoughts, or if in emergency need of medications verbally reviewed with group members and suicide prevention hotline number verbally provided to group members. Micheline “facts about addiction” psychoeducational material reviewed. Clinician explored with group members their opinion on if addiction is a disease or a choice. Clinician discussed with group members the Biopsychosocial approach to addiction. Clinician began discussion of addiction and the brain.      Response: Patient attended class in person. Patient participated in completion of check in form.   Patient on check in form denied suicidal or homicidal thoughts or plan or intent to hurt self or others currently or since last group meeting.   Patient participated in review of psychoeducational material and group discussions.     Personal Assessment 0-10 Scale (10 worst)    Anxiety:  4   Depression:  1   Cravings: 2     ASSESSMENT:     ..  Lab on 05/18/2022   Component Date Value Ref Range  Status   • Ethanol, Urine 05/18/2022 Negative  Cutoff=0.020 % Final   • THC, Screen, Urine 05/18/2022 Negative  Negative Final   • Phencyclidine (PCP), Urine 05/18/2022 Negative  Negative Final   • Cocaine Screen, Urine 05/18/2022 Negative  Negative Final   • Methamphetamine, Ur 05/18/2022 Negative  Negative Final   • Opiate Screen 05/18/2022 Negative  Negative Final   • Amphetamine Screen, Urine 05/18/2022 Negative  Negative Final   • Benzodiazepine Screen, Urine 05/18/2022 Negative  Negative Final   • Tricyclic Antidepressants Screen 05/18/2022 Negative  Negative Final   • Methadone Screen, Urine 05/18/2022 Negative  Negative Final   • Barbiturates Screen, Urine 05/18/2022 Negative  Negative Final   • Oxycodone Screen, Urine 05/18/2022 Negative  Negative Final   • Propoxyphene Screen 05/18/2022 Negative  Negative Final   • Buprenorphine, Screen, Urine 05/18/2022 Positive (A) Negative Final   • Creatinine, Urine 05/18/2022 135.5  >=20 mg/dL Final   • Buprenorphine/CR 05/18/2022 349  ng/mg creat Final   • NORBUPRENORPHINE/CR 05/18/2022 836  ng/mg creat Final   • NORBUP/BUP RATIO 05/18/2022 2.40  RATIO Final    This test was developed and its performance characteristics  determined by [facility].  It has not been cleared or approved  by the Food and Drug Administration.   • Naloxone 05/18/2022 1201  Cutoff=25 ng/ml Final   • TRP-LINK 05/18/2022 Comment   Final    These test results were not transmitted to [facility].  If you would like your patient's urine drug  test results to transmit to [facility], please  contact your [facility]  to complete  a physician authorization form.   Lab on 05/10/2022   Component Date Value Ref Range Status   • Ethanol, Urine 05/10/2022 Negative  Cutoff=0.020 % Final   • THC, Screen, Urine 05/10/2022 Negative  Negative Final   • Phencyclidine (PCP), Urine 05/10/2022 Negative  Negative Final   • Cocaine Screen, Urine 05/10/2022 Negative  Negative Final    • Methamphetamine, Ur 05/10/2022 Negative  Negative Final   • Opiate Screen 05/10/2022 Negative  Negative Final   • Amphetamine Screen, Urine 05/10/2022 Negative  Negative Final   • Benzodiazepine Screen, Urine 05/10/2022 Negative  Negative Final   • Tricyclic Antidepressants Screen 05/10/2022 Positive (A) Negative Final   • Methadone Screen, Urine 05/10/2022 Negative  Negative Final   • Barbiturates Screen, Urine 05/10/2022 Negative  Negative Final   • Oxycodone Screen, Urine 05/10/2022 Negative  Negative Final   • Propoxyphene Screen 05/10/2022 Negative  Negative Final   • Buprenorphine, Screen, Urine 05/10/2022 Positive (A) Negative Final   • Creatinine, Urine 05/10/2022 175.1  >=20 mg/dL Final   • Buprenorphine/CR 05/10/2022 52  ng/mg creat Final   • NORBUPRENORPHINE/CR 05/10/2022 292  ng/mg creat Final   • NORBUP/BUP RATIO 05/10/2022 5.62  RATIO Final    This test was developed and its performance characteristics  determined by [facility].  It has not been cleared or approved  by the Food and Drug Administration.   • Naloxone 05/10/2022 176  Cutoff=25 ng/ml Final   • TRP-LINK 05/10/2022 Comment   Final    These test results were not transmitted to [facility].  If you would like your patient's urine drug  test results to transmit to [facility], please  contact your [facility]  to complete  a physician authorization form.   • Antidepressants 05/10/2022 Negative   Final   • AMITRIPTYLINE, UR 05/10/2022 Not Detected   Final   • Clomipramine, Ur 05/10/2022 Not Detected   Final   • Desemethylclomipramine 05/10/2022 Not Detected   Final   • Desipramine 05/10/2022 Not Detected   Final   • Doxepin 05/10/2022 Not Detected   Final   • Desmethyldoxepin, Ur 05/10/2022 Not Detected   Final   • Imipramine 05/10/2022 Not Detected   Final   • Nortriptyline 05/10/2022 Not Detected   Final   • Protriptyline 05/10/2022 Not Detected   Final   • Trimipramine 05/10/2022 Not Detected   Final   •  Level of Detection: 05/10/2022 Comment   Final    Testing Threshold:  50 or 100 ng/mL                                                                       '  This test was developed and its performance characteristics  determined by [facility].  It has not been cleared or approved  by the Food and Drug Administration.   Lab on 05/03/2022   Component Date Value Ref Range Status   • Ethanol, Urine 05/03/2022 Negative  Cutoff=0.020 % Final   • THC, Screen, Urine 05/03/2022 Negative  Negative Final   • Phencyclidine (PCP), Urine 05/03/2022 Negative  Negative Final   • Cocaine Screen, Urine 05/03/2022 Negative  Negative Final   • Methamphetamine, Ur 05/03/2022 Negative  Negative Final   • Opiate Screen 05/03/2022 Negative  Negative Final   • Amphetamine Screen, Urine 05/03/2022 Negative  Negative Final   • Benzodiazepine Screen, Urine 05/03/2022 Negative  Negative Final   • Tricyclic Antidepressants Screen 05/03/2022 Positive (A) Negative Final   • Methadone Screen, Urine 05/03/2022 Negative  Negative Final   • Barbiturates Screen, Urine 05/03/2022 Negative  Negative Final   • Oxycodone Screen, Urine 05/03/2022 Negative  Negative Final   • Propoxyphene Screen 05/03/2022 Negative  Negative Final   • Buprenorphine, Screen, Urine 05/03/2022 Positive (A) Negative Final   • Creatinine, Urine 05/03/2022 259.0  >=20 mg/dL Final   • Buprenorphine/CR 05/03/2022 47  ng/mg creat Final   • NORBUPRENORPHINE/CR 05/03/2022 137  ng/mg creat Final   • NORBUP/BUP RATIO 05/03/2022 2.91  RATIO Final    This test was developed and its performance characteristics  determined by [facility].  It has not been cleared or approved  by the Food and Drug Administration.   • Naloxone 05/03/2022 310  Cutoff=25 ng/ml Final   • TRP-LINK 05/03/2022 Comment   Final    These test results were not transmitted to [facility].  If you would like your patient's urine drug  test results to transmit to [facility], please  contact your [facility]   to complete  a physician authorization form.   • Antidepressants 05/03/2022 Negative   Final   • AMITRIPTYLINE, UR 05/03/2022 Not Detected   Final   • Clomipramine, Ur 05/03/2022 Not Detected   Final   • Desemethylclomipramine 05/03/2022 Not Detected   Final   • Desipramine 05/03/2022 Not Detected   Final   • Doxepin 05/03/2022 Not Detected   Final   • Desmethyldoxepin, Ur 05/03/2022 Not Detected   Final   • Imipramine 05/03/2022 Not Detected   Final   • Nortriptyline 05/03/2022 Not Detected   Final   • Protriptyline 05/03/2022 Not Detected   Final   • Trimipramine 05/03/2022 Not Detected   Final   • Level of Detection: 05/03/2022 Comment   Final    Testing Threshold:  50 or 100 ng/mL                                                                       '  This test was developed and its performance characteristics  determined by [facility].  It has not been cleared or approved  by the Food and Drug Administration.   • Specimen Status 05/03/2022 Comment   Final    Sent to [facility]  Written Authorization  Written Authorization  Written Authorization Received.  Authorization received from original order  [id number] 05-  Logged by Apurva Pendleton       Mental Status Exam  Hygiene:  good  Dress: casual  Attitude: cooperative and agreeable   Motor Activity: appropriate  Eye Contact:  good  Speech: regular rate and rhythm   Mood:  calm and cooperative  Affect:  Appropriate  Thought Processes:  Linear  Thought Content:  Normal  Suicidal Thoughts:  denies  Homicidal Thoughts:  denies  Crisis Safety Plan: yes, to come to the emergency room.  Hallucinations:  denies  Reliability: fair  Insight: fair  Judgement: fair  Impulse Control: fair    Family issues related to recovery:  No change, see previous encounters    Recovery/spiritual support group attendance: No     Sponsor: No     Motivation for treatment: During initial assessment, patient reported “my kids”.     Patient's Support Network  Includes: During initial assessment, patient reported sober support system is “my family and youngest son’s mom”.     Progress toward goal: Not at goal    Prognosis: Fair with Ongoing Treatment     Self-reported number of days sober: Patient reported March 16th on check in form.     ASAM Dimensions:  I.    Intoxication/Withdrawal:  0  (Patient during initial assessment reported no recent substance use).  II.   Medical Conditions/Complications:  1 (Due to medical conditions and history of seizures discussed by patient during initial assessment).  III.  Behavioral/Emotional/Cognitive: 1 (Due to patient's reported depression, anxiety, and feelings of hopelessness during initial assessment).  IV.  Readiness to Change: 1 (Patient appeared to be ready to change based on his self-report during initial assessment).  V.   Relapse Risk: 1 (Patient during initial assessment reported he has had one relapse in the past).  VI:  Recovery Environment: 1 (Patient during initial assessment discussed wanting to find a place of his own).  Total ASAM Score = 05      BASELINE SCORES 04/25/2022       VINICIUS-7   (17)                  PHQ-9   (13)        Patient agreeable to adhere to medication regimen as prescribed and report any side effects. Patient will contact this office, call 911 or present to the nearest emergency room should suicidal or homicidal ideations occur.    Impression/Formulation:    ICD-10-CM ICD-9-CM   1. Opioid use disorder, severe, dependence (HCC)  F11.20 304.00   2. Cocaine use disorder, severe, in sustained remission (HCC)  F14.21 304.23       CLINICAL MANUVERING/INTERVENTIONS: Therapist utilized a person-centered approach to build rapport with group member.  Therapist implemented motivational interviewing techniques to assist client with exploring and resolving ambivalence associated with commitment to change behaviors related to substance use and addiction.  Therapist applied cognitive behavioral strategies to  facilitate identification of maladaptive patterns of thinking and behavior that contribute to client’s risk for continued substance use and relapse.  Therapist employed group interaction activities to build rapport among group members, promote sobriety, and emphasize relapse prevention.  Therapist promoted safe nonjudgmental environment by providing group members with unconditional positive regard and encouraging group members to comply with group rules and guidelines. Therapist assisted group member with identifying and implementing healthier coping strategies.      PLAN:  Continue [facility] IOP Phase I   Aftercare:  [facility] Phase II  Program Assignments:  Personal recovery plan, relapse prevention plan, attendance of recovery support group meetings, exploration of sponsorship, drug/alcohol screens.     Please note that portions of this note were completed with a voice recognition program. Efforts were made to edit dictation, but occasionally words are mistranscribed.       This document signed by Duy Garcia, May 30, 2022, 11:45 EDT

## 2022-05-31 ENCOUNTER — OFFICE VISIT (OUTPATIENT)
Dept: PSYCHIATRY | Facility: CLINIC | Age: 42
End: 2022-05-31

## 2022-05-31 ENCOUNTER — LAB (OUTPATIENT)
Dept: LAB | Facility: HOSPITAL | Age: 42
End: 2022-05-31

## 2022-05-31 VITALS
WEIGHT: 250 LBS | SYSTOLIC BLOOD PRESSURE: 148 MMHG | DIASTOLIC BLOOD PRESSURE: 88 MMHG | HEIGHT: 65 IN | HEART RATE: 88 BPM | BODY MASS INDEX: 41.65 KG/M2

## 2022-05-31 DIAGNOSIS — F11.21 OPIOID USE DISORDER, SEVERE, IN EARLY REMISSION, ON MAINTENANCE THERAPY, DEPENDENCE (HCC): ICD-10-CM

## 2022-05-31 DIAGNOSIS — F11.20 OPIOID USE DISORDER, SEVERE, DEPENDENCE: ICD-10-CM

## 2022-05-31 PROCEDURE — G0480 DRUG TEST DEF 1-7 CLASSES: HCPCS

## 2022-05-31 PROCEDURE — 80307 DRUG TEST PRSMV CHEM ANLYZR: CPT

## 2022-05-31 PROCEDURE — 99212 OFFICE O/P EST SF 10 MIN: CPT | Performed by: NURSE PRACTITIONER

## 2022-05-31 PROCEDURE — G0481 DRUG TEST DEF 8-14 CLASSES: HCPCS

## 2022-05-31 RX ORDER — BUPRENORPHINE HYDROCHLORIDE AND NALOXONE HYDROCHLORIDE DIHYDRATE 8; 2 MG/1; MG/1
2 TABLET SUBLINGUAL DAILY
Qty: 14 TABLET | Refills: 0 | Status: SHIPPED | OUTPATIENT
Start: 2022-05-31 | End: 2022-06-07 | Stop reason: SDUPTHER

## 2022-05-31 RX ORDER — LEVETIRACETAM 750 MG/1
750 TABLET ORAL 2 TIMES DAILY
COMMUNITY
Start: 2022-05-24

## 2022-05-31 RX ORDER — METOPROLOL SUCCINATE 25 MG/1
TABLET, EXTENDED RELEASE ORAL
COMMUNITY
Start: 2022-05-25

## 2022-05-31 RX ORDER — SUMATRIPTAN 50 MG/1
TABLET, FILM COATED ORAL
COMMUNITY
Start: 2022-05-24 | End: 2022-11-01

## 2022-05-31 NOTE — PROGRESS NOTES
Office  Follow Up Visit      Patient Name: Aroldo Cai  : 1980   MRN: [id number]     Referring Provider: Gerald Dobson MD    Chief Complaint: Substance use    History of Present Illness:   Aroldo Cai is a 41 y.o. male here today for a 2-week follow-up on him OUD.  Continues taking buprenorphine/naloxone 16-4 mg daily.  Tolerating well with no AE reported. UDS +benzo 2022. Admits taking one of his father's Valium as he could not sleep. States he was not aware this is not allowed and did not know risks associated with combining benzo with buprenorphine. Will discuss with Duy Garcia at [facility] tomorrow and repeat UDS today. Has had increase in stressors in the home due to having to care for both parents that have back issues. Mom may be having surgery in the next few months. These stressors have triggered 1-2 cravings over the last week.  Continues doing well in his recovery from hip surgery.  Still attending physical therapy 2 times per week.  No other complaints today.    History:   Substance use started in  after bilateral hip surgery and was started on opioid pain medications.  Went to pain management for about a year post-op. Started buying pain pills off the street and did so for about a year.  Has had several periods of sobriety in the past with rehab, incarceration. Released from group home 2021 and was introduced to heroin by a friend.  Daily use until OD 3/2022 which prompted him to seek treatment. Had left hip replacement 3/23/2022 (Dr. Gann). Continued bup/nal throughout hospital stay with approval from Dr. Gann, per pt. Was given Percocet 5/325 mg and ibuprofen 800mg for additional pain control. Percocet was tapered off and he has had not further use. Mother disposed of remaining medication.     Triggers: Stress, pain    Cravings: 1-2 due to stress    Relapse Prevention: MOUD, peer support, continue IOP phase 1    Urine Drug Screen (today's visit) discussed: We will obtain  after appointment    UDS Confirmation: 5/25/2022 +bup, +norbup, +TCA. +benzo on prelim. Will await confirmation.  Admits Valium use.    QIANA (PDMP) Reviewed for Current/Active Medications: Gabapentin, buprenorphine/naloxone as expected    Past Surgical History:  Past Surgical History:   Procedure Laterality Date   • ABDOMINAL SURGERY     • BRAIN SURGERY  1994    craniotomy,  right frontal lobe AVM   • CHOLECYSTECTOMY WITH INTRAOPERATIVE CHOLANGIOGRAM N/A 07/27/2021    Procedure: CHOLECYSTECTOMY LAPAROSCOPIC INTRAOPERATIVE CHOLANGIOGRAPHY;  Surgeon: Hardik Blancas MD;  Location: [facility] OR;  Service: General;  Laterality: N/A;   • COLONOSCOPY     • ENDOSCOPY N/A 10/22/2021    Procedure: ESOPHAGOGASTRODUODENOSCOPY WITH BIOPSY;  Surgeon: aHrdik Blancas MD;  Location: [facility] ENDOSCOPY;  Service: Gastroenterology;  Laterality: N/A;   • HERNIA REPAIR Right     inguinal   • HIP SURGERY Bilateral    • HIP SURGERY Left        Problem List:  Patient Active Problem List   Diagnosis   • Calculus of gallbladder without cholecystitis without obstruction   • Right upper quadrant abdominal pain   • Nausea and vomiting   • Opioid use disorder, severe, dependence (HCC)       Allergy:   Allergies   Allergen Reactions   • Dilantin [Phenytoin] Seizure   • Penicillins Anaphylaxis   • Phenobarbital Seizure   • Tegretol [Carbamazepine] Seizure   • Latex Rash        Current Medications:   Current Outpatient Medications   Medication Sig Dispense Refill   • buprenorphine-naloxone (SUBOXONE) 8-2 MG per SL tablet Place 2 tablets under the tongue Daily for 7 days. 14 tablet 0   • cloNIDine (Catapres) 0.1 MG tablet Take 1 tablet by mouth Every Night. 30 tablet 2   • cyclobenzaprine (FLEXERIL) 10 MG tablet Take 10 mg by mouth every night at bedtime.     • ibuprofen (ADVIL,MOTRIN) 800 MG tablet      • levETIRAcetam (KEPPRA) 750 MG tablet Take 750 mg by mouth 2 (Two) Times a Day.     • metoprolol succinate XL (TOPROL-XL) 25 MG 24 hr tablet       • OLANZapine (zyPREXA) 5 MG tablet Take 1 tablet by mouth Every Night. 30 tablet 2   • omeprazole (priLOSEC) 40 MG capsule      • ondansetron ODT (ZOFRAN-ODT) 8 MG disintegrating tablet Every 8 (Eight) Hours As Needed.     • SUMAtriptan (IMITREX) 50 MG tablet take 1 tablet by mouth at onset of MIGRAINE may repeat in 2 hours if needed max of 2 tablets in 24 hours     • traZODone (DESYREL) 100 MG tablet      • venlafaxine (EFFEXOR) 50 MG tablet Take 1 tablet by mouth 2 (Two) Times a Day. 60 tablet 2   • venlafaxine (EFFEXOR) 75 MG tablet Take 1 tablet by mouth 2 (Two) Times a Day. 60 tablet 2     No current facility-administered medications for this visit.       Past Medical History:  Past Medical History:   Diagnosis Date   • Anesthesia complication     pt reports he is hard to wake after anesthesia   • Anxiety    • Arthritis    • Bipolar 1 disorder (HCC)    • COVID-19 02/2021   • Depression    • Dysphagia     food and liquids.   • Extensive tattoos    • Gout    • Hepatitis C 2018    Patient took medication    • History of echocardiogram    • Kidney stones    • Pneumonia    • PTSD (post-traumatic stress disorder)    • Seizure (HCC)     last seizure years ago.   • Seizures (HCC)    • Short-term memory loss    • Wears contact lenses    • Wears glasses        Social History:  Social History     Socioeconomic History   • Marital status:    Tobacco Use   • Smoking status: Current Every Day Smoker     Packs/day: 0.50     Years: 30.00     Pack years: 15.00     Types: Cigarettes   • Smokeless tobacco: Never Used   Vaping Use   • Vaping Use: Never used   Substance and Sexual Activity   • Alcohol use: Not Currently   • Drug use: Not Currently     Types: Cocaine(coke)     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   • Sexual activity: Defer       Family History:  Family History   Problem Relation Age of Onset   • Diabetes Mother    • Diabetes insipidus Mother    • Heart disease Mother    • Cancer Father          skin   • Diabetes Father    • Hypertension Father    • Arthritis Father    • Diabetes insipidus Father          Subjective      Review of Systems:   Review of Systems   Constitutional: Positive for fatigue. Negative for chills and fever.   Respiratory: Negative for shortness of breath.    Cardiovascular: Negative for chest pain.   Gastrointestinal: Negative for abdominal pain.   Musculoskeletal: Positive for arthralgias, back pain and gait problem.   Skin: Negative for skin lesions.   Neurological: Negative for seizures and confusion.   Psychiatric/Behavioral: Positive for sleep disturbance and stress. Negative for hallucinations, suicidal ideas and depressed mood. The patient is nervous/anxious.        PHQ-9 Score:  11     VINICIUS-7 Score:   Feeling nervous, anxious or on edge: More than half the days  Not being able to stop or control worrying: More than half the days  Worrying too much about different things: More than half the days  Trouble Relaxing: More than half the days  Being so restless that it is hard to sit still: Several days  Feeling afraid as if something awful might happen: Several days  Becoming easily annoyed or irritable: More than half the days  VINICIUS 7 Total Score: 12  If you checked any problems, how difficult have these problems made it for you to do your work, take care of things at home, or get along with other people: Somewhat difficult    Patient History:   The following portions of the patient's history were reviewed and updated as appropriate: allergies, current medications, past family history, past medical history, past social history, past surgical history and problem list.     Social:  Social History     Socioeconomic History   • Marital status:    Tobacco Use   • Smoking status: Current Every Day Smoker     Packs/day: 0.50     Years: 30.00     Pack years: 15.00     Types: Cigarettes   • Smokeless tobacco: Never Used   Vaping Use   • Vaping Use: Never used   Substance and Sexual  "Activity   • Alcohol use: Not Currently   • Drug use: Not Currently     Types: Cocaine(coke)     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   • Sexual activity: Defer       Medications:     Current Outpatient Medications:   •  buprenorphine-naloxone (SUBOXONE) 8-2 MG per SL tablet, Place 2 tablets under the tongue Daily for 7 days., Disp: 14 tablet, Rfl: 0  •  cloNIDine (Catapres) 0.1 MG tablet, Take 1 tablet by mouth Every Night., Disp: 30 tablet, Rfl: 2  •  cyclobenzaprine (FLEXERIL) 10 MG tablet, Take 10 mg by mouth every night at bedtime., Disp: , Rfl:   •  ibuprofen (ADVIL,MOTRIN) 800 MG tablet, , Disp: , Rfl:   •  levETIRAcetam (KEPPRA) 750 MG tablet, Take 750 mg by mouth 2 (Two) Times a Day., Disp: , Rfl:   •  metoprolol succinate XL (TOPROL-XL) 25 MG 24 hr tablet, , Disp: , Rfl:   •  OLANZapine (zyPREXA) 5 MG tablet, Take 1 tablet by mouth Every Night., Disp: 30 tablet, Rfl: 2  •  omeprazole (priLOSEC) 40 MG capsule, , Disp: , Rfl:   •  ondansetron ODT (ZOFRAN-ODT) 8 MG disintegrating tablet, Every 8 (Eight) Hours As Needed., Disp: , Rfl:   •  SUMAtriptan (IMITREX) 50 MG tablet, take 1 tablet by mouth at onset of MIGRAINE may repeat in 2 hours if needed max of 2 tablets in 24 hours, Disp: , Rfl:   •  traZODone (DESYREL) 100 MG tablet, , Disp: , Rfl:   •  venlafaxine (EFFEXOR) 50 MG tablet, Take 1 tablet by mouth 2 (Two) Times a Day., Disp: 60 tablet, Rfl: 2  •  venlafaxine (EFFEXOR) 75 MG tablet, Take 1 tablet by mouth 2 (Two) Times a Day., Disp: 60 tablet, Rfl: 2    Objective     Physical Exam:  Physical Exam    Vital Signs:   Vitals:    05/31/22 1255   BP: 148/88   Pulse: 88   Weight: 113 kg (250 lb)   Height: 165.1 cm (65\")     Body mass index is 41.6 kg/m².     Mental Status Exam:   Hygiene:   good  Cooperation:  Cooperative  Eye Contact:  Good  Psychomotor Behavior:  Appropriate  Affect:  Full range  Mood: normal  Speech:  Normal  Thought Process:  Goal directed  Thought Content:  Normal  Suicidal:  " None  Homicidal:  None  Hallucinations:  None  Delusion:  None  Memory:  Intact  Orientation:  Grossly intact  Reliability:  good  Insight:  Good  Judgement:  Fair  Impulse Control:  Fair    Assessment / Plan      Assessment & Plan   -Continue buprenorphine/naloxone 16/4 mg daily.  -Will have RTC dose at next visit +0 remaining  -Will see back in 1 week to review UDS and to accommodate scheduling issues  -Advisement to take part in and remain active in 12 Step Recovery Meetings, IOP, and/or 1:1 therapy/counseling and to establish/maintain an active relationship with a recovery sponsor.   -Continued monitoring for illicit substances for patient safety, medication compliance and future care.  -Discussed risks of combining benzodiazepines with buprenorphine.  Patient verbalizes understanding of risks associated with this combination as well as risk associated with taking medications that are not prescribed to him.     Visit Diagnoses     Opioid use disorder, severe, in early remission, on maintenance therapy, dependence (HCC)        Relevant Medications    buprenorphine-naloxone (SUBOXONE) 8-2 MG per SL tablet      Diagnoses       Codes Comments    Opioid use disorder, severe, in early remission, on maintenance therapy, dependence (HCC)     ICD-10-CM: F11.21  ICD-9-CM: 304.03             PLAN:  1. Safety: No acute safety concerns  2. Risk Assessment: Risk of self-harm acutely is low. Risk of self-harm chronically is also low, but could be further elevated in the event of treatment noncompliance and/or AODA.      TREATMENT PLAN/GOALS: Continue supportive psychotherapy efforts and medications as indicated. Treatment and medication options discussed during today's visit. Patient acknowledged and verbally consented to continue with current treatment plan and was educated on the importance of compliance with treatment and follow-up appointments.      MEDICATION ISSUES:  QIANA reviewed as expected.  Discussed medication  options and treatment plan of prescribed medication as well as the risks, benefits, and side effects including potential falls, possible impaired driving and metabolic adversities among others. Patient is agreeable to call the office with any worsening of symptoms or onset of side effects. Patient is agreeable to call 911 or go to the nearest ER should he/she begin having SI/HI. No medication side effects or related complaints today.     MEDS ORDERED DURING VISIT:  New Medications Ordered This Visit   Medications   • buprenorphine-naloxone (SUBOXONE) 8-2 MG per SL tablet     Sig: Place 2 tablets under the tongue Daily for 7 days.     Dispense:  14 tablet     Refill:  0     [id number]       Return in 1 week (on 6/7/2022) for Follow Up Medication.           This document has been electronically signed by PRISCILLA Wen  May 31, 2022 13:21 EDT      Part of this note may be an electronic transcription/translation of spoken language to printed text using the Dragon Dictation System.

## 2022-06-01 ENCOUNTER — OFFICE VISIT (OUTPATIENT)
Dept: PSYCHIATRY | Facility: HOSPITAL | Age: 42
End: 2022-06-01

## 2022-06-01 DIAGNOSIS — F11.20 OPIOID USE DISORDER, SEVERE, DEPENDENCE: Primary | ICD-10-CM

## 2022-06-01 DIAGNOSIS — F14.21 COCAINE USE DISORDER, SEVERE, IN SUSTAINED REMISSION: ICD-10-CM

## 2022-06-01 PROCEDURE — H0015 ALCOHOL AND/OR DRUG SERVICES: HCPCS | Performed by: NURSE PRACTITIONER

## 2022-06-02 ENCOUNTER — OFFICE VISIT (OUTPATIENT)
Dept: PSYCHIATRY | Facility: HOSPITAL | Age: 42
End: 2022-06-02

## 2022-06-02 DIAGNOSIS — F11.20 OPIOID USE DISORDER, SEVERE, DEPENDENCE: Primary | ICD-10-CM

## 2022-06-02 DIAGNOSIS — F14.21 COCAINE USE DISORDER, SEVERE, IN SUSTAINED REMISSION: ICD-10-CM

## 2022-06-02 LAB
7AMINOCLONAZEPAM/CREAT UR: NOT DETECTED NG/MG CREAT
A-OH ALPRAZ/CREAT UR: NOT DETECTED NG/MG CREAT
A-OH-TRIAZOLAM/CREAT UR CFM: NOT DETECTED NG/MG CREAT
ALPHA-HYDROXYMIDAZOLAM, URINE: NOT DETECTED NG/MG CREAT
ALPRAZ/CREAT UR CFM: NOT DETECTED NG/MG CREAT
AMITRIP UR QL CFM: NOT DETECTED
ANTIDEPRESSANTS UR QL: NEGATIVE
BENZODIAZ UR QL CFM: NORMAL
CLOMIPRAMINE UR QL: NOT DETECTED
CLONAZEPAM/CREAT UR CFM: NOT DETECTED NG/MG CREAT
DESALKYLFLURAZ/CREAT UR: NOT DETECTED NG/MG CREAT
DESIPRAMINE UR QL CFM: NOT DETECTED
DESMETHYLFLUNITRAZEPAM: NOT DETECTED NG/MG CREAT
DIAZEPAM/CREAT UR: NOT DETECTED NG/MG CREAT
DOXEPIN UR QL CFM: NOT DETECTED
ETHANOL UR-MCNC: NEGATIVE %
FLUNITRAZEPAM UR QL CFM: NOT DETECTED NG/MG CREAT
IMIPRAMINE UR QL CFM: NOT DETECTED
LEVEL OF DETECTION:: NORMAL
LEVEL OF DETECTION:: NORMAL
LORAZEPAM/CREAT UR: NOT DETECTED NG/MG CREAT
MIDAZOLAM/CREAT UR CFM: NOT DETECTED NG/MG CREAT
NORCLOMIPRAMINE UR QL: NOT DETECTED
NORDIAZEPAM/CREAT UR: 35 NG/MG CREAT
NORDOXEPIN UR QL: NOT DETECTED
NORTRIP UR QL CFM: NOT DETECTED
OXAZEPAM/CREAT UR: 36 NG/MG CREAT
PROTRIP UR QL: NOT DETECTED
TEMAZEPAM/CREAT UR: 36 NG/MG CREAT
TRIMIPRAMINE UR QL: NOT DETECTED

## 2022-06-02 PROCEDURE — H0015 ALCOHOL AND/OR DRUG SERVICES: HCPCS | Performed by: NURSE PRACTITIONER

## 2022-06-04 LAB
ACCEPTABLE CREAT UR QL: 202.5 MG/DL
NALOXONE UR CFM-MCNC: 40 NG/ML
NORBUP/BUP RATIO: 11.19 RATIO
NORBUPRENORPHINE UR CFM-MCNC: 302 NG/MG CREAT
NORBUPRENORPHINE/CREAT UR: 27 NG/MG CREAT
TRP-LINK: NORMAL

## 2022-06-06 NOTE — PROGRESS NOTES
NAME: Aroldo Cai  DATE: 06/01/2022     IOP GROUP   [id number]    Number of participants: 8  IOP GROUP NOTE     DATA:     3 hour IOP group therapy session (Check-ins, Coping Skills, Relapse Prevention)     Check Ins: Therapist continued facilitation of rapport building strategies between group members. Therapist asked that each patient check in with home life and recovery efforts and identify triggers, cravings, and high risk situations that arise between group sessions. Therapist provided empathy and support during group session.     Session Content/Coping Skills: Introductions completed. Check ins completed by group members. Clinician processed updates since last group meeting. Therapist Aid “[employer] [facility] [employer]” reviewed with group members. Clinician reviewed article “The Many Sides of Denial in Addiction” ([facility]) with group members. Clinician discussed the risk of denial in addiction recovery. Group members discussed which types of denial they have experienced and if they are currently displaying any of these types of denial.     Response: Patient attended class in person. Patient participated in completion of check in form.   Patient on check in form denied suicidal or homicidal thoughts or plan or intent to hurt self or others currently or since last group meeting.   Patient answered yes then marked no on check in form to question regarding safety concerns since last group meeting. Patient reported on check in form that he had used drugs or alcohol since last group meeting, and reported May 28th on check in form. Patient participated in review of psychoeducational material and group discussions.     Personal Assessment 0-10 Scale (10 worst)    Anxiety:  10+   Depression:  2   Cravings: 8     ASSESSMENT:     ..  Lab on 05/31/2022   Component Date Value Ref Range Status   • Ethanol, Urine 05/31/2022 Negative  Cutoff=0.020 % Final   • THC, Screen, Urine 05/31/2022 Negative   Negative Final   • Phencyclidine (PCP), Urine 05/31/2022 Negative  Negative Final   • Cocaine Screen, Urine 05/31/2022 Negative  Negative Final   • Methamphetamine, Ur 05/31/2022 Negative  Negative Final   • Opiate Screen 05/31/2022 Negative  Negative Final   • Amphetamine Screen, Urine 05/31/2022 Negative  Negative Final   • Benzodiazepine Screen, Urine 05/31/2022 Positive (A) Negative Final   • Tricyclic Antidepressants Screen 05/31/2022 Positive (A) Negative Final   • Methadone Screen, Urine 05/31/2022 Negative  Negative Final   • Barbiturates Screen, Urine 05/31/2022 Negative  Negative Final   • Oxycodone Screen, Urine 05/31/2022 Negative  Negative Final   • Propoxyphene Screen 05/31/2022 Negative  Negative Final   • Buprenorphine, Screen, Urine 05/31/2022 Positive (A) Negative Final   • Creatinine, Urine 05/31/2022 202.5  >=20 mg/dL Final   • Buprenorphine/CR 05/31/2022 27  ng/mg creat Final   • NORBUPRENORPHINE/CR 05/31/2022 302  ng/mg creat Final   • NORBUP/BUP RATIO 05/31/2022 11.19  RATIO Final    This test was developed and its performance characteristics  determined by [employer].  It has not been cleared or approved  by the Food and Drug Administration.   • Naloxone 05/31/2022 40  Cutoff=25 ng/ml Final   • TRP-LINK 05/31/2022 Comment   Final    These test results were not transmitted to [facility].  If you would like your patient's urine drug  test results to transmit to [facility], please  contact your [employer]  to complete  a physician authorization form.   Lab on 05/25/2022   Component Date Value Ref Range Status   • Ethanol, Urine 05/25/2022 Negative  Cutoff=0.020 % Final   • THC, Screen, Urine 05/25/2022 Negative  Negative Final   • Phencyclidine (PCP), Urine 05/25/2022 Negative  Negative Final   • Cocaine Screen, Urine 05/25/2022 Negative  Negative Final   • Methamphetamine, Ur 05/25/2022 Negative  Negative Final   • Opiate Screen 05/25/2022 Negative  Negative  Final   • Amphetamine Screen, Urine 05/25/2022 Negative  Negative Final   • Benzodiazepine Screen, Urine 05/25/2022 Positive (A) Negative Final   • Tricyclic Antidepressants Screen 05/25/2022 Positive (A) Negative Final   • Methadone Screen, Urine 05/25/2022 Negative  Negative Final   • Barbiturates Screen, Urine 05/25/2022 Negative  Negative Final   • Oxycodone Screen, Urine 05/25/2022 Negative  Negative Final   • Propoxyphene Screen 05/25/2022 Negative  Negative Final   • Buprenorphine, Screen, Urine 05/25/2022 Positive (A) Negative Final   • Creatinine, Urine 05/25/2022 211.2  >=20 mg/dL Final   • Buprenorphine/CR 05/25/2022 349  ng/mg creat Final   • NORBUPRENORPHINE/CR 05/25/2022 384  ng/mg creat Final   • NORBUP/BUP RATIO 05/25/2022 1.10  RATIO Final    This test was developed and its performance characteristics  determined by [employer].  It has not been cleared or approved  by the Food and Drug Administration.   • Naloxone 05/25/2022 2366  Cutoff=25 ng/ml Final   • TRP-LINK 05/25/2022 Comment   Final    These test results were not transmitted to [facility].  If you would like your patient's urine drug  test results to transmit to [facility], please  contact your [employer]  to complete  a physician authorization form.   • Benzodiazepine Screen, Urine 05/25/2022 +POSITIVE+   Final   • DIAZEPAM URINE QUANT (REF) 05/25/2022 Not Detected  ng/mg creat Final   • Desmethyldiazepam 05/25/2022 35  ng/mg creat Final   • Oxazepam, urine 05/25/2022 36  ng/mg creat Final   • Temazepam, urine 05/25/2022 36  ng/mg creat Final    Expected metabolism of benzodiazepine class drugs:   Parent Drug       Detected Metabolites   -----------       --------------------   Diazepam:         Desmethyldiazepam, Temazepam, Oxazepam   Chlordiazepoxide: Desmethyldiazepam, Oxazepam   Clorazepate:      Desmethyldiazepam, Oxazepam   Halazepam:        Desmethyldiazepam, Oxazepam   Temazepam:         Oxazepam   Oxazepam:         None   • ALPRAZOLAM 05/25/2022 Not Detected  ng/mg creat Final   • ALPHA-HYDROXYALPRAZOLAM UR, QT (RE* 05/25/2022 Not Detected  ng/mg creat Final   • DESALKLFLURAZEPAM UR QUANT (REF) 05/25/2022 Not Detected  ng/mg creat Final   • LORAZEPAM URINE QUANT (REF) 05/25/2022 Not Detected  ng/mg creat Final   • Alpha-hydroxytriazolam, Urine 05/25/2022 Not Detected  ng/mg creat Final   • Clonazepam Urine 05/25/2022 Not Detected  ng/mg creat Final   • 7-Aminoclonazepam Urine 05/25/2022 Not Detected  ng/mg creat Final   • MIDAZOLAM UR, QUANT (REF) 05/25/2022 Not Detected  ng/mg creat Final   • Alpha-hydroxymidazolam, Urine 05/25/2022 Not Detected  ng/mg creat Final   • Flunitrazepam 05/25/2022 Not Detected  ng/mg creat Final   • DESMETHYLFLUNITRAZEPAM 05/25/2022 Not Detected  ng/mg creat Final   • Level of Detection: 05/25/2022 Comment   Final    Testing Threshold:  20 ng/mL                                                                       '  This test was developed and its performance characteristics  determined by [facility].  It has not been cleared or approved  by the Food and Drug Administration.   • Antidepressants 05/25/2022 Negative   Final   • AMITRIPTYLINE, UR 05/25/2022 Not Detected   Final   • Clomipramine, Ur 05/25/2022 Not Detected   Final   • Desemethylclomipramine 05/25/2022 Not Detected   Final   • Desipramine 05/25/2022 Not Detected   Final   • Doxepin 05/25/2022 Not Detected   Final   • Desmethyldoxepin, Ur 05/25/2022 Not Detected   Final   • Imipramine 05/25/2022 Not Detected   Final   • Nortriptyline 05/25/2022 Not Detected   Final   • Protriptyline 05/25/2022 Not Detected   Final   • Trimipramine 05/25/2022 Not Detected   Final   • Level of Detection: 05/25/2022 Comment   Final    Testing Threshold:  50 or 100 ng/mL                                                                       '  This test was developed and its performance characteristics  determined by [facility].  It  has not been cleared or approved  by the Food and Drug Administration.   Lab on 05/18/2022   Component Date Value Ref Range Status   • Ethanol, Urine 05/18/2022 Negative  Cutoff=0.020 % Final   • THC, Screen, Urine 05/18/2022 Negative  Negative Final   • Phencyclidine (PCP), Urine 05/18/2022 Negative  Negative Final   • Cocaine Screen, Urine 05/18/2022 Negative  Negative Final   • Methamphetamine, Ur 05/18/2022 Negative  Negative Final   • Opiate Screen 05/18/2022 Negative  Negative Final   • Amphetamine Screen, Urine 05/18/2022 Negative  Negative Final   • Benzodiazepine Screen, Urine 05/18/2022 Negative  Negative Final   • Tricyclic Antidepressants Screen 05/18/2022 Negative  Negative Final   • Methadone Screen, Urine 05/18/2022 Negative  Negative Final   • Barbiturates Screen, Urine 05/18/2022 Negative  Negative Final   • Oxycodone Screen, Urine 05/18/2022 Negative  Negative Final   • Propoxyphene Screen 05/18/2022 Negative  Negative Final   • Buprenorphine, Screen, Urine 05/18/2022 Positive (A) Negative Final   • Creatinine, Urine 05/18/2022 135.5  >=20 mg/dL Final   • Buprenorphine/CR 05/18/2022 349  ng/mg creat Final   • NORBUPRENORPHINE/CR 05/18/2022 836  ng/mg creat Final   • NORBUP/BUP RATIO 05/18/2022 2.40  RATIO Final    This test was developed and its performance characteristics  determined by [facility].  It has not been cleared or approved  by the Food and Drug Administration.   • Naloxone 05/18/2022 1201  Cutoff=25 ng/ml Final   • TRP-LINK 05/18/2022 Comment   Final    These test results were not transmitted to [facility].  If you would like your patient's urine drug  test results to transmit to [facility], please  contact your [employer]  to complete  a physician authorization form.       Mental Status Exam  Hygiene:  good  Dress: casual  Attitude: cooperative and agreeable   Motor Activity: appropriate  Eye Contact:  good  Speech: regular rate and rhythm   Mood:   calm and cooperative  Affect:  Appropriate  Thought Processes:  Linear  Thought Content:  Normal  Suicidal Thoughts:  denies  Homicidal Thoughts:  denies  Crisis Safety Plan: yes, to come to the emergency room.  Hallucinations:  denies  Reliability: fair  Insight: fair  Judgement: fair  Impulse Control: fair    Family issues related to recovery:  No change, see previous encounters    Recovery/spiritual support group attendance: No     Sponsor: No     Motivation for treatment: During initial assessment, patient reported “my kids”.     Patient's Support Network Includes: During initial assessment, patient reported sober support system is “my family and youngest son’s mom”.     Progress toward goal: Not at goal    Prognosis: Fair with Ongoing Treatment     Self-reported number of days sober: Patient reported May 28th on check in form.     ASAM Dimensions:  I.    Intoxication/Withdrawal:  0  (Patient during initial assessment reported no recent substance use).  II.   Medical Conditions/Complications:  1 (Due to medical conditions and history of seizures discussed by patient during initial assessment).  III.  Behavioral/Emotional/Cognitive: 1 (Due to patient's reported depression, anxiety, and feelings of hopelessness during initial assessment).  IV.  Readiness to Change: 1 (Patient appeared to be ready to change based on his self-report during initial assessment).  V.   Relapse Risk: 1 (Patient during initial assessment reported he has had one relapse in the past).  VI:  Recovery Environment: 1 (Patient during initial assessment discussed wanting to find a place of his own).  Total ASAM Score = 05      BASELINE SCORES 04/25/2022       VINICIUS-7   (17)                  PHQ-9   (13)        Patient agreeable to adhere to medication regimen as prescribed and report any side effects. Patient will contact this office, call 911 or present to the nearest emergency room should suicidal or homicidal ideations  occur.    Impression/Formulation:    ICD-10-CM ICD-9-CM   1. Opioid use disorder, severe, dependence (HCC)  F11.20 304.00   2. Cocaine use disorder, severe, in sustained remission (HCC)  F14.21 304.23       CLINICAL MANUVERING/INTERVENTIONS: Therapist utilized a person-centered approach to build rapport with group member.  Therapist implemented motivational interviewing techniques to assist client with exploring and resolving ambivalence associated with commitment to change behaviors related to substance use and addiction.  Therapist applied cognitive behavioral strategies to facilitate identification of maladaptive patterns of thinking and behavior that contribute to client’s risk for continued substance use and relapse.  Therapist employed group interaction activities to build rapport among group members, promote sobriety, and emphasize relapse prevention.  Therapist promoted safe nonjudgmental environment by providing group members with unconditional positive regard and encouraging group members to comply with group rules and guidelines. Therapist assisted group member with identifying and implementing healthier coping strategies.      PLAN:  Continue [facility] IOP Phase I   Aftercare:  [facility] Phase II  Program Assignments:  Personal recovery plan, relapse prevention plan, attendance of recovery support group meetings, exploration of sponsorship, drug/alcohol screens.     Please note that portions of this note were completed with a voice recognition program. Efforts were made to edit dictation, but occasionally words are mistranscribed.       This document signed by Duy Garcia, June 6, 2022, 14:12 EDT

## 2022-06-07 ENCOUNTER — OFFICE VISIT (OUTPATIENT)
Dept: PSYCHIATRY | Facility: CLINIC | Age: 42
End: 2022-06-07

## 2022-06-07 ENCOUNTER — LAB (OUTPATIENT)
Dept: LAB | Facility: HOSPITAL | Age: 42
End: 2022-06-07

## 2022-06-07 VITALS
WEIGHT: 250 LBS | DIASTOLIC BLOOD PRESSURE: 78 MMHG | HEIGHT: 65 IN | SYSTOLIC BLOOD PRESSURE: 128 MMHG | HEART RATE: 78 BPM | BODY MASS INDEX: 41.65 KG/M2

## 2022-06-07 DIAGNOSIS — F11.21 OPIOID USE DISORDER, SEVERE, IN EARLY REMISSION, ON MAINTENANCE THERAPY, DEPENDENCE (HCC): ICD-10-CM

## 2022-06-07 DIAGNOSIS — F11.20 OPIOID USE DISORDER, SEVERE, DEPENDENCE: ICD-10-CM

## 2022-06-07 PROCEDURE — 80307 DRUG TEST PRSMV CHEM ANLYZR: CPT

## 2022-06-07 PROCEDURE — G0480 DRUG TEST DEF 1-7 CLASSES: HCPCS

## 2022-06-07 PROCEDURE — 99212 OFFICE O/P EST SF 10 MIN: CPT | Performed by: NURSE PRACTITIONER

## 2022-06-07 RX ORDER — AZITHROMYCIN 250 MG/1
TABLET, FILM COATED ORAL
COMMUNITY
Start: 2022-06-02 | End: 2022-06-27

## 2022-06-07 RX ORDER — COLCHICINE 0.6 MG/1
0.6 TABLET ORAL DAILY
COMMUNITY

## 2022-06-07 RX ORDER — BUPRENORPHINE HYDROCHLORIDE AND NALOXONE HYDROCHLORIDE DIHYDRATE 8; 2 MG/1; MG/1
2 TABLET SUBLINGUAL DAILY
Qty: 26 TABLET | Refills: 0 | Status: SHIPPED | OUTPATIENT
Start: 2022-06-07 | End: 2022-06-20 | Stop reason: SDUPTHER

## 2022-06-07 RX ORDER — VENLAFAXINE 100 MG/1
100 TABLET ORAL DAILY
COMMUNITY
Start: 2022-05-31 | End: 2022-06-28 | Stop reason: SDUPTHER

## 2022-06-07 NOTE — PROGRESS NOTES
Office  Follow Up Visit      Patient Name: Aroldo Cai  : 1980   MRN: [id number]     Referring Provider: Gerald Dobson MD    Chief Complaint: Substance use    History of Present Illness:   Aroldo Cai is a 42 y.o. male who is here today for 1 week follow up on substance use disorder.  Continues taking buprenorphine/naloxone 16-4 mg daily and is tolerating well with no adverse effects reported.  UDS was positive for benzodiazepines on 2022.  Patient admitted to taking a Valium from his dad at that time.  No further use.  UDS continues to be positive.  Waiting for follow-up confirmation.  Called after last appointment, prior to doing UDS at the lab, and admitted recurrence of heroin use.  Was giving another individual ride home and that individual presented the patient with heroin.  Abstained at the time but did later use when he got home.  He is adamant that this is only occurred once since about 3/2022.  He has reported use to [facility] during IOP and to peer support.  Continues to report 1-2 cravings per day.  Had significant argument with his dad about his recovery that has been triggering for him.  Reports he does not feel well today.  Has had cough, congestion for the last several days.  COVID-19 testing was negative.  Plans to go to urgent care after appointment today.    Triggers: Stress, issues with his father    Cravings: 1-2 times per day over the last week    Relapse Prevention: MOUD, IOP, peer support, coping mechanisms    Urine Drug Screen (today's visit) discussed: Positive buprenorphine, positive TCA, positive benzodiazepine on prelim.  Will await confirmation.    UDS Confirmation: Positive buprenorphine/nor-buprenorphine. TCA, benzodiazepine confirmation pending    QIANA (PDMP) Reviewed for Current/Active Medications: buprenorphine/naloxone as expected    History:   Substance use started in  after bilateral hip surgery and was started on opioid pain medications.   Went to pain management for about a year post-op. Started buying pain pills off the street and did so for about a year.  Has had several periods of sobriety in the past with rehab, incarceration. Released from [facility] 4/2021 and was introduced to heroin by a friend.  Daily use until OD 3/2022 which prompted him to seek treatment. Had left hip replacement 3/23/2022 (Dr. Gann). Continued bup/nal throughout hospital stay with approval from Dr. Gann, per pt. Was given Percocet 5/325 mg and ibuprofen 800mg for additional pain control. Percocet was tapered off and he has had not further use. Mother disposed of remaining medication.     Past Surgical History:  Past Surgical History:   Procedure Laterality Date   • ABDOMINAL SURGERY     • BRAIN SURGERY  1994    craniotomy,  right frontal lobe AVM   • CHOLECYSTECTOMY WITH INTRAOPERATIVE CHOLANGIOGRAM N/A 07/27/2021    Procedure: CHOLECYSTECTOMY LAPAROSCOPIC INTRAOPERATIVE CHOLANGIOGRAPHY;  Surgeon: Hardik Blancas MD;  Location: [facility] OR;  Service: General;  Laterality: N/A;   • COLONOSCOPY     • ENDOSCOPY N/A 10/22/2021    Procedure: ESOPHAGOGASTRODUODENOSCOPY WITH BIOPSY;  Surgeon: Hardik Blancas MD;  Location: [facility] ENDOSCOPY;  Service: Gastroenterology;  Laterality: N/A;   • HERNIA REPAIR Right     inguinal   • HIP SURGERY Bilateral    • HIP SURGERY Left        Problem List:  Patient Active Problem List   Diagnosis   • Calculus of gallbladder without cholecystitis without obstruction   • Right upper quadrant abdominal pain   • Nausea and vomiting   • Opioid use disorder, severe, dependence (HCC)       Allergy:   Allergies   Allergen Reactions   • Dilantin [Phenytoin] Seizure   • Penicillins Anaphylaxis   • Phenobarbital Seizure   • Tegretol [Carbamazepine] Seizure   • Latex Rash        Current Medications:   Current Outpatient Medications   Medication Sig Dispense Refill   • azithromycin (ZITHROMAX) 250 MG tablet TAKE 2 TABLETS BY MOUTH ON DAY 1, THEN TAKE 1  TABLET BY MOUTH DAILY DAYS 2 THRU 5     • buprenorphine-naloxone (SUBOXONE) 8-2 MG per SL tablet Place 2 tablets under the tongue Daily for 13 days. 26 tablet 0   • cloNIDine (Catapres) 0.1 MG tablet Take 1 tablet by mouth Every Night. 30 tablet 2   • colchicine 0.6 MG tablet Take 0.6 mg by mouth Daily.     • cyclobenzaprine (FLEXERIL) 10 MG tablet Take 10 mg by mouth every night at bedtime.     • ibuprofen (ADVIL,MOTRIN) 800 MG tablet      • levETIRAcetam (KEPPRA) 750 MG tablet Take 750 mg by mouth 2 (Two) Times a Day.     • metoprolol succinate XL (TOPROL-XL) 25 MG 24 hr tablet      • OLANZapine (zyPREXA) 5 MG tablet Take 1 tablet by mouth Every Night. 30 tablet 2   • omeprazole (priLOSEC) 40 MG capsule      • ondansetron ODT (ZOFRAN-ODT) 8 MG disintegrating tablet Every 8 (Eight) Hours As Needed.     • SUMAtriptan (IMITREX) 50 MG tablet take 1 tablet by mouth at onset of MIGRAINE may repeat in 2 hours if needed max of 2 tablets in 24 hours     • traZODone (DESYREL) 100 MG tablet      • venlafaxine (EFFEXOR) 100 MG tablet Take 100 mg by mouth Daily.       No current facility-administered medications for this visit.       Past Medical History:  Past Medical History:   Diagnosis Date   • Anesthesia complication     pt reports he is hard to wake after anesthesia   • Anxiety    • Arthritis    • Bipolar 1 disorder (HCC)    • COVID-19 02/2021   • Depression    • Dysphagia     food and liquids.   • Extensive tattoos    • Gout    • Hepatitis C 2018    Patient took medication    • History of echocardiogram    • Kidney stones    • Pneumonia    • PTSD (post-traumatic stress disorder)    • Seizure (HCC)     last seizure years ago.   • Seizures (HCC)    • Short-term memory loss    • Wears contact lenses    • Wears glasses        Social History:  Social History     Socioeconomic History   • Marital status:    Tobacco Use   • Smoking status: Current Every Day Smoker     Packs/day: 0.50     Years: 30.00     Pack years:  15.00     Types: Cigarettes   • Smokeless tobacco: Never Used   Vaping Use   • Vaping Use: Never used   Substance and Sexual Activity   • Alcohol use: Not Currently   • Drug use: Not Currently     Types: Cocaine(coke)     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   • Sexual activity: Defer       Family History:  Family History   Problem Relation Age of Onset   • Diabetes Mother    • Diabetes insipidus Mother    • Heart disease Mother    • Cancer Father         skin   • Diabetes Father    • Hypertension Father    • Arthritis Father    • Diabetes insipidus Father          Subjective      Review of Systems:   Review of Systems   Constitutional: Positive for fatigue. Negative for chills and fever.   Respiratory: Negative for shortness of breath.    Cardiovascular: Negative for chest pain.   Gastrointestinal: Negative for abdominal pain.   Musculoskeletal: Positive for arthralgias, back pain and gait problem.   Skin: Negative for skin lesions.   Neurological: Negative for seizures and confusion.   Psychiatric/Behavioral: Positive for sleep disturbance and stress. Negative for hallucinations, suicidal ideas and depressed mood. The patient is nervous/anxious.        PHQ-9 Score:   11    VINICIUS-7 Score:   Feeling nervous, anxious or on edge: More than half the days  Not being able to stop or control worrying: More than half the days  Worrying too much about different things: More than half the days  Trouble Relaxing: More than half the days  Being so restless that it is hard to sit still: Several days  Feeling afraid as if something awful might happen: Not at all  Becoming easily annoyed or irritable: More than half the days  VINICIUS 7 Total Score: 11  If you checked any problems, how difficult have these problems made it for you to do your work, take care of things at home, or get along with other people: Somewhat difficult    Patient History:   The following portions of the patient's history were reviewed and updated as  appropriate: allergies, current medications, past family history, past medical history, past social history, past surgical history and problem list.     Social:  Social History     Socioeconomic History   • Marital status:    Tobacco Use   • Smoking status: Current Every Day Smoker     Packs/day: 0.50     Years: 30.00     Pack years: 15.00     Types: Cigarettes   • Smokeless tobacco: Never Used   Vaping Use   • Vaping Use: Never used   Substance and Sexual Activity   • Alcohol use: Not Currently   • Drug use: Not Currently     Types: Cocaine(coke)     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   • Sexual activity: Defer       Medications:     Current Outpatient Medications:   •  azithromycin (ZITHROMAX) 250 MG tablet, TAKE 2 TABLETS BY MOUTH ON DAY 1, THEN TAKE 1 TABLET BY MOUTH DAILY DAYS 2 THRU 5, Disp: , Rfl:   •  buprenorphine-naloxone (SUBOXONE) 8-2 MG per SL tablet, Place 2 tablets under the tongue Daily for 13 days., Disp: 26 tablet, Rfl: 0  •  cloNIDine (Catapres) 0.1 MG tablet, Take 1 tablet by mouth Every Night., Disp: 30 tablet, Rfl: 2  •  colchicine 0.6 MG tablet, Take 0.6 mg by mouth Daily., Disp: , Rfl:   •  cyclobenzaprine (FLEXERIL) 10 MG tablet, Take 10 mg by mouth every night at bedtime., Disp: , Rfl:   •  ibuprofen (ADVIL,MOTRIN) 800 MG tablet, , Disp: , Rfl:   •  levETIRAcetam (KEPPRA) 750 MG tablet, Take 750 mg by mouth 2 (Two) Times a Day., Disp: , Rfl:   •  metoprolol succinate XL (TOPROL-XL) 25 MG 24 hr tablet, , Disp: , Rfl:   •  OLANZapine (zyPREXA) 5 MG tablet, Take 1 tablet by mouth Every Night., Disp: 30 tablet, Rfl: 2  •  omeprazole (priLOSEC) 40 MG capsule, , Disp: , Rfl:   •  ondansetron ODT (ZOFRAN-ODT) 8 MG disintegrating tablet, Every 8 (Eight) Hours As Needed., Disp: , Rfl:   •  SUMAtriptan (IMITREX) 50 MG tablet, take 1 tablet by mouth at onset of MIGRAINE may repeat in 2 hours if needed max of 2 tablets in 24 hours, Disp: , Rfl:   •  traZODone (DESYREL) 100 MG tablet, ,  "Disp: , Rfl:   •  venlafaxine (EFFEXOR) 100 MG tablet, Take 100 mg by mouth Daily., Disp: , Rfl:     Objective     Physical Exam:  Physical Exam  Constitutional:       General: He is not in acute distress.     Appearance: Normal appearance. He is ill-appearing.   Eyes:      General: No scleral icterus.  Pulmonary:      Effort: No respiratory distress.   Skin:     General: Skin is dry.   Neurological:      Mental Status: He is alert and oriented to person, place, and time.      Gait: Gait abnormal.       Vital Signs:   Vitals:    06/07/22 1302   BP: 128/78   Pulse: 78   Weight: 113 kg (250 lb)   Height: 165.1 cm (65\")     Body mass index is 41.6 kg/m².     Mental Status Exam:   Hygiene:   good  Cooperation:  Cooperative  Eye Contact:  Good  Psychomotor Behavior:  Appropriate  Affect:  Full range  Mood: normal  Speech:  Normal  Thought Process:  Goal directed  Thought Content:  Normal  Suicidal:  None  Homicidal:  None  Hallucinations:  None  Delusion:  None  Memory:  Intact  Orientation:  Grossly intact  Reliability:  good  Insight:  Fair  Judgement:  Fair  Impulse Control:  Poor    Assessment / Plan      Assessment & Plan   -Continue buprenorphine/naloxone 16-4 mg daily.  -Will have RTC dose at next visit +0 remaining  -Continued monitoring for illicit substances for patient safety, medication compliance and future care.  -Encouraged patient as he continues to do well in MAT and IOP program despite most recent setback.  -Will reach out to peer support today  -Continue IOP  -Reviewed rules on taking other individuals medications, illicit substances  -Has Narcan at home     Visit Diagnoses     Opioid use disorder, severe, in early remission, on maintenance therapy, dependence (HCC)        Relevant Medications    buprenorphine-naloxone (SUBOXONE) 8-2 MG per SL tablet      Diagnoses       Codes Comments    Opioid use disorder, severe, in early remission, on maintenance therapy, dependence (HCC)     ICD-10-CM: " F11.21  ICD-9-CM: 304.03             PLAN:  1. Safety: No acute safety concerns  2. Risk Assessment: Risk of self-harm acutely is low. Risk of self-harm chronically is also low, but could be further elevated in the event of treatment noncompliance and/or AODA.      TREATMENT PLAN/GOALS: Continue supportive psychotherapy efforts and medications as indicated. Treatment and medication options discussed during today's visit. Patient acknowledged and verbally consented to continue with current treatment plan and was educated on the importance of compliance with treatment and follow-up appointments.      MEDICATION ISSUES:  QIANA reviewed as expected.  Discussed medication options and treatment plan of prescribed medication as well as the risks, benefits, and side effects including potential falls, possible impaired driving and metabolic adversities among others. Patient is agreeable to call the office with any worsening of symptoms or onset of side effects. Patient is agreeable to call 911 or go to the nearest ER should he/she begin having SI/HI. No medication side effects or related complaints today.     MEDS ORDERED DURING VISIT:  New Medications Ordered This Visit   Medications   • buprenorphine-naloxone (SUBOXONE) 8-2 MG per SL tablet     Sig: Place 2 tablets under the tongue Daily for 13 days.     Dispense:  26 tablet     Refill:  0     [id number]       Return in 13 days (on 6/20/2022) for Follow Up Medication.           This document has been electronically signed by PRISCILLA Wen  June 7, 2022 14:18 EDT      Part of this note may be an electronic transcription/translation of spoken language to printed text using the Dragon Dictation System.

## 2022-06-08 ENCOUNTER — OFFICE VISIT (OUTPATIENT)
Dept: PSYCHIATRY | Facility: HOSPITAL | Age: 42
End: 2022-06-08

## 2022-06-08 DIAGNOSIS — F14.21 COCAINE USE DISORDER, SEVERE, IN SUSTAINED REMISSION: ICD-10-CM

## 2022-06-08 DIAGNOSIS — F11.20 OPIOID USE DISORDER, SEVERE, DEPENDENCE: Primary | ICD-10-CM

## 2022-06-08 LAB — ETHANOL UR-MCNC: NEGATIVE %

## 2022-06-08 PROCEDURE — H0015 ALCOHOL AND/OR DRUG SERVICES: HCPCS | Performed by: NURSE PRACTITIONER

## 2022-06-08 NOTE — PROGRESS NOTES
NAME: Aroldo Cai  DATE: 06/02/2022     IOP GROUP   [id number]    Number of participants: 7  IOP GROUP NOTE     DATA:     3 hour IOP group therapy session (Check-ins, Coping Skills, Relapse Prevention)     Check Ins: Therapist continued facilitation of rapport building strategies between group members. Therapist asked that each patient check in with home life and recovery efforts and identify triggers, cravings, and high risk situations that arise between group sessions. Therapist provided empathy and support during group session.     Session Content/Coping Skills: Introductions completed.  and  joined for first part of meeting.  discussed with group resources available. Check in’s completed by group members. PAWS and PAWS Tools psychoeducational material reviewed and discussed with group members. Stages of Relapse psychoeducational material (Treatment Connection) reviewed. Group members began developing their relapse prevention plans and sharing with the group.     Response: Patient attended class in person. Patient participated in completion of check in form.   Patient on check in form denied suicidal or homicidal thoughts or plan or intent to hurt self or others currently or since last group meeting. Patient participated in introductions, review of psychoeducational material, and relapse prevention plan development.     Personal Assessment 0-10 Scale (10 worst)    Anxiety:  4   Depression:  2   Cravings: 2     ASSESSMENT:     ..  Lab on 05/31/2022   Component Date Value Ref Range Status   • Ethanol, Urine 05/31/2022 Negative  Cutoff=0.020 % Final   • THC, Screen, Urine 05/31/2022 Negative  Negative Final   • Phencyclidine (PCP), Urine 05/31/2022 Negative  Negative Final   • Cocaine Screen, Urine 05/31/2022 Negative  Negative Final   • Methamphetamine, Ur 05/31/2022 Negative  Negative Final   • Opiate Screen 05/31/2022 Negative  Negative Final   • Amphetamine  Screen, Urine 05/31/2022 Negative  Negative Final   • Benzodiazepine Screen, Urine 05/31/2022 Positive (A) Negative Final   • Tricyclic Antidepressants Screen 05/31/2022 Positive (A) Negative Final   • Methadone Screen, Urine 05/31/2022 Negative  Negative Final   • Barbiturates Screen, Urine 05/31/2022 Negative  Negative Final   • Oxycodone Screen, Urine 05/31/2022 Negative  Negative Final   • Propoxyphene Screen 05/31/2022 Negative  Negative Final   • Buprenorphine, Screen, Urine 05/31/2022 Positive (A) Negative Final   • Creatinine, Urine 05/31/2022 202.5  >=20 mg/dL Final   • Buprenorphine/CR 05/31/2022 27  ng/mg creat Final   • NORBUPRENORPHINE/CR 05/31/2022 302  ng/mg creat Final   • NORBUP/BUP RATIO 05/31/2022 11.19  RATIO Final    This test was developed and its performance characteristics  determined by [employer].  It has not been cleared or approved  by the Food and Drug Administration.   • Naloxone 05/31/2022 40  Cutoff=25 ng/ml Final   • TRP-LINK 05/31/2022 Comment   Final    These test results were not transmitted to [facility].  If you would like your patient's urine drug  test results to transmit to [facility], please  contact your [employer]  to complete  a physician authorization form.   Lab on 05/25/2022   Component Date Value Ref Range Status   • Ethanol, Urine 05/25/2022 Negative  Cutoff=0.020 % Final   • THC, Screen, Urine 05/25/2022 Negative  Negative Final   • Phencyclidine (PCP), Urine 05/25/2022 Negative  Negative Final   • Cocaine Screen, Urine 05/25/2022 Negative  Negative Final   • Methamphetamine, Ur 05/25/2022 Negative  Negative Final   • Opiate Screen 05/25/2022 Negative  Negative Final   • Amphetamine Screen, Urine 05/25/2022 Negative  Negative Final   • Benzodiazepine Screen, Urine 05/25/2022 Positive (A) Negative Final   • Tricyclic Antidepressants Screen 05/25/2022 Positive (A) Negative Final   • Methadone Screen, Urine 05/25/2022 Negative   Negative Final   • Barbiturates Screen, Urine 05/25/2022 Negative  Negative Final   • Oxycodone Screen, Urine 05/25/2022 Negative  Negative Final   • Propoxyphene Screen 05/25/2022 Negative  Negative Final   • Buprenorphine, Screen, Urine 05/25/2022 Positive (A) Negative Final   • Creatinine, Urine 05/25/2022 211.2  >=20 mg/dL Final   • Buprenorphine/CR 05/25/2022 349  ng/mg creat Final   • NORBUPRENORPHINE/CR 05/25/2022 384  ng/mg creat Final   • NORBUP/BUP RATIO 05/25/2022 1.10  RATIO Final    This test was developed and its performance characteristics  determined by [facility].  It has not been cleared or approved  by the Food and Drug Administration.   • Naloxone 05/25/2022 2366  Cutoff=25 ng/ml Final   • TRP-LINK 05/25/2022 Comment   Final    These test results were not transmitted to [facility].  If you would like your patient's urine drug  test results to transmit to [facility], please  contact your [employer]  to complete  a physician authorization form.   • Benzodiazepine Screen, Urine 05/25/2022 +POSITIVE+   Final   • DIAZEPAM URINE QUANT (REF) 05/25/2022 Not Detected  ng/mg creat Final   • Desmethyldiazepam 05/25/2022 35  ng/mg creat Final   • Oxazepam, urine 05/25/2022 36  ng/mg creat Final   • Temazepam, urine 05/25/2022 36  ng/mg creat Final    Expected metabolism of benzodiazepine class drugs:   Parent Drug       Detected Metabolites   -----------       --------------------   Diazepam:         Desmethyldiazepam, Temazepam, Oxazepam   Chlordiazepoxide: Desmethyldiazepam, Oxazepam   Clorazepate:      Desmethyldiazepam, Oxazepam   Halazepam:        Desmethyldiazepam, Oxazepam   Temazepam:        Oxazepam   Oxazepam:         None   • ALPRAZOLAM 05/25/2022 Not Detected  ng/mg creat Final   • ALPHA-HYDROXYALPRAZOLAM UR, QT (RE* 05/25/2022 Not Detected  ng/mg creat Final   • DESALKLFLURAZEPAM UR QUANT (REF) 05/25/2022 Not Detected  ng/mg creat Final   • LORAZEPAM URINE  QUANT (REF) 05/25/2022 Not Detected  ng/mg creat Final   • Alpha-hydroxytriazolam, Urine 05/25/2022 Not Detected  ng/mg creat Final   • Clonazepam Urine 05/25/2022 Not Detected  ng/mg creat Final   • 7-Aminoclonazepam Urine 05/25/2022 Not Detected  ng/mg creat Final   • MIDAZOLAM UR, QUANT (REF) 05/25/2022 Not Detected  ng/mg creat Final   • Alpha-hydroxymidazolam, Urine 05/25/2022 Not Detected  ng/mg creat Final   • Flunitrazepam 05/25/2022 Not Detected  ng/mg creat Final   • DESMETHYLFLUNITRAZEPAM 05/25/2022 Not Detected  ng/mg creat Final   • Level of Detection: 05/25/2022 Comment   Final    Testing Threshold:  20 ng/mL                                                                       '  This test was developed and its performance characteristics  determined by [employer].  It has not been cleared or approved  by the Food and Drug Administration.   • Antidepressants 05/25/2022 Negative   Final   • AMITRIPTYLINE, UR 05/25/2022 Not Detected   Final   • Clomipramine, Ur 05/25/2022 Not Detected   Final   • Desemethylclomipramine 05/25/2022 Not Detected   Final   • Desipramine 05/25/2022 Not Detected   Final   • Doxepin 05/25/2022 Not Detected   Final   • Desmethyldoxepin, Ur 05/25/2022 Not Detected   Final   • Imipramine 05/25/2022 Not Detected   Final   • Nortriptyline 05/25/2022 Not Detected   Final   • Protriptyline 05/25/2022 Not Detected   Final   • Trimipramine 05/25/2022 Not Detected   Final   • Level of Detection: 05/25/2022 Comment   Final    Testing Threshold:  50 or 100 ng/mL                                                                       '  This test was developed and its performance characteristics  determined by [facility].  It has not been cleared or approved  by the Food and Drug Administration.   Lab on 05/18/2022   Component Date Value Ref Range Status   • Ethanol, Urine 05/18/2022 Negative  Cutoff=0.020 % Final   • THC, Screen, Urine 05/18/2022 Negative  Negative Final   • Phencyclidine (PCP),  Urine 05/18/2022 Negative  Negative Final   • Cocaine Screen, Urine 05/18/2022 Negative  Negative Final   • Methamphetamine, Ur 05/18/2022 Negative  Negative Final   • Opiate Screen 05/18/2022 Negative  Negative Final   • Amphetamine Screen, Urine 05/18/2022 Negative  Negative Final   • Benzodiazepine Screen, Urine 05/18/2022 Negative  Negative Final   • Tricyclic Antidepressants Screen 05/18/2022 Negative  Negative Final   • Methadone Screen, Urine 05/18/2022 Negative  Negative Final   • Barbiturates Screen, Urine 05/18/2022 Negative  Negative Final   • Oxycodone Screen, Urine 05/18/2022 Negative  Negative Final   • Propoxyphene Screen 05/18/2022 Negative  Negative Final   • Buprenorphine, Screen, Urine 05/18/2022 Positive (A) Negative Final   • Creatinine, Urine 05/18/2022 135.5  >=20 mg/dL Final   • Buprenorphine/CR 05/18/2022 349  ng/mg creat Final   • NORBUPRENORPHINE/CR 05/18/2022 836  ng/mg creat Final   • NORBUP/BUP RATIO 05/18/2022 2.40  RATIO Final    This test was developed and its performance characteristics  determined by [employer].  It has not been cleared or approved  by the Food and Drug Administration.   • Naloxone 05/18/2022 1201  Cutoff=25 ng/ml Final   • TRP-LINK 05/18/2022 Comment   Final    These test results were not transmitted to [facility].  If you would like your patient's urine drug  test results to transmit to [facility], please  contact your [employer]  to complete  a physician authorization form.       Mental Status Exam  Hygiene:  good  Dress: casual  Attitude: cooperative and agreeable   Motor Activity: appropriate  Eye Contact:  good  Speech: regular rate and rhythm   Mood:  calm and cooperative  Affect:  Appropriate  Thought Processes:  Linear  Thought Content:  Normal  Suicidal Thoughts:  denies  Homicidal Thoughts:  denies  Crisis Safety Plan: yes, to come to the emergency room.  Hallucinations:  denies  Reliability: fair  Insight:  fair  Judgement: fair  Impulse Control: fair    Family issues related to recovery:  No change, see previous encounters    Recovery/spiritual support group attendance: No     Sponsor: No     Motivation for treatment: During initial assessment, patient reported “my kids”.     Patient's Support Network Includes: During initial assessment, patient reported sober support system is “my family and youngest son’s mom”.     Progress toward goal: Not at goal    Prognosis: Fair with Ongoing Treatment     Self-reported number of days sober: Patient reported 5-26-22 on check in form.     ASAM Dimensions:  I.    Intoxication/Withdrawal:  0  (Patient during initial assessment reported no recent substance use).  II.   Medical Conditions/Complications:  1 (Due to medical conditions and history of seizures discussed by patient during initial assessment).  III.  Behavioral/Emotional/Cognitive: 1 (Due to patient's reported depression, anxiety, and feelings of hopelessness during initial assessment).  IV.  Readiness to Change: 1 (Patient appeared to be ready to change based on his self-report during initial assessment).  V.   Relapse Risk: 1 (Patient during initial assessment reported he has had one relapse in the past).  VI:  Recovery Environment: 1 (Patient during initial assessment discussed wanting to find a place of his own).  Total ASAM Score = 05      BASELINE SCORES 04/25/2022       VINICIUS-7   (17)                  PHQ-9   (13)        Patient agreeable to adhere to medication regimen as prescribed and report any side effects. Patient will contact this office, call 911 or present to the nearest emergency room should suicidal or homicidal ideations occur.    Impression/Formulation:    ICD-10-CM ICD-9-CM   1. Opioid use disorder, severe, dependence (HCC)  F11.20 304.00   2. Cocaine use disorder, severe, in sustained remission (HCC)  F14.21 304.23       CLINICAL MANUVERING/INTERVENTIONS: Therapist utilized a person-centered approach to  build rapport with group member.  Therapist implemented motivational interviewing techniques to assist client with exploring and resolving ambivalence associated with commitment to change behaviors related to substance use and addiction.  Therapist applied cognitive behavioral strategies to facilitate identification of maladaptive patterns of thinking and behavior that contribute to client’s risk for continued substance use and relapse.  Therapist employed group interaction activities to build rapport among group members, promote sobriety, and emphasize relapse prevention.  Therapist promoted safe nonjudgmental environment by providing group members with unconditional positive regard and encouraging group members to comply with group rules and guidelines. Therapist assisted group member with identifying and implementing healthier coping strategies.      PLAN:  Continue [facility] IOP Phase I   Aftercare:  [facility] Phase II  Program Assignments:  Personal recovery plan, relapse prevention plan, attendance of recovery support group meetings, exploration of sponsorship, drug/alcohol screens.     Please note that portions of this note were completed with a voice recognition program. Efforts were made to edit dictation, but occasionally words are mistranscribed.       This document signed by Duy Garcia, June 8, 2022, 10:27 EDT

## 2022-06-09 ENCOUNTER — OFFICE VISIT (OUTPATIENT)
Dept: PSYCHIATRY | Facility: HOSPITAL | Age: 42
End: 2022-06-09

## 2022-06-09 DIAGNOSIS — F11.20 OPIOID USE DISORDER, SEVERE, DEPENDENCE: Primary | ICD-10-CM

## 2022-06-09 DIAGNOSIS — F14.21 COCAINE USE DISORDER, SEVERE, IN SUSTAINED REMISSION: ICD-10-CM

## 2022-06-09 PROCEDURE — H0015 ALCOHOL AND/OR DRUG SERVICES: HCPCS | Performed by: NURSE PRACTITIONER

## 2022-06-11 LAB — BENZODIAZ UR QL: NEGATIVE

## 2022-06-12 LAB
7AMINOCLONAZEPAM/CREAT UR: NOT DETECTED NG/MG CREAT
A-OH ALPRAZ/CREAT UR: NOT DETECTED NG/MG CREAT
A-OH-TRIAZOLAM/CREAT UR CFM: NOT DETECTED NG/MG CREAT
ACCEPTABLE CREAT UR QL: 181.8 MG/DL
ALPHA-HYDROXYMIDAZOLAM, URINE: NOT DETECTED NG/MG CREAT
ALPRAZ/CREAT UR CFM: NOT DETECTED NG/MG CREAT
AMITRIP UR QL CFM: NOT DETECTED
ANTIDEPRESSANTS UR QL: NEGATIVE
BENZODIAZ UR QL CFM: NORMAL
CLOMIPRAMINE UR QL: NOT DETECTED
CLONAZEPAM/CREAT UR CFM: NOT DETECTED NG/MG CREAT
DESALKYLFLURAZ/CREAT UR: NOT DETECTED NG/MG CREAT
DESIPRAMINE UR QL CFM: NOT DETECTED
DESMETHYLFLUNITRAZEPAM: NOT DETECTED NG/MG CREAT
DIAZEPAM/CREAT UR: NOT DETECTED NG/MG CREAT
DOXEPIN UR QL CFM: NOT DETECTED
FLUNITRAZEPAM UR QL CFM: NOT DETECTED NG/MG CREAT
IMIPRAMINE UR QL CFM: NOT DETECTED
LEVEL OF DETECTION:: NORMAL
LEVEL OF DETECTION:: NORMAL
LORAZEPAM/CREAT UR: NOT DETECTED NG/MG CREAT
MIDAZOLAM/CREAT UR CFM: NOT DETECTED NG/MG CREAT
NALOXONE UR CFM-MCNC: 204 NG/ML
NORBUP/BUP RATIO: 4.04 RATIO
NORBUPRENORPHINE UR CFM-MCNC: 275 NG/MG CREAT
NORBUPRENORPHINE/CREAT UR: 68 NG/MG CREAT
NORCLOMIPRAMINE UR QL: NOT DETECTED
NORDIAZEPAM/CREAT UR: NOT DETECTED NG/MG CREAT
NORDOXEPIN UR QL: NOT DETECTED
NORTRIP UR QL CFM: NOT DETECTED
OXAZEPAM/CREAT UR: 39 NG/MG CREAT
PROTRIP UR QL: NOT DETECTED
TEMAZEPAM/CREAT UR: NOT DETECTED NG/MG CREAT
TRIMIPRAMINE UR QL: NOT DETECTED
TRP-LINK: NORMAL

## 2022-06-13 ENCOUNTER — TELEPHONE (OUTPATIENT)
Dept: PSYCHIATRY | Facility: CLINIC | Age: 42
End: 2022-06-13

## 2022-06-13 ENCOUNTER — HOSPITAL ENCOUNTER (OUTPATIENT)
Dept: GENERAL RADIOLOGY | Facility: HOSPITAL | Age: 42
Discharge: HOME | End: 2022-06-13
Admitting: INTERNAL MEDICINE

## 2022-06-13 ENCOUNTER — TRANSCRIBE ORDERS (OUTPATIENT)
Dept: GENERAL RADIOLOGY | Facility: HOSPITAL | Age: 42
End: 2022-06-13

## 2022-06-13 DIAGNOSIS — M25.572 LEFT ANKLE PAIN, UNSPECIFIED CHRONICITY: Primary | ICD-10-CM

## 2022-06-13 DIAGNOSIS — M25.572 LEFT ANKLE PAIN, UNSPECIFIED CHRONICITY: ICD-10-CM

## 2022-06-13 PROCEDURE — 73610 X-RAY EXAM OF ANKLE: CPT

## 2022-06-13 NOTE — TELEPHONE ENCOUNTER
Called pt for a random pill count advised him he had 24 hours to get here for this. Advised him to bring the medication bottle and medication with him to the office.

## 2022-06-15 ENCOUNTER — LAB (OUTPATIENT)
Dept: LAB | Facility: HOSPITAL | Age: 42
End: 2022-06-15

## 2022-06-15 ENCOUNTER — OFFICE VISIT (OUTPATIENT)
Dept: PSYCHIATRY | Facility: HOSPITAL | Age: 42
End: 2022-06-15

## 2022-06-15 DIAGNOSIS — F14.21 COCAINE USE DISORDER, SEVERE, IN SUSTAINED REMISSION: ICD-10-CM

## 2022-06-15 DIAGNOSIS — F11.20 OPIOID USE DISORDER, SEVERE, DEPENDENCE: ICD-10-CM

## 2022-06-15 DIAGNOSIS — F11.20 OPIOID USE DISORDER, SEVERE, DEPENDENCE: Primary | ICD-10-CM

## 2022-06-15 PROCEDURE — G0480 DRUG TEST DEF 1-7 CLASSES: HCPCS

## 2022-06-15 PROCEDURE — 80307 DRUG TEST PRSMV CHEM ANLYZR: CPT

## 2022-06-15 PROCEDURE — H0015 ALCOHOL AND/OR DRUG SERVICES: HCPCS | Performed by: NURSE PRACTITIONER

## 2022-06-16 ENCOUNTER — OFFICE VISIT (OUTPATIENT)
Dept: PSYCHIATRY | Facility: HOSPITAL | Age: 42
End: 2022-06-16

## 2022-06-16 DIAGNOSIS — F11.20 OPIOID USE DISORDER, SEVERE, DEPENDENCE: Primary | ICD-10-CM

## 2022-06-16 LAB
LABORATORY COMMENT REPORT: NORMAL
TRICYCLICS UR QL CFM: NEGATIVE NG/ML

## 2022-06-16 PROCEDURE — H0015 ALCOHOL AND/OR DRUG SERVICES: HCPCS | Performed by: NURSE PRACTITIONER

## 2022-06-17 LAB — ETHANOL UR-MCNC: NEGATIVE %

## 2022-06-19 LAB
ACCEPTABLE CREAT UR QL: 251.4 MG/DL
NALOXONE UR CFM-MCNC: 265 NG/ML
NORBUP/BUP RATIO: 3.28 RATIO
NORBUPRENORPHINE UR CFM-MCNC: 187 NG/MG CREAT
NORBUPRENORPHINE/CREAT UR: 57 NG/MG CREAT
TRP-LINK: NORMAL

## 2022-06-20 ENCOUNTER — OFFICE VISIT (OUTPATIENT)
Dept: PSYCHIATRY | Facility: CLINIC | Age: 42
End: 2022-06-20

## 2022-06-20 ENCOUNTER — OFFICE VISIT (OUTPATIENT)
Dept: PSYCHIATRY | Facility: HOSPITAL | Age: 42
End: 2022-06-20

## 2022-06-20 ENCOUNTER — LAB (OUTPATIENT)
Dept: LAB | Facility: HOSPITAL | Age: 42
End: 2022-06-20

## 2022-06-20 VITALS
HEIGHT: 65 IN | SYSTOLIC BLOOD PRESSURE: 136 MMHG | BODY MASS INDEX: 41.48 KG/M2 | DIASTOLIC BLOOD PRESSURE: 90 MMHG | WEIGHT: 249 LBS | HEART RATE: 88 BPM

## 2022-06-20 DIAGNOSIS — F11.20 OPIOID USE DISORDER, SEVERE, DEPENDENCE: ICD-10-CM

## 2022-06-20 DIAGNOSIS — F11.21 OPIOID USE DISORDER, SEVERE, IN EARLY REMISSION, ON MAINTENANCE THERAPY, DEPENDENCE (HCC): ICD-10-CM

## 2022-06-20 DIAGNOSIS — F11.20 OPIOID USE DISORDER, SEVERE, DEPENDENCE: Primary | ICD-10-CM

## 2022-06-20 DIAGNOSIS — F14.21 COCAINE USE DISORDER, SEVERE, IN SUSTAINED REMISSION: ICD-10-CM

## 2022-06-20 PROCEDURE — 99212 OFFICE O/P EST SF 10 MIN: CPT | Performed by: NURSE PRACTITIONER

## 2022-06-20 PROCEDURE — 80307 DRUG TEST PRSMV CHEM ANLYZR: CPT

## 2022-06-20 PROCEDURE — G0480 DRUG TEST DEF 1-7 CLASSES: HCPCS

## 2022-06-20 PROCEDURE — H0015 ALCOHOL AND/OR DRUG SERVICES: HCPCS | Performed by: NURSE PRACTITIONER

## 2022-06-20 RX ORDER — ALLOPURINOL 100 MG/1
100 TABLET ORAL DAILY
COMMUNITY
Start: 2022-06-13

## 2022-06-20 RX ORDER — BUPRENORPHINE HYDROCHLORIDE AND NALOXONE HYDROCHLORIDE DIHYDRATE 8; 2 MG/1; MG/1
2 TABLET SUBLINGUAL DAILY
Qty: 12 TABLET | Refills: 0 | Status: SHIPPED | OUTPATIENT
Start: 2022-06-20 | End: 2022-06-27 | Stop reason: SDUPTHER

## 2022-06-20 RX ORDER — VENLAFAXINE 75 MG/1
TABLET ORAL
COMMUNITY
Start: 2022-06-07 | End: 2022-06-28

## 2022-06-20 RX ORDER — PREDNISONE 10 MG/1
TABLET ORAL
COMMUNITY
Start: 2022-06-11 | End: 2022-06-27

## 2022-06-20 RX ORDER — DEXTROMETHORPHAN HYDROBROMIDE AND PROMETHAZINE HYDROCHLORIDE 15; 6.25 MG/5ML; MG/5ML
SYRUP ORAL
COMMUNITY
Start: 2022-06-11 | End: 2022-11-01

## 2022-06-20 NOTE — PROGRESS NOTES
Office  Follow Up Visit      Patient Name: Aroldo Cai  : 1980   MRN: [id number]     Referring Provider: Gerald Dobson MD    Chief Complaint: Substance use    History of Present Illness:   Aroldo Cai is a 42 y.o. 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  Taking buprenorphine/naloxone 16-4 mg daily. Having breakthrough cravings about once per day.  Using coping mechanisms learned in IOP to andrey.  No recurrence of any illicit substance use since 2022.  Had recent random UDS with pill count.  Pill count with +6 tablets.  Had not been taking due to illness.  Had some increasing cravings after recovering and took more medications than prescribed.  Reports +3 tablets at home today.  Reports he will not be in IOP tonight as his sister is in town for Father's Day.  He has not seen her since [place]. Denies AE of medication, SI/HI.    Triggers: Stress, issues with father, exposure to illicit substance    Cravings: Once a day    Relapse Prevention:MOUD, IOP, peer support, coping mechanisms    Urine Drug Screen (today's visit) discussed: Prelim positive buprenorphine, TCA    UDS Confirmation: 6/15/2022 prelim positive benzodiazepine, TCA, buprenorphine    QIANA (PDMP) Reviewed for Current/Active Medications: buprenorphine/naloxone as expected.    History:   Substance use started in  after bilateral hip surgery and was started on opioid pain medications.  Went to pain management for about a year post-op. Started buying pain pills off the street and did so for about a year.  Has had several periods of sobriety in the past with rehab, incarceration. Released from detention 2021 and was introduced to heroin by a friend.  Daily use until OD 3/2022 which prompted him to seek treatment. Had left hip replacement 3/23/2022 (Dr. Gann). Continued bup/nal throughout hospital stay with approval from Dr. Gann, per pt. Was given Percocet 5/325 mg and ibuprofen 800mg for additional pain  control. Percocet was tapered off and he has had not further use. Mother disposed of remaining medication.     Past Surgical History:  Past Surgical History:   Procedure Laterality Date   • ABDOMINAL SURGERY     • BRAIN SURGERY  1994    craniotomy,  right frontal lobe AVM   • CHOLECYSTECTOMY WITH INTRAOPERATIVE CHOLANGIOGRAM N/A 07/27/2021    Procedure: CHOLECYSTECTOMY LAPAROSCOPIC INTRAOPERATIVE CHOLANGIOGRAPHY;  Surgeon: Hardik Blancas MD;  Location: [facility] OR;  Service: General;  Laterality: N/A;   • COLONOSCOPY     • ENDOSCOPY N/A 10/22/2021    Procedure: ESOPHAGOGASTRODUODENOSCOPY WITH BIOPSY;  Surgeon: Hardik Blancas MD;  Location: [facility] ENDOSCOPY;  Service: Gastroenterology;  Laterality: N/A;   • HERNIA REPAIR Right     inguinal   • HIP SURGERY Bilateral    • HIP SURGERY Left        Problem List:  Patient Active Problem List   Diagnosis   • Calculus of gallbladder without cholecystitis without obstruction   • Right upper quadrant abdominal pain   • Nausea and vomiting   • Opioid use disorder, severe, dependence (HCC)       Allergy:   Allergies   Allergen Reactions   • Dilantin [Phenytoin] Seizure   • Penicillins Anaphylaxis   • Phenobarbital Seizure   • Tegretol [Carbamazepine] Seizure   • Latex Rash        Current Medications:   Current Outpatient Medications   Medication Sig Dispense Refill   • allopurinol (ZYLOPRIM) 100 MG tablet Take 100 mg by mouth Daily.     • azithromycin (ZITHROMAX) 250 MG tablet TAKE 2 TABLETS BY MOUTH ON DAY 1, THEN TAKE 1 TABLET BY MOUTH DAILY DAYS 2 THRU 5     • buprenorphine-naloxone (SUBOXONE) 8-2 MG per SL tablet Place 2 tablets under the tongue Daily for 6 days. 12 tablet 0   • cloNIDine (Catapres) 0.1 MG tablet Take 1 tablet by mouth Every Night. 30 tablet 2   • colchicine 0.6 MG tablet Take 0.6 mg by mouth Daily.     • cyclobenzaprine (FLEXERIL) 10 MG tablet Take 10 mg by mouth every night at bedtime.     • ibuprofen (ADVIL,MOTRIN) 800 MG tablet      • levETIRAcetam  (KEPPRA) 750 MG tablet Take 750 mg by mouth 2 (Two) Times a Day.     • metoprolol succinate XL (TOPROL-XL) 25 MG 24 hr tablet      • OLANZapine (zyPREXA) 5 MG tablet Take 1 tablet by mouth Every Night. 30 tablet 2   • omeprazole (priLOSEC) 40 MG capsule      • ondansetron ODT (ZOFRAN-ODT) 8 MG disintegrating tablet Every 8 (Eight) Hours As Needed.     • predniSONE (DELTASONE) 10 MG tablet TAKE 1 TABLET BY MOUTH EVERY TWELVE HOURS UNTIL GONE     • promethazine-dextromethorphan (PROMETHAZINE-DM) 6.25-15 MG/5ML syrup TAKE 10 MLS BY MOUTH EVERY SIX HOURS AS NEEDED FOR COUGH     • SUMAtriptan (IMITREX) 50 MG tablet take 1 tablet by mouth at onset of MIGRAINE may repeat in 2 hours if needed max of 2 tablets in 24 hours     • traZODone (DESYREL) 100 MG tablet      • venlafaxine (EFFEXOR) 100 MG tablet Take 100 mg by mouth Daily.     • venlafaxine (EFFEXOR) 75 MG tablet TAKE 1 TABLET BY MOUTH TWO TIMES A DAY (generic effexor)       No current facility-administered medications for this visit.       Past Medical History:  Past Medical History:   Diagnosis Date   • Anesthesia complication     pt reports he is hard to wake after anesthesia   • Anxiety    • Arthritis    • Bipolar 1 disorder (HCC)    • COVID-19 02/2021   • Depression    • Dysphagia     food and liquids.   • Extensive tattoos    • Gout    • Hepatitis C 2018    Patient took medication    • History of echocardiogram    • Kidney stones    • Pneumonia    • PTSD (post-traumatic stress disorder)    • Seizure (HCC)     last seizure years ago.   • Seizures (HCC)    • Short-term memory loss    • Wears contact lenses    • Wears glasses        Social History:  Social History     Socioeconomic History   • Marital status:    Tobacco Use   • Smoking status: Current Every Day Smoker     Packs/day: 0.50     Years: 30.00     Pack years: 15.00     Types: Cigarettes   • Smokeless tobacco: Never Used   Vaping Use   • Vaping Use: Never used   Substance and Sexual Activity   •  Alcohol use: Not Currently   • Drug use: Not Currently     Types: Cocaine(coke)     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   • Sexual activity: Defer       Family History:  Family History   Problem Relation Age of Onset   • Diabetes Mother    • Diabetes insipidus Mother    • Heart disease Mother    • Cancer Father         skin   • Diabetes Father    • Hypertension Father    • Arthritis Father    • Diabetes insipidus Father          Subjective      Review of Systems:   Review of Systems   Constitutional: Positive for fatigue. Negative for chills and fever.   Respiratory: Negative for shortness of breath.    Cardiovascular: Negative for chest pain.   Gastrointestinal: Negative for abdominal pain.   Musculoskeletal: Positive for back pain and gait problem.   Skin: Negative for skin lesions.   Neurological: Negative for seizures and confusion.   Psychiatric/Behavioral: Positive for stress. Negative for hallucinations, sleep disturbance, suicidal ideas and depressed mood. The patient is nervous/anxious.        PHQ-9 Score:   13    VINICIUS-7 Score:   Feeling nervous, anxious or on edge: More than half the days  Not being able to stop or control worrying: Nearly every day  Worrying too much about different things: More than half the days  Trouble Relaxing: More than half the days  Being so restless that it is hard to sit still: More than half the days  Feeling afraid as if something awful might happen: More than half the days  Becoming easily annoyed or irritable: Several days  VINICIUS 7 Total Score: 14  If you checked any problems, how difficult have these problems made it for you to do your work, take care of things at home, or get along with other people: Somewhat difficult    Patient History:   The following portions of the patient's history were reviewed and updated as appropriate: allergies, current medications, past family history, past medical history, past social history, past surgical history and problem list.      Social:  Social History     Socioeconomic History   • Marital status:    Tobacco Use   • Smoking status: Current Every Day Smoker     Packs/day: 0.50     Years: 30.00     Pack years: 15.00     Types: Cigarettes   • Smokeless tobacco: Never Used   Vaping Use   • Vaping Use: Never used   Substance and Sexual Activity   • Alcohol use: Not Currently   • Drug use: Not Currently     Types: Cocaine(coke)     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   • Sexual activity: Defer       Medications:     Current Outpatient Medications:   •  allopurinol (ZYLOPRIM) 100 MG tablet, Take 100 mg by mouth Daily., Disp: , Rfl:   •  azithromycin (ZITHROMAX) 250 MG tablet, TAKE 2 TABLETS BY MOUTH ON DAY 1, THEN TAKE 1 TABLET BY MOUTH DAILY DAYS 2 THRU 5, Disp: , Rfl:   •  buprenorphine-naloxone (SUBOXONE) 8-2 MG per SL tablet, Place 2 tablets under the tongue Daily for 6 days., Disp: 12 tablet, Rfl: 0  •  cloNIDine (Catapres) 0.1 MG tablet, Take 1 tablet by mouth Every Night., Disp: 30 tablet, Rfl: 2  •  colchicine 0.6 MG tablet, Take 0.6 mg by mouth Daily., Disp: , Rfl:   •  cyclobenzaprine (FLEXERIL) 10 MG tablet, Take 10 mg by mouth every night at bedtime., Disp: , Rfl:   •  ibuprofen (ADVIL,MOTRIN) 800 MG tablet, , Disp: , Rfl:   •  levETIRAcetam (KEPPRA) 750 MG tablet, Take 750 mg by mouth 2 (Two) Times a Day., Disp: , Rfl:   •  metoprolol succinate XL (TOPROL-XL) 25 MG 24 hr tablet, , Disp: , Rfl:   •  OLANZapine (zyPREXA) 5 MG tablet, Take 1 tablet by mouth Every Night., Disp: 30 tablet, Rfl: 2  •  omeprazole (priLOSEC) 40 MG capsule, , Disp: , Rfl:   •  ondansetron ODT (ZOFRAN-ODT) 8 MG disintegrating tablet, Every 8 (Eight) Hours As Needed., Disp: , Rfl:   •  predniSONE (DELTASONE) 10 MG tablet, TAKE 1 TABLET BY MOUTH EVERY TWELVE HOURS UNTIL GONE, Disp: , Rfl:   •  promethazine-dextromethorphan (PROMETHAZINE-DM) 6.25-15 MG/5ML syrup, TAKE 10 MLS BY MOUTH EVERY SIX HOURS AS NEEDED FOR COUGH, Disp: , Rfl:   •   "SUMAtriptan (IMITREX) 50 MG tablet, take 1 tablet by mouth at onset of MIGRAINE may repeat in 2 hours if needed max of 2 tablets in 24 hours, Disp: , Rfl:   •  traZODone (DESYREL) 100 MG tablet, , Disp: , Rfl:   •  venlafaxine (EFFEXOR) 100 MG tablet, Take 100 mg by mouth Daily., Disp: , Rfl:   •  venlafaxine (EFFEXOR) 75 MG tablet, TAKE 1 TABLET BY MOUTH TWO TIMES A DAY (generic effexor), Disp: , Rfl:     Objective     Physical Exam:  Physical Exam  Vitals reviewed.   Constitutional:       General: He is not in acute distress.     Appearance: Normal appearance. He is well-developed. He is not ill-appearing.   Eyes:      General: No scleral icterus.  Pulmonary:      Effort: No respiratory distress.   Neurological:      Mental Status: He is alert and oriented to person, place, and time.      Gait: Gait abnormal.   Psychiatric:         Speech: Speech normal.         Behavior: Behavior normal.         Thought Content: Thought content normal. Thought content does not include homicidal or suicidal ideation. Thought content does not include homicidal or suicidal plan.         Vital Signs:   Vitals:    06/20/22 1318   BP: 136/90   Pulse: 88   Weight: 113 kg (249 lb)   Height: 165.1 cm (65\")     Body mass index is 41.44 kg/m².     Mental Status Exam:   Hygiene:   good  Cooperation:  Cooperative  Eye Contact:  Good  Psychomotor Behavior:  Appropriate  Affect:  Full range  Mood: normal  Speech:  Normal  Thought Process:  Goal directed  Thought Content:  Normal  Suicidal:  None  Homicidal:  None  Hallucinations:  None  Delusion:  None  Memory:  Intact  Orientation:  Person, Place, Time and Situation  Reliability:  good  Insight:  Good  Judgement:  Fair  Impulse Control:  Fair    Assessment / Plan      Assessment & Plan   -Continue buprenorphine/naloxone 16-4 mg daily.  -Will have RTC dose at next visit +1 remaining. Has 3 extra at home today. Will send 6 day supply.  -Advisement to take part in and remain active in 12 Step " Recovery Meetings, IOP, and/or 1:1 therapy/counseling and to establish/maintain an active relationship with a recovery sponsor.   -Continued monitoring for illicit substances for patient safety, medication compliance and future care.     Visit Diagnoses     Opioid use disorder, severe, in early remission, on maintenance therapy, dependence (HCC)        Relevant Medications    buprenorphine-naloxone (SUBOXONE) 8-2 MG per SL tablet      Diagnoses       Codes Comments    Opioid use disorder, severe, in early remission, on maintenance therapy, dependence (HCC)     ICD-10-CM: F11.21  ICD-9-CM: 304.03             PLAN:  1. Safety: No acute safety concerns  2. Risk Assessment: Risk of self-harm acutely is low. Risk of self-harm chronically is also low, but could be further elevated in the event of treatment noncompliance and/or AODA.      TREATMENT PLAN/GOALS: Continue supportive psychotherapy efforts and medications as indicated. Treatment and medication options discussed during today's visit. Patient acknowledged and verbally consented to continue with current treatment plan and was educated on the importance of compliance with treatment and follow-up appointments.      MEDICATION ISSUES:  QIANA reviewed as expected.  Discussed medication options and treatment plan of prescribed medication as well as the risks, benefits, and side effects including potential falls, possible impaired driving and metabolic adversities among others. Patient is agreeable to call the office with any worsening of symptoms or onset of side effects. Patient is agreeable to call 911 or go to the nearest ER should he/she begin having SI/HI. No medication side effects or related complaints today.     MEDS ORDERED DURING VISIT:  New Medications Ordered This Visit   Medications   • buprenorphine-naloxone (SUBOXONE) 8-2 MG per SL tablet     Sig: Place 2 tablets under the tongue Daily for 6 days.     Dispense:  12 tablet     Refill:  0     [id number]        Return in about 1 week (around 6/27/2022) for Follow Up Medication.           This document has been electronically signed by PRISCILLA Wen  June 20, 2022 13:50 EDT      Part of this note may be an electronic transcription/translation of spoken language to printed text using the Dragon Dictation System.

## 2022-06-21 ENCOUNTER — OFFICE VISIT (OUTPATIENT)
Dept: PSYCHIATRY | Facility: HOSPITAL | Age: 42
End: 2022-06-21

## 2022-06-21 DIAGNOSIS — F11.20 OPIOID USE DISORDER, SEVERE, DEPENDENCE: Primary | ICD-10-CM

## 2022-06-21 DIAGNOSIS — F14.21 COCAINE USE DISORDER, SEVERE, IN SUSTAINED REMISSION: ICD-10-CM

## 2022-06-21 LAB — ETHANOL UR-MCNC: NEGATIVE %

## 2022-06-21 PROCEDURE — H0015 ALCOHOL AND/OR DRUG SERVICES: HCPCS | Performed by: NURSE PRACTITIONER

## 2022-06-22 ENCOUNTER — DOCUMENTATION (OUTPATIENT)
Dept: PSYCHIATRY | Facility: HOSPITAL | Age: 42
End: 2022-06-22

## 2022-06-22 ENCOUNTER — OFFICE VISIT (OUTPATIENT)
Dept: PSYCHIATRY | Facility: HOSPITAL | Age: 42
End: 2022-06-22

## 2022-06-22 DIAGNOSIS — F14.21 COCAINE USE DISORDER, SEVERE, IN SUSTAINED REMISSION: ICD-10-CM

## 2022-06-22 DIAGNOSIS — F11.20 OPIOID USE DISORDER, SEVERE, DEPENDENCE: Primary | ICD-10-CM

## 2022-06-22 PROCEDURE — H0015 ALCOHOL AND/OR DRUG SERVICES: HCPCS | Performed by: NURSE PRACTITIONER

## 2022-06-22 NOTE — PROGRESS NOTES
"NAME: Aroldo Cai  DATE: 06/08/2022    [facility] GROUP   [id number]    Number of participants: 6  IOP GROUP NOTE     DATA:     3 hour IOP group therapy session (Check-ins, Coping Skills, Relapse Prevention)     Check Ins: Therapist continued facilitation of rapport building strategies between group members. Therapist asked that each patient check in with home life and recovery efforts and identify triggers, cravings, and high risk situations that arise between group sessions. Therapist provided empathy and support during group session.     Session Content/Coping Skills: Check ins completed by group members. Clinician discussed setting boundaries with group members. [facility] Psychoeducational material began and discussed with group members. As a check out activity, Group members shared one thing that a classmate said during group that made them think.     Response: Patient attended class in person. Patient participated in completion of check in form.   Patient on check in form denied suicidal or homicidal thoughts or plan or intent to hurt self or others currently or since last group meeting. Patient participated in group discussions and review of psychoeducational material.     Patient participated in verbal review of treatment plan and patient verbally agreed to treatment plan.     Patient participated in completion of PHQ-9 and scored a 15. Patient answered \"several days\" to question \"thoughts that you would be better off dead or of hurting yourself in some way\". Clinician explored this with patient. Patient stated that it was not suicidal in nature. Patient stated that things get stressful and money that he took gets thrown up in his face. Patient during session denied current suicidal thoughts or plan or intent to hurt self.     Patient participated in completion of VINICIUS-7 scale and scored a 21.       Personal Assessment 0-10 Scale (10 worst)    Anxiety:  8   Depression:  5   Cravings: 8     ASSESSMENT: "     ..  Lab on 06/07/2022   Component Date Value Ref Range Status   • Ethanol, Urine 06/07/2022 Negative  Cutoff=0.020 % Final   • THC, Screen, Urine 06/07/2022 Negative  Negative Final   • Phencyclidine (PCP), Urine 06/07/2022 Negative  Negative Final   • Cocaine Screen, Urine 06/07/2022 Negative  Negative Final   • Methamphetamine, Ur 06/07/2022 Negative  Negative Final   • Opiate Screen 06/07/2022 Negative  Negative Final   • Amphetamine Screen, Urine 06/07/2022 Negative  Negative Final   • Benzodiazepine Screen, Urine 06/07/2022 Positive (A) Negative Final   • Tricyclic Antidepressants Screen 06/07/2022 Positive (A) Negative Final   • Methadone Screen, Urine 06/07/2022 Negative  Negative Final   • Barbiturates Screen, Urine 06/07/2022 Negative  Negative Final   • Oxycodone Screen, Urine 06/07/2022 Negative  Negative Final   • Propoxyphene Screen 06/07/2022 Negative  Negative Final   • Buprenorphine, Screen, Urine 06/07/2022 Positive (A) Negative Final   • Creatinine, Urine 06/07/2022 181.8  >=20 mg/dL Final   • Buprenorphine/CR 06/07/2022 68  ng/mg creat Final   • NORBUPRENORPHINE/CR 06/07/2022 275  ng/mg creat Final   • NORBUP/BUP RATIO 06/07/2022 4.04  RATIO Final    This test was developed and its performance characteristics  determined by [employer].  It has not been cleared or approved  by the Food and Drug Administration.   • Naloxone 06/07/2022 204  Cutoff=25 ng/ml Final   • TRP-LINK 06/07/2022 Comment   Final    These test results were not transmitted to [facility].  If you would like your patient's urine drug  test results to transmit to [facility], please  contact your [employer]  to complete  a physician authorization form.   • Antidepressants 06/07/2022 Negative   Final   • AMITRIPTYLINE, UR 06/07/2022 Not Detected   Final   • Clomipramine, Ur 06/07/2022 Not Detected   Final   • Desemethylclomipramine 06/07/2022 Not Detected   Final   • Desipramine 06/07/2022 Not  Detected   Final   • Doxepin 06/07/2022 Not Detected   Final   • Desmethyldoxepin, Ur 06/07/2022 Not Detected   Final   • Imipramine 06/07/2022 Not Detected   Final   • Nortriptyline 06/07/2022 Not Detected   Final   • Protriptyline 06/07/2022 Not Detected   Final   • Trimipramine 06/07/2022 Not Detected   Final   • Level of Detection: 06/07/2022 Comment   Final    Testing Threshold:  50 or 100 ng/mL                                                                       '  This test was developed and its performance characteristics  determined by [employer].  It has not been cleared or approved  by the Food and Drug Administration.   • Benzodiazepine Screen, Urine 06/07/2022 +POSITIVE+   Final   • DIAZEPAM URINE QUANT (REF) 06/07/2022 Not Detected  ng/mg creat Final   • Desmethyldiazepam 06/07/2022 Not Detected  ng/mg creat Final   • Oxazepam, urine 06/07/2022 39  ng/mg creat Final   • Temazepam, urine 06/07/2022 Not Detected  ng/mg creat Final    Expected metabolism of benzodiazepine class drugs:   Parent Drug       Detected Metabolites   -----------       --------------------   Diazepam:         Desmethyldiazepam, Temazepam, Oxazepam   Chlordiazepoxide: Desmethyldiazepam, Oxazepam   Clorazepate:      Desmethyldiazepam, Oxazepam   Halazepam:        Desmethyldiazepam, Oxazepam   Temazepam:        Oxazepam   Oxazepam:         None   • ALPRAZOLAM 06/07/2022 Not Detected  ng/mg creat Final   • ALPHA-HYDROXYALPRAZOLAM UR, QT (RE* 06/07/2022 Not Detected  ng/mg creat Final   • DESALKLFLURAZEPAM UR QUANT (REF) 06/07/2022 Not Detected  ng/mg creat Final   • LORAZEPAM URINE QUANT (REF) 06/07/2022 Not Detected  ng/mg creat Final   • Alpha-hydroxytriazolam, Urine 06/07/2022 Not Detected  ng/mg creat Final   • Clonazepam Urine 06/07/2022 Not Detected  ng/mg creat Final   • 7-Aminoclonazepam Urine 06/07/2022 Not Detected  ng/mg creat Final   • MIDAZOLAM UR, QUANT (REF) 06/07/2022 Not Detected  ng/mg creat Final   •  Alpha-hydroxymidazolam, Urine 06/07/2022 Not Detected  ng/mg creat Final   • Flunitrazepam 06/07/2022 Not Detected  ng/mg creat Final   • DESMETHYLFLUNITRAZEPAM 06/07/2022 Not Detected  ng/mg creat Final   • Level of Detection: 06/07/2022 Comment   Final    Testing Threshold:  20 ng/mL                                                                       '  This test was developed and its performance characteristics  determined by [facility].  It has not been cleared or approved  by the Food and Drug Administration.   Lab on 05/31/2022   Component Date Value Ref Range Status   • Ethanol, Urine 05/31/2022 Negative  Cutoff=0.020 % Final   • THC, Screen, Urine 05/31/2022 Negative  Negative Final   • Phencyclidine (PCP), Urine 05/31/2022 Negative  Negative Final   • Cocaine Screen, Urine 05/31/2022 Negative  Negative Final   • Methamphetamine, Ur 05/31/2022 Negative  Negative Final   • Opiate Screen 05/31/2022 Negative  Negative Final   • Amphetamine Screen, Urine 05/31/2022 Negative  Negative Final   • Benzodiazepine Screen, Urine 05/31/2022 Positive (A) Negative Final   • Tricyclic Antidepressants Screen 05/31/2022 Positive (A) Negative Final   • Methadone Screen, Urine 05/31/2022 Negative  Negative Final   • Barbiturates Screen, Urine 05/31/2022 Negative  Negative Final   • Oxycodone Screen, Urine 05/31/2022 Negative  Negative Final   • Propoxyphene Screen 05/31/2022 Negative  Negative Final   • Buprenorphine, Screen, Urine 05/31/2022 Positive (A) Negative Final   • Creatinine, Urine 05/31/2022 202.5  >=20 mg/dL Final   • Buprenorphine/CR 05/31/2022 27  ng/mg creat Final   • NORBUPRENORPHINE/CR 05/31/2022 302  ng/mg creat Final   • NORBUP/BUP RATIO 05/31/2022 11.19  RATIO Final    This test was developed and its performance characteristics  determined by [employer].  It has not been cleared or approved  by the Food and Drug Administration.   • Naloxone 05/31/2022 40  Cutoff=25 ng/ml Final   • TRP-LINK 05/31/2022 Comment    Final    These test results were not transmitted to [facility].  If you would like your patient's urine drug  test results to transmit to [facility], please  contact your [employer]  to complete  a physician authorization form.   • Tricyclics 05/31/2022 Negative  Ilngeo=625 ng/mL Final    Confirmation performed by [employer]   • Please note 05/31/2022 Comment   Final    Drug-test results should be interpreted in the context of clinical  information. Patient metabolic variables, specific drug chemistry, and  specimen characteristics can affect test outcome. Technical  consultation is available if a test result is inconsistent with an  expected outcome. ([email or web address] or call toll-free  [phone])   • Benzodiazepines Urine 05/31/2022 Negative  Gajwgx=083 Final   Lab on 05/25/2022   Component Date Value Ref Range Status   • Ethanol, Urine 05/25/2022 Negative  Cutoff=0.020 % Final   • THC, Screen, Urine 05/25/2022 Negative  Negative Final   • Phencyclidine (PCP), Urine 05/25/2022 Negative  Negative Final   • Cocaine Screen, Urine 05/25/2022 Negative  Negative Final   • Methamphetamine, Ur 05/25/2022 Negative  Negative Final   • Opiate Screen 05/25/2022 Negative  Negative Final   • Amphetamine Screen, Urine 05/25/2022 Negative  Negative Final   • Benzodiazepine Screen, Urine 05/25/2022 Positive (A) Negative Final   • Tricyclic Antidepressants Screen 05/25/2022 Positive (A) Negative Final   • Methadone Screen, Urine 05/25/2022 Negative  Negative Final   • Barbiturates Screen, Urine 05/25/2022 Negative  Negative Final   • Oxycodone Screen, Urine 05/25/2022 Negative  Negative Final   • Propoxyphene Screen 05/25/2022 Negative  Negative Final   • Buprenorphine, Screen, Urine 05/25/2022 Positive (A) Negative Final   • Creatinine, Urine 05/25/2022 211.2  >=20 mg/dL Final   • Buprenorphine/CR 05/25/2022 349  ng/mg creat Final   • NORBUPRENORPHINE/CR 05/25/2022  384  ng/mg creat Final   • NORBUP/BUP RATIO 05/25/2022 1.10  RATIO Final    This test was developed and its performance characteristics  determined by [employer].  It has not been cleared or approved  by the Food and Drug Administration.   • Naloxone 05/25/2022 2366  Cutoff=25 ng/ml Final   • TRP-LINK 05/25/2022 Comment   Final    These test results were not transmitted to [facility].  If you would like your patient's urine drug  test results to transmit to [facility], please  contact your [employer]  to complete  a physician authorization form.   • Benzodiazepine Screen, Urine 05/25/2022 +POSITIVE+   Final   • DIAZEPAM URINE QUANT (REF) 05/25/2022 Not Detected  ng/mg creat Final   • Desmethyldiazepam 05/25/2022 35  ng/mg creat Final   • Oxazepam, urine 05/25/2022 36  ng/mg creat Final   • Temazepam, urine 05/25/2022 36  ng/mg creat Final    Expected metabolism of benzodiazepine class drugs:   Parent Drug       Detected Metabolites   -----------       --------------------   Diazepam:         Desmethyldiazepam, Temazepam, Oxazepam   Chlordiazepoxide: Desmethyldiazepam, Oxazepam   Clorazepate:      Desmethyldiazepam, Oxazepam   Halazepam:        Desmethyldiazepam, Oxazepam   Temazepam:        Oxazepam   Oxazepam:         None   • ALPRAZOLAM 05/25/2022 Not Detected  ng/mg creat Final   • ALPHA-HYDROXYALPRAZOLAM UR, QT (RE* 05/25/2022 Not Detected  ng/mg creat Final   • DESALKLFLURAZEPAM UR QUANT (REF) 05/25/2022 Not Detected  ng/mg creat Final   • LORAZEPAM URINE QUANT (REF) 05/25/2022 Not Detected  ng/mg creat Final   • Alpha-hydroxytriazolam, Urine 05/25/2022 Not Detected  ng/mg creat Final   • Clonazepam Urine 05/25/2022 Not Detected  ng/mg creat Final   • 7-Aminoclonazepam Urine 05/25/2022 Not Detected  ng/mg creat Final   • MIDAZOLAM UR, QUANT (REF) 05/25/2022 Not Detected  ng/mg creat Final   • Alpha-hydroxymidazolam, Urine 05/25/2022 Not Detected  ng/mg creat Final   •  Flunitrazepam 05/25/2022 Not Detected  ng/mg creat Final   • DESMETHYLFLUNITRAZEPAM 05/25/2022 Not Detected  ng/mg creat Final   • Level of Detection: 05/25/2022 Comment   Final    Testing Threshold:  20 ng/mL                                                                       '  This test was developed and its performance characteristics  determined by [facility].  It has not been cleared or approved  by the Food and Drug Administration.   • Antidepressants 05/25/2022 Negative   Final   • AMITRIPTYLINE, UR 05/25/2022 Not Detected   Final   • Clomipramine, Ur 05/25/2022 Not Detected   Final   • Desemethylclomipramine 05/25/2022 Not Detected   Final   • Desipramine 05/25/2022 Not Detected   Final   • Doxepin 05/25/2022 Not Detected   Final   • Desmethyldoxepin, Ur 05/25/2022 Not Detected   Final   • Imipramine 05/25/2022 Not Detected   Final   • Nortriptyline 05/25/2022 Not Detected   Final   • Protriptyline 05/25/2022 Not Detected   Final   • Trimipramine 05/25/2022 Not Detected   Final   • Level of Detection: 05/25/2022 Comment   Final    Testing Threshold:  50 or 100 ng/mL                                                                       '  This test was developed and its performance characteristics  determined by [employer].  It has not been cleared or approved  by the Food and Drug Administration.       Mental Status Exam  Hygiene:  good  Dress: casual  Attitude: cooperative and agreeable   Motor Activity: appropriate  Eye Contact:  good  Speech: regular rate and rhythm   Mood:  calm and cooperative  Affect:  Appropriate  Thought Processes:  Linear  Thought Content:  Normal  Suicidal Thoughts:  denies  Homicidal Thoughts:  denies  Crisis Safety Plan: yes, to come to the emergency room.  Hallucinations:  denies  Reliability: fair  Insight: fair  Judgement: fair  Impulse Control: fair    Family issues related to recovery:  No change, see previous encounters    Recovery/spiritual support group attendance: No      Sponsor: No     Motivation for treatment: During initial assessment, patient reported “my kids”.     Patient's Support Network Includes: During initial assessment, patient reported sober support system is “my family and youngest son’s mom”.     Progress toward goal: Not at goal    Prognosis: Fair with Ongoing Treatment     Self-reported number of days sober: Patient reported May 26th on check in form.    ASAM Dimensions:  I.    Intoxication/Withdrawal:  0  (Patient during initial assessment reported no recent substance use).  II.   Medical Conditions/Complications:  1 (Due to medical conditions and history of seizures discussed by patient during initial assessment).  III.  Behavioral/Emotional/Cognitive: 1 (Due to patient's reported depression, anxiety, and feelings of hopelessness during initial assessment).  IV.  Readiness to Change: 1 (Patient appeared to be ready to change based on his self-report during initial assessment).  V.   Relapse Risk: 1 (Patient during initial assessment reported he has had one relapse in the past).  VI:  Recovery Environment: 1 (Patient during initial assessment discussed wanting to find a place of his own).  Total ASAM Score = 05      BASELINE SCORES 04/25/2022       VINICIUS-7   (17)                  PHQ-9   (13)      Updated Scores 6/8/2022  VINICIUS-7 (21)  PHQ-9 (15)      Patient agreeable to adhere to medication regimen as prescribed and report any side effects. Patient will contact this office, call 911 or present to the nearest emergency room should suicidal or homicidal ideations occur.    Impression/Formulation:    ICD-10-CM ICD-9-CM   1. Opioid use disorder, severe, dependence (HCC)  F11.20 304.00   2. Cocaine use disorder, severe, in sustained remission (HCC)  F14.21 304.23       CLINICAL MANUVERING/INTERVENTIONS: Therapist utilized a person-centered approach to build rapport with group member.  Therapist implemented motivational interviewing techniques to assist client with  exploring and resolving ambivalence associated with commitment to change behaviors related to substance use and addiction.  Therapist applied cognitive behavioral strategies to facilitate identification of maladaptive patterns of thinking and behavior that contribute to client’s risk for continued substance use and relapse.  Therapist employed group interaction activities to build rapport among group members, promote sobriety, and emphasize relapse prevention.  Therapist promoted safe nonjudgmental environment by providing group members with unconditional positive regard and encouraging group members to comply with group rules and guidelines. Therapist assisted group member with identifying and implementing healthier coping strategies.      PLAN:  Continue [facility] IOP Phase I   Aftercare:  [facility] Phase II  Program Assignments:  Personal recovery plan, relapse prevention plan, attendance of recovery support group meetings, exploration of sponsorship, drug/alcohol screens.     Please note that portions of this note were completed with a voice recognition program. Efforts were made to edit dictation, but occasionally words are mistranscribed.       This document signed by Duy Garcia, June 22, 2022, 12:08 EDT

## 2022-06-22 NOTE — PROGRESS NOTES
[facility]  [address], [address]  [place] [address]      Intensive Outpatient Program for Chemical Dependence ([facility] IOP)  Verbally Reviewed on 6/8/2022    Treatment Plan Reassessment (biweekly)       Long Term Goal:  Aroldo Cai will establish a sustained recovery and will maintain total abstinence from mind-altering substances other than what is prescribed and/or approved by the attending physician. Pt will learn, practice, and utilize behavioral and cognitive coping skills to help maintain sobriety.    Patient Care Needs:  Aroldo Cai presents with Opioid use disorder, severe, on maintenance therapy, dependence and Cocaine use disorder, severe, in sustained remission and is at high risk for relapse without continued treatment.  Pt has a history of using drugs/alcohol until intoxicated or passed out and has been unable to stop or cut down use of alcohol/drugs once starting, despite verbalized desire to do so, and the negative consequences from continued use.  Pt's use has negatively impacted social, occupational, and/or physical functioning.     1.  Patient will participate in a medical evaluation to assess the effects of chemical dependence and will cooperate with an evaluation by the attending psychiatrist or psychiatric nurse practitioner for psychotropic medication if appropriate.      2.  Patient will adhere to the IOP group rules.        3.  Patient will attend group sessions as scheduled. Patient will notify therapist and support staff of any absences or tardies. Patient will provide a valid and verifiable excuse for absences to be considered excused.    4.  Patient will participate in random drug and alcohol screenings and will exhibit negative results on said screenings.    5.  Patient will identify high risk situations, people, and places to avoid or manage in order to maintain sobriety.    6.  Patient will participate in group discussions by  giving and receiving feedback appropriately.      7.  Patient will develop a positive network of support by attending a minimum of two recovery/spiritual support groups each week (two outside of IOP group) and by exploring, obtaining or maintaining a sponsor or sober support person.      8.  Patient will identify, practice, and implement at least 5 healthy coping strategies to manage difficult emotions while remaining abstinent.    9.   Patient will develop relapse prevention skills and be able to identify and share them with the group in the form of a relapse prevention plan.    10.  Patient will write a personal recovery plan and share it with the group prior to discharge.    11.  Patient will offer family participation in family education groups, if appropriate.    12.  Patient will exhibit increased motivation to change as assessed by his/her cooperation and behavior and the ASAM assessment tool.    13.  Patient will utilize healthy coping skills to manage depressive and anxious symptoms without using alcohol or other mind-altering substances and will exhibit decreased score on the PHQ-9 and VINICIUS-7.   1. Patient participated in an evaluation for medication management with PRISCILLA Wen. Patient is meeting with medical provider as scheduled for medication management and follow-up.          2.  Patient read and signed IOP Group Rules. Patient is adhering to group rules.      3.  Patient has 1 unexcused absence.                  4.  Patient tested positive for Tricyclic Antidepressants on the following dates: 5/3/2022, 5/10/2022, 5/25/2022, 5/31/2022, 6/7/2022, 6/15/2022, 6/20/2022.    Patient tested positive for Benzodiazepines on the following dates: 5/25/2022, 5/31/2022, 6/7/2022, 6/15/2022.    Patient has tested positive for Buprenorphine but currently sees MAT provider.         5.  Patient has encountered the following high-risk situations since beginning treatment: Patient reported old friends, old  "places, music.     6.  Patient provides and receives feedback daily. When not actively participating, patient is attentive and appropriate.    7.  Patient is not attending recovery/spiritual support group meetings at least once times per week. Patient does not have a sponsor/sober support person and is not working the 12 steps.    8.  Patient has identified and implemented the following coping strategies during high risk situations: Patient reported \"get myself away from being in head\", \"get around thought of being high\", patient reported he erased several numbers, coming to group, and talking with peer support.         9.  Patient has identified high-risk people, places, and things as well as healthy coping strategies for avoiding relapse.       10.  Patient has not yet developed a personal recovery plan.      11.  No family participation.          12.  Patient reports cravings were an 8 on a 0:10 scale (0-none) on check in completed on 6/8/2022.  Patient's behavior indicates ongoing motivation for sobriety.  ASAM indicates 05.    13. Pt is managing anxiety and or depression without using mind-altering substances. Patient scored a 21 on VINICIUS-7 scale completed on 6/8/2022. Patient scored a 15 on PHQ-9 scale completed on 6/8/2022.     1.  Partially completed.  Patient participated initial evaluation for medication management with PRISCILLA Wen.  Patient will continue participating in as scheduled medication management and follow-up appointments with psychiatric medical provider.    2.  Patient is making progress toward goal and will continue working toward objective.     3.  Patient will continue working toward objective.                   4.  Patient will continue working toward objective.          5.  Partially completed. Patient will continue working toward objective.      6.  Partially completed. Patient will continue working toward objective.        7.  Patient is not making progress toward goal.   "                 8.  Partially completed. Patient will continue working toward objective.            9.  Partially completed. Patient will continue working toward objective.           10.  Partially completed. Patient will continue working toward objective.       11.  Patient will continue to encourage family participation.      12.  Partially completed.  Patient will continue implementing behavioral changes to promote long-term sobriety and recovery.    13. Partially completed.  Anxiety and depression will continue to be monitored.         Team Members:  Patient - Verbally Agreed on 6/8/2022  Medical Provider - PRISCILLA Wen  [facility] Licensed Therapist - Duy Garcia LCSW  [facility] Director - Marie Alexander, [facility]  Support Staff

## 2022-06-23 ENCOUNTER — APPOINTMENT (OUTPATIENT)
Dept: PSYCHIATRY | Facility: HOSPITAL | Age: 42
End: 2022-06-23

## 2022-06-23 LAB
7AMINOCLONAZEPAM/CREAT UR: NOT DETECTED NG/MG CREAT
A-OH ALPRAZ/CREAT UR: NOT DETECTED NG/MG CREAT
A-OH-TRIAZOLAM/CREAT UR CFM: NOT DETECTED NG/MG CREAT
ALPHA-HYDROXYMIDAZOLAM, URINE: NOT DETECTED NG/MG CREAT
ALPRAZ/CREAT UR CFM: NOT DETECTED NG/MG CREAT
AMITRIP UR QL CFM: NOT DETECTED
ANTIDEPRESSANTS UR QL: NEGATIVE
BENZODIAZ UR QL CFM: NORMAL
CLOMIPRAMINE UR QL: NOT DETECTED
CLONAZEPAM/CREAT UR CFM: NOT DETECTED NG/MG CREAT
DESALKYLFLURAZ/CREAT UR: NOT DETECTED NG/MG CREAT
DESIPRAMINE UR QL CFM: NOT DETECTED
DESMETHYLFLUNITRAZEPAM: NOT DETECTED NG/MG CREAT
DIAZEPAM/CREAT UR: NOT DETECTED NG/MG CREAT
DOXEPIN UR QL CFM: NOT DETECTED
FLUNITRAZEPAM UR QL CFM: NOT DETECTED NG/MG CREAT
IMIPRAMINE UR QL CFM: NOT DETECTED
LEVEL OF DETECTION:: NORMAL
LEVEL OF DETECTION:: NORMAL
LORAZEPAM/CREAT UR: NOT DETECTED NG/MG CREAT
MIDAZOLAM/CREAT UR CFM: NOT DETECTED NG/MG CREAT
NORCLOMIPRAMINE UR QL: NOT DETECTED
NORDIAZEPAM/CREAT UR: NOT DETECTED NG/MG CREAT
NORDOXEPIN UR QL: NOT DETECTED
NORTRIP UR QL CFM: NOT DETECTED
OXAZEPAM/CREAT UR: 21 NG/MG CREAT
PROTRIP UR QL: NOT DETECTED
TEMAZEPAM/CREAT UR: NOT DETECTED NG/MG CREAT
TRIMIPRAMINE UR QL: NOT DETECTED

## 2022-06-24 LAB
ACCEPTABLE CREAT UR QL: 195.8 MG/DL
NALOXONE UR CFM-MCNC: 2041 NG/ML
NORBUP/BUP RATIO: 4.64 RATIO
NORBUPRENORPHINE UR CFM-MCNC: 339 NG/MG CREAT
NORBUPRENORPHINE/CREAT UR: 73 NG/MG CREAT
TRP-LINK: NORMAL

## 2022-06-27 ENCOUNTER — OFFICE VISIT (OUTPATIENT)
Dept: PSYCHIATRY | Facility: CLINIC | Age: 42
End: 2022-06-27

## 2022-06-27 ENCOUNTER — LAB (OUTPATIENT)
Dept: LAB | Facility: HOSPITAL | Age: 42
End: 2022-06-27

## 2022-06-27 ENCOUNTER — APPOINTMENT (OUTPATIENT)
Dept: PSYCHIATRY | Facility: HOSPITAL | Age: 42
End: 2022-06-27

## 2022-06-27 VITALS
DIASTOLIC BLOOD PRESSURE: 74 MMHG | WEIGHT: 242 LBS | BODY MASS INDEX: 40.32 KG/M2 | HEART RATE: 105 BPM | SYSTOLIC BLOOD PRESSURE: 128 MMHG | HEIGHT: 65 IN

## 2022-06-27 DIAGNOSIS — F11.21 OPIOID USE DISORDER, SEVERE, IN EARLY REMISSION, ON MAINTENANCE THERAPY, DEPENDENCE (HCC): ICD-10-CM

## 2022-06-27 DIAGNOSIS — F11.20 OPIOID USE DISORDER, SEVERE, DEPENDENCE: ICD-10-CM

## 2022-06-27 PROCEDURE — 99212 OFFICE O/P EST SF 10 MIN: CPT | Performed by: NURSE PRACTITIONER

## 2022-06-27 PROCEDURE — G0480 DRUG TEST DEF 1-7 CLASSES: HCPCS

## 2022-06-27 PROCEDURE — 80306 DRUG TEST PRSMV INSTRMNT: CPT

## 2022-06-27 RX ORDER — BUPRENORPHINE HYDROCHLORIDE AND NALOXONE HYDROCHLORIDE DIHYDRATE 8; 2 MG/1; MG/1
2 TABLET SUBLINGUAL DAILY
Qty: 16 TABLET | Refills: 0 | Status: SHIPPED | OUTPATIENT
Start: 2022-06-27 | End: 2022-07-05 | Stop reason: SDUPTHER

## 2022-06-27 NOTE — PROGRESS NOTES
Office  Follow Up Visit      Patient Name: Aroldo Cai  : 1980   MRN: [id number]     Referring Provider: Gerald Dobson MD    Chief Complaint: Substance use    History of Present Illness:   Aroldo Cai is a 42 y.o. 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 Having increase in cravings due to stress. Had follow up with neurology and was told he has a soft spot on his brain. Will meet with neurosurgeon at  to discuss in August. This has been stressful to him as well as dealing with his parents chronic health conditions and co-paremtning his son. Continues taking medication as prescribed. Denies any recurrence of illicit substances since last follow up. Continues in IOP. Moving to phase 2 soon. Talks with peer support regularly that is helpful. No other complaints or AE of medication reported today.    Triggers: Stress, anxiety    Cravings: few, daily    Relapse Prevention: MOUD, peer support, IOP    Urine Drug Screen (today's visit) discussed: Not available at time of appt    UDS Confirmation: 2022 Positive buprenorphine/nor-buprenorphine    QIANA (PDMP) Reviewed for Current/Active Medications: buprenorphine/naloxone as expected    History:   Substance use started in  after bilateral hip surgery and was started on opioid pain medications.  Went to pain management for about a year post-op. Started buying pain pills off the street and did so for about a year.  Has had several periods of sobriety in the past with rehab, incarceration. Released from assisted 2021 and was introduced to heroin by a friend.  Daily use until OD 3/2022 which prompted him to seek treatment. Had left hip replacement 3/23/2022 (Dr. Gann). Continued bup/nal throughout hospital stay with approval from Dr. Gann, per pt. Was given Percocet 5/325 mg and ibuprofen 800mg for additional pain control. Percocet was tapered off and he has had not further use. Mother disposed of remaining medication.    Past  Surgical History:  Past Surgical History:   Procedure Laterality Date   • ABDOMINAL SURGERY     • BRAIN SURGERY  1994    craniotomy,  right frontal lobe AVM   • CHOLECYSTECTOMY WITH INTRAOPERATIVE CHOLANGIOGRAM N/A 07/27/2021    Procedure: CHOLECYSTECTOMY LAPAROSCOPIC INTRAOPERATIVE CHOLANGIOGRAPHY;  Surgeon: Hardik Blancas MD;  Location: [facility] OR;  Service: General;  Laterality: N/A;   • COLONOSCOPY     • ENDOSCOPY N/A 10/22/2021    Procedure: ESOPHAGOGASTRODUODENOSCOPY WITH BIOPSY;  Surgeon: Hardik Blancas MD;  Location: [facility] ENDOSCOPY;  Service: Gastroenterology;  Laterality: N/A;   • HERNIA REPAIR Right     inguinal   • HIP SURGERY Bilateral    • HIP SURGERY Left        Problem List:  Patient Active Problem List   Diagnosis   • Calculus of gallbladder without cholecystitis without obstruction   • Right upper quadrant abdominal pain   • Nausea and vomiting   • Opioid use disorder, severe, dependence (HCC)       Allergy:   Allergies   Allergen Reactions   • Dilantin [Phenytoin] Seizure   • Penicillins Anaphylaxis   • Phenobarbital Seizure   • Tegretol [Carbamazepine] Seizure   • Latex Rash        Current Medications:   Current Outpatient Medications   Medication Sig Dispense Refill   • allopurinol (ZYLOPRIM) 100 MG tablet Take 100 mg by mouth Daily.     • buprenorphine-naloxone (SUBOXONE) 8-2 MG per SL tablet Place 2 tablets under the tongue Daily for 8 days. 16 tablet 0   • cloNIDine (Catapres) 0.1 MG tablet Take 1 tablet by mouth Every Night. 30 tablet 2   • colchicine 0.6 MG tablet Take 0.6 mg by mouth Daily.     • cyclobenzaprine (FLEXERIL) 10 MG tablet Take 10 mg by mouth every night at bedtime.     • ibuprofen (ADVIL,MOTRIN) 800 MG tablet      • levETIRAcetam (KEPPRA) 750 MG tablet Take 750 mg by mouth 2 (Two) Times a Day.     • metoprolol succinate XL (TOPROL-XL) 25 MG 24 hr tablet      • OLANZapine (zyPREXA) 5 MG tablet Take 1 tablet by mouth Every Night. 30 tablet 2   • omeprazole (priLOSEC) 40  MG capsule      • ondansetron ODT (ZOFRAN-ODT) 8 MG disintegrating tablet Every 8 (Eight) Hours As Needed.     • promethazine-dextromethorphan (PROMETHAZINE-DM) 6.25-15 MG/5ML syrup TAKE 10 MLS BY MOUTH EVERY SIX HOURS AS NEEDED FOR COUGH     • SUMAtriptan (IMITREX) 50 MG tablet take 1 tablet by mouth at onset of MIGRAINE may repeat in 2 hours if needed max of 2 tablets in 24 hours     • traZODone (DESYREL) 100 MG tablet      • venlafaxine (EFFEXOR) 100 MG tablet Take 100 mg by mouth Daily.     • venlafaxine (EFFEXOR) 75 MG tablet TAKE 1 TABLET BY MOUTH TWO TIMES A DAY (generic effexor)       No current facility-administered medications for this visit.       Past Medical History:  Past Medical History:   Diagnosis Date   • Anesthesia complication     pt reports he is hard to wake after anesthesia   • Anxiety    • Arthritis    • Bipolar 1 disorder (HCC)    • COVID-19 02/2021   • Depression    • Dysphagia     food and liquids.   • Extensive tattoos    • Gout    • Hepatitis C 2018    Patient took medication    • History of echocardiogram    • Kidney stones    • Pneumonia    • PTSD (post-traumatic stress disorder)    • Seizure (HCC)     last seizure years ago.   • Seizures (HCC)    • Short-term memory loss    • Wears contact lenses    • Wears glasses        Social History:  Social History     Socioeconomic History   • Marital status:    Tobacco Use   • Smoking status: Current Every Day Smoker     Packs/day: 0.50     Years: 30.00     Pack years: 15.00     Types: Cigarettes   • Smokeless tobacco: Never Used   Vaping Use   • Vaping Use: Never used   Substance and Sexual Activity   • Alcohol use: Not Currently   • Drug use: Not Currently     Types: Cocaine(coke)     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   • Sexual activity: Defer       Family History:  Family History   Problem Relation Age of Onset   • Diabetes Mother    • Diabetes insipidus Mother    • Heart disease Mother    • Cancer Father         skin   •  Diabetes Father    • Hypertension Father    • Arthritis Father    • Diabetes insipidus Father          Subjective      Review of Systems:   Review of Systems   Constitutional: Positive for fatigue. Negative for chills and fever.   Respiratory: Negative for shortness of breath.    Cardiovascular: Negative for chest pain.   Gastrointestinal: Negative for abdominal pain.   Musculoskeletal: Positive for arthralgias, back pain and gait problem.   Skin: Negative for skin lesions.   Neurological: Negative for seizures and confusion.   Psychiatric/Behavioral: Positive for stress. Negative for hallucinations, sleep disturbance, suicidal ideas and depressed mood. The patient is nervous/anxious.        PHQ-9 Depression Screening  Little interest or pleasure in doing things? 2-->more than half the days   Feeling down, depressed, or hopeless? 2-->more than half the days   Trouble falling or staying asleep, or sleeping too much? 3-->nearly every day   Feeling tired or having little energy? 3-->nearly every day   Poor appetite or overeating? 3-->nearly every day   Feeling bad about yourself - or that you are a failure or have let yourself or your family down? 2-->more than half the days   Trouble concentrating on things, such as reading the newspaper or watching television? 1-->several days   Moving or speaking so slowly that other people could have noticed? Or the opposite - being so fidgety or restless that you have been moving around a lot more than usual? 0-->not at all   Thoughts that you would be better off dead, or of hurting yourself in some way? 0-->not at all   PHQ-9 Total Score 16   If you checked off any problems, how difficult have these problems made it for you to do your work, take care of things at home, or get along with other people? somewhat difficult         VINICIUS-7 Score:   Feeling nervous, anxious or on edge: More than half the days  Not being able to stop or control worrying: More than half the days  Worrying  too much about different things: More than half the days  Trouble Relaxing: More than half the days  Being so restless that it is hard to sit still: Several days  Feeling afraid as if something awful might happen: Not at all  Becoming easily annoyed or irritable: Nearly every day  VINICIUS 7 Total Score: 12  If you checked any problems, how difficult have these problems made it for you to do your work, take care of things at home, or get along with other people: Somewhat difficult    Patient History:   The following portions of the patient's history were reviewed and updated as appropriate: allergies, current medications, past family history, past medical history, past social history, past surgical history and problem list.     Social:  Social History     Socioeconomic History   • Marital status:    Tobacco Use   • Smoking status: Current Every Day Smoker     Packs/day: 0.50     Years: 30.00     Pack years: 15.00     Types: Cigarettes   • Smokeless tobacco: Never Used   Vaping Use   • Vaping Use: Never used   Substance and Sexual Activity   • Alcohol use: Not Currently   • Drug use: Not Currently     Types: Cocaine(coke)     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   • Sexual activity: Defer       Medications:     Current Outpatient Medications:   •  allopurinol (ZYLOPRIM) 100 MG tablet, Take 100 mg by mouth Daily., Disp: , Rfl:   •  buprenorphine-naloxone (SUBOXONE) 8-2 MG per SL tablet, Place 2 tablets under the tongue Daily for 8 days., Disp: 16 tablet, Rfl: 0  •  cloNIDine (Catapres) 0.1 MG tablet, Take 1 tablet by mouth Every Night., Disp: 30 tablet, Rfl: 2  •  colchicine 0.6 MG tablet, Take 0.6 mg by mouth Daily., Disp: , Rfl:   •  cyclobenzaprine (FLEXERIL) 10 MG tablet, Take 10 mg by mouth every night at bedtime., Disp: , Rfl:   •  ibuprofen (ADVIL,MOTRIN) 800 MG tablet, , Disp: , Rfl:   •  levETIRAcetam (KEPPRA) 750 MG tablet, Take 750 mg by mouth 2 (Two) Times a Day., Disp: , Rfl:   •  metoprolol  "succinate XL (TOPROL-XL) 25 MG 24 hr tablet, , Disp: , Rfl:   •  OLANZapine (zyPREXA) 5 MG tablet, Take 1 tablet by mouth Every Night., Disp: 30 tablet, Rfl: 2  •  omeprazole (priLOSEC) 40 MG capsule, , Disp: , Rfl:   •  ondansetron ODT (ZOFRAN-ODT) 8 MG disintegrating tablet, Every 8 (Eight) Hours As Needed., Disp: , Rfl:   •  promethazine-dextromethorphan (PROMETHAZINE-DM) 6.25-15 MG/5ML syrup, TAKE 10 MLS BY MOUTH EVERY SIX HOURS AS NEEDED FOR COUGH, Disp: , Rfl:   •  SUMAtriptan (IMITREX) 50 MG tablet, take 1 tablet by mouth at onset of MIGRAINE may repeat in 2 hours if needed max of 2 tablets in 24 hours, Disp: , Rfl:   •  traZODone (DESYREL) 100 MG tablet, , Disp: , Rfl:   •  venlafaxine (EFFEXOR) 100 MG tablet, Take 100 mg by mouth Daily., Disp: , Rfl:   •  venlafaxine (EFFEXOR) 75 MG tablet, TAKE 1 TABLET BY MOUTH TWO TIMES A DAY (generic effexor), Disp: , Rfl:     Objective     Physical Exam:  Physical Exam  Vitals reviewed.   Constitutional:       General: He is not in acute distress.     Appearance: Normal appearance. He is well-developed. He is not ill-appearing.   Eyes:      General: No scleral icterus.  Pulmonary:      Effort: No respiratory distress.   Neurological:      Mental Status: He is alert and oriented to person, place, and time.      Gait: Gait abnormal.   Psychiatric:         Speech: Speech normal.         Behavior: Behavior normal.         Thought Content: Thought content normal. Thought content does not include homicidal or suicidal ideation. Thought content does not include homicidal or suicidal plan.         Vital Signs:   Vitals:    06/27/22 1257   BP: 128/74   Pulse: 105   Weight: 110 kg (242 lb)   Height: 165.1 cm (65\")     Body mass index is 40.27 kg/m².     Mental Status Exam:   Hygiene:   good  Cooperation:  Cooperative  Eye Contact:  Good  Psychomotor Behavior:  Appropriate  Affect:  Full range  Mood: normal  Speech:  Normal  Thought Process:  Goal directed  Thought Content:  " Normal  Suicidal:  None  Homicidal:  None  Hallucinations:  None  Delusion:  None  Memory:  Intact  Orientation:  Person, Place, Time and Situation  Reliability:  good  Insight:  Good  Judgement:  Good  Impulse Control:  Good    Assessment / Plan      Assessment & Plan   -Continue buprenorphine/naloxone 16-4 mg daily.  -Will have RTC dose at next visit +1 tab remaining  -Advisement to take part in and remain active in 12 Step Recovery Meetings, IOP, and/or 1:1 therapy/counseling and to establish/maintain an active relationship with a recovery sponsor.   -Continued monitoring for illicit substances for patient safety, medication compliance and future care.  -Agreeable to meet with peer support after visit today  -Will accommodate 2-week dosing at follow-up with appropriate UDS as he continues to be compliant with all aspects of care     Visit Diagnoses     Opioid use disorder, severe, in early remission, on maintenance therapy, dependence (HCC)        Relevant Medications    buprenorphine-naloxone (SUBOXONE) 8-2 MG per SL tablet      Diagnoses       Codes Comments    Opioid use disorder, severe, in early remission, on maintenance therapy, dependence (HCC)     ICD-10-CM: F11.21  ICD-9-CM: 304.03             PLAN:  1. Safety: No acute safety concerns  2. Risk Assessment: Risk of self-harm acutely is low. Risk of self-harm chronically is also low, but could be further elevated in the event of treatment noncompliance and/or AODA.      TREATMENT PLAN/GOALS: Continue supportive psychotherapy efforts and medications as indicated. Treatment and medication options discussed during today's visit. Patient acknowledged and verbally consented to continue with current treatment plan and was educated on the importance of compliance with treatment and follow-up appointments.      MEDICATION ISSUES:  QIANA reviewed as expected.  Discussed medication options and treatment plan of prescribed medication as well as the risks, benefits,  and side effects including potential falls, possible impaired driving and metabolic adversities among others. Patient is agreeable to call the office with any worsening of symptoms or onset of side effects. Patient is agreeable to call 911 or go to the nearest ER should he/she begin having SI/HI. No medication side effects or related complaints today.     MEDS ORDERED DURING VISIT:  New Medications Ordered This Visit   Medications   • buprenorphine-naloxone (SUBOXONE) 8-2 MG per SL tablet     Sig: Place 2 tablets under the tongue Daily for 8 days.     Dispense:  16 tablet     Refill:  0     [id number]       Return in 8 days (on 7/5/2022) for Follow Up Medication.           This document has been electronically signed by PRISCILLA Wen  June 27, 2022 13:23 EDT      Part of this note may be an electronic transcription/translation of spoken language to printed text using the Dragon Dictation System.

## 2022-06-28 ENCOUNTER — OFFICE VISIT (OUTPATIENT)
Dept: PSYCHIATRY | Facility: HOSPITAL | Age: 42
End: 2022-06-28

## 2022-06-28 ENCOUNTER — OFFICE VISIT (OUTPATIENT)
Dept: PSYCHIATRY | Facility: CLINIC | Age: 42
End: 2022-06-28

## 2022-06-28 VITALS
WEIGHT: 243 LBS | DIASTOLIC BLOOD PRESSURE: 70 MMHG | BODY MASS INDEX: 40.48 KG/M2 | HEART RATE: 105 BPM | SYSTOLIC BLOOD PRESSURE: 124 MMHG | HEIGHT: 65 IN

## 2022-06-28 DIAGNOSIS — G47.09 OTHER INSOMNIA: Chronic | ICD-10-CM

## 2022-06-28 DIAGNOSIS — F31.81 BIPOLAR II DISORDER: Primary | Chronic | ICD-10-CM

## 2022-06-28 DIAGNOSIS — F11.21 OPIOID USE DISORDER, SEVERE, IN EARLY REMISSION, ON MAINTENANCE THERAPY, DEPENDENCE (HCC): Primary | ICD-10-CM

## 2022-06-28 DIAGNOSIS — F41.1 GENERALIZED ANXIETY DISORDER: Chronic | ICD-10-CM

## 2022-06-28 DIAGNOSIS — F14.21 COCAINE DEPENDENCE, IN REMISSION: ICD-10-CM

## 2022-06-28 PROCEDURE — H0015 ALCOHOL AND/OR DRUG SERVICES: HCPCS | Performed by: SOCIAL WORKER

## 2022-06-28 PROCEDURE — 99214 OFFICE O/P EST MOD 30 MIN: CPT | Performed by: NURSE PRACTITIONER

## 2022-06-28 RX ORDER — CLONIDINE HYDROCHLORIDE 0.2 MG/1
0.2 TABLET ORAL NIGHTLY
Qty: 30 TABLET | Refills: 2 | Status: SHIPPED | OUTPATIENT
Start: 2022-06-28 | End: 2022-08-29 | Stop reason: SDUPTHER

## 2022-06-28 RX ORDER — VENLAFAXINE 100 MG/1
250 TABLET ORAL DAILY
Qty: 75 TABLET | Refills: 2 | Status: SHIPPED | OUTPATIENT
Start: 2022-06-28 | End: 2022-08-29 | Stop reason: SDUPTHER

## 2022-06-28 NOTE — PROGRESS NOTES
"Chief Complaint  Anxiety, Depression, Sleeping Problem, and Manic Behavior    Subjective          Aroldo Cai presents to [facility] for medication management of his anxiety, bipolar disorder, sleeping difficulties.    History of Present Illness: Patient presents today for follow-up appointment of last being seen on 04/28/2022.  At that appointment, the patient's Zyprexa was decreased due to his weight gain, and he was started on clonidine 0.1 mg to help with his sleep.  Patient says today \"I have been really good, I just still cannot get to sleep\".  Patient reports it often takes him between 2 and 3 hours to get to sleep.  He says he has been busy working with his dad's company, however has an appointment upcoming with a new neurosurgeon because he says his most recent scans have shown a potential issue.  He says because of this he is trying to take it easy some.  Patient admits to having a heroin relapse last month, but says it was a one-time thing.  He is still participating actively in IOP and MAT, and is doing well in both of those areas.  He is almost done with phase one of the IOP program.  He reports his mood has been stable, and denies any significant concerns with anxiety.  He really feels his only issue at this point is sleep.  Patient denies any issues with appetite.  Patient denies any SI/HI, A/V hallucinations.    Current Medications:   Current Outpatient Medications   Medication Sig Dispense Refill   • allopurinol (ZYLOPRIM) 100 MG tablet Take 100 mg by mouth Daily.     • buprenorphine-naloxone (SUBOXONE) 8-2 MG per SL tablet Place 2 tablets under the tongue Daily for 8 days. 16 tablet 0   • cloNIDine (Catapres) 0.2 MG tablet Take 1 tablet by mouth Every Night. 30 tablet 2   • colchicine 0.6 MG tablet Take 0.6 mg by mouth Daily.     • cyclobenzaprine (FLEXERIL) 10 MG tablet Take 10 mg by mouth every night at bedtime.     • ibuprofen (ADVIL,MOTRIN) 800 MG tablet " "     • levETIRAcetam (KEPPRA) 750 MG tablet Take 750 mg by mouth 2 (Two) Times a Day.     • metoprolol succinate XL (TOPROL-XL) 25 MG 24 hr tablet      • OLANZapine (zyPREXA) 5 MG tablet Take 1 tablet by mouth Every Night. 30 tablet 2   • omeprazole (priLOSEC) 40 MG capsule      • ondansetron ODT (ZOFRAN-ODT) 8 MG disintegrating tablet Every 8 (Eight) Hours As Needed.     • promethazine-dextromethorphan (PROMETHAZINE-DM) 6.25-15 MG/5ML syrup TAKE 10 MLS BY MOUTH EVERY SIX HOURS AS NEEDED FOR COUGH     • SUMAtriptan (IMITREX) 50 MG tablet take 1 tablet by mouth at onset of MIGRAINE may repeat in 2 hours if needed max of 2 tablets in 24 hours     • traZODone (DESYREL) 100 MG tablet      • venlafaxine (EFFEXOR) 100 MG tablet Take 2.5 tablets by mouth Daily. 75 tablet 2     No current facility-administered medications for this visit.       Mental Status Exam:   Hygiene:   good  Cooperation:  Cooperative  Eye Contact:  Good  Psychomotor Behavior:  Restless  Affect:  Appropriate  Mood: euthymic  Speech:  Normal  Thought Process:  Goal directed  Thought Content:  Mood congruent  Suicidal:  None  Homicidal:  None  Hallucinations:  None  Delusion:  None  Memory:  Intact  Orientation:  Person, Place, Time and Situation  Reliability:  fair  Insight:  Fair  Judgement:  Fair  Impulse Control:  Fair  Physical/Medical Issues:  Seizure disorder       Objective   Vital Signs:   /70   Pulse 105   Ht 165.1 cm (65\")   Wt 110 kg (243 lb)   BMI 40.44 kg/m²     Physical Exam  Neurological:      Mental Status: He is oriented to person, place, and time. Mental status is at baseline.      Coordination: Coordination is intact.      Gait: Gait is intact.   Psychiatric:         Behavior: Behavior is cooperative.        Result Review :     The following data was reviewed by: PRISCILLA Lawson on 06/28/2022:    Data reviewed: Previous notes, medication history          Assessment and Plan { Review (Popup)  Quality  BestPractice " :23}   Problem List Items Addressed This Visit    None     Visit Diagnoses     Bipolar II disorder (HCC)  (Chronic)   -  Primary    Relevant Medications    cloNIDine (Catapres) 0.2 MG tablet    venlafaxine (EFFEXOR) 100 MG tablet    Generalized anxiety disorder  (Chronic)       Relevant Medications    cloNIDine (Catapres) 0.2 MG tablet    venlafaxine (EFFEXOR) 100 MG tablet    Other insomnia  (Chronic)       Relevant Medications    cloNIDine (Catapres) 0.2 MG tablet          PHQ-9 Score:   PHQ-9 Total Score: 15      Depression Screening:  Patient screened positive for depression based on a PHQ-9 score of 15 on 6/28/2022. Follow-up recommendations include: Prescribed antidepressant medication treatment and Suicide Risk Assessment performed.        Tobacco Cessation:  Aroldo Cai  reports that he has been smoking cigarettes. He has a 15.00 pack-year smoking history. He has never used smokeless tobacco.. I have educated him on the risk of diseases from using tobacco products such as cancer, COPD and heart disease.     I advised him to quit and he is not willing to quit.    I spent 3  minutes counseling the patient.           Impression/Plan:  -This is a follow-up appointment.  Patient presents today and reports he has been doing well with regards to his mood and anxiety, but has continued to struggle with his sleep.  Patient expresses frustration over limited medication availability because of his past substance use, as well as not finding something that has helped significantly.  He was started on clonidine at his last appointment because he had reported it helped with sleep in the past.  Discussed increasing this to 0.2 mg nightly, as well as performing a Genes[employer] test.  Patient reports he was advised it could help narrow down medication options.  Discussed [employer]'s tests and their role, and advised we could perform 1 today.  Patient would like to do this.  -Increase clonidine to 0.2 mg nightly.  -Maintain  Effexor 125 mg twice daily.  Patient reports he takes this all at once as 250 mg daily because he is unable to remember to take the second dose otherwise.  -Maintain Zyprexa 5 mg nightly.  -Genesight test performed today in office.  -Schedule follow-up appointment for 6 weeks to discuss results.    MEDS ORDERED DURING VISIT:  New Medications Ordered This Visit   Medications   • cloNIDine (Catapres) 0.2 MG tablet     Sig: Take 1 tablet by mouth Every Night.     Dispense:  30 tablet     Refill:  2   • venlafaxine (EFFEXOR) 100 MG tablet     Sig: Take 2.5 tablets by mouth Daily.     Dispense:  75 tablet     Refill:  2         Follow Up   Return in about 6 weeks (around 8/9/2022), or if symptoms worsen or fail to improve, for Next scheduled follow up.  Patient was given instructions and counseling regarding his condition or for health maintenance advice. Please see specific information pulled into the AVS if appropriate.       TREATMENT PLAN/GOALS: Continue supportive psychotherapy efforts and medications as indicated. Treatment and medication options discussed during today's visit. Patient acknowledged and verbally consented to continue with current treatment plan and was educated on the importance of compliance with treatment and follow-up appointments.    MEDICATION ISSUES:  Discussed medication options and treatment plan of prescribed medication as well as the risks, benefits, and side effects including potential falls, possible impaired driving and metabolic adversities among others. Patient is agreeable to call the office with any worsening of symptoms or onset of side effects. Patient is agreeable to call 911 or go to the nearest ER should he/she begin having SI/HI.          This document has been electronically signed by PRISCILLA Raphael, PMHNP-BC  June 29, 2022 07:44 EDT      Part of this note may be an electronic transcription/translation of spoken language to printed text using the Dragon Dictation  System.

## 2022-06-29 ENCOUNTER — OFFICE VISIT (OUTPATIENT)
Dept: PSYCHIATRY | Facility: HOSPITAL | Age: 42
End: 2022-06-29

## 2022-06-29 DIAGNOSIS — F14.21 COCAINE DEPENDENCE, IN REMISSION: ICD-10-CM

## 2022-06-29 DIAGNOSIS — F11.21 OPIOID USE DISORDER, SEVERE, IN EARLY REMISSION, ON MAINTENANCE THERAPY, DEPENDENCE (HCC): Primary | ICD-10-CM

## 2022-06-29 PROCEDURE — H0015 ALCOHOL AND/OR DRUG SERVICES: HCPCS | Performed by: SOCIAL WORKER

## 2022-06-29 NOTE — PROGRESS NOTES
.NAME: Aroldo Cai  DATE: 06/28/2022    Phase I  9:00AM - 12:00PM    IOP GROUP NOTE    DATA:     3 hour IOP group therapy session (Check-ins, Coping Skills, Relapse Prevention)     Check Ins:  Therapist continued facilitation of rapport building strategies between group members. Therapist asked that each patient check in with home life and recovery efforts and identify triggers, cravings, and high risk situations that arise between group sessions.  Group members discussed barriers and benefits of attending recovery meetings.  Therapist provided empathy and support during group session.  Therapist facilitated reading the daily motivation passages from Daily Reflections ([employer] 1991) and Just for Today (The 12 Steps and 12 Traditions, 1991) and invited group members to reflect and discuss.      Session Content/Coping Skills:  Video on spectrum of MARCELINO care was shown, and therapist facilitated group discussion on the spectrum of MARCELINO healthcare and accessing the various levels of care.  Discussed motivation for recovery and accessing treatment as well as identifying barriers.  Discussed co-occurring disorders, psychiatric medication management.  Therapist facilitated focused break out discussion on stress management and seeking support during times of high anxiety and stress.    Response:  Patient was engaged and participated in group activity and discussion.  Patient shared multiple experiences and demonstrated active listening to peers.  Patient was conversational during group discussion and appeared open in group.    Anxiety:  8   Depression:  5   Cravings: 8    ASSESSMENT:     Lab Review  positive for Antidepressants and bupenorphine on lastest UDS available.    Mental Status Exam  Hygiene:  good  Dress: casual  Attitude: cooperative and agreeable   Motor Activity: appropriate  Eye Contact:  good  Speech: regular rate and rhythm   Mood:  calm and conversant  Affect:  Appropriate  Thought Processes:  Goal  directed  Thought Content:  Normal  Suicidal Thoughts:  denies  Homicidal Thoughts:  denies  Crisis Safety Plan: yes, to come to the emergency room.  Hallucinations:  denies  Reliability: good  Insight: good  Judgement: good  Impulse Control: good    Patient's Support Network Includes: No change, see previous encounters    Progress toward goal: Not at goal    ASAM Dimensions:  I.    Intoxication/Withdrawal:  0  (Patient during initial assessment reported no recent substance use).  II.   Medical Conditions/Complications:  1 (Due to medical conditions and history of seizures discussed by patient during initial assessment).  III.  Behavioral/Emotional/Cognitive: 1 (Due to patient's reported depression, anxiety, and feelings of hopelessness during initial assessment).  IV.  Readiness to Change: 1 (Patient appeared to be ready to change based on his self-report during initial assessment).  V.   Relapse Risk: 1 (Patient during initial assessment reported he has had one relapse in the past).  VI:  Recovery Environment: 1 (Patient during initial assessment discussed wanting to find a place of his own).  Total ASAM Score = 05        Prognosis: Good with Ongoing Treatment       Sponsor:  no,  Have you made contact with  him/her this past week?  no.     Identification of environmental and personal barriers to recovery: coping with limited and/or negative emotions, feelings of remorse and guilt, work, family, overall understanding of disease concept     Impression/Formulation:     Diagnosis Plan   1. Opioid use disorder, severe, in early remission, on maintenance therapy, dependence (HCC)     2. Cocaine dependence, in remission ([facility])         CLINICAL MANUVERING/INTERVENTIONS:  Therapist utilized a person-centered approach to build rapport with group member.  Therapist implemented motivational interviewing techniques to assist client with exploring and resolving ambivalence associated with commitment to change behaviors related  to substance use and addiction.  Therapist applied cognitive behavioral strategies to facilitate identification of maladaptive patterns of thinking and behavior that contribute to cleint's risk for continued substance use and relapse.  Therapist employed group interaction activities to build rapport among group members, promote sobriety, and emphasize relapse prevention.  Therapist promoted safe nonjudgmental environment by providing group members with unconditional positive regard and encouraging group members to comply with group rules and guidelines.  Therapist utilized dialectical behavior techniques to teach and model emotional regulation and relaxation.  Therapist assisted group member with identifying and implementing healthier coping strategies.  Group member was encouraged to attend community support group meetings, make positive daily choices, and maintain healthy boundaries.  Group member was encouraged to invite family members to family educational group on Wednesdays.    BASELINE SCORES 04/25/2022  VINICIUS-7   (17)                  PHQ-9   (13)      Updated Scores 6/8/2022  VINICIUS-7 (21)  PHQ-9 (15)      PLAN:  Continue [facility] IOP Phase I   Aftercare:  [facility] Phase II  Program Assignments:  Personal recovery plan, relapse prevention plan, attendance of recovery support group meetings, exploration of sponsorship, drug/alcohol screens.      Please note that portions of this note were completed with a voice recognition program. Efforts were made to edit dictation, but occasionally words are mistranscribed.      This document signed by Marie Alexander [facility], June 29, 2022, 09:33 EDT

## 2022-06-30 NOTE — PROGRESS NOTES
NAME: Aroldo Cai  DATE: 06/09/2022     IOP GROUP   [id number]    Number of participants: 6  IOP GROUP NOTE     DATA:     3 hour IOP group therapy session (Check-ins, Coping Skills, Relapse Prevention)     Check Ins:  Therapist continued facilitation of rapport building strategies between group members. Therapist asked that each patient check in with home life and recovery efforts and identify triggers, cravings, and high risk situations that arise between group sessions. Therapist provided empathy and support during group session.     Session Content/Coping Skills:  Check ins completed by group members. Clinician explored ideas for motivation with group members. Clinician educated group members on Co-Occurring disorders and the importance of mental health treatment. [facility] psycho[facility]ational material finished. William Talks video Wasted: Exposing the Family Effect of Addiction YouTube video viewed. Group members participated in discussion of check out questions.    Response:  Patient attended class in person. Patient participated in completion of check in form.  Patient on check in form denied suicidal or homicidal thoughts or plan or intent to hurt self or others currently or since last group meeting.  Patient participated in group discussions and review of psychoeducational material.    Personal Assessment 0-10 Scale (10 worst)    Anxiety:  9   Depression:  4   Cravings: 0     ASSESSMENT:     ..  Lab on 06/07/2022   Component Date Value Ref Range Status   • Ethanol, Urine 06/07/2022 Negative  Cutoff=0.020 % Final   • THC, Screen, Urine 06/07/2022 Negative  Negative Final   • Phencyclidine (PCP), Urine 06/07/2022 Negative  Negative Final   • Cocaine Screen, Urine 06/07/2022 Negative  Negative Final   • Methamphetamine, Ur 06/07/2022 Negative  Negative Final   • Opiate Screen 06/07/2022 Negative  Negative Final   • Amphetamine Screen, Urine 06/07/2022 Negative  Negative Final   • Benzodiazepine Screen,  Urine 06/07/2022 Positive (A) Negative Final   • Tricyclic Antidepressants Screen 06/07/2022 Positive (A) Negative Final   • Methadone Screen, Urine 06/07/2022 Negative  Negative Final   • Barbiturates Screen, Urine 06/07/2022 Negative  Negative Final   • Oxycodone Screen, Urine 06/07/2022 Negative  Negative Final   • Propoxyphene Screen 06/07/2022 Negative  Negative Final   • Buprenorphine, Screen, Urine 06/07/2022 Positive (A) Negative Final   • Creatinine, Urine 06/07/2022 181.8  >=20 mg/dL Final   • Buprenorphine/CR 06/07/2022 68  ng/mg creat Final   • NORBUPRENORPHINE/CR 06/07/2022 275  ng/mg creat Final   • NORBUP/BUP RATIO 06/07/2022 4.04  RATIO Final    This test was developed and its performance characteristics  determined by [employer]  It has not been cleared or approved  by the Food and Drug Administration.   • Naloxone 06/07/2022 204  Cutoff=25 ng/ml Final   • TRP-LINK 06/07/2022 Comment   Final    These test results were not transmitted to [facility].  If you would like your patient's urine drug  test results to transmit to [facility], please  contact your [employer]  to complete  a physician authorization form.   • Antidepressants 06/07/2022 Negative   Final   • AMITRIPTYLINE, UR 06/07/2022 Not Detected   Final   • Clomipramine, Ur 06/07/2022 Not Detected   Final   • Desemethylclomipramine 06/07/2022 Not Detected   Final   • Desipramine 06/07/2022 Not Detected   Final   • Doxepin 06/07/2022 Not Detected   Final   • Desmethyldoxepin, Ur 06/07/2022 Not Detected   Final   • Imipramine 06/07/2022 Not Detected   Final   • Nortriptyline 06/07/2022 Not Detected   Final   • Protriptyline 06/07/2022 Not Detected   Final   • Trimipramine 06/07/2022 Not Detected   Final   • Level of Detection: 06/07/2022 Comment   Final    Testing Threshold:  50 or 100 ng/mL                                                                       '  This test was developed and its performance  characteristics  determined by [employer].  It has not been cleared or approved  by the Food and Drug Administration.   • Benzodiazepine Screen, Urine 06/07/2022 +POSITIVE+   Final   • DIAZEPAM URINE QUANT (REF) 06/07/2022 Not Detected  ng/mg creat Final   • Desmethyldiazepam 06/07/2022 Not Detected  ng/mg creat Final   • Oxazepam, urine 06/07/2022 39  ng/mg creat Final   • Temazepam, urine 06/07/2022 Not Detected  ng/mg creat Final    Expected metabolism of benzodiazepine class drugs:   Parent Drug       Detected Metabolites   -----------       --------------------   Diazepam:         Desmethyldiazepam, Temazepam, Oxazepam   Chlordiazepoxide: Desmethyldiazepam, Oxazepam   Clorazepate:      Desmethyldiazepam, Oxazepam   Halazepam:        Desmethyldiazepam, Oxazepam   Temazepam:        Oxazepam   Oxazepam:         None   • ALPRAZOLAM 06/07/2022 Not Detected  ng/mg creat Final   • ALPHA-HYDROXYALPRAZOLAM UR, QT (RE* 06/07/2022 Not Detected  ng/mg creat Final   • DESALKLFLURAZEPAM UR QUANT (REF) 06/07/2022 Not Detected  ng/mg creat Final   • LORAZEPAM URINE QUANT (REF) 06/07/2022 Not Detected  ng/mg creat Final   • Alpha-hydroxytriazolam, Urine 06/07/2022 Not Detected  ng/mg creat Final   • Clonazepam Urine 06/07/2022 Not Detected  ng/mg creat Final   • 7-Aminoclonazepam Urine 06/07/2022 Not Detected  ng/mg creat Final   • MIDAZOLAM UR, QUANT (REF) 06/07/2022 Not Detected  ng/mg creat Final   • Alpha-hydroxymidazolam, Urine 06/07/2022 Not Detected  ng/mg creat Final   • Flunitrazepam 06/07/2022 Not Detected  ng/mg creat Final   • DESMETHYLFLUNITRAZEPAM 06/07/2022 Not Detected  ng/mg creat Final   • Level of Detection: 06/07/2022 Comment   Final    Testing Threshold:  20 ng/mL                                                                       '  This test was developed and its performance characteristics  determined by [employer].  It has not been cleared or approved  by the Food and Drug Administration.   Lab on  05/31/2022   Component Date Value Ref Range Status   • Ethanol, Urine 05/31/2022 Negative  Cutoff=0.020 % Final   • THC, Screen, Urine 05/31/2022 Negative  Negative Final   • Phencyclidine (PCP), Urine 05/31/2022 Negative  Negative Final   • Cocaine Screen, Urine 05/31/2022 Negative  Negative Final   • Methamphetamine, Ur 05/31/2022 Negative  Negative Final   • Opiate Screen 05/31/2022 Negative  Negative Final   • Amphetamine Screen, Urine 05/31/2022 Negative  Negative Final   • Benzodiazepine Screen, Urine 05/31/2022 Positive (A) Negative Final   • Tricyclic Antidepressants Screen 05/31/2022 Positive (A) Negative Final   • Methadone Screen, Urine 05/31/2022 Negative  Negative Final   • Barbiturates Screen, Urine 05/31/2022 Negative  Negative Final   • Oxycodone Screen, Urine 05/31/2022 Negative  Negative Final   • Propoxyphene Screen 05/31/2022 Negative  Negative Final   • Buprenorphine, Screen, Urine 05/31/2022 Positive (A) Negative Final   • Creatinine, Urine 05/31/2022 202.5  >=20 mg/dL Final   • Buprenorphine/CR 05/31/2022 27  ng/mg creat Final   • NORBUPRENORPHINE/CR 05/31/2022 302  ng/mg creat Final   • NORBUP/BUP RATIO 05/31/2022 11.19  RATIO Final    This test was developed and its performance characteristics  determined by [employer].  It has not been cleared or approved  by the Food and Drug Administration.   • Naloxone 05/31/2022 40  Cutoff=25 ng/ml Final   • TRP-LINK 05/31/2022 Comment   Final    These test results were not transmitted to [facility].  If you would like your patient's urine drug  test results to transmit to [facility], please  contact your [employer]  to complete  a physician authorization form.   • Tricyclics 05/31/2022 Negative  Sitbln=246 ng/mL Final    Confirmation performed by [employer]   • Please note 05/31/2022 Comment   Final    Drug-test results should be interpreted in the context of clinical  information. Patient metabolic  variables, specific drug chemistry, and  specimen characteristics can affect test outcome. Technical  consultation is available if a test result is inconsistent with an  expected outcome. (email-[email or web address] or call toll-free  [phone])   • Benzodiazepines Urine 05/31/2022 Negative  Zylutc=705 Final   Lab on 05/25/2022   Component Date Value Ref Range Status   • Ethanol, Urine 05/25/2022 Negative  Cutoff=0.020 % Final   • THC, Screen, Urine 05/25/2022 Negative  Negative Final   • Phencyclidine (PCP), Urine 05/25/2022 Negative  Negative Final   • Cocaine Screen, Urine 05/25/2022 Negative  Negative Final   • Methamphetamine, Ur 05/25/2022 Negative  Negative Final   • Opiate Screen 05/25/2022 Negative  Negative Final   • Amphetamine Screen, Urine 05/25/2022 Negative  Negative Final   • Benzodiazepine Screen, Urine 05/25/2022 Positive (A) Negative Final   • Tricyclic Antidepressants Screen 05/25/2022 Positive (A) Negative Final   • Methadone Screen, Urine 05/25/2022 Negative  Negative Final   • Barbiturates Screen, Urine 05/25/2022 Negative  Negative Final   • Oxycodone Screen, Urine 05/25/2022 Negative  Negative Final   • Propoxyphene Screen 05/25/2022 Negative  Negative Final   • Buprenorphine, Screen, Urine 05/25/2022 Positive (A) Negative Final   • Creatinine, Urine 05/25/2022 211.2  >=20 mg/dL Final   • Buprenorphine/CR 05/25/2022 349  ng/mg creat Final   • NORBUPRENORPHINE/CR 05/25/2022 384  ng/mg creat Final   • NORBUP/BUP RATIO 05/25/2022 1.10  RATIO Final    This test was developed and its performance characteristics  determined by [employer].  It has not been cleared or approved  by the Food and Drug Administration.   • Naloxone 05/25/2022 2366  Cutoff=25 ng/ml Final   • TRP-LINK 05/25/2022 Comment   Final    These test results were not transmitted to [facility].  If you would like your patient's urine drug  test results to transmit to [facility], please  contact your  [facility]  to complete  a physician authorization form.   • Benzodiazepine Screen, Urine 05/25/2022 +POSITIVE+   Final   • DIAZEPAM URINE QUANT (REF) 05/25/2022 Not Detected  ng/mg creat Final   • Desmethyldiazepam 05/25/2022 35  ng/mg creat Final   • Oxazepam, urine 05/25/2022 36  ng/mg creat Final   • Temazepam, urine 05/25/2022 36  ng/mg creat Final    Expected metabolism of benzodiazepine class drugs:   Parent Drug       Detected Metabolites   -----------       --------------------   Diazepam:         Desmethyldiazepam, Temazepam, Oxazepam   Chlordiazepoxide: Desmethyldiazepam, Oxazepam   Clorazepate:      Desmethyldiazepam, Oxazepam   Halazepam:        Desmethyldiazepam, Oxazepam   Temazepam:        Oxazepam   Oxazepam:         None   • ALPRAZOLAM 05/25/2022 Not Detected  ng/mg creat Final   • ALPHA-HYDROXYALPRAZOLAM UR, QT (RE* 05/25/2022 Not Detected  ng/mg creat Final   • DESALKLFLURAZEPAM UR QUANT (REF) 05/25/2022 Not Detected  ng/mg creat Final   • LORAZEPAM URINE QUANT (REF) 05/25/2022 Not Detected  ng/mg creat Final   • Alpha-hydroxytriazolam, Urine 05/25/2022 Not Detected  ng/mg creat Final   • Clonazepam Urine 05/25/2022 Not Detected  ng/mg creat Final   • 7-Aminoclonazepam Urine 05/25/2022 Not Detected  ng/mg creat Final   • MIDAZOLAM UR, QUANT (REF) 05/25/2022 Not Detected  ng/mg creat Final   • Alpha-hydroxymidazolam, Urine 05/25/2022 Not Detected  ng/mg creat Final   • Flunitrazepam 05/25/2022 Not Detected  ng/mg creat Final   • DESMETHYLFLUNITRAZEPAM 05/25/2022 Not Detected  ng/mg creat Final   • Level of Detection: 05/25/2022 Comment   Final    Testing Threshold:  20 ng/mL                                                                       '  This test was developed and its performance characteristics  determined by [employer].  It has not been cleared or approved  by the Food and Drug Administration.   • Antidepressants 05/25/2022 Negative   Final   • AMITRIPTYLINE, UR  05/25/2022 Not Detected   Final   • Clomipramine, Ur 05/25/2022 Not Detected   Final   • Desemethylclomipramine 05/25/2022 Not Detected   Final   • Desipramine 05/25/2022 Not Detected   Final   • Doxepin 05/25/2022 Not Detected   Final   • Desmethyldoxepin, Ur 05/25/2022 Not Detected   Final   • Imipramine 05/25/2022 Not Detected   Final   • Nortriptyline 05/25/2022 Not Detected   Final   • Protriptyline 05/25/2022 Not Detected   Final   • Trimipramine 05/25/2022 Not Detected   Final   • Level of Detection: 05/25/2022 Comment   Final    Testing Threshold:  50 or 100 ng/mL                                                                       '  This test was developed and its performance characteristics  determined by [employer].  It has not been cleared or approved  by the Food and Drug Administration.       Mental Status Exam  Hygiene:  good  Dress: casual  Attitude: cooperative and agreeable   Motor Activity: appropriate  Eye Contact:  good  Speech: regular rate and rhythm   Mood:  calm and cooperative  Affect:  Appropriate  Thought Processes:  Goal directed  Thought Content:  Normal  Suicidal Thoughts:  denies  Homicidal Thoughts:  denies  Crisis Safety Plan: yes, to come to the emergency room.  Hallucinations:  denies  Reliability: fair  Insight: fair  Judgement: fair  Impulse Control: fair    Family issues related to recovery:  No change, see previous encounters    Recovery/spiritual support group attendance: No     Sponsor: No     Motivation for treatment: During initial assessment, patient reported “my kids”.     Patient's Support Network Includes: During initial assessment, patient reported sober support system is “my family and youngest son’s mom”.     Progress toward goal: Not at goal    Prognosis: Fair with Ongoing Treatment     Self-reported number of days sober:  Patient reported May 26th on check in form.     ASAM Dimensions:  I.    Intoxication/Withdrawal:  0  (Patient during initial assessment reported  no recent substance use).  II.   Medical Conditions/Complications:  1 (Due to medical conditions and history of seizures discussed by patient during initial assessment).  III.  Behavioral/Emotional/Cognitive: 1 (Due to patient's reported depression, anxiety, and feelings of hopelessness during initial assessment).  IV.  Readiness to Change: 1 (Patient appeared to be ready to change based on his self-report during initial assessment).  V.   Relapse Risk: 1 (Patient during initial assessment reported he has had one relapse in the past).  VI:  Recovery Environment: 1 (Patient during initial assessment discussed wanting to find a place of his own).  Total ASAM Score = 05      BASELINE SCORES 04/25/2022       VINICIUS-7   (17)                  PHQ-9   (13)    Updated Scores 6/8/2022  VINICIUS-7 (21)  PHQ-9 (15)     Patient agreeable to adhere to medication regimen as prescribed and report any side effects. Patient will contact this office, call 911 or present to the nearest emergency room should suicidal or homicidal ideations occur.    Impression/Formulation:    ICD-10-CM ICD-9-CM   1. Opioid use disorder, severe, dependence (HCC)  F11.20 304.00   2. Cocaine use disorder, severe, in sustained remission (HCC)  F14.21 304.23       CLINICAL MANUVERING/INTERVENTIONS:  Therapist utilized a person-centered approach to build rapport with group member. Therapist implemented motivational interviewing techniques to assist client with exploring and resolving ambivalence associated with commitment to change behaviors related to substance use and addiction. Therapist applied cognitive behavioral strategies to facilitate identification of maladaptive patterns of thinking and behavior that contribute to client’s risk for continued substance use and relapse. Therapist employed group interaction activities to build rapport among group members, promote sobriety, and emphasize relapse prevention. Therapist promoted safe nonjudgmental environment by  providing group members with unconditional positive regard and encouraging group members to comply with group rules and guidelines. Therapist assisted group member with identifying and implementing healthier coping strategies.     PLAN:  Continue [facility] IOP Phase I   Aftercare:  [facility] Phase II  Program Assignments:  Personal recovery plan, relapse prevention plan, attendance of recovery support group meetings, exploration of sponsorship, drug/alcohol screens.     Please note that portions of this note were completed with a voice recognition program. Efforts were made to edit dictation, but occasionally words are mistranscribed.       This document signed by Duy Garcia, June 30, 2022, 18:38 EDT

## 2022-07-01 NOTE — PROGRESS NOTES
.NAME: Aroldo Cai  DATE: 06/29/2022    Phase I: 1800 - 2100    IOP GROUP NOTE    DATA:     3 hour IOP group therapy session (Check-ins, Coping Skills, Relapse Prevention)     Check Ins:  Therapist continued facilitation of rapport building strategies between group members. Therapist asked that each patient check in with home life and recovery efforts and identify triggers, cravings, and high risk situations that arise between group sessions.  Group members discussed barriers and benefits of attending recovery meetings.  Therapist provided empathy and support during group session.  Therapist facilitated reading the daily motivation passages from Daily Reflections ([employer] 1991) and Just for Today (The 12 Steps and 12 Traditions, 1991) and invited group members to reflect and discuss.      Session Content/Coping Skills:  Therapist facilitated DBT House group activity.  Encouraged group members to identify areas of support, foundational beliefs, areas of their life they are hiding, areas of their life they want to get rid of, aspects of protection; identifying the barriers that need to be addressed or boundaries that need to be place for healthier lives; building a new house with goals, identifying what is motivational to keep on the recovery journey, and what makes them happiest/proud of and want to show the world.       Response:  Patient engaged and conversational with peers.  Patient was encouraging to other group members.  Patient openly shared he was working on rebuilding his foundation, exploring his values.  Patient shared he felt his family was not supportive of his treatment initially and reflected he himself felt like he wasn't going to get much out of [facility], however, his perception has changed.  Patient reflected he was able to teach family members new concepts on MARCELINO which has been beneficial to his recovery and his family. Patient processed that yesterday he felt cravings to use triggered by a  situation about his father's power tools being at a pawn shop and him owing money, however, he utilized coping skills and reached out to Peer Support and was able to redirect the cravings.  Patient reports recognizing that he needs someone to help him process things and keep him accountable.         Anxiety:  0/10  Depression:   0/10  Cravings:   0/10      ASSESSMENT:     Lab Review  positive for Antidepressants and Bupenorphine    Mental Status Exam  Hygiene:  good  Dress: casual  Attitude: cooperative and agreeable   Motor Activity: appropriate  Eye Contact:  good  Speech: regular rate and rhythm   Mood:  calm and conversant  Affect:  Appropriate  Thought Processes:  Goal directed  Thought Content:  Normal  Suicidal Thoughts:  denies  Homicidal Thoughts:  denies  Crisis Safety Plan: yes, to come to the emergency room.  Hallucinations:  denies  Reliability: good  Insight: fair  Judgement: good  Impulse Control: fair    Progress toward goal: Not at goal    ASAM Dimensions:  I.    Intoxication/Withdrawal:  0  (Patient during initial assessment reported no recent substance use).  II.   Medical Conditions/Complications:  1 (Due to medical conditions and history of seizures discussed by patient during initial assessment).  III.  Behavioral/Emotional/Cognitive: 1 (Due to patient's reported depression, anxiety, and feelings of hopelessness during initial assessment).  IV.  Readiness to Change: 1 (Patient appeared to be ready to change based on his self-report during initial assessment).  V.   Relapse Risk: 1 (Patient during initial assessment reported he has had one relapse in the past).  VI:  Recovery Environment: 1 (Patient during initial assessment discussed wanting to find a place of his own).  Total ASAM Score = 05        Prognosis: Good with Ongoing Treatment     Family issues related to recovery:  See family history report    Recovery/spiritual support group attendance: No    Identification of environmental and  personal barriers to recovery: coping with limited and/or negative emotions, feelings of remorse and guilt, work, family, overall understanding of disease concept     Self-reported number of days sober:  33 days    Impression/Formulation:     Diagnosis Plan   1. Opioid use disorder, severe, in early remission, on maintenance therapy, dependence (HCC)     2. Cocaine dependence, in remission (HCC)         CLINICAL MANUVERING/INTERVENTIONS:  Therapist utilized a person-centered approach to build rapport with group member.  Therapist implemented motivational interviewing techniques to assist client with exploring and resolving ambivalence associated with commitment to change behaviors related to substance use and addiction.  Therapist applied cognitive behavioral strategies to facilitate identification of maladaptive patterns of thinking and behavior that contribute to cleint's risk for continued substance use and relapse.  Therapist employed group interaction activities to build rapport among group members, promote sobriety, and emphasize relapse prevention.  Therapist promoted safe nonjudgmental environment by providing group members with unconditional positive regard and encouraging group members to comply with group rules and guidelines.  Therapist utilized dialectical behavior techniques to teach and model emotional regulation and relaxation.  Therapist assisted group member with identifying and implementing healthier coping strategies.  Group member was encouraged to attend community support group meetings, make positive daily choices, and maintain healthy boundaries.  Group member was encouraged to invite family members to family educational group on Wednesdays.      BASELINE SCORES 04/25/2022  VINICIUS-7   (17)                  PHQ-9   (13)      Updated Scores 6/8/2022  VINICIUS-7 (21)  PHQ-9 (15)    PLAN:  Continue [facility] IOP Phase I   Aftercare:  [facility]  Phase II  Program Assignments:  Personal recovery plan, relapse prevention plan, attendance of recovery support group meetings, exploration of sponsorship, drug/alcohol screens.      Please note that portions of this note were completed with a voice recognition program. Efforts were made to edit dictation, but occasionally words are mistranscribed.      This document signed by Marie [facility], July 1, 2022, 08:44 EDT

## 2022-07-01 NOTE — PROGRESS NOTES
NAME: Aroldo Cai  DATE: 06/16/2022     IOP GROUP   [id number]    Number of participants: 6  IOP GROUP NOTE     DATA:     3 hour IOP group therapy session (Check-ins, Coping Skills, Relapse Prevention)     Check Ins:  Therapist continued facilitation of rapport building strategies between group members. Therapist asked that each patient check in with home life and recovery efforts and identify triggers, cravings, and high risk situations that arise between group sessions. Therapist provided empathy and support during group session.      Session Content/Coping Skills:  Check ins completed by group members. [facility] Therapy Resource sheet, Mental Health Crisis Line Sheets, and Behavioral Health Brochure provided to group members who attended in person. Group activity of showing and telling about an item that is important to them completed. Living in Balance, Addiction and Loss finished. End of the day quote reviewed and discussed with group members.     Response:  Patient attended class in person. Patient participated in completion of check in form.   Patient on check in form denied suicidal or homicidal thoughts or plan or intent to hurt self or others currently or since last group meeting.   Patient participated in review of psychoeducational material, Show and Tell activity, and group discussions.     Personal Assessment 0-10 Scale (10 worst)    Anxiety:  6   Depression:  0   Cravings: 2     ASSESSMENT:     ..  Lab on 06/15/2022   Component Date Value Ref Range Status   • Ethanol, Urine 06/15/2022 Negative  Cutoff=0.020 % Final   • THC, Screen, Urine 06/15/2022 Negative  Negative Final   • Phencyclidine (PCP), Urine 06/15/2022 Negative  Negative Final   • Cocaine Screen, Urine 06/15/2022 Negative  Negative Final   • Methamphetamine, Ur 06/15/2022 Negative  Negative Final   • Opiate Screen 06/15/2022 Negative  Negative Final   • Amphetamine Screen, Urine 06/15/2022 Negative  Negative Final    • Benzodiazepine Screen, Urine 06/15/2022 Positive (A) Negative Final   • Tricyclic Antidepressants Screen 06/15/2022 Positive (A) Negative Final   • Methadone Screen, Urine 06/15/2022 Negative  Negative Final   • Barbiturates Screen, Urine 06/15/2022 Negative  Negative Final   • Oxycodone Screen, Urine 06/15/2022 Negative  Negative Final   • Propoxyphene Screen 06/15/2022 Negative  Negative Final   • Buprenorphine, Screen, Urine 06/15/2022 Positive (A) Negative Final   • Creatinine, Urine 06/15/2022 251.4  >=20 mg/dL Final   • Buprenorphine/CR 06/15/2022 57  ng/mg creat Final   • NORBUPRENORPHINE/CR 06/15/2022 187  ng/mg creat Final   • NORBUP/BUP RATIO 06/15/2022 3.28  RATIO Final    This test was developed and its performance characteristics  determined by [employer].  It has not been cleared or approved  by the Food and Drug Administration.   • Naloxone 06/15/2022 265  Cutoff=25 ng/ml Final   • TRP-LINK 06/15/2022 Comment   Final    These test results were not transmitted to [facility].  If you would like your patient's urine drug  test results to transmit to [facility], please  contact your [employer]  to complete  a physician authorization form.   • Antidepressants 06/15/2022 Negative   Final   • AMITRIPTYLINE, UR 06/15/2022 Not Detected   Final   • Clomipramine, Ur 06/15/2022 Not Detected   Final   • Desemethylclomipramine 06/15/2022 Not Detected   Final   • Desipramine 06/15/2022 Not Detected   Final   • Doxepin 06/15/2022 Not Detected   Final   • Desmethyldoxepin, Ur 06/15/2022 Not Detected   Final   • Imipramine 06/15/2022 Not Detected   Final   • Nortriptyline 06/15/2022 Not Detected   Final   • Protriptyline 06/15/2022 Not Detected   Final   • Trimipramine 06/15/2022 Not Detected   Final   • Level of Detection: 06/15/2022 Comment   Final    Testing Threshold:  50 or 100 ng/mL                                                                       '  This test was  developed and its performance characteristics  determined by [employer].  It has not been cleared or approved  by the Food and Drug Administration.   • Benzodiazepine Screen, Urine 06/15/2022 +POSITIVE+   Final   • DIAZEPAM URINE QUANT (REF) 06/15/2022 Not Detected  ng/mg creat Final   • Desmethyldiazepam 06/15/2022 Not Detected  ng/mg creat Final   • Oxazepam, urine 06/15/2022 21  ng/mg creat Final   • Temazepam, urine 06/15/2022 Not Detected  ng/mg creat Final    Expected metabolism of benzodiazepine class drugs:   Parent Drug       Detected Metabolites   -----------       --------------------   Diazepam:         Desmethyldiazepam, Temazepam, Oxazepam   Chlordiazepoxide: Desmethyldiazepam, Oxazepam   Clorazepate:      Desmethyldiazepam, Oxazepam   Halazepam:        Desmethyldiazepam, Oxazepam   Temazepam:        Oxazepam   Oxazepam:         None   • ALPRAZOLAM 06/15/2022 Not Detected  ng/mg creat Final   • ALPHA-HYDROXYALPRAZOLAM UR, QT (RE* 06/15/2022 Not Detected  ng/mg creat Final   • DESALKLFLURAZEPAM UR QUANT (REF) 06/15/2022 Not Detected  ng/mg creat Final   • LORAZEPAM URINE QUANT (REF) 06/15/2022 Not Detected  ng/mg creat Final   • Alpha-hydroxytriazolam, Urine 06/15/2022 Not Detected  ng/mg creat Final   • Clonazepam Urine 06/15/2022 Not Detected  ng/mg creat Final   • 7-Aminoclonazepam Urine 06/15/2022 Not Detected  ng/mg creat Final   • MIDAZOLAM UR, QUANT (REF) 06/15/2022 Not Detected  ng/mg creat Final   • Alpha-hydroxymidazolam, Urine 06/15/2022 Not Detected  ng/mg creat Final   • Flunitrazepam 06/15/2022 Not Detected  ng/mg creat Final   • DESMETHYLFLUNITRAZEPAM 06/15/2022 Not Detected  ng/mg creat Final   • Level of Detection: 06/15/2022 Comment   Final    Testing Threshold:  20 ng/mL                                                                       '  This test was developed and its performance characteristics  determined by [employer].  It has not been cleared or approved  by the Food and Drug  Administration.   Lab on 06/07/2022   Component Date Value Ref Range Status   • Ethanol, Urine 06/07/2022 Negative  Cutoff=0.020 % Final   • THC, Screen, Urine 06/07/2022 Negative  Negative Final   • Phencyclidine (PCP), Urine 06/07/2022 Negative  Negative Final   • Cocaine Screen, Urine 06/07/2022 Negative  Negative Final   • Methamphetamine, Ur 06/07/2022 Negative  Negative Final   • Opiate Screen 06/07/2022 Negative  Negative Final   • Amphetamine Screen, Urine 06/07/2022 Negative  Negative Final   • Benzodiazepine Screen, Urine 06/07/2022 Positive (A) Negative Final   • Tricyclic Antidepressants Screen 06/07/2022 Positive (A) Negative Final   • Methadone Screen, Urine 06/07/2022 Negative  Negative Final   • Barbiturates Screen, Urine 06/07/2022 Negative  Negative Final   • Oxycodone Screen, Urine 06/07/2022 Negative  Negative Final   • Propoxyphene Screen 06/07/2022 Negative  Negative Final   • Buprenorphine, Screen, Urine 06/07/2022 Positive (A) Negative Final   • Creatinine, Urine 06/07/2022 181.8  >=20 mg/dL Final   • Buprenorphine/CR 06/07/2022 68  ng/mg creat Final   • NORBUPRENORPHINE/CR 06/07/2022 275  ng/mg creat Final   • NORBUP/BUP RATIO 06/07/2022 4.04  RATIO Final    This test was developed and its performance characteristics  determined by [employer].  It has not been cleared or approved  by the Food and Drug Administration.   • Naloxone 06/07/2022 204  Cutoff=25 ng/ml Final   • TRP-LINK 06/07/2022 Comment   Final    These test results were not transmitted to [facility].  If you would like your patient's urine drug  test results to transmit to [facility], please  contact your [employer]  to complete  a physician authorization form.   • Antidepressants 06/07/2022 Negative   Final   • AMITRIPTYLINE, UR 06/07/2022 Not Detected   Final   • Clomipramine, Ur 06/07/2022 Not Detected   Final   • Desemethylclomipramine 06/07/2022 Not Detected   Final   • Desipramine  06/07/2022 Not Detected   Final   • Doxepin 06/07/2022 Not Detected   Final   • Desmethyldoxepin, Ur 06/07/2022 Not Detected   Final   • Imipramine 06/07/2022 Not Detected   Final   • Nortriptyline 06/07/2022 Not Detected   Final   • Protriptyline 06/07/2022 Not Detected   Final   • Trimipramine 06/07/2022 Not Detected   Final   • Level of Detection: 06/07/2022 Comment   Final    Testing Threshold:  50 or 100 ng/mL                                                                       '  This test was developed and its performance characteristics  determined by [employer].  It has not been cleared or approved  by the Food and Drug Administration.   • Benzodiazepine Screen, Urine 06/07/2022 +POSITIVE+   Final   • DIAZEPAM URINE QUANT (REF) 06/07/2022 Not Detected  ng/mg creat Final   • Desmethyldiazepam 06/07/2022 Not Detected  ng/mg creat Final   • Oxazepam, urine 06/07/2022 39  ng/mg creat Final   • Temazepam, urine 06/07/2022 Not Detected  ng/mg creat Final    Expected metabolism of benzodiazepine class drugs:   Parent Drug       Detected Metabolites   -----------       --------------------   Diazepam:         Desmethyldiazepam, Temazepam, Oxazepam   Chlordiazepoxide: Desmethyldiazepam, Oxazepam   Clorazepate:      Desmethyldiazepam, Oxazepam   Halazepam:        Desmethyldiazepam, Oxazepam   Temazepam:        Oxazepam   Oxazepam:         None   • ALPRAZOLAM 06/07/2022 Not Detected  ng/mg creat Final   • ALPHA-HYDROXYALPRAZOLAM UR, QT (RE* 06/07/2022 Not Detected  ng/mg creat Final   • DESALKLFLURAZEPAM UR QUANT (REF) 06/07/2022 Not Detected  ng/mg creat Final   • LORAZEPAM URINE QUANT (REF) 06/07/2022 Not Detected  ng/mg creat Final   • Alpha-hydroxytriazolam, Urine 06/07/2022 Not Detected  ng/mg creat Final   • Clonazepam Urine 06/07/2022 Not Detected  ng/mg creat Final   • 7-Aminoclonazepam Urine 06/07/2022 Not Detected  ng/mg creat Final   • MIDAZOLAM UR, QUANT (REF) 06/07/2022 Not Detected  ng/mg creat Final   •  Alpha-hydroxymidazolam, Urine 06/07/2022 Not Detected  ng/mg creat Final   • Flunitrazepam 06/07/2022 Not Detected  ng/mg creat Final   • DESMETHYLFLUNITRAZEPAM 06/07/2022 Not Detected  ng/mg creat Final   • Level of Detection: 06/07/2022 Comment   Final    Testing Threshold:  20 ng/mL                                                                       '  This test was developed and its performance characteristics  determined by [facility].  It has not been cleared or approved  by the Food and Drug Administration.   Lab on 05/31/2022   Component Date Value Ref Range Status   • Ethanol, Urine 05/31/2022 Negative  Cutoff=0.020 % Final   • THC, Screen, Urine 05/31/2022 Negative  Negative Final   • Phencyclidine (PCP), Urine 05/31/2022 Negative  Negative Final   • Cocaine Screen, Urine 05/31/2022 Negative  Negative Final   • Methamphetamine, Ur 05/31/2022 Negative  Negative Final   • Opiate Screen 05/31/2022 Negative  Negative Final   • Amphetamine Screen, Urine 05/31/2022 Negative  Negative Final   • Benzodiazepine Screen, Urine 05/31/2022 Positive (A) Negative Final   • Tricyclic Antidepressants Screen 05/31/2022 Positive (A) Negative Final   • Methadone Screen, Urine 05/31/2022 Negative  Negative Final   • Barbiturates Screen, Urine 05/31/2022 Negative  Negative Final   • Oxycodone Screen, Urine 05/31/2022 Negative  Negative Final   • Propoxyphene Screen 05/31/2022 Negative  Negative Final   • Buprenorphine, Screen, Urine 05/31/2022 Positive (A) Negative Final   • Creatinine, Urine 05/31/2022 202.5  >=20 mg/dL Final   • Buprenorphine/CR 05/31/2022 27  ng/mg creat Final   • NORBUPRENORPHINE/CR 05/31/2022 302  ng/mg creat Final   • NORBUP/BUP RATIO 05/31/2022 11.19  RATIO Final    This test was developed and its performance characteristics  determined by [facility].  It has not been cleared or approved  by the Food and Drug Administration.   • Naloxone 05/31/2022 40  Cutoff=25 ng/ml Final   • TRP-LINK 05/31/2022 Comment    Final    These test results were not transmitted to [facility].  If you would like your patient's urine drug  test results to transmit to [facility], please  contact your [facility]  to complete  a physician authorization form.   • Tricyclics 05/31/2022 Negative  Btsumj=046 ng/mL Final    Confirmation performed by [employer] Spect[employer]   • Please note 05/31/2022 Comment   Final    Drug-test results should be interpreted in the context of clinical  information. Patient metabolic variables, specific drug chemistry, and  specimen characteristics can affect test outcome. Technical  consultation is available if a test result is inconsistent with an  expected outcome. ([email or web address] or call toll-free  [phone])   • Benzodiazepines Urine 05/31/2022 Negative  [id number]=092 Final       Mental Status Exam  Hygiene:  good  Dress: casual  Attitude: cooperative and agreeable   Motor Activity: appropriate  Eye Contact:  good  Speech: regular rate and rhythm   Mood:  calm and cooperative  Affect:  Appropriate  Thought Processes:  Linear  Thought Content:  Normal  Suicidal Thoughts:  denies  Homicidal Thoughts:  denies  Crisis Safety Plan: yes, to come to the emergency room.  Hallucinations:  denies  Reliability: fair  Insight: fair  Judgement: fair  Impulse Control: fair    Family issues related to recovery:  No change, see previous encounters    Recovery/spiritual support group attendance: No     Sponsor: No       Motivation for treatment:  During initial assessment, patient reported “my kids”.     Patient's Support Network Includes: During initial assessment, patient reported sober support system is “my family and youngest son’s mom”.     Progress toward goal: Not at goal    Prognosis: Fair with Ongoing Treatment     Self-reported number of days sober:  Patient reported May 26 on check in form.     ASAM Dimensions:  I.    Intoxication/Withdrawal:  0  (Patient during initial  assessment reported no recent substance use).  II.   Medical Conditions/Complications:  1 (Due to medical conditions and history of seizures discussed by patient during initial assessment).  III.  Behavioral/Emotional/Cognitive: 1 (Due to patient's reported depression, anxiety, and feelings of hopelessness during initial assessment).  IV.  Readiness to Change: 1 (Patient appeared to be ready to change based on his self-report during initial assessment).  V.   Relapse Risk: 1 (Patient during initial assessment reported he has had one relapse in the past).  VI:  Recovery Environment: 1 (Patient during initial assessment discussed wanting to find a place of his own).  Total ASAM Score = 05      BASELINE SCORES 04/25/2022       VINICIUS-7   (17)                  PHQ-9   (13)    Updated Scores 6/8/2022  VINICIUS-7 (21)  PHQ-9 (15)      Patient agreeable to adhere to medication regimen as prescribed and report any side effects. Patient will contact this office, call 911 or present to the nearest emergency room should suicidal or homicidal ideations occur.    Impression/Formulation:    ICD-10-CM ICD-9-CM   1. Opioid use disorder, severe, dependence (HCC)  F11.20 304.00       CLINICAL MANUVERING/INTERVENTIONS: Therapist utilized a person-centered approach to build rapport with group member.  Therapist implemented motivational interviewing techniques to assist client with exploring and resolving ambivalence associated with commitment to change behaviors related to substance use and addiction.  Therapist applied cognitive behavioral strategies to facilitate identification of maladaptive patterns of thinking and behavior that contribute to client’s risk for continued substance use and relapse.  Therapist employed group interaction activities to build rapport among group members, promote sobriety, and emphasize relapse prevention.  Therapist promoted safe nonjudgmental environment by providing group members with unconditional positive  regard and encouraging group members to comply with group rules and guidelines. Therapist assisted group member with identifying and implementing healthier coping strategies.      PLAN:  Continue [facility] IOP Phase I   Aftercare:  [facility] Phase II  Program Assignments:  Personal recovery plan, relapse prevention plan, attendance of recovery support group meetings, exploration of sponsorship, drug/alcohol screens.     Please note that portions of this note were completed with a voice recognition program. Efforts were made to edit dictation, but occasionally words are mistranscribed.       This document signed by Duy Garcia, July 1, 2022, 17:08 EDT

## 2022-07-01 NOTE — PROGRESS NOTES
NAME: Aroldo Cai  DATE: 06/15/2022    [facility] GROUP   [id number]    Number of participants: 6  IOP GROUP NOTE     DATA:     3 hour IOP group therapy session (Check-ins, Coping Skills, Relapse Prevention)     Check Ins:  Therapist continued facilitation of rapport building strategies between group members. Therapist asked that each patient check in with home life and recovery efforts and identify triggers, cravings, and high risk situations that arise between group sessions. Therapist provided empathy and support during group session.     Session Content/Coping Skills:  Check ins completed by group members. Clinician discussed with group members the bio-psycho-social-spiritual approach to recovery.  Keila joined for first part of meeting and shared just for today reading. Review of Living in Balance- Addiction and Loss began.     Response:  Patient attended class in person. Patient participated in completion of check in form.   Patient on check in form denied suicidal or homicidal thoughts or plan or intent to hurt self or others currently or since last group meeting.   Patient participated in review of psychoeducational material and group discussions.     Clinician met with patient to discuss positive screens for Benzodiazapine. Patient denied use that had not already been reported and stated that he had been using cough syrup. Patient stated he would try to bring in a medication list.      Personal Assessment 0-10 Scale (10 worst)    Anxiety:  2   Depression:  0   Cravings: 1     ASSESSMENT:     ..  Lab on 06/15/2022   Component Date Value Ref Range Status   • Ethanol, Urine 06/15/2022 Negative  Cutoff=0.020 % Final   • THC, Screen, Urine 06/15/2022 Negative  Negative Final   • Phencyclidine (PCP), Urine 06/15/2022 Negative  Negative Final   • Cocaine Screen, Urine 06/15/2022 Negative  Negative Final   • Methamphetamine, Ur 06/15/2022 Negative  Negative Final   • Opiate Screen  06/15/2022 Negative  Negative Final   • Amphetamine Screen, Urine 06/15/2022 Negative  Negative Final   • Benzodiazepine Screen, Urine 06/15/2022 Positive (A) Negative Final   • Tricyclic Antidepressants Screen 06/15/2022 Positive (A) Negative Final   • Methadone Screen, Urine 06/15/2022 Negative  Negative Final   • Barbiturates Screen, Urine 06/15/2022 Negative  Negative Final   • Oxycodone Screen, Urine 06/15/2022 Negative  Negative Final   • Propoxyphene Screen 06/15/2022 Negative  Negative Final   • Buprenorphine, Screen, Urine 06/15/2022 Positive (A) Negative Final   • Creatinine, Urine 06/15/2022 251.4  >=20 mg/dL Final   • Buprenorphine/CR 06/15/2022 57  ng/mg creat Final   • NORBUPRENORPHINE/CR 06/15/2022 187  ng/mg creat Final   • NORBUP/BUP RATIO 06/15/2022 3.28  RATIO Final    This test was developed and its performance characteristics  determined by [facility].  It has not been cleared or approved  by the Food and Drug Administration.   • Naloxone 06/15/2022 265  Cutoff=25 ng/ml Final   • TRP-LINK 06/15/2022 Comment   Final    These test results were not transmitted to [facility].  If you would like your patient's urine drug  test results to transmit to [facility], please  contact your [employer]  to complete  a physician authorization form.   • Antidepressants 06/15/2022 Negative   Final   • AMITRIPTYLINE, UR 06/15/2022 Not Detected   Final   • Clomipramine, Ur 06/15/2022 Not Detected   Final   • Desemethylclomipramine 06/15/2022 Not Detected   Final   • Desipramine 06/15/2022 Not Detected   Final   • Doxepin 06/15/2022 Not Detected   Final   • Desmethyldoxepin, Ur 06/15/2022 Not Detected   Final   • Imipramine 06/15/2022 Not Detected   Final   • Nortriptyline 06/15/2022 Not Detected   Final   • Protriptyline 06/15/2022 Not Detected   Final   • Trimipramine 06/15/2022 Not Detected   Final   • Level of Detection: 06/15/2022 Comment   Final    Testing Threshold:  50 or  100 ng/mL                                                                       '  This test was developed and its performance characteristics  determined by [facility].  It has not been cleared or approved  by the Food and Drug Administration.   • Benzodiazepine Screen, Urine 06/15/2022 +POSITIVE+   Final   • DIAZEPAM URINE QUANT (REF) 06/15/2022 Not Detected  ng/mg creat Final   • Desmethyldiazepam 06/15/2022 Not Detected  ng/mg creat Final   • Oxazepam, urine 06/15/2022 21  ng/mg creat Final   • Temazepam, urine 06/15/2022 Not Detected  ng/mg creat Final    Expected metabolism of benzodiazepine class drugs:   Parent Drug       Detected Metabolites   -----------       --------------------   Diazepam:         Desmethyldiazepam, Temazepam, Oxazepam   Chlordiazepoxide: Desmethyldiazepam, Oxazepam   Clorazepate:      Desmethyldiazepam, Oxazepam   Halazepam:        Desmethyldiazepam, Oxazepam   Temazepam:        Oxazepam   Oxazepam:         None   • ALPRAZOLAM 06/15/2022 Not Detected  ng/mg creat Final   • ALPHA-HYDROXYALPRAZOLAM UR, QT (RE* 06/15/2022 Not Detected  ng/mg creat Final   • DESALKLFLURAZEPAM UR QUANT (REF) 06/15/2022 Not Detected  ng/mg creat Final   • LORAZEPAM URINE QUANT (REF) 06/15/2022 Not Detected  ng/mg creat Final   • Alpha-hydroxytriazolam, Urine 06/15/2022 Not Detected  ng/mg creat Final   • Clonazepam Urine 06/15/2022 Not Detected  ng/mg creat Final   • 7-Aminoclonazepam Urine 06/15/2022 Not Detected  ng/mg creat Final   • MIDAZOLAM UR, QUANT (REF) 06/15/2022 Not Detected  ng/mg creat Final   • Alpha-hydroxymidazolam, Urine 06/15/2022 Not Detected  ng/mg creat Final   • Flunitrazepam 06/15/2022 Not Detected  ng/mg creat Final   • DESMETHYLFLUNITRAZEPAM 06/15/2022 Not Detected  ng/mg creat Final   • Level of Detection: 06/15/2022 Comment   Final    Testing Threshold:  20 ng/mL                                                                       '  This test was developed and its performance  characteristics  determined by [facility].  It has not been cleared or approved  by the Food and Drug Administration.   Lab on 06/07/2022   Component Date Value Ref Range Status   • Ethanol, Urine 06/07/2022 Negative  Cutoff=0.020 % Final   • THC, Screen, Urine 06/07/2022 Negative  Negative Final   • Phencyclidine (PCP), Urine 06/07/2022 Negative  Negative Final   • Cocaine Screen, Urine 06/07/2022 Negative  Negative Final   • Methamphetamine, Ur 06/07/2022 Negative  Negative Final   • Opiate Screen 06/07/2022 Negative  Negative Final   • Amphetamine Screen, Urine 06/07/2022 Negative  Negative Final   • Benzodiazepine Screen, Urine 06/07/2022 Positive (A) Negative Final   • Tricyclic Antidepressants Screen 06/07/2022 Positive (A) Negative Final   • Methadone Screen, Urine 06/07/2022 Negative  Negative Final   • Barbiturates Screen, Urine 06/07/2022 Negative  Negative Final   • Oxycodone Screen, Urine 06/07/2022 Negative  Negative Final   • Propoxyphene Screen 06/07/2022 Negative  Negative Final   • Buprenorphine, Screen, Urine 06/07/2022 Positive (A) Negative Final   • Creatinine, Urine 06/07/2022 181.8  >=20 mg/dL Final   • Buprenorphine/CR 06/07/2022 68  ng/mg creat Final   • NORBUPRENORPHINE/CR 06/07/2022 275  ng/mg creat Final   • NORBUP/BUP RATIO 06/07/2022 4.04  RATIO Final    This test was developed and its performance characteristics  determined by [facility].  It has not been cleared or approved  by the Food and Drug Administration.   • Naloxone 06/07/2022 204  Cutoff=25 ng/ml Final   • TRP-LINK 06/07/2022 Comment   Final    These test results were not transmitted to [facility].  If you would like your patient's urine drug  test results to transmit to [facility], please  contact your [employer]  to complete  a physician authorization form.   • Antidepressants 06/07/2022 Negative   Final   • AMITRIPTYLINE, UR 06/07/2022 Not Detected   Final   • Clomipramine, Ur 06/07/2022  Not Detected   Final   • Desemethylclomipramine 06/07/2022 Not Detected   Final   • Desipramine 06/07/2022 Not Detected   Final   • Doxepin 06/07/2022 Not Detected   Final   • Desmethyldoxepin, Ur 06/07/2022 Not Detected   Final   • Imipramine 06/07/2022 Not Detected   Final   • Nortriptyline 06/07/2022 Not Detected   Final   • Protriptyline 06/07/2022 Not Detected   Final   • Trimipramine 06/07/2022 Not Detected   Final   • Level of Detection: 06/07/2022 Comment   Final    Testing Threshold:  50 or 100 ng/mL                                                                       '  This test was developed and its performance characteristics  determined by [facility].  It has not been cleared or approved  by the Food and Drug Administration.   • Benzodiazepine Screen, Urine 06/07/2022 +POSITIVE+   Final   • DIAZEPAM URINE QUANT (REF) 06/07/2022 Not Detected  ng/mg creat Final   • Desmethyldiazepam 06/07/2022 Not Detected  ng/mg creat Final   • Oxazepam, urine 06/07/2022 39  ng/mg creat Final   • Temazepam, urine 06/07/2022 Not Detected  ng/mg creat Final    Expected metabolism of benzodiazepine class drugs:   Parent Drug       Detected Metabolites   -----------       --------------------   Diazepam:         Desmethyldiazepam, Temazepam, Oxazepam   Chlordiazepoxide: Desmethyldiazepam, Oxazepam   Clorazepate:      Desmethyldiazepam, Oxazepam   Halazepam:        Desmethyldiazepam, Oxazepam   Temazepam:        Oxazepam   Oxazepam:         None   • ALPRAZOLAM 06/07/2022 Not Detected  ng/mg creat Final   • ALPHA-HYDROXYALPRAZOLAM UR, QT (RE* 06/07/2022 Not Detected  ng/mg creat Final   • DESALKLFLURAZEPAM UR QUANT (REF) 06/07/2022 Not Detected  ng/mg creat Final   • LORAZEPAM URINE QUANT (REF) 06/07/2022 Not Detected  ng/mg creat Final   • Alpha-hydroxytriazolam, Urine 06/07/2022 Not Detected  ng/mg creat Final   • Clonazepam Urine 06/07/2022 Not Detected  ng/mg creat Final   • 7-Aminoclonazepam Urine 06/07/2022 Not Detected   ng/mg creat Final   • MIDAZOLAM UR, QUANT (REF) 06/07/2022 Not Detected  ng/mg creat Final   • Alpha-hydroxymidazolam, Urine 06/07/2022 Not Detected  ng/mg creat Final   • Flunitrazepam 06/07/2022 Not Detected  ng/mg creat Final   • DESMETHYLFLUNITRAZEPAM 06/07/2022 Not Detected  ng/mg creat Final   • Level of Detection: 06/07/2022 Comment   Final    Testing Threshold:  20 ng/mL                                                                       '  This test was developed and its performance characteristics  determined by [facility].  It has not been cleared or approved  by the Food and Drug Administration.   Lab on 05/31/2022   Component Date Value Ref Range Status   • Ethanol, Urine 05/31/2022 Negative  Cutoff=0.020 % Final   • THC, Screen, Urine 05/31/2022 Negative  Negative Final   • Phencyclidine (PCP), Urine 05/31/2022 Negative  Negative Final   • Cocaine Screen, Urine 05/31/2022 Negative  Negative Final   • Methamphetamine, Ur 05/31/2022 Negative  Negative Final   • Opiate Screen 05/31/2022 Negative  Negative Final   • Amphetamine Screen, Urine 05/31/2022 Negative  Negative Final   • Benzodiazepine Screen, Urine 05/31/2022 Positive (A) Negative Final   • Tricyclic Antidepressants Screen 05/31/2022 Positive (A) Negative Final   • Methadone Screen, Urine 05/31/2022 Negative  Negative Final   • Barbiturates Screen, Urine 05/31/2022 Negative  Negative Final   • Oxycodone Screen, Urine 05/31/2022 Negative  Negative Final   • Propoxyphene Screen 05/31/2022 Negative  Negative Final   • Buprenorphine, Screen, Urine 05/31/2022 Positive (A) Negative Final   • Creatinine, Urine 05/31/2022 202.5  >=20 mg/dL Final   • Buprenorphine/CR 05/31/2022 27  ng/mg creat Final   • NORBUPRENORPHINE/CR 05/31/2022 302  ng/mg creat Final   • NORBUP/BUP RATIO 05/31/2022 11.19  RATIO Final    This test was developed and its performance characteristics  determined by [facility].  It has not been cleared or approved  by the Food and Drug  Administration.   • Naloxone 05/31/2022 40  Cutoff=25 ng/ml Final   • TRP-LINK 05/31/2022 Comment   Final    These test results were not transmitted to [facility].  If you would like your patient's urine drug  test results to transmit to [facility], please  contact your [facility]  to complete  a physician authorization form.   • Tricyclics 05/31/2022 Negative  Kbldrz=960 ng/mL Final    Confirmation performed by Mass Spectrometry   • Please note 05/31/2022 Comment   Final    Drug-test results should be interpreted in the context of clinical  information. Patient metabolic variables, specific drug chemistry, and  specimen characteristics can affect test outcome. Technical  consultation is available if a test result is inconsistent with an  expected outcome. ([email or web address] or call toll-free  [phone])   • Benzodiazepines Urine 05/31/2022 Negative  [id number]=[id number] Final       Mental Status Exam  Hygiene:  good  Dress: casual  Attitude: cooperative and agreeable   Motor Activity: appropriate  Eye Contact:  good  Speech: regular rate and rhythm   Mood:  calm and cooperative  Affect:  Appropriate  Thought Processes:  Goal directed  Thought Content:  Normal  Suicidal Thoughts:  denies  Homicidal Thoughts:  denies  Crisis Safety Plan: yes, to come to the emergency room.  Hallucinations:  denies  Reliability: fair  Insight: fair  Judgement: fair  Impulse Control: fair    Family issues related to recovery:  No change, see previous encounters    Recovery/spiritual support group attendance: No     Sponsor: No       Motivation for treatment: During initial assessment, patient reported “my kids”.     Patient's Support Network Includes: During initial assessment, patient reported sober support system is “my family and youngest son’s mom”.     Progress toward goal: Not at goal    Prognosis: Fair with Ongoing Treatment     Self-reported number of days sober: Patient reported  May 26th on check in form.     ASAM Dimensions:  I.    Intoxication/Withdrawal:  0  (Patient during initial assessment reported no recent substance use).  II.   Medical Conditions/Complications:  1 (Due to medical conditions and history of seizures discussed by patient during initial assessment).  III.  Behavioral/Emotional/Cognitive: 1 (Due to patient's reported depression, anxiety, and feelings of hopelessness during initial assessment).  IV.  Readiness to Change: 1 (Patient appeared to be ready to change based on his self-report during initial assessment).  V.   Relapse Risk: 1 (Patient during initial assessment reported he has had one relapse in the past).  VI:  Recovery Environment: 1 (Patient during initial assessment discussed wanting to find a place of his own).  Total ASAM Score = 05      BASELINE SCORES 04/25/2022       VINICIUS-7   (17)                  PHQ-9   (13)    Updated Scores 6/8/2022  VINICIUS-7 (21)  PHQ-9 (15)      Patient agreeable to adhere to medication regimen as prescribed and report any side effects. Patient will contact this office, call 911 or present to the nearest emergency room should suicidal or homicidal ideations occur.    Impression/Formulation:    ICD-10-CM ICD-9-CM   1. Opioid use disorder, severe, dependence (HCC)  F11.20 304.00   2. Cocaine use disorder, severe, in sustained remission (HCC)  F14.21 304.23       CLINICAL MANUVERING/INTERVENTIONS:  Therapist utilized a person-centered approach to build rapport with group member.  Therapist implemented motivational interviewing techniques to assist client with exploring and resolving ambivalence associated with commitment to change behaviors related to substance use and addiction.  Therapist applied cognitive behavioral strategies to facilitate identification of maladaptive patterns of thinking and behavior that contribute to client’s risk for continued substance use and relapse.  Therapist employed group interaction activities to build  rapport among group members, promote sobriety, and emphasize relapse prevention.  Therapist promoted safe nonjudgmental environment by providing group members with unconditional positive regard and encouraging group members to comply with group rules and guidelines. Therapist assisted group member with identifying and implementing healthier coping strategies.      PLAN:  Continue [facility] IOP Phase I   Aftercare:  [facility] Phase II  Program Assignments:  Personal recovery plan, relapse prevention plan, attendance of recovery support group meetings, exploration of sponsorship, drug/alcohol screens.     Please note that portions of this note were completed with a voice recognition program. Efforts were made to edit dictation, but occasionally words are mistranscribed.       This document signed by Duy Garcia, July 1, 2022, 16:17 EDT

## 2022-07-02 NOTE — PROGRESS NOTES
NAME: Aroldo Cai  DATE: 06/20/2022     IOP GROUP   [id number]    Number of participants: 7  IOP GROUP NOTE     DATA:     3 hour IOP group therapy session (Check-ins, Coping Skills, Relapse Prevention)     Check Ins:   Therapist continued facilitation of rapport building strategies between group members. Therapist asked that each patient check in with home life and recovery efforts and identify triggers, cravings, and high risk situations that arise between group sessions. Therapist provided empathy and support during group session.      Session Content/Coping Skills:  Introductions completed. Clinician encouraged meetings by group members. Check ins completed by group members. Two truths and a lie icebreaker activity completed. Value card sort activity completed by group members. Clinician discussed values with group members and how addiction affects values. Check out questions completed.      Response:  Patient attended class in person. Patient participated in completion of check in form.   Patient on check in form denied suicidal or homicidal thoughts or plan or intent to hurt self or others currently or since last group meeting.   Patient participated in group discussions and completion of value card sort activity and discussion of values.      Personal Assessment 0-10 Scale (10 worst)    Anxiety:  4   Depression:  0   Cravings: 0     ASSESSMENT:     ..  Lab on 06/20/2022   Component Date Value Ref Range Status   • THC, Screen, Urine 06/20/2022 Negative  Negative Final   • Phencyclidine (PCP), Urine 06/20/2022 Negative  Negative Final   • Cocaine Screen, Urine 06/20/2022 Negative  Negative Final   • Methamphetamine, Ur 06/20/2022 Negative  Negative Final   • Opiate Screen 06/20/2022 Negative  Negative Final   • Amphetamine Screen, Urine 06/20/2022 Negative  Negative Final   • Benzodiazepine Screen, Urine 06/20/2022 Negative  Negative Final   • Tricyclic Antidepressants Screen 06/20/2022 Positive (A)  Negative Final   • Methadone Screen, Urine 06/20/2022 Negative  Negative Final   • Barbiturates Screen, Urine 06/20/2022 Negative  Negative Final   • Oxycodone Screen, Urine 06/20/2022 Negative  Negative Final   • Propoxyphene Screen 06/20/2022 Negative  Negative Final   • Buprenorphine, Screen, Urine 06/20/2022 Positive (A) Negative Final   • Ethanol, Urine 06/20/2022 Negative  Cutoff=0.020 % Final   • Creatinine, Urine 06/20/2022 195.8  >=20 mg/dL Final   • Buprenorphine/CR 06/20/2022 73  ng/mg creat Final   • NORBUPRENORPHINE/CR 06/20/2022 339  ng/mg creat Final   • NORBUP/BUP RATIO 06/20/2022 4.64  RATIO Final    This test was developed and its performance characteristics  determined by [facility].  It has not been cleared or approved  by the Food and Drug Administration.   • Naloxone 06/20/2022 2041  Cutoff=25 ng/ml Final   • TRP-LINK 06/20/2022 Comment   Final    These test results were not transmitted to [facility].  If you would like your patient's urine drug  test results to transmit to [facility], please  contact your [facility]  to complete  a physician authorization form.   • Antidepressants 06/20/2022 Negative   Final   • AMITRIPTYLINE, UR 06/20/2022 Not Detected   Final   • Clomipramine, Ur 06/20/2022 Not Detected   Final   • Desemethylclomipramine 06/20/2022 Not Detected   Final   • Desipramine 06/20/2022 Not Detected   Final   • Doxepin 06/20/2022 Not Detected   Final   • Desmethyldoxepin, Ur 06/20/2022 Not Detected   Final   • Imipramine 06/20/2022 Not Detected   Final   • Nortriptyline 06/20/2022 Not Detected   Final   • Protriptyline 06/20/2022 Not Detected   Final   • Trimipramine 06/20/2022 Not Detected   Final   • Level of Detection: 06/20/2022 Comment   Final    Testing Threshold:  50 or 100 ng/mL                                                                       '  This test was developed and its performance characteristics  determined by [facility].  It  has not been cleared or approved  by the Food and Drug Administration.   Lab on 06/15/2022   Component Date Value Ref Range Status   • Ethanol, Urine 06/15/2022 Negative  Cutoff=0.020 % Final   • THC, Screen, Urine 06/15/2022 Negative  Negative Final   • Phencyclidine (PCP), Urine 06/15/2022 Negative  Negative Final   • Cocaine Screen, Urine 06/15/2022 Negative  Negative Final   • Methamphetamine, Ur 06/15/2022 Negative  Negative Final   • Opiate Screen 06/15/2022 Negative  Negative Final   • Amphetamine Screen, Urine 06/15/2022 Negative  Negative Final   • Benzodiazepine Screen, Urine 06/15/2022 Positive (A) Negative Final   • Tricyclic Antidepressants Screen 06/15/2022 Positive (A) Negative Final   • Methadone Screen, Urine 06/15/2022 Negative  Negative Final   • Barbiturates Screen, Urine 06/15/2022 Negative  Negative Final   • Oxycodone Screen, Urine 06/15/2022 Negative  Negative Final   • Propoxyphene Screen 06/15/2022 Negative  Negative Final   • Buprenorphine, Screen, Urine 06/15/2022 Positive (A) Negative Final   • Creatinine, Urine 06/15/2022 251.4  >=20 mg/dL Final   • Buprenorphine/CR 06/15/2022 57  ng/mg creat Final   • NORBUPRENORPHINE/CR 06/15/2022 187  ng/mg creat Final   • NORBUP/BUP RATIO 06/15/2022 3.28  RATIO Final    This test was developed and its performance characteristics  determined by [facility].  It has not been cleared or approved  by the Food and Drug Administration.   • Naloxone 06/15/2022 265  Cutoff=25 ng/ml Final   • TRP-LINK 06/15/2022 Comment   Final    These test results were not transmitted to [facility].  If you would like your patient's urine drug  test results to transmit to [facility], please  contact your [facility]  to complete  a physician authorization form.   • Antidepressants 06/15/2022 Negative   Final   • AMITRIPTYLINE, UR 06/15/2022 Not Detected   Final   • Clomipramine, Ur 06/15/2022 Not Detected   Final   •  Desemethylclomipramine 06/15/2022 Not Detected   Final   • Desipramine 06/15/2022 Not Detected   Final   • Doxepin 06/15/2022 Not Detected   Final   • Desmethyldoxepin, Ur 06/15/2022 Not Detected   Final   • Imipramine 06/15/2022 Not Detected   Final   • Nortriptyline 06/15/2022 Not Detected   Final   • Protriptyline 06/15/2022 Not Detected   Final   • Trimipramine 06/15/2022 Not Detected   Final   • Level of Detection: 06/15/2022 Comment   Final    Testing Threshold:  50 or 100 ng/mL                                                                       '  This test was developed and its performance characteristics  determined by [facility].  It has not been cleared or approved  by the Food and Drug Administration.   • Benzodiazepine Screen, Urine 06/15/2022 +POSITIVE+   Final   • DIAZEPAM URINE QUANT (REF) 06/15/2022 Not Detected  ng/mg creat Final   • Desmethyldiazepam 06/15/2022 Not Detected  ng/mg creat Final   • Oxazepam, urine 06/15/2022 21  ng/mg creat Final   • Temazepam, urine 06/15/2022 Not Detected  ng/mg creat Final    Expected metabolism of benzodiazepine class drugs:   Parent Drug       Detected Metabolites   -----------       --------------------   Diazepam:         Desmethyldiazepam, Temazepam, Oxazepam   Chlordiazepoxide: Desmethyldiazepam, Oxazepam   Clorazepate:      Desmethyldiazepam, Oxazepam   Halazepam:        Desmethyldiazepam, Oxazepam   Temazepam:        Oxazepam   Oxazepam:         None   • ALPRAZOLAM 06/15/2022 Not Detected  ng/mg creat Final   • ALPHA-HYDROXYALPRAZOLAM UR, QT (RE* 06/15/2022 Not Detected  ng/mg creat Final   • DESALKLFLURAZEPAM UR QUANT (REF) 06/15/2022 Not Detected  ng/mg creat Final   • LORAZEPAM URINE QUANT (REF) 06/15/2022 Not Detected  ng/mg creat Final   • Alpha-hydroxytriazolam, Urine 06/15/2022 Not Detected  ng/mg creat Final   • Clonazepam Urine 06/15/2022 Not Detected  ng/mg creat Final   • 7-Aminoclonazepam Urine 06/15/2022 Not Detected  ng/mg creat Final   •  MIDAZOLAM UR, QUANT (REF) 06/15/2022 Not Detected  ng/mg creat Final   • Alpha-hydroxymidazolam, Urine 06/15/2022 Not Detected  ng/mg creat Final   • Flunitrazepam 06/15/2022 Not Detected  ng/mg creat Final   • DESMETHYLFLUNITRAZEPAM 06/15/2022 Not Detected  ng/mg creat Final   • Level of Detection: 06/15/2022 Comment   Final    Testing Threshold:  20 ng/mL                                                                       '  This test was developed and its performance characteristics  determined by [facility].  It has not been cleared or approved  by the Food and Drug Administration.   Lab on 06/07/2022   Component Date Value Ref Range Status   • Ethanol, Urine 06/07/2022 Negative  Cutoff=0.020 % Final   • THC, Screen, Urine 06/07/2022 Negative  Negative Final   • Phencyclidine (PCP), Urine 06/07/2022 Negative  Negative Final   • Cocaine Screen, Urine 06/07/2022 Negative  Negative Final   • Methamphetamine, Ur 06/07/2022 Negative  Negative Final   • Opiate Screen 06/07/2022 Negative  Negative Final   • Amphetamine Screen, Urine 06/07/2022 Negative  Negative Final   • Benzodiazepine Screen, Urine 06/07/2022 Positive (A) Negative Final   • Tricyclic Antidepressants Screen 06/07/2022 Positive (A) Negative Final   • Methadone Screen, Urine 06/07/2022 Negative  Negative Final   • Barbiturates Screen, Urine 06/07/2022 Negative  Negative Final   • Oxycodone Screen, Urine 06/07/2022 Negative  Negative Final   • Propoxyphene Screen 06/07/2022 Negative  Negative Final   • Buprenorphine, Screen, Urine 06/07/2022 Positive (A) Negative Final   • Creatinine, Urine 06/07/2022 181.8  >=20 mg/dL Final   • Buprenorphine/CR 06/07/2022 68  ng/mg creat Final   • NORBUPRENORPHINE/CR 06/07/2022 275  ng/mg creat Final   • NORBUP/BUP RATIO 06/07/2022 4.04  RATIO Final    This test was developed and its performance characteristics  determined by [facility].  It has not been cleared or approved  by the Food and Drug Administration.   • Naloxone  06/07/2022 204  Cutoff=25 ng/ml Final   • TRP-LINK 06/07/2022 Comment   Final    These test results were not transmitted to [facility].  If you would like your patient's urine drug  test results to transmit to [facility], please  contact your [facility]  to complete  a physician authorization form.   • Antidepressants 06/07/2022 Negative   Final   • AMITRIPTYLINE, UR 06/07/2022 Not Detected   Final   • Clomipramine, Ur 06/07/2022 Not Detected   Final   • Desemethylclomipramine 06/07/2022 Not Detected   Final   • Desipramine 06/07/2022 Not Detected   Final   • Doxepin 06/07/2022 Not Detected   Final   • Desmethyldoxepin, Ur 06/07/2022 Not Detected   Final   • Imipramine 06/07/2022 Not Detected   Final   • Nortriptyline 06/07/2022 Not Detected   Final   • Protriptyline 06/07/2022 Not Detected   Final   • Trimipramine 06/07/2022 Not Detected   Final   • Level of Detection: 06/07/2022 Comment   Final    Testing Threshold:  50 or 100 ng/mL                                                                       '  This test was developed and its performance characteristics  determined by [facility].  It has not been cleared or approved  by the Food and Drug Administration.   • Benzodiazepine Screen, Urine 06/07/2022 +POSITIVE+   Final   • DIAZEPAM URINE QUANT (REF) 06/07/2022 Not Detected  ng/mg creat Final   • Desmethyldiazepam 06/07/2022 Not Detected  ng/mg creat Final   • Oxazepam, urine 06/07/2022 39  ng/mg creat Final   • Temazepam, urine 06/07/2022 Not Detected  ng/mg creat Final    Expected metabolism of benzodiazepine class drugs:   Parent Drug       Detected Metabolites   -----------       --------------------   Diazepam:         Desmethyldiazepam, Temazepam, Oxazepam   Chlordiazepoxide: Desmethyldiazepam, Oxazepam   Clorazepate:      Desmethyldiazepam, Oxazepam   Halazepam:        Desmethyldiazepam, Oxazepam   Temazepam:        Oxazepam   Oxazepam:         None   • ALPRAZOLAM  06/07/2022 Not Detected  ng/mg creat Final   • ALPHA-HYDROXYALPRAZOLAM UR, QT (RE* 06/07/2022 Not Detected  ng/mg creat Final   • DESALKLFLURAZEPAM UR QUANT (REF) 06/07/2022 Not Detected  ng/mg creat Final   • LORAZEPAM URINE QUANT (REF) 06/07/2022 Not Detected  ng/mg creat Final   • Alpha-hydroxytriazolam, Urine 06/07/2022 Not Detected  ng/mg creat Final   • Clonazepam Urine 06/07/2022 Not Detected  ng/mg creat Final   • 7-Aminoclonazepam Urine 06/07/2022 Not Detected  ng/mg creat Final   • MIDAZOLAM UR, QUANT (REF) 06/07/2022 Not Detected  ng/mg creat Final   • Alpha-hydroxymidazolam, Urine 06/07/2022 Not Detected  ng/mg creat Final   • Flunitrazepam 06/07/2022 Not Detected  ng/mg creat Final   • DESMETHYLFLUNITRAZEPAM 06/07/2022 Not Detected  ng/mg creat Final   • Level of Detection: 06/07/2022 Comment   Final    Testing Threshold:  20 ng/mL                                                                       '  This test was developed and its performance characteristics  determined by [facility].  It has not been cleared or approved  by the Food and Drug Administration.   Lab on 05/31/2022   Component Date Value Ref Range Status   • Ethanol, Urine 05/31/2022 Negative  Cutoff=0.020 % Final   • THC, Screen, Urine 05/31/2022 Negative  Negative Final   • Phencyclidine (PCP), Urine 05/31/2022 Negative  Negative Final   • Cocaine Screen, Urine 05/31/2022 Negative  Negative Final   • Methamphetamine, Ur 05/31/2022 Negative  Negative Final   • Opiate Screen 05/31/2022 Negative  Negative Final   • Amphetamine Screen, Urine 05/31/2022 Negative  Negative Final   • Benzodiazepine Screen, Urine 05/31/2022 Positive (A) Negative Final   • Tricyclic Antidepressants Screen 05/31/2022 Positive (A) Negative Final   • Methadone Screen, Urine 05/31/2022 Negative  Negative Final   • Barbiturates Screen, Urine 05/31/2022 Negative  Negative Final   • Oxycodone Screen, Urine 05/31/2022 Negative  Negative Final   • Propoxyphene Screen  05/31/2022 Negative  Negative Final   • Buprenorphine, Screen, Urine 05/31/2022 Positive (A) Negative Final   • Creatinine, Urine 05/31/2022 202.5  >=20 mg/dL Final   • Buprenorphine/CR 05/31/2022 27  ng/mg creat Final   • NORBUPRENORPHINE/CR 05/31/2022 302  ng/mg creat Final   • NORBUP/BUP RATIO 05/31/2022 11.19  RATIO Final    This test was developed and its performance characteristics  determined by [employer].  It has not been cleared or approved  by the Food and Drug Administration.   • Naloxone 05/31/2022 40  Cutoff=25 ng/ml Final   • TRP-LINK 05/31/2022 Comment   Final    These test results were not transmitted to [facility].  If you would like your patient's urine drug  test results to transmit to [facility], please  contact your [facility]  to complete  a physician authorization form.   • Tricyclics 05/31/2022 Negative  Ptyljp=850 ng/mL Final    Confirmation performed by Mass Spectrometry   • Please note 05/31/2022 Comment   Final    Drug-test results should be interpreted in the context of clinical  information. Patient metabolic variables, specific drug chemistry, and  specimen characteristics can affect test outcome. Technical  consultation is available if a test result is inconsistent with an  expected outcome. ([email or web address] or call toll-free  [phone])   • Benzodiazepines Urine 05/31/2022 Negative  [id number] Final       Mental Status Exam  Hygiene:  good  Dress: casual  Attitude: cooperative and agreeable   Motor Activity: appropriate  Eye Contact:  good  Speech: regular rate and rhythm   Mood:  calm and cooperative  Affect:  Appropriate  Thought Processes:  Linear  Thought Content:  Normal  Suicidal Thoughts:  denies  Homicidal Thoughts:  denies  Crisis Safety Plan: yes, to come to the emergency room.  Hallucinations:  denies  Reliability: fair  Insight: fair  Judgement: fair  Impulse Control: fair    Family issues related to recovery:  No  change, see previous encounters    Recovery/spiritual support group attendance: No     Sponsor: No       Motivation for treatment:  During initial assessment, patient reported “my kids”.     Patient's Support Network Includes: During initial assessment, patient reported sober support system is “my family and youngest son’s mom”.     Progress toward goal: Not at goal    Prognosis: Fair with Ongoing Treatment     Self-reported number of days sober:  Patient reported May 26th on check in form.     ASAM Dimensions:  I.    Intoxication/Withdrawal:  0  (Patient during initial assessment reported no recent substance use).  II.   Medical Conditions/Complications:  1 (Due to medical conditions and history of seizures discussed by patient during initial assessment).  III.  Behavioral/Emotional/Cognitive: 1 (Due to patient's reported depression, anxiety, and feelings of hopelessness during initial assessment).  IV.  Readiness to Change: 1 (Patient appeared to be ready to change based on his self-report during initial assessment).  V.   Relapse Risk: 1 (Patient during initial assessment reported he has had one relapse in the past).  VI:  Recovery Environment: 1 (Patient during initial assessment discussed wanting to find a place of his own).  Total ASAM Score = 05      BASELINE SCORES 04/25/2022       VINICIUS-7   (17)                  PHQ-9   (13)    Updated Scores 6/8/2022  VINICIUS-7 (21)  PHQ-9 (15)      Patient agreeable to adhere to medication regimen as prescribed and report any side effects. Patient will contact this office, call 911 or present to the nearest emergency room should suicidal or homicidal ideations occur.    Impression/Formulation:    ICD-10-CM ICD-9-CM   1. Opioid use disorder, severe, dependence ([facility])  F11.20 304.00   2. Cocaine use disorder, severe, in sustained remission ([facility])  F14.21 304.23       CLINICAL MANUVERING/INTERVENTIONS: Therapist utilized a person-centered approach to build rapport with group member.   Therapist implemented motivational interviewing techniques to assist client with exploring and resolving ambivalence associated with commitment to change behaviors related to substance use and addiction.  Therapist applied cognitive behavioral strategies to facilitate identification of maladaptive patterns of thinking and behavior that contribute to client’s risk for continued substance use and relapse.  Therapist employed group interaction activities to build rapport among group members, promote sobriety, and emphasize relapse prevention.  Therapist promoted safe nonjudgmental environment by providing group members with unconditional positive regard and encouraging group members to comply with group rules and guidelines. Therapist assisted group member with identifying and implementing healthier coping strategies.      PLAN:  Continue [facility] IOP Phase I   Aftercare:  [facility] Phase II  Program Assignments:  Personal recovery plan, relapse prevention plan, attendance of recovery support group meetings, exploration of sponsorship, drug/alcohol screens.     Please note that portions of this note were completed with a voice recognition program. Efforts were made to edit dictation, but occasionally words are mistranscribed.       This document signed by Duy Garcia, July 2, 2022, 17:08 EDT

## 2022-07-03 NOTE — PROGRESS NOTES
NAME: Aroldo Cai  DATE: 06/21/2022     IOP GROUP   [id number]    Number of participants: 7  IOP GROUP NOTE     DATA:     3 hour IOP group therapy session (Check-ins, Coping Skills, Relapse Prevention)     Check Ins:  Therapist continued facilitation of rapport building strategies between group members. Therapist asked that each patient check in with home life and recovery efforts and identify triggers, cravings, and high risk situations that arise between group sessions. Therapist provided empathy and support during group session.      Session Content/Coping Skills:  Clinician processed stressors presented by group members. Clinician educated group members on the “pink cloud” stage of recovery and explored strategies for moving beyond this stage. Check ins completed by group members. [employer] 12 step psychoeducational material reviewed and discussed. [employer] 12 step video began. Group members answered discussion questions regarding the 12 steps.     Response:  Patient attended class in person. Patient participated in completion of check in form.   Patient on check in form denied suicidal or homicidal thoughts or plan or intent to hurt self or others currently or since last group meeting.   Patient participated review of psychoeducational material and discussion of the 12 steps.      Personal Assessment 0-10 Scale (10 worst)    Anxiety:  2   Depression:  0   Cravings: 0     ASSESSMENT:     ..  Lab on 06/20/2022   Component Date Value Ref Range Status   • THC, Screen, Urine 06/20/2022 Negative  Negative Final   • Phencyclidine (PCP), Urine 06/20/2022 Negative  Negative Final   • Cocaine Screen, Urine 06/20/2022 Negative  Negative Final   • Methamphetamine, Ur 06/20/2022 Negative  Negative Final   • Opiate Screen 06/20/2022 Negative  Negative Final   • Amphetamine Screen, Urine 06/20/2022 Negative  Negative Final   • Benzodiazepine Screen, Urine 06/20/2022 Negative  Negative Final   • Tricyclic Antidepressants  Screen 06/20/2022 Positive (A) Negative Final   • Methadone Screen, Urine 06/20/2022 Negative  Negative Final   • Barbiturates Screen, Urine 06/20/2022 Negative  Negative Final   • Oxycodone Screen, Urine 06/20/2022 Negative  Negative Final   • Propoxyphene Screen 06/20/2022 Negative  Negative Final   • Buprenorphine, Screen, Urine 06/20/2022 Positive (A) Negative Final   • Ethanol, Urine 06/20/2022 Negative  Cutoff=0.020 % Final   • Creatinine, Urine 06/20/2022 195.8  >=20 mg/dL Final   • Buprenorphine/CR 06/20/2022 73  ng/mg creat Final   • NORBUPRENORPHINE/CR 06/20/2022 339  ng/mg creat Final   • NORBUP/BUP RATIO 06/20/2022 4.64  RATIO Final    This test was developed and its performance characteristics  determined by [employer].  It has not been cleared or approved  by the Food and Drug Administration.   • Naloxone 06/20/2022 2041  Cutoff=25 ng/ml Final   • TRP-LINK 06/20/2022 Comment   Final    These test results were not transmitted to [facility].  If you would like your patient's urine drug  test results to transmit to [facility], please  contact your [facility]  to complete  a physician authorization form.   • Antidepressants 06/20/2022 Negative   Final   • AMITRIPTYLINE, UR 06/20/2022 Not Detected   Final   • Clomipramine, Ur 06/20/2022 Not Detected   Final   • Desemethylclomipramine 06/20/2022 Not Detected   Final   • Desipramine 06/20/2022 Not Detected   Final   • Doxepin 06/20/2022 Not Detected   Final   • Desmethyldoxepin, Ur 06/20/2022 Not Detected   Final   • Imipramine 06/20/2022 Not Detected   Final   • Nortriptyline 06/20/2022 Not Detected   Final   • Protriptyline 06/20/2022 Not Detected   Final   • Trimipramine 06/20/2022 Not Detected   Final   • Level of Detection: 06/20/2022 Comment   Final    Testing Threshold:  50 or 100 ng/mL                                                                       '  This test was developed and its performance  characteristics  determined by [facility].  It has not been cleared or approved  by the Food and Drug Administration.   Lab on 06/15/2022   Component Date Value Ref Range Status   • Ethanol, Urine 06/15/2022 Negative  Cutoff=0.020 % Final   • THC, Screen, Urine 06/15/2022 Negative  Negative Final   • Phencyclidine (PCP), Urine 06/15/2022 Negative  Negative Final   • Cocaine Screen, Urine 06/15/2022 Negative  Negative Final   • Methamphetamine, Ur 06/15/2022 Negative  Negative Final   • Opiate Screen 06/15/2022 Negative  Negative Final   • Amphetamine Screen, Urine 06/15/2022 Negative  Negative Final   • Benzodiazepine Screen, Urine 06/15/2022 Positive (A) Negative Final   • Tricyclic Antidepressants Screen 06/15/2022 Positive (A) Negative Final   • Methadone Screen, Urine 06/15/2022 Negative  Negative Final   • Barbiturates Screen, Urine 06/15/2022 Negative  Negative Final   • Oxycodone Screen, Urine 06/15/2022 Negative  Negative Final   • Propoxyphene Screen 06/15/2022 Negative  Negative Final   • Buprenorphine, Screen, Urine 06/15/2022 Positive (A) Negative Final   • Creatinine, Urine 06/15/2022 251.4  >=20 mg/dL Final   • Buprenorphine/CR 06/15/2022 57  ng/mg creat Final   • NORBUPRENORPHINE/CR 06/15/2022 187  ng/mg creat Final   • NORBUP/BUP RATIO 06/15/2022 3.28  RATIO Final    This test was developed and its performance characteristics  determined by [facility].  It has not been cleared or approved  by the Food and Drug Administration.   • Naloxone 06/15/2022 265  Cutoff=25 ng/ml Final   • TRP-LINK 06/15/2022 Comment   Final    These test results were not transmitted to [facility].  If you would like your patient's urine drug  test results to transmit to [facility], please  contact your [employer]  to complete  a physician authorization form.   • Antidepressants 06/15/2022 Negative   Final   • AMITRIPTYLINE, UR 06/15/2022 Not Detected   Final   • Clomipramine, Ur 06/15/2022  Not Detected   Final   • Desemethylclomipramine 06/15/2022 Not Detected   Final   • Desipramine 06/15/2022 Not Detected   Final   • Doxepin 06/15/2022 Not Detected   Final   • Desmethyldoxepin, Ur 06/15/2022 Not Detected   Final   • Imipramine 06/15/2022 Not Detected   Final   • Nortriptyline 06/15/2022 Not Detected   Final   • Protriptyline 06/15/2022 Not Detected   Final   • Trimipramine 06/15/2022 Not Detected   Final   • Level of Detection: 06/15/2022 Comment   Final    Testing Threshold:  50 or 100 ng/mL                                                                       '  This test was developed and its performance characteristics  determined by [employer].  It has not been cleared or approved  by the Food and Drug Administration.   • Benzodiazepine Screen, Urine 06/15/2022 +POSITIVE+   Final   • DIAZEPAM URINE QUANT (REF) 06/15/2022 Not Detected  ng/mg creat Final   • Desmethyldiazepam 06/15/2022 Not Detected  ng/mg creat Final   • Oxazepam, urine 06/15/2022 21  ng/mg creat Final   • Temazepam, urine 06/15/2022 Not Detected  ng/mg creat Final    Expected metabolism of benzodiazepine class drugs:   Parent Drug       Detected Metabolites   -----------       --------------------   Diazepam:         Desmethyldiazepam, Temazepam, Oxazepam   Chlordiazepoxide: Desmethyldiazepam, Oxazepam   Clorazepate:      Desmethyldiazepam, Oxazepam   Halazepam:        Desmethyldiazepam, Oxazepam   Temazepam:        Oxazepam   Oxazepam:         None   • ALPRAZOLAM 06/15/2022 Not Detected  ng/mg creat Final   • ALPHA-HYDROXYALPRAZOLAM UR, QT (RE* 06/15/2022 Not Detected  ng/mg creat Final   • DESALKLFLURAZEPAM UR QUANT (REF) 06/15/2022 Not Detected  ng/mg creat Final   • LORAZEPAM URINE QUANT (REF) 06/15/2022 Not Detected  ng/mg creat Final   • Alpha-hydroxytriazolam, Urine 06/15/2022 Not Detected  ng/mg creat Final   • Clonazepam Urine 06/15/2022 Not Detected  ng/mg creat Final   • 7-Aminoclonazepam Urine 06/15/2022 Not Detected   ng/mg creat Final   • MIDAZOLAM UR, QUANT (REF) 06/15/2022 Not Detected  ng/mg creat Final   • Alpha-hydroxymidazolam, Urine 06/15/2022 Not Detected  ng/mg creat Final   • Flunitrazepam 06/15/2022 Not Detected  ng/mg creat Final   • DESMETHYLFLUNITRAZEPAM 06/15/2022 Not Detected  ng/mg creat Final   • Level of Detection: 06/15/2022 Comment   Final    Testing Threshold:  20 ng/mL                                                                       '  This test was developed and its performance characteristics  determined by [facility].  It has not been cleared or approved  by the Food and Drug Administration.   Lab on 06/07/2022   Component Date Value Ref Range Status   • Ethanol, Urine 06/07/2022 Negative  Cutoff=0.020 % Final   • THC, Screen, Urine 06/07/2022 Negative  Negative Final   • Phencyclidine (PCP), Urine 06/07/2022 Negative  Negative Final   • Cocaine Screen, Urine 06/07/2022 Negative  Negative Final   • Methamphetamine, Ur 06/07/2022 Negative  Negative Final   • Opiate Screen 06/07/2022 Negative  Negative Final   • Amphetamine Screen, Urine 06/07/2022 Negative  Negative Final   • Benzodiazepine Screen, Urine 06/07/2022 Positive (A) Negative Final   • Tricyclic Antidepressants Screen 06/07/2022 Positive (A) Negative Final   • Methadone Screen, Urine 06/07/2022 Negative  Negative Final   • Barbiturates Screen, Urine 06/07/2022 Negative  Negative Final   • Oxycodone Screen, Urine 06/07/2022 Negative  Negative Final   • Propoxyphene Screen 06/07/2022 Negative  Negative Final   • Buprenorphine, Screen, Urine 06/07/2022 Positive (A) Negative Final   • Creatinine, Urine 06/07/2022 181.8  >=20 mg/dL Final   • Buprenorphine/CR 06/07/2022 68  ng/mg creat Final   • NORBUPRENORPHINE/CR 06/07/2022 275  ng/mg creat Final   • NORBUP/BUP RATIO 06/07/2022 4.04  RATIO Final    This test was developed and its performance characteristics  determined by [facility].  It has not been cleared or approved  by the Food and Drug  Administration.   • Naloxone 06/07/2022 204  Cutoff=25 ng/ml Final   • TRP-LINK 06/07/2022 Comment   Final    These test results were not transmitted to [facility].  If you would like your patient's urine drug  test results to transmit to [facility], please  contact your [facility]  to complete  a physician authorization form.   • Antidepressants 06/07/2022 Negative   Final   • AMITRIPTYLINE, UR 06/07/2022 Not Detected   Final   • Clomipramine, Ur 06/07/2022 Not Detected   Final   • Desemethylclomipramine 06/07/2022 Not Detected   Final   • Desipramine 06/07/2022 Not Detected   Final   • Doxepin 06/07/2022 Not Detected   Final   • Desmethyldoxepin, Ur 06/07/2022 Not Detected   Final   • Imipramine 06/07/2022 Not Detected   Final   • Nortriptyline 06/07/2022 Not Detected   Final   • Protriptyline 06/07/2022 Not Detected   Final   • Trimipramine 06/07/2022 Not Detected   Final   • Level of Detection: 06/07/2022 Comment   Final    Testing Threshold:  50 or 100 ng/mL                                                                       '  This test was developed and its performance characteristics  determined by [facility].  It has not been cleared or approved  by the Food and Drug Administration.   • Benzodiazepine Screen, Urine 06/07/2022 +POSITIVE+   Final   • DIAZEPAM URINE QUANT (REF) 06/07/2022 Not Detected  ng/mg creat Final   • Desmethyldiazepam 06/07/2022 Not Detected  ng/mg creat Final   • Oxazepam, urine 06/07/2022 39  ng/mg creat Final   • Temazepam, urine 06/07/2022 Not Detected  ng/mg creat Final    Expected metabolism of benzodiazepine class drugs:   Parent Drug       Detected Metabolites   -----------       --------------------   Diazepam:         Desmethyldiazepam, Temazepam, Oxazepam   Chlordiazepoxide: Desmethyldiazepam, Oxazepam   Clorazepate:      Desmethyldiazepam, Oxazepam   Halazepam:        Desmethyldiazepam, Oxazepam   Temazepam:        Oxazepam   Oxazepam:          None   • ALPRAZOLAM 06/07/2022 Not Detected  ng/mg creat Final   • ALPHA-HYDROXYALPRAZOLAM UR, QT (RE* 06/07/2022 Not Detected  ng/mg creat Final   • DESALKLFLURAZEPAM UR QUANT (REF) 06/07/2022 Not Detected  ng/mg creat Final   • LORAZEPAM URINE QUANT (REF) 06/07/2022 Not Detected  ng/mg creat Final   • Alpha-hydroxytriazolam, Urine 06/07/2022 Not Detected  ng/mg creat Final   • Clonazepam Urine 06/07/2022 Not Detected  ng/mg creat Final   • 7-Aminoclonazepam Urine 06/07/2022 Not Detected  ng/mg creat Final   • MIDAZOLAM UR, QUANT (REF) 06/07/2022 Not Detected  ng/mg creat Final   • Alpha-hydroxymidazolam, Urine 06/07/2022 Not Detected  ng/mg creat Final   • Flunitrazepam 06/07/2022 Not Detected  ng/mg creat Final   • DESMETHYLFLUNITRAZEPAM 06/07/2022 Not Detected  ng/mg creat Final   • Level of Detection: 06/07/2022 Comment   Final    Testing Threshold:  20 ng/mL                                                                       '  This test was developed and its performance characteristics  determined by [employer].  It has not been cleared or approved  by the Food and Drug Administration.       Mental Status Exam  Hygiene:  good  Dress: casual  Attitude: cooperative and agreeable   Motor Activity: appropriate  Eye Contact:  good  Speech: regular rate and rhythm   Mood:  calm and cooperative  Affect:  Appropriate  Thought Processes:  Linear  Thought Content:  Normal  Suicidal Thoughts:  denies  Homicidal Thoughts:  denies  Crisis Safety Plan: yes, to come to the emergency room.  Hallucinations:  denies  Reliability: fair  Insight: fair  Judgement: fair  Impulse Control: fair    Family issues related to recovery:  No change, see previous encounters    Recovery/spiritual support group attendance: No     Sponsor: No      Motivation for treatment:  During initial assessment, patient reported “my kids”.     Patient's Support Network Includes: During initial assessment, patient reported sober support system is  “my family and youngest son’s mom”.     Progress toward goal: Not at goal    Prognosis: Fair with Ongoing Treatment     Self-reported number of days sober:  Patient reported May 26th on check in form.     ASAM Dimensions:  I.    Intoxication/Withdrawal:  0  (Patient during initial assessment reported no recent substance use).  II.   Medical Conditions/Complications:  1 (Due to medical conditions and history of seizures discussed by patient during initial assessment).  III.  Behavioral/Emotional/Cognitive: 1 (Due to patient's reported depression, anxiety, and feelings of hopelessness during initial assessment).  IV.  Readiness to Change: 1 (Patient appeared to be ready to change based on his self-report during initial assessment).  V.   Relapse Risk: 1 (Patient during initial assessment reported he has had one relapse in the past).  VI:  Recovery Environment: 1 (Patient during initial assessment discussed wanting to find a place of his own).  Total ASAM Score = 05      BASELINE SCORES 04/25/2022       VINICIUS-7   (17)                  PHQ-9   (13)    Updated Scores 6/8/2022  VINICIUS-7 (21)  PHQ-9 (15)    Patient agreeable to adhere to medication regimen as prescribed and report any side effects. Patient will contact this office, call 911 or present to the nearest emergency room should suicidal or homicidal ideations occur.    Impression/Formulation:    ICD-10-CM ICD-9-CM   1. Opioid use disorder, severe, dependence (HCC)  F11.20 304.00   2. Cocaine use disorder, severe, in sustained remission (HCC)  F14.21 304.23       CLINICAL MANUVERING/INTERVENTIONS: Therapist utilized a person-centered approach to build rapport with group member.  Therapist implemented motivational interviewing techniques to assist client with exploring and resolving ambivalence associated with commitment to change behaviors related to substance use and addiction.  Therapist applied cognitive behavioral strategies to facilitate identification of  maladaptive patterns of thinking and behavior that contribute to client’s risk for continued substance use and relapse.  Therapist employed group interaction activities to build rapport among group members, promote sobriety, and emphasize relapse prevention.  Therapist promoted safe nonjudgmental environment by providing group members with unconditional positive regard and encouraging group members to comply with group rules and guidelines. Therapist assisted group member with identifying and implementing healthier coping strategies.      PLAN:  Continue [facility] IOP Phase I   Aftercare:  [facility] Phase II  Program Assignments:  Personal recovery plan, relapse prevention plan, attendance of recovery support group meetings, exploration of sponsorship, drug/alcohol screens.     Please note that portions of this note were completed with a voice recognition program. Efforts were made to edit dictation, but occasionally words are mistranscribed.       This document signed by Duy Garcia, July 3, 2022, 14:23 EDT

## 2022-07-03 NOTE — PROGRESS NOTES
"NAME: Aroldo Cai  DATE: 06/22/2022    [facility] GROUP   [id number]    Number of participants: 6  IOP GROUP NOTE     DATA:     3 hour IOP group therapy session (Check-ins, Coping Skills, Relapse Prevention)     Check Ins: Therapist continued facilitation of rapport building strategies between group members. Therapist asked that each patient check in with home life and recovery efforts and identify triggers, cravings, and high risk situations that arise between group sessions. Therapist provided empathy and support during group session.     Session Content/Coping Skills: Check ins completed by group members. , Keila, joined for first part of meeting and shared Just for Today reading. Clinician explored radical acceptance with group members. [place] survival group activity completed as a group rapport building exercise. Clinician discussed with group members the importance of teamwork and communication in group. Weekend relapse prevention plans developed and shared by group members.     Response: Patient attended class in person. Patient participated in completion of check in form.   Patient on check in form denied suicidal or homicidal thoughts or plan or intent to hurt self or others currently or since last group meeting. Patient participated group activity, group discussions, and discussion of weekend relapse prevention plans.     Clinician met with patient following group. Patient reported coping skills he has used include reading, \"self-time\", work, talking with his dad, and spending time with his kids. Patient reported depression is \"better\". Patient reported anxiety is a constant 4 (1-low). Patient denied risk factors. Patient reported he may try online meetings. Patient reported last substance use was May 26th when he used Valium. Patient reported last heroin use was a week before that. Patient reported motivation for treatment is \"my son\". Patient reported currently or in the past two " weeks he has had no suicidal or homicidal thoughts. Patient denied suicidal thoughts or plan or intent to hurt self. Patient denied homicidal thoughts or plan or intent to hurt others. Patient denied death wishes.         Personal Assessment 0-10 Scale (10 worst)    Anxiety:  4   Depression:  0   Cravings: 0     ASSESSMENT:     ..  Lab on 06/20/2022   Component Date Value Ref Range Status   • THC, Screen, Urine 06/20/2022 Negative  Negative Final   • Phencyclidine (PCP), Urine 06/20/2022 Negative  Negative Final   • Cocaine Screen, Urine 06/20/2022 Negative  Negative Final   • Methamphetamine, Ur 06/20/2022 Negative  Negative Final   • Opiate Screen 06/20/2022 Negative  Negative Final   • Amphetamine Screen, Urine 06/20/2022 Negative  Negative Final   • Benzodiazepine Screen, Urine 06/20/2022 Negative  Negative Final   • Tricyclic Antidepressants Screen 06/20/2022 Positive (A) Negative Final   • Methadone Screen, Urine 06/20/2022 Negative  Negative Final   • Barbiturates Screen, Urine 06/20/2022 Negative  Negative Final   • Oxycodone Screen, Urine 06/20/2022 Negative  Negative Final   • Propoxyphene Screen 06/20/2022 Negative  Negative Final   • Buprenorphine, Screen, Urine 06/20/2022 Positive (A) Negative Final   • Ethanol, Urine 06/20/2022 Negative  Cutoff=0.020 % Final   • Creatinine, Urine 06/20/2022 195.8  >=20 mg/dL Final   • Buprenorphine/CR 06/20/2022 73  ng/mg creat Final   • NORBUPRENORPHINE/CR 06/20/2022 339  ng/mg creat Final   • NORBUP/BUP RATIO 06/20/2022 4.64  RATIO Final    This test was developed and its performance characteristics  determined by [facility].  It has not been cleared or approved  by the Food and Drug Administration.   • Naloxone 06/20/2022 2041  Cutoff=25 ng/ml Final   • TRP-LINK 06/20/2022 Comment   Final    These test results were not transmitted to [facility].  If you would like your patient's urine drug  test results to transmit to [facility], please  contact  your [facility]  to complete  a physician authorization form.   • Antidepressants 06/20/2022 Negative   Final   • AMITRIPTYLINE, UR 06/20/2022 Not Detected   Final   • Clomipramine, Ur 06/20/2022 Not Detected   Final   • Desemethylclomipramine 06/20/2022 Not Detected   Final   • Desipramine 06/20/2022 Not Detected   Final   • Doxepin 06/20/2022 Not Detected   Final   • Desmethyldoxepin, Ur 06/20/2022 Not Detected   Final   • Imipramine 06/20/2022 Not Detected   Final   • Nortriptyline 06/20/2022 Not Detected   Final   • Protriptyline 06/20/2022 Not Detected   Final   • Trimipramine 06/20/2022 Not Detected   Final   • Level of Detection: 06/20/2022 Comment   Final    Testing Threshold:  50 or 100 ng/mL                                                                       '  This test was developed and its performance characteristics  determined by [facility].  It has not been cleared or approved  by the Food and Drug Administration.   Lab on 06/15/2022   Component Date Value Ref Range Status   • Ethanol, Urine 06/15/2022 Negative  Cutoff=0.020 % Final   • THC, Screen, Urine 06/15/2022 Negative  Negative Final   • Phencyclidine (PCP), Urine 06/15/2022 Negative  Negative Final   • Cocaine Screen, Urine 06/15/2022 Negative  Negative Final   • Methamphetamine, Ur 06/15/2022 Negative  Negative Final   • Opiate Screen 06/15/2022 Negative  Negative Final   • Amphetamine Screen, Urine 06/15/2022 Negative  Negative Final   • Benzodiazepine Screen, Urine 06/15/2022 Positive (A) Negative Final   • Tricyclic Antidepressants Screen 06/15/2022 Positive (A) Negative Final   • Methadone Screen, Urine 06/15/2022 Negative  Negative Final   • Barbiturates Screen, Urine 06/15/2022 Negative  Negative Final   • Oxycodone Screen, Urine 06/15/2022 Negative  Negative Final   • Propoxyphene Screen 06/15/2022 Negative  Negative Final   • Buprenorphine, Screen, Urine 06/15/2022 Positive (A) Negative Final   • Creatinine, Urine  06/15/2022 251.4  >=20 mg/dL Final   • Buprenorphine/CR 06/15/2022 57  ng/mg creat Final   • NORBUPRENORPHINE/CR 06/15/2022 187  ng/mg creat Final   • NORBUP/BUP RATIO 06/15/2022 3.28  RATIO Final    This test was developed and its performance characteristics  determined by [facility].  It has not been cleared or approved  by the Food and Drug Administration.   • Naloxone 06/15/2022 265  Cutoff=25 ng/ml Final   • TRP-LINK 06/15/2022 Comment   Final    These test results were not transmitted to [facility].  If you would like your patient's urine drug  test results to transmit to [facility], please  contact your [facility]  to complete  a physician authorization form.   • Antidepressants 06/15/2022 Negative   Final   • AMITRIPTYLINE, UR 06/15/2022 Not Detected   Final   • Clomipramine, Ur 06/15/2022 Not Detected   Final   • Desemethylclomipramine 06/15/2022 Not Detected   Final   • Desipramine 06/15/2022 Not Detected   Final   • Doxepin 06/15/2022 Not Detected   Final   • Desmethyldoxepin, Ur 06/15/2022 Not Detected   Final   • Imipramine 06/15/2022 Not Detected   Final   • Nortriptyline 06/15/2022 Not Detected   Final   • Protriptyline 06/15/2022 Not Detected   Final   • Trimipramine 06/15/2022 Not Detected   Final   • Level of Detection: 06/15/2022 Comment   Final    Testing Threshold:  50 or 100 ng/mL                                                                       '  This test was developed and its performance characteristics  determined by [facility].  It has not been cleared or approved  by the Food and Drug Administration.   • Benzodiazepine Screen, Urine 06/15/2022 +POSITIVE+   Final   • DIAZEPAM URINE QUANT (REF) 06/15/2022 Not Detected  ng/mg creat Final   • Desmethyldiazepam 06/15/2022 Not Detected  ng/mg creat Final   • Oxazepam, urine 06/15/2022 21  ng/mg creat Final   • Temazepam, urine 06/15/2022 Not Detected  ng/mg creat Final    Expected metabolism of  benzodiazepine class drugs:   Parent Drug       Detected Metabolites   -----------       --------------------   Diazepam:         Desmethyldiazepam, Temazepam, Oxazepam   Chlordiazepoxide: Desmethyldiazepam, Oxazepam   Clorazepate:      Desmethyldiazepam, Oxazepam   Halazepam:        Desmethyldiazepam, Oxazepam   Temazepam:        Oxazepam   Oxazepam:         None   • ALPRAZOLAM 06/15/2022 Not Detected  ng/mg creat Final   • ALPHA-HYDROXYALPRAZOLAM UR, QT (RE* 06/15/2022 Not Detected  ng/mg creat Final   • DESALKLFLURAZEPAM UR QUANT (REF) 06/15/2022 Not Detected  ng/mg creat Final   • LORAZEPAM URINE QUANT (REF) 06/15/2022 Not Detected  ng/mg creat Final   • Alpha-hydroxytriazolam, Urine 06/15/2022 Not Detected  ng/mg creat Final   • Clonazepam Urine 06/15/2022 Not Detected  ng/mg creat Final   • 7-Aminoclonazepam Urine 06/15/2022 Not Detected  ng/mg creat Final   • MIDAZOLAM UR, QUANT (REF) 06/15/2022 Not Detected  ng/mg creat Final   • Alpha-hydroxymidazolam, Urine 06/15/2022 Not Detected  ng/mg creat Final   • Flunitrazepam 06/15/2022 Not Detected  ng/mg creat Final   • DESMETHYLFLUNITRAZEPAM 06/15/2022 Not Detected  ng/mg creat Final   • Level of Detection: 06/15/2022 Comment   Final    Testing Threshold:  20 ng/mL                                                                       '  This test was developed and its performance characteristics  determined by [facility].  It has not been cleared or approved  by the Food and Drug Administration.   Lab on 06/07/2022   Component Date Value Ref Range Status   • Ethanol, Urine 06/07/2022 Negative  Cutoff=0.020 % Final   • THC, Screen, Urine 06/07/2022 Negative  Negative Final   • Phencyclidine (PCP), Urine 06/07/2022 Negative  Negative Final   • Cocaine Screen, Urine 06/07/2022 Negative  Negative Final   • Methamphetamine, Ur 06/07/2022 Negative  Negative Final   • Opiate Screen 06/07/2022 Negative  Negative Final   • Amphetamine Screen, Urine 06/07/2022 Negative   Negative Final   • Benzodiazepine Screen, Urine 06/07/2022 Positive (A) Negative Final   • Tricyclic Antidepressants Screen 06/07/2022 Positive (A) Negative Final   • Methadone Screen, Urine 06/07/2022 Negative  Negative Final   • Barbiturates Screen, Urine 06/07/2022 Negative  Negative Final   • Oxycodone Screen, Urine 06/07/2022 Negative  Negative Final   • Propoxyphene Screen 06/07/2022 Negative  Negative Final   • Buprenorphine, Screen, Urine 06/07/2022 Positive (A) Negative Final   • Creatinine, Urine 06/07/2022 181.8  >=20 mg/dL Final   • Buprenorphine/CR 06/07/2022 68  ng/mg creat Final   • NORBUPRENORPHINE/CR 06/07/2022 275  ng/mg creat Final   • NORBUP/BUP RATIO 06/07/2022 4.04  RATIO Final    This test was developed and its performance characteristics  determined by [facility].  It has not been cleared or approved  by the Food and Drug Administration.   • Naloxone 06/07/2022 204  Cutoff=25 ng/ml Final   • TRP-LINK 06/07/2022 Comment   Final    These test results were not transmitted to [facility].  If you would like your patient's urine drug  test results to transmit to [facility], please  contact your [facility]  to complete  a physician authorization form.   • Antidepressants 06/07/2022 Negative   Final   • AMITRIPTYLINE, UR 06/07/2022 Not Detected   Final   • Clomipramine, Ur 06/07/2022 Not Detected   Final   • Desemethylclomipramine 06/07/2022 Not Detected   Final   • Desipramine 06/07/2022 Not Detected   Final   • Doxepin 06/07/2022 Not Detected   Final   • Desmethyldoxepin, Ur 06/07/2022 Not Detected   Final   • Imipramine 06/07/2022 Not Detected   Final   • Nortriptyline 06/07/2022 Not Detected   Final   • Protriptyline 06/07/2022 Not Detected   Final   • Trimipramine 06/07/2022 Not Detected   Final   • Level of Detection: 06/07/2022 Comment   Final    Testing Threshold:  50 or 100 ng/mL                                                                        '  This test was developed and its performance characteristics  determined by [facility].  It has not been cleared or approved  by the Food and Drug Administration.   • Benzodiazepine Screen, Urine 06/07/2022 +POSITIVE+   Final   • DIAZEPAM URINE QUANT (REF) 06/07/2022 Not Detected  ng/mg creat Final   • Desmethyldiazepam 06/07/2022 Not Detected  ng/mg creat Final   • Oxazepam, urine 06/07/2022 39  ng/mg creat Final   • Temazepam, urine 06/07/2022 Not Detected  ng/mg creat Final    Expected metabolism of benzodiazepine class drugs:   Parent Drug       Detected Metabolites   -----------       --------------------   Diazepam:         Desmethyldiazepam, Temazepam, Oxazepam   Chlordiazepoxide: Desmethyldiazepam, Oxazepam   Clorazepate:      Desmethyldiazepam, Oxazepam   Halazepam:        Desmethyldiazepam, Oxazepam   Temazepam:        Oxazepam   Oxazepam:         None   • ALPRAZOLAM 06/07/2022 Not Detected  ng/mg creat Final   • ALPHA-HYDROXYALPRAZOLAM UR, QT (RE* 06/07/2022 Not Detected  ng/mg creat Final   • DESALKLFLURAZEPAM UR QUANT (REF) 06/07/2022 Not Detected  ng/mg creat Final   • LORAZEPAM URINE QUANT (REF) 06/07/2022 Not Detected  ng/mg creat Final   • Alpha-hydroxytriazolam, Urine 06/07/2022 Not Detected  ng/mg creat Final   • Clonazepam Urine 06/07/2022 Not Detected  ng/mg creat Final   • 7-Aminoclonazepam Urine 06/07/2022 Not Detected  ng/mg creat Final   • MIDAZOLAM UR, QUANT (REF) 06/07/2022 Not Detected  ng/mg creat Final   • Alpha-hydroxymidazolam, Urine 06/07/2022 Not Detected  ng/mg creat Final   • Flunitrazepam 06/07/2022 Not Detected  ng/mg creat Final   • DESMETHYLFLUNITRAZEPAM 06/07/2022 Not Detected  ng/mg creat Final   • Level of Detection: 06/07/2022 Comment   Final    Testing Threshold:  20 ng/mL                                                                       '  This test was developed and its performance characteristics  determined by [facility].  It has not been cleared or approved  by the  Food and Drug Administration.       Mental Status Exam  Hygiene:  good  Dress: casual  Attitude: cooperative and agreeable   Motor Activity: appropriate  Eye Contact:  good  Speech: regular rate and rhythm   Mood:  calm and cooperative  Affect:  Appropriate  Thought Processes:  Linear  Thought Content:  Normal  Suicidal Thoughts:  denies  Homicidal Thoughts:  denies  Crisis Safety Plan: yes, to come to the emergency room.  Hallucinations:  denies  Reliability: fair  Insight: fair  Judgement: fair  Impulse Control: fair    Family issues related to recovery:  No change, see previous encounters    Recovery/spiritual support group attendance: No     Sponsor: No     Motivation for treatment: During initial assessment, patient reported “my kids”.     Patient's Support Network Includes: During initial assessment, patient reported sober support system is “my family and youngest son’s mom”.     Progress toward goal: Not at goal    Prognosis: Fair with Ongoing Treatment     Self-reported number of days sober: Patient reported May 26th on check in form.     ASAM Dimensions:  I.    Intoxication/Withdrawal:  0  (Patient during initial assessment reported no recent substance use).  II.   Medical Conditions/Complications:  1 (Due to medical conditions and history of seizures discussed by patient during initial assessment).  III.  Behavioral/Emotional/Cognitive: 1 (Due to patient's reported depression, anxiety, and feelings of hopelessness during initial assessment).  IV.  Readiness to Change: 1 (Patient appeared to be ready to change based on his self-report during initial assessment).  V.   Relapse Risk: 1 (Patient during initial assessment reported he has had one relapse in the past).  VI:  Recovery Environment: 1 (Patient during initial assessment discussed wanting to find a place of his own).  Total ASAM Score = 05      BASELINE SCORES 04/25/2022       VINICIUS-7   (17)                  PHQ-9   (13)    Updated Scores  6/8/2022  VINICIUS-7 (21)  PHQ-9 (15)     Patient agreeable to adhere to medication regimen as prescribed and report any side effects. Patient will contact this office, call 911 or present to the nearest emergency room should suicidal or homicidal ideations occur.    Impression/Formulation:    ICD-10-CM ICD-9-CM   1. Opioid use disorder, severe, dependence (HCC)  F11.20 304.00   2. Cocaine use disorder, severe, in sustained remission (HCC)  F14.21 304.23       CLINICAL MANUVERING/INTERVENTIONS: Therapist utilized a person-centered approach to build rapport with group member.  Therapist implemented motivational interviewing techniques to assist client with exploring and resolving ambivalence associated with commitment to change behaviors related to substance use and addiction.  Therapist applied cognitive behavioral strategies to facilitate identification of maladaptive patterns of thinking and behavior that contribute to client’s risk for continued substance use and relapse.  Therapist employed group interaction activities to build rapport among group members, promote sobriety, and emphasize relapse prevention.  Therapist promoted safe nonjudgmental environment by providing group members with unconditional positive regard and encouraging group members to comply with group rules and guidelines. Therapist assisted group member with identifying and implementing healthier coping strategies.       PLAN:  Continue [facility] IOP Phase I   Aftercare:  [facility] Phase II  Program Assignments:  Personal recovery plan, relapse prevention plan, attendance of recovery support group meetings, exploration of sponsorship, drug/alcohol screens.     Please note that portions of this note were completed with a voice recognition program. Efforts were made to edit dictation, but occasionally words are mistranscribed.       This document signed by Duy Garcia, July 3, 2022, 15:45 EDT

## 2022-07-04 ENCOUNTER — APPOINTMENT (OUTPATIENT)
Dept: PSYCHIATRY | Facility: HOSPITAL | Age: 42
End: 2022-07-04

## 2022-07-05 ENCOUNTER — OFFICE VISIT (OUTPATIENT)
Dept: PSYCHIATRY | Facility: CLINIC | Age: 42
End: 2022-07-05

## 2022-07-05 ENCOUNTER — OFFICE VISIT (OUTPATIENT)
Dept: PSYCHIATRY | Facility: HOSPITAL | Age: 42
End: 2022-07-05

## 2022-07-05 ENCOUNTER — LAB (OUTPATIENT)
Dept: LAB | Facility: HOSPITAL | Age: 42
End: 2022-07-05

## 2022-07-05 VITALS
BODY MASS INDEX: 40.65 KG/M2 | WEIGHT: 244 LBS | SYSTOLIC BLOOD PRESSURE: 124 MMHG | HEIGHT: 65 IN | DIASTOLIC BLOOD PRESSURE: 72 MMHG

## 2022-07-05 DIAGNOSIS — F14.21 COCAINE USE DISORDER, SEVERE, IN SUSTAINED REMISSION: ICD-10-CM

## 2022-07-05 DIAGNOSIS — F11.20 OPIOID USE DISORDER, SEVERE, DEPENDENCE: ICD-10-CM

## 2022-07-05 DIAGNOSIS — F11.21 OPIOID USE DISORDER, SEVERE, IN EARLY REMISSION, ON MAINTENANCE THERAPY, DEPENDENCE (HCC): ICD-10-CM

## 2022-07-05 DIAGNOSIS — F11.20 OPIOID USE DISORDER, SEVERE, DEPENDENCE: Primary | ICD-10-CM

## 2022-07-05 LAB
AMITRIP UR QL CFM: NOT DETECTED
AMPHET+METHAMPHET UR QL: NEGATIVE
AMPHETAMINES UR QL: NEGATIVE
ANTIDEPRESSANTS UR QL: NEGATIVE
BARBITURATES UR QL SCN: NEGATIVE
BENZODIAZ UR QL SCN: NEGATIVE
BUPRENORPHINE SERPL-MCNC: POSITIVE NG/ML
BUPRENORPHINE UR QL CFM: NORMAL
BUPRENORPHINE/CREAT UR: 78 NG/MG CREAT
CANNABINOIDS SERPL QL: NEGATIVE
CLOMIPRAMINE UR QL: NOT DETECTED
COCAINE UR QL: NEGATIVE
DESIPRAMINE UR QL CFM: NOT DETECTED
DOXEPIN UR QL CFM: NOT DETECTED
IMIPRAMINE UR QL CFM: NOT DETECTED
LEVEL OF DETECTION:: NORMAL
LEVEL OF DETECTION:: NORMAL
METHADONE UR QL SCN: NEGATIVE
NORBUPRENORPHINE/CREAT UR: 376 NG/MG CREAT
NORCLOMIPRAMINE UR QL: NOT DETECTED
NORDOXEPIN UR QL: NOT DETECTED
NORTRIP UR QL CFM: NOT DETECTED
OPIATES UR QL: NEGATIVE
OXYCODONE UR QL SCN: NEGATIVE
PCP UR QL SCN: NEGATIVE
PROPOXYPH UR QL: NEGATIVE
PROTRIP UR QL: NOT DETECTED
TRICYCLICS UR QL SCN: POSITIVE
TRIMIPRAMINE UR QL: NOT DETECTED

## 2022-07-05 PROCEDURE — 80307 DRUG TEST PRSMV CHEM ANLYZR: CPT

## 2022-07-05 PROCEDURE — 99212 OFFICE O/P EST SF 10 MIN: CPT | Performed by: NURSE PRACTITIONER

## 2022-07-05 PROCEDURE — H0015 ALCOHOL AND/OR DRUG SERVICES: HCPCS | Performed by: NURSE PRACTITIONER

## 2022-07-05 PROCEDURE — G0480 DRUG TEST DEF 1-7 CLASSES: HCPCS

## 2022-07-05 RX ORDER — BUPRENORPHINE HYDROCHLORIDE AND NALOXONE HYDROCHLORIDE DIHYDRATE 8; 2 MG/1; MG/1
2 TABLET SUBLINGUAL DAILY
Qty: 28 TABLET | Refills: 0 | Status: SHIPPED | OUTPATIENT
Start: 2022-07-05 | End: 2022-07-19 | Stop reason: SDUPTHER

## 2022-07-05 NOTE — PROGRESS NOTES
"     Office  Follow Up Visit      Patient Name: Aroldo Cai  : 1980   MRN: [id number]     Referring Provider: Gerald Dobson MD    Chief Complaint: Substance use    History of Present Illness:   Aroldo Cai is a 42 y.o. male who is here today for follow up related to substance use.  Taking buprenorphine/naloxone 16-4 mg daily as prescribed.  Tolerating well and denies any adverse effects.  Feels cravings are \"as good as they can be\" at this time and is happy with current medication overall.  Missed one class of IOP last week due to migraine.  Had a good .  Celebrated with family out of town.  No other complaints today.    Triggers: Stress, exposure to illicit substances, anxiety    Cravings: Stable    Relapse Prevention: IOP, peer support, MOUD    Urine Drug Screen (today's visit) discussed: Positive buprenorphine as expected    UDS Confirmation: 2022 positive buprenorphine.  Nor buprenorphine pending.    QIANA (PDMP) Reviewed for Current/Active Medications: buprenorphine/naloxone as expected.    Past Surgical History:  Past Surgical History:   Procedure Laterality Date   • ABDOMINAL SURGERY     • BRAIN SURGERY      craniotomy,  right frontal lobe AVM   • CHOLECYSTECTOMY WITH INTRAOPERATIVE CHOLANGIOGRAM N/A 2021    Procedure: CHOLECYSTECTOMY LAPAROSCOPIC INTRAOPERATIVE CHOLANGIOGRAPHY;  Surgeon: Hardik Blancas MD;  Location: [facility] OR;  Service: General;  Laterality: N/A;   • COLONOSCOPY     • ENDOSCOPY N/A 10/22/2021    Procedure: ESOPHAGOGASTRODUODENOSCOPY WITH BIOPSY;  Surgeon: Hardik Blancas MD;  Location: [facility] ENDOSCOPY;  Service: Gastroenterology;  Laterality: N/A;   • HERNIA REPAIR Right     inguinal   • HIP SURGERY Bilateral    • HIP SURGERY Left        Problem List:  Patient Active Problem List   Diagnosis   • Calculus of gallbladder without cholecystitis without obstruction   • Right upper quadrant abdominal pain   • Nausea and vomiting   • Opioid use " disorder, severe, dependence (HCC)       Allergy:   Allergies   Allergen Reactions   • Dilantin [Phenytoin] Seizure   • Penicillins Anaphylaxis   • Phenobarbital Seizure   • Tegretol [Carbamazepine] Seizure   • Latex Rash        Current Medications:   Current Outpatient Medications   Medication Sig Dispense Refill   • allopurinol (ZYLOPRIM) 100 MG tablet Take 100 mg by mouth Daily.     • buprenorphine-naloxone (SUBOXONE) 8-2 MG per SL tablet Place 2 tablets under the tongue Daily for 14 days. 28 tablet 0   • cloNIDine (Catapres) 0.2 MG tablet Take 1 tablet by mouth Every Night. 30 tablet 2   • colchicine 0.6 MG tablet Take 0.6 mg by mouth Daily.     • cyclobenzaprine (FLEXERIL) 10 MG tablet Take 10 mg by mouth every night at bedtime.     • ibuprofen (ADVIL,MOTRIN) 800 MG tablet      • levETIRAcetam (KEPPRA) 750 MG tablet Take 750 mg by mouth 2 (Two) Times a Day.     • metoprolol succinate XL (TOPROL-XL) 25 MG 24 hr tablet      • OLANZapine (zyPREXA) 5 MG tablet Take 1 tablet by mouth Every Night. 30 tablet 2   • omeprazole (priLOSEC) 40 MG capsule      • ondansetron ODT (ZOFRAN-ODT) 8 MG disintegrating tablet Every 8 (Eight) Hours As Needed.     • promethazine-dextromethorphan (PROMETHAZINE-DM) 6.25-15 MG/5ML syrup TAKE 10 MLS BY MOUTH EVERY SIX HOURS AS NEEDED FOR COUGH     • SUMAtriptan (IMITREX) 50 MG tablet take 1 tablet by mouth at onset of MIGRAINE may repeat in 2 hours if needed max of 2 tablets in 24 hours     • traZODone (DESYREL) 100 MG tablet      • venlafaxine (EFFEXOR) 100 MG tablet Take 2.5 tablets by mouth Daily. 75 tablet 2     No current facility-administered medications for this visit.       Past Medical History:  Past Medical History:   Diagnosis Date   • Anesthesia complication     pt reports he is hard to wake after anesthesia   • Anxiety    • Arthritis    • Bipolar 1 disorder (HCC)    • COVID-19 02/2021   • Depression    • Dysphagia     food and liquids.   • Extensive tattoos    • Gout    •  Hepatitis C 2018    Patient took medication    • History of echocardiogram    • Kidney stones    • Pneumonia    • PTSD (post-traumatic stress disorder)    • Seizure (HCC)     last seizure years ago.   • Seizures (HCC)    • Short-term memory loss    • Wears contact lenses    • Wears glasses        Social History:  Social History     Socioeconomic History   • Marital status:    Tobacco Use   • Smoking status: Current Every Day Smoker     Packs/day: 0.50     Years: 30.00     Pack years: 15.00     Types: Cigarettes   • Smokeless tobacco: Never Used   Vaping Use   • Vaping Use: Never used   Substance and Sexual Activity   • Alcohol use: Not Currently   • Drug use: Not Currently     Types: Cocaine(coke)     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   • Sexual activity: Defer       Family History:  Family History   Problem Relation Age of Onset   • Diabetes Mother    • Diabetes insipidus Mother    • Heart disease Mother    • Cancer Father         skin   • Diabetes Father    • Hypertension Father    • Arthritis Father    • Diabetes insipidus Father          Subjective      Review of Systems:   Review of Systems   Constitutional: Positive for fatigue. Negative for chills and fever.   Respiratory: Negative for shortness of breath.    Cardiovascular: Negative for chest pain.   Gastrointestinal: Negative for abdominal pain.   Musculoskeletal: Positive for arthralgias, back pain and gait problem.   Skin: Negative for skin lesions.   Neurological: Negative for seizures and confusion.   Psychiatric/Behavioral: Positive for stress. Negative for hallucinations, sleep disturbance, suicidal ideas and depressed mood. The patient is nervous/anxious.      PHQ-9 Total Score: 12    VINICIUS-7 Score:   Feeling nervous, anxious or on edge: Nearly every day  Not being able to stop or control worrying: More than half the days  Worrying too much about different things: Nearly every day  Trouble Relaxing: More than half the days  Being so  restless that it is hard to sit still: Nearly every day  Feeling afraid as if something awful might happen: Several days  Becoming easily annoyed or irritable: Nearly every day  VINICIUS 7 Total Score: 17  If you checked any problems, how difficult have these problems made it for you to do your work, take care of things at home, or get along with other people: Not difficult at all    Patient History:   The following portions of the patient's history were reviewed and updated as appropriate: allergies, current medications, past family history, past medical history, past social history, past surgical history and problem list.     Social:  Social History     Socioeconomic History   • Marital status:    Tobacco Use   • Smoking status: Current Every Day Smoker     Packs/day: 0.50     Years: 30.00     Pack years: 15.00     Types: Cigarettes   • Smokeless tobacco: Never Used   Vaping Use   • Vaping Use: Never used   Substance and Sexual Activity   • Alcohol use: Not Currently   • Drug use: Not Currently     Types: Cocaine(coke)     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   • Sexual activity: Defer       Medications:     Current Outpatient Medications:   •  allopurinol (ZYLOPRIM) 100 MG tablet, Take 100 mg by mouth Daily., Disp: , Rfl:   •  buprenorphine-naloxone (SUBOXONE) 8-2 MG per SL tablet, Place 2 tablets under the tongue Daily for 14 days., Disp: 28 tablet, Rfl: 0  •  cloNIDine (Catapres) 0.2 MG tablet, Take 1 tablet by mouth Every Night., Disp: 30 tablet, Rfl: 2  •  colchicine 0.6 MG tablet, Take 0.6 mg by mouth Daily., Disp: , Rfl:   •  cyclobenzaprine (FLEXERIL) 10 MG tablet, Take 10 mg by mouth every night at bedtime., Disp: , Rfl:   •  ibuprofen (ADVIL,MOTRIN) 800 MG tablet, , Disp: , Rfl:   •  levETIRAcetam (KEPPRA) 750 MG tablet, Take 750 mg by mouth 2 (Two) Times a Day., Disp: , Rfl:   •  metoprolol succinate XL (TOPROL-XL) 25 MG 24 hr tablet, , Disp: , Rfl:   •  OLANZapine (zyPREXA) 5 MG tablet, Take  "1 tablet by mouth Every Night., Disp: 30 tablet, Rfl: 2  •  omeprazole (priLOSEC) 40 MG capsule, , Disp: , Rfl:   •  ondansetron ODT (ZOFRAN-ODT) 8 MG disintegrating tablet, Every 8 (Eight) Hours As Needed., Disp: , Rfl:   •  promethazine-dextromethorphan (PROMETHAZINE-DM) 6.25-15 MG/5ML syrup, TAKE 10 MLS BY MOUTH EVERY SIX HOURS AS NEEDED FOR COUGH, Disp: , Rfl:   •  SUMAtriptan (IMITREX) 50 MG tablet, take 1 tablet by mouth at onset of MIGRAINE may repeat in 2 hours if needed max of 2 tablets in 24 hours, Disp: , Rfl:   •  traZODone (DESYREL) 100 MG tablet, , Disp: , Rfl:   •  venlafaxine (EFFEXOR) 100 MG tablet, Take 2.5 tablets by mouth Daily., Disp: 75 tablet, Rfl: 2    Objective     Physical Exam:  Physical Exam  Vitals reviewed.   Constitutional:       General: He is not in acute distress.     Appearance: He is well-developed. He is not ill-appearing.   Eyes:      General: No scleral icterus.  Pulmonary:      Effort: No respiratory distress.   Neurological:      Mental Status: He is alert and oriented to person, place, and time.      Gait: Gait abnormal.   Psychiatric:         Speech: Speech normal.         Behavior: Behavior normal.         Thought Content: Thought content normal. Thought content does not include homicidal or suicidal ideation. Thought content does not include homicidal or suicidal plan.         Vital Signs:   Vitals:    07/05/22 1122   BP: 124/72   Weight: 111 kg (244 lb)   Height: 165.1 cm (65\")     Body mass index is 40.6 kg/m².     Mental Status Exam:   Hygiene:   good  Cooperation:  Cooperative  Eye Contact:  Good  Psychomotor Behavior:  Appropriate  Affect:  Full range  Mood: normal  Speech:  Normal  Thought Process:  Goal directed  Thought Content:  Normal  Suicidal:  None  Homicidal:  None  Hallucinations:  None  Delusion:  None  Memory:  Intact  Orientation:  Person, Place, Time and Situation  Reliability:  good  Insight:  Good  Judgement:  Good  Impulse Control:  " Good    Assessment / Plan      Assessment & Plan   -Continue buprenorphine/naloxone 16-4 mg daily.  -Will have RTC dose at next visit +0 remaining  -Advisement to take part in and remain active in 12 Step Recovery Meetings, IOP, and/or 1:1 therapy/counseling and to establish/maintain an active relationship with a recovery sponsor.   -Continued monitoring for illicit substances for patient safety, medication compliance and future care.  -Continue IOP.  Declines being with peers support today.  Will reach out as needed.  -Will increase to 2-week supply as patient has been compliant with all aspects of care.     Visit Diagnoses     Opioid use disorder, severe, in early remission, on maintenance therapy, dependence (HCC)        Relevant Medications    buprenorphine-naloxone (SUBOXONE) 8-2 MG per SL tablet      Diagnoses       Codes Comments    Opioid use disorder, severe, in early remission, on maintenance therapy, dependence (HCC)     ICD-10-CM: F11.21  ICD-9-CM: 304.03             PLAN:  1. Safety: No acute safety concerns  2. Risk Assessment: Risk of self-harm acutely is low. Risk of self-harm chronically is also low, but could be further elevated in the event of treatment noncompliance and/or AODA.      TREATMENT PLAN/GOALS: Continue supportive psychotherapy efforts and medications as indicated. Treatment and medication options discussed during today's visit. Patient acknowledged and verbally consented to continue with current treatment plan and was educated on the importance of compliance with treatment and follow-up appointments.      MEDICATION ISSUES:  QIANA reviewed as expected.  Discussed medication options and treatment plan of prescribed medication as well as the risks, benefits, and side effects including potential falls, possible impaired driving and metabolic adversities among others. Patient is agreeable to call the office with any worsening of symptoms or onset of side effects. Patient is agreeable to  call 911 or go to the nearest ER should he/she begin having SI/HI. No medication side effects or related complaints today.     MEDS ORDERED DURING VISIT:  New Medications Ordered This Visit   Medications   • buprenorphine-naloxone (SUBOXONE) 8-2 MG per SL tablet     Sig: Place 2 tablets under the tongue Daily for 14 days.     Dispense:  28 tablet     Refill:  0     [id number]       Return in 2 weeks (on 7/19/2022) for Follow Up Medication.           This document has been electronically signed by PRISCILLA Wen  July 5, 2022 12:03 EDT      Part of this note may be an electronic transcription/translation of spoken language to printed text using the Dragon Dictation System.

## 2022-07-06 ENCOUNTER — OFFICE VISIT (OUTPATIENT)
Dept: PSYCHIATRY | Facility: HOSPITAL | Age: 42
End: 2022-07-06

## 2022-07-06 DIAGNOSIS — F11.20 OPIOID USE DISORDER, SEVERE, DEPENDENCE: Primary | ICD-10-CM

## 2022-07-06 DIAGNOSIS — F14.21 COCAINE USE DISORDER, SEVERE, IN SUSTAINED REMISSION: ICD-10-CM

## 2022-07-06 LAB — ETHANOL UR-MCNC: NEGATIVE %

## 2022-07-06 PROCEDURE — H0015 ALCOHOL AND/OR DRUG SERVICES: HCPCS | Performed by: NURSE PRACTITIONER

## 2022-07-06 NOTE — PROGRESS NOTES
"NAME: Aroldo Cai  DATE: 07/06/2022    [facility] GROUP   [id number]    Number of participants: 8  IOP GROUP NOTE     DATA:     3 hour IOP group therapy session (Check-ins, Coping Skills, Relapse Prevention)     Check Ins: Therapist continued facilitation of rapport building strategies between group members. Therapist asked that each patient check in with home life and recovery efforts and identify triggers, cravings, and high risk situations that arise between group sessions. Therapist provided empathy and support during group session.      Session Content/Coping Skills: Group discussion was held regarding sober living. Group members discussed take aways from the documentary that was viewed on 7/5/2022. Check in assessments completed by group members. Clinician discussed cravings with group members and encouraged group members to explore and discuss what they feel when they are having a craving. Article titled “Drug and Alcohol Addiction: What are the factors that play a role” began ([employer]). Discussion questions was assigned for homework.     Response: Patient attended class in person. Patient participated in completion of check in form.   Patient on check in form denied suicidal or homicidal thoughts or plan or intent to hurt self or others currently or in the past 7 days. Patient participated in review of psychoeducational material and group discussions. Patient shared relapse prevention plan.     Patient met with clinician individually and treatment plan was reviewed. Patient identified no high risk situations since last meeting. Patient reported coping skills used are step back, taking breath, counting, thinking about what's going on\". Patient participated in completion of PHQ-9 and scored a 11. Patient participated in completion of VINICIUS-7 and scored a 15.     Personal Assessment 0-10 Scale (10 worst)    Anxiety:  0   Depression:  0   Cravings: 0     ASSESSMENT:     ..  Lab on 07/05/2022 "   Component Date Value Ref Range Status   • Ethanol, Urine 07/05/2022 Negative  Cutoff=0.020 % Final   • THC, Screen, Urine 07/05/2022 Negative  Negative Final   • Phencyclidine (PCP), Urine 07/05/2022 Negative  Negative Final   • Cocaine Screen, Urine 07/05/2022 Negative  Negative Final   • Methamphetamine, Ur 07/05/2022 Negative  Negative Final   • Opiate Screen 07/05/2022 Negative  Negative Final   • Amphetamine Screen, Urine 07/05/2022 Negative  Negative Final   • Benzodiazepine Screen, Urine 07/05/2022 Negative  Negative Final   • Tricyclic Antidepressants Screen 07/05/2022 Positive (A) Negative Final   • Methadone Screen, Urine 07/05/2022 Negative  Negative Final   • Barbiturates Screen, Urine 07/05/2022 Negative  Negative Final   • Oxycodone Screen, Urine 07/05/2022 Negative  Negative Final   • Propoxyphene Screen 07/05/2022 Negative  Negative Final   • Buprenorphine, Screen, Urine 07/05/2022 Positive (A) Negative Final   Lab on 06/27/2022   Component Date Value Ref Range Status   • THC, Screen, Urine 06/27/2022 Negative  Negative Final   • Phencyclidine (PCP), Urine 06/27/2022 Negative  Negative Final   • Cocaine Screen, Urine 06/27/2022 Negative  Negative Final   • Methamphetamine, Ur 06/27/2022 Negative  Negative Final   • Opiate Screen 06/27/2022 Negative  Negative Final   • Amphetamine Screen, Urine 06/27/2022 Negative  Negative Final   • Benzodiazepine Screen, Urine 06/27/2022 Negative  Negative Final   • Tricyclic Antidepressants Screen 06/27/2022 Positive (A) Negative Final   • Methadone Screen, Urine 06/27/2022 Negative  Negative Final   • Barbiturates Screen, Urine 06/27/2022 Negative  Negative Final   • Oxycodone Screen, Urine 06/27/2022 Negative  Negative Final   • Propoxyphene Screen 06/27/2022 Negative  Negative Final   • Buprenorphine, Screen, Urine 06/27/2022 Positive (A) Negative Final   • BUPRENORPHINE 06/27/2022 +POSITIVE+   Final   • Buprenorphine, Urine 06/27/2022 78  ng/mg creat Final    • Norbuprenorphine 06/27/2022 376  ng/mg creat Final   • Level of Detection: 06/27/2022 Comment   Final    Testing Threshold: buprenorphine, 1.0 ng/mL                     norbuprenorphine, 5 ng/mL                                                                       '  This test was developed and its performance characteristics  determined by [facility].  It has not been cleared or approved  by the Food and Drug Administration.   • Antidepressants 06/27/2022 Negative   Final   • AMITRIPTYLINE, UR 06/27/2022 Not Detected   Final   • Clomipramine, Ur 06/27/2022 Not Detected   Final   • Desemethylclomipramine 06/27/2022 Not Detected   Final   • Desipramine 06/27/2022 Not Detected   Final   • Doxepin 06/27/2022 Not Detected   Final   • Desmethyldoxepin, Ur 06/27/2022 Not Detected   Final   • Imipramine 06/27/2022 Not Detected   Final   • Nortriptyline 06/27/2022 Not Detected   Final   • Protriptyline 06/27/2022 Not Detected   Final   • Trimipramine 06/27/2022 Not Detected   Final   • Level of Detection: 06/27/2022 Comment   Final    Testing Threshold:  50 or 100 ng/mL                                                                       '  This test was developed and its performance characteristics  determined by [facility].  It has not been cleared or approved  by the Food and Drug Administration.   Lab on 06/20/2022   Component Date Value Ref Range Status   • THC, Screen, Urine 06/20/2022 Negative  Negative Final   • Phencyclidine (PCP), Urine 06/20/2022 Negative  Negative Final   • Cocaine Screen, Urine 06/20/2022 Negative  Negative Final   • Methamphetamine, Ur 06/20/2022 Negative  Negative Final   • Opiate Screen 06/20/2022 Negative  Negative Final   • Amphetamine Screen, Urine 06/20/2022 Negative  Negative Final   • Benzodiazepine Screen, Urine 06/20/2022 Negative  Negative Final   • Tricyclic Antidepressants Screen 06/20/2022 Positive (A) Negative Final   • Methadone Screen, Urine 06/20/2022 Negative  Negative Final    • Barbiturates Screen, Urine 06/20/2022 Negative  Negative Final   • Oxycodone Screen, Urine 06/20/2022 Negative  Negative Final   • Propoxyphene Screen 06/20/2022 Negative  Negative Final   • Buprenorphine, Screen, Urine 06/20/2022 Positive (A) Negative Final   • Ethanol, Urine 06/20/2022 Negative  Cutoff=0.020 % Final   • Creatinine, Urine 06/20/2022 195.8  >=20 mg/dL Final   • Buprenorphine/CR 06/20/2022 73  ng/mg creat Final   • NORBUPRENORPHINE/CR 06/20/2022 339  ng/mg creat Final   • NORBUP/BUP RATIO 06/20/2022 4.64  RATIO Final    This test was developed and its performance characteristics  determined by [facility].  It has not been cleared or approved  by the Food and Drug Administration.   • Naloxone 06/20/2022 2041  Cutoff=25 ng/ml Final   • TRP-LINK 06/20/2022 Comment   Final    These test results were not transmitted to [facility].  If you would like your patient's urine drug  test results to transmit to [facility], please  contact your [employer]  to complete  a physician authorization form.   • Antidepressants 06/20/2022 Negative   Final   • AMITRIPTYLINE, UR 06/20/2022 Not Detected   Final   • Clomipramine, Ur 06/20/2022 Not Detected   Final   • Desemethylclomipramine 06/20/2022 Not Detected   Final   • Desipramine 06/20/2022 Not Detected   Final   • Doxepin 06/20/2022 Not Detected   Final   • Desmethyldoxepin, Ur 06/20/2022 Not Detected   Final   • Imipramine 06/20/2022 Not Detected   Final   • Nortriptyline 06/20/2022 Not Detected   Final   • Protriptyline 06/20/2022 Not Detected   Final   • Trimipramine 06/20/2022 Not Detected   Final   • Level of Detection: 06/20/2022 Comment   Final    Testing Threshold:  50 or 100 ng/mL                                                                       '  This test was developed and its performance characteristics  determined by [facility].  It has not been cleared or approved  by the Food and Drug Administration.        Mental Status Exam  Hygiene:  good  Dress: casual  Attitude: cooperative and agreeable   Motor Activity: appropriate  Eye Contact:  good  Speech: regular rate and rhythm   Mood:  calm and cooperative  Affect:  Appropriate  Thought Processes:  Linear  Thought Content:  Normal  Suicidal Thoughts:  denies  Homicidal Thoughts:  denies  Crisis Safety Plan: yes, to come to the emergency room.  Hallucinations:  denies  Reliability: fair  Insight: fair  Judgement: fair  Impulse Control: fair    Family issues related to recovery:  No change, see previous encounters    Recovery/spiritual support group attendance: No     Sponsor: No     Motivation for treatment: During initial assessment, patient reported “my kids”.     Patient's Support Network Includes: During initial assessment, patient reported sober support system is “my family and youngest son’s mom”.     Progress toward goal: Not at goal    Prognosis: Fair with Ongoing Treatment     Self-reported number of days sober: Patient reported May 26th on check in form.     ASAM Dimensions:  I.    Intoxication/Withdrawal:  0  (Patient during initial assessment reported no recent substance use).  II.   Medical Conditions/Complications:  1 (Due to medical conditions and history of seizures discussed by patient during initial assessment).  III.  Behavioral/Emotional/Cognitive: 1 (Due to patient's reported depression, anxiety, and feelings of hopelessness during initial assessment).  IV.  Readiness to Change: 1 (Patient appeared to be ready to change based on his self-report during initial assessment).  V.   Relapse Risk: 1 (Patient during initial assessment reported he has had one relapse in the past).  VI:  Recovery Environment: 1 (Patient during initial assessment discussed wanting to find a place of his own).  Total ASAM Score = 05      BASELINE SCORES 04/25/2022       VINICIUS-7   (17)                  PHQ-9   (13)    Updated Scores: 7/6/2022  VINICIUS-7 (15)  PHQ-9 (11)    Patient  agreeable to adhere to medication regimen as prescribed and report any side effects. Patient will contact this office, call 911 or present to the nearest emergency room should suicidal or homicidal ideations occur.    Impression/Formulation:    ICD-10-CM ICD-9-CM   1. Opioid use disorder, severe, dependence (HCC)  F11.20 304.00   2. Cocaine use disorder, severe, in sustained remission (HCC)  F14.21 304.23       CLINICAL MANUVERING/INTERVENTIONS: Therapist utilized a person-centered approach to build rapport with group member.  Therapist implemented motivational interviewing techniques to assist client with exploring and resolving ambivalence associated with commitment to change behaviors related to substance use and addiction.  Therapist applied cognitive behavioral strategies to facilitate identification of maladaptive patterns of thinking and behavior that contribute to client’s risk for continued substance use and relapse.  Therapist employed group interaction activities to build rapport among group members, promote sobriety, and emphasize relapse prevention.  Therapist promoted safe nonjudgmental environment by providing group members with unconditional positive regard and encouraging group members to comply with group rules and guidelines. Therapist assisted group member with identifying and implementing healthier coping strategies.      PLAN:  Continue [facility] IOP Phase I   Aftercare:  [facility] Phase II  Program Assignments:  Personal recovery plan, relapse prevention plan, attendance of recovery support group meetings, exploration of sponsorship, drug/alcohol screens.     Please note that portions of this note were completed with a voice recognition program. Efforts were made to edit dictation, but occasionally words are mistranscribed.       This document signed by Duy Garcia, July 7, 2022, 09:35 EDT

## 2022-07-06 NOTE — PROGRESS NOTES
NAME: Aroldo Cai  DATE: 07/05/2022     IOP GROUP   [id number]    Number of participants: 6  IOP GROUP NOTE     DATA:     3 hour IOP group therapy session (Check-ins, Coping Skills, Relapse Prevention)     Check Ins: Therapist continued facilitation of rapport building strategies between group members. Therapist asked that each patient check in with home life and recovery efforts and identify triggers, cravings, and high risk situations that arise between group sessions. Therapist provided empathy and support during group session.     Session Content/Coping Skills: Group was facilitated by Zulma Armendariz [facility]. Check ins were completed by group members. “[employer]” documentary was shown to group members.     Response: Patient attended class in person. Patient participated in completion of check in form.   Patient on check in form denied suicidal or homicidal thoughts or plan or intent to hurt self or others currently or in the past 7 days.   Patient participated in review of documentary.     Personal Assessment 0-10 Scale (10 worst)    Anxiety:  0   Depression:  0   Cravings: 0     ASSESSMENT:     ..  Lab on 07/05/2022   Component Date Value Ref Range Status   • THC, Screen, Urine 07/05/2022 Negative  Negative Final   • Phencyclidine (PCP), Urine 07/05/2022 Negative  Negative Final   • Cocaine Screen, Urine 07/05/2022 Negative  Negative Final   • Methamphetamine, Ur 07/05/2022 Negative  Negative Final   • Opiate Screen 07/05/2022 Negative  Negative Final   • Amphetamine Screen, Urine 07/05/2022 Negative  Negative Final   • Benzodiazepine Screen, Urine 07/05/2022 Negative  Negative Final   • Tricyclic Antidepressants Screen 07/05/2022 Positive (A) Negative Final   • Methadone Screen, Urine 07/05/2022 Negative  Negative Final   • Barbiturates Screen, Urine 07/05/2022 Negative  Negative Final   • Oxycodone Screen, Urine 07/05/2022 Negative  Negative Final   • Propoxyphene Screen 07/05/2022 Negative   Negative Final   • Buprenorphine, Screen, Urine 07/05/2022 Positive (A) Negative Final   Lab on 06/27/2022   Component Date Value Ref Range Status   • THC, Screen, Urine 06/27/2022 Negative  Negative Final   • Phencyclidine (PCP), Urine 06/27/2022 Negative  Negative Final   • Cocaine Screen, Urine 06/27/2022 Negative  Negative Final   • Methamphetamine, Ur 06/27/2022 Negative  Negative Final   • Opiate Screen 06/27/2022 Negative  Negative Final   • Amphetamine Screen, Urine 06/27/2022 Negative  Negative Final   • Benzodiazepine Screen, Urine 06/27/2022 Negative  Negative Final   • Tricyclic Antidepressants Screen 06/27/2022 Positive (A) Negative Final   • Methadone Screen, Urine 06/27/2022 Negative  Negative Final   • Barbiturates Screen, Urine 06/27/2022 Negative  Negative Final   • Oxycodone Screen, Urine 06/27/2022 Negative  Negative Final   • Propoxyphene Screen 06/27/2022 Negative  Negative Final   • Buprenorphine, Screen, Urine 06/27/2022 Positive (A) Negative Final   • BUPRENORPHINE 06/27/2022 +POSITIVE+   Final   • Buprenorphine, Urine 06/27/2022 78  ng/mg creat Final   • Norbuprenorphine 06/27/2022 376  ng/mg creat Final   • Level of Detection: 06/27/2022 Comment   Final    Testing Threshold: buprenorphine, 1.0 ng/mL                     norbuprenorphine, 5 ng/mL                                                                       '  This test was developed and its performance characteristics  determined by [facility].  It has not been cleared or approved  by the Food and Drug Administration.   • Antidepressants 06/27/2022 Negative   Final   • AMITRIPTYLINE, UR 06/27/2022 Not Detected   Final   • Clomipramine, Ur 06/27/2022 Not Detected   Final   • Desemethylclomipramine 06/27/2022 Not Detected   Final   • Desipramine 06/27/2022 Not Detected   Final   • Doxepin 06/27/2022 Not Detected   Final   • Desmethyldoxepin, Ur 06/27/2022 Not Detected   Final   • Imipramine 06/27/2022 Not Detected   Final   •  Nortriptyline 06/27/2022 Not Detected   Final   • Protriptyline 06/27/2022 Not Detected   Final   • Trimipramine 06/27/2022 Not Detected   Final   • Level of Detection: 06/27/2022 Comment   Final    Testing Threshold:  50 or 100 ng/mL                                                                       '  This test was developed and its performance characteristics  determined by [facility].  It has not been cleared or approved  by the Food and Drug Administration.   Lab on 06/20/2022   Component Date Value Ref Range Status   • THC, Screen, Urine 06/20/2022 Negative  Negative Final   • Phencyclidine (PCP), Urine 06/20/2022 Negative  Negative Final   • Cocaine Screen, Urine 06/20/2022 Negative  Negative Final   • Methamphetamine, Ur 06/20/2022 Negative  Negative Final   • Opiate Screen 06/20/2022 Negative  Negative Final   • Amphetamine Screen, Urine 06/20/2022 Negative  Negative Final   • Benzodiazepine Screen, Urine 06/20/2022 Negative  Negative Final   • Tricyclic Antidepressants Screen 06/20/2022 Positive (A) Negative Final   • Methadone Screen, Urine 06/20/2022 Negative  Negative Final   • Barbiturates Screen, Urine 06/20/2022 Negative  Negative Final   • Oxycodone Screen, Urine 06/20/2022 Negative  Negative Final   • Propoxyphene Screen 06/20/2022 Negative  Negative Final   • Buprenorphine, Screen, Urine 06/20/2022 Positive (A) Negative Final   • Ethanol, Urine 06/20/2022 Negative  Cutoff=0.020 % Final   • Creatinine, Urine 06/20/2022 195.8  >=20 mg/dL Final   • Buprenorphine/CR 06/20/2022 73  ng/mg creat Final   • NORBUPRENORPHINE/CR 06/20/2022 339  ng/mg creat Final   • NORBUP/BUP RATIO 06/20/2022 4.64  RATIO Final    This test was developed and its performance characteristics  determined by [facility].  It has not been cleared or approved  by the Food and Drug Administration.   • Naloxone 06/20/2022 2041  Cutoff=25 ng/ml Final   • TRP-LINK 06/20/2022 Comment   Final    These test results were not transmitted to  [facility].  If you would like your patient's urine drug  test results to transmit to [facility], please  contact your [employer]  to complete  a physician authorization form.   • Antidepressants 06/20/2022 Negative   Final   • AMITRIPTYLINE, UR 06/20/2022 Not Detected   Final   • Clomipramine, Ur 06/20/2022 Not Detected   Final   • Desemethylclomipramine 06/20/2022 Not Detected   Final   • Desipramine 06/20/2022 Not Detected   Final   • Doxepin 06/20/2022 Not Detected   Final   • Desmethyldoxepin, Ur 06/20/2022 Not Detected   Final   • Imipramine 06/20/2022 Not Detected   Final   • Nortriptyline 06/20/2022 Not Detected   Final   • Protriptyline 06/20/2022 Not Detected   Final   • Trimipramine 06/20/2022 Not Detected   Final   • Level of Detection: 06/20/2022 Comment   Final    Testing Threshold:  50 or 100 ng/mL                                                                       '  This test was developed and its performance characteristics  determined by [facility].  It has not been cleared or approved  by the Food and Drug Administration.   Lab on 06/15/2022   Component Date Value Ref Range Status   • Ethanol, Urine 06/15/2022 Negative  Cutoff=0.020 % Final   • THC, Screen, Urine 06/15/2022 Negative  Negative Final   • Phencyclidine (PCP), Urine 06/15/2022 Negative  Negative Final   • Cocaine Screen, Urine 06/15/2022 Negative  Negative Final   • Methamphetamine, Ur 06/15/2022 Negative  Negative Final   • Opiate Screen 06/15/2022 Negative  Negative Final   • Amphetamine Screen, Urine 06/15/2022 Negative  Negative Final   • Benzodiazepine Screen, Urine 06/15/2022 Positive (A) Negative Final   • Tricyclic Antidepressants Screen 06/15/2022 Positive (A) Negative Final   • Methadone Screen, Urine 06/15/2022 Negative  Negative Final   • Barbiturates Screen, Urine 06/15/2022 Negative  Negative Final   • Oxycodone Screen, Urine 06/15/2022 Negative  Negative Final   • Propoxyphene  Screen 06/15/2022 Negative  Negative Final   • Buprenorphine, Screen, Urine 06/15/2022 Positive (A) Negative Final   • Creatinine, Urine 06/15/2022 251.4  >=20 mg/dL Final   • Buprenorphine/CR 06/15/2022 57  ng/mg creat Final   • NORBUPRENORPHINE/CR 06/15/2022 187  ng/mg creat Final   • NORBUP/BUP RATIO 06/15/2022 3.28  RATIO Final    This test was developed and its performance characteristics  determined by [facility].  It has not been cleared or approved  by the Food and Drug Administration.   • Naloxone 06/15/2022 265  Cutoff=25 ng/ml Final   • TRP-LINK 06/15/2022 Comment   Final    These test results were not transmitted to [facility].  If you would like your patient's urine drug  test results to transmit to [facility], please  contact your [employer]  to complete  a physician authorization form.   • Antidepressants 06/15/2022 Negative   Final   • AMITRIPTYLINE, UR 06/15/2022 Not Detected   Final   • Clomipramine, Ur 06/15/2022 Not Detected   Final   • Desemethylclomipramine 06/15/2022 Not Detected   Final   • Desipramine 06/15/2022 Not Detected   Final   • Doxepin 06/15/2022 Not Detected   Final   • Desmethyldoxepin, Ur 06/15/2022 Not Detected   Final   • Imipramine 06/15/2022 Not Detected   Final   • Nortriptyline 06/15/2022 Not Detected   Final   • Protriptyline 06/15/2022 Not Detected   Final   • Trimipramine 06/15/2022 Not Detected   Final   • Level of Detection: 06/15/2022 Comment   Final    Testing Threshold:  50 or 100 ng/mL                                                                       '  This test was developed and its performance characteristics  determined by [facility].  It has not been cleared or approved  by the Food and Drug Administration.   • Benzodiazepine Screen, Urine 06/15/2022 +POSITIVE+   Final   • DIAZEPAM URINE QUANT (REF) 06/15/2022 Not Detected  ng/mg creat Final   • Desmethyldiazepam 06/15/2022 Not Detected  ng/mg creat Final   • Oxazepam,  urine 06/15/2022 21  ng/mg creat Final   • Temazepam, urine 06/15/2022 Not Detected  ng/mg creat Final    Expected metabolism of benzodiazepine class drugs:   Parent Drug       Detected Metabolites   -----------       --------------------   Diazepam:         Desmethyldiazepam, Temazepam, Oxazepam   Chlordiazepoxide: Desmethyldiazepam, Oxazepam   Clorazepate:      Desmethyldiazepam, Oxazepam   Halazepam:        Desmethyldiazepam, Oxazepam   Temazepam:        Oxazepam   Oxazepam:         None   • ALPRAZOLAM 06/15/2022 Not Detected  ng/mg creat Final   • ALPHA-HYDROXYALPRAZOLAM UR, QT (RE* 06/15/2022 Not Detected  ng/mg creat Final   • DESALKLFLURAZEPAM UR QUANT (REF) 06/15/2022 Not Detected  ng/mg creat Final   • LORAZEPAM URINE QUANT (REF) 06/15/2022 Not Detected  ng/mg creat Final   • Alpha-hydroxytriazolam, Urine 06/15/2022 Not Detected  ng/mg creat Final   • Clonazepam Urine 06/15/2022 Not Detected  ng/mg creat Final   • 7-Aminoclonazepam Urine 06/15/2022 Not Detected  ng/mg creat Final   • MIDAZOLAM UR, QUANT (REF) 06/15/2022 Not Detected  ng/mg creat Final   • Alpha-hydroxymidazolam, Urine 06/15/2022 Not Detected  ng/mg creat Final   • Flunitrazepam 06/15/2022 Not Detected  ng/mg creat Final   • DESMETHYLFLUNITRAZEPAM 06/15/2022 Not Detected  ng/mg creat Final   • Level of Detection: 06/15/2022 Comment   Final    Testing Threshold:  20 ng/mL                                                                       '  This test was developed and its performance characteristics  determined by [facility].  It has not been cleared or approved  by the Food and Drug Administration.       Mental Status Exam  Hygiene:  good  Dress: casual  Attitude: cooperative and agreeable   Motor Activity: appropriate  Eye Contact:  good  Speech: regular rate and rhythm   Mood:  calm and cooperative  Affect:  Appropriate  Thought Processes:  Linear  Thought Content:  Normal  Suicidal Thoughts:  denies  Homicidal Thoughts:  denies  Crisis  Safety Plan: yes, to come to the emergency room.  Hallucinations:  denies  Reliability: fair  Insight: fair  Judgement: fair  Impulse Control: fair    Family issues related to recovery:  No change, see previous encounters    Recovery/spiritual support group attendance: No     Sponsor: No       Motivation for treatment: During initial assessment, patient reported “my kids”.     Patient's Support Network Includes: During initial assessment, patient reported sober support system is “my family and youngest son’s mom”.     Progress toward goal: Not at goal    Prognosis: Fair with Ongoing Treatment     Self-reported number of days sober: Patient reported May 26th on check in form.     ASAM Dimensions:  I.    Intoxication/Withdrawal:  0  (Patient during initial assessment reported no recent substance use).  II.   Medical Conditions/Complications:  1 (Due to medical conditions and history of seizures discussed by patient during initial assessment).  III.  Behavioral/Emotional/Cognitive: 1 (Due to patient's reported depression, anxiety, and feelings of hopelessness during initial assessment).  IV.  Readiness to Change: 1 (Patient appeared to be ready to change based on his self-report during initial assessment).  V.   Relapse Risk: 1 (Patient during initial assessment reported he has had one relapse in the past).  VI:  Recovery Environment: 1 (Patient during initial assessment discussed wanting to find a place of his own).  Total ASAM Score = 05      BASELINE SCORES 04/25/2022       VINICIUS-7   (17)                  PHQ-9   (13)    Updated Scores 6/8/2022  VINICIUS-7 (21)  PHQ-9 (15)      Patient agreeable to adhere to medication regimen as prescribed and report any side effects. Patient will contact this office, call 911 or present to the nearest emergency room should suicidal or homicidal ideations occur.    Impression/Formulation:    ICD-10-CM ICD-9-CM   1. Opioid use disorder, severe, dependence (HCC)  F11.20 304.00   2. Cocaine  use disorder, severe, in sustained remission ([facility])  F14.21 304.23       CLINICAL MANUVERING/INTERVENTIONS: Therapist utilized a person-centered approach to build rapport with group member.  Therapist implemented motivational interviewing techniques to assist client with exploring and resolving ambivalence associated with commitment to change behaviors related to substance use and addiction.  Therapist applied cognitive behavioral strategies to facilitate identification of maladaptive patterns of thinking and behavior that contribute to client’s risk for continued substance use and relapse.  Therapist employed group interaction activities to build rapport among group members, promote sobriety, and emphasize relapse prevention.  Therapist promoted safe nonjudgmental environment by providing group members with unconditional positive regard and encouraging group members to comply with group rules and guidelines. Therapist assisted group member with identifying and implementing healthier coping strategies.      PLAN:  Continue [facility] IOP Phase I   Aftercare:  [facility] Phase II  Program Assignments:  Personal recovery plan, relapse prevention plan, attendance of recovery support group meetings, exploration of sponsorship, drug/alcohol screens.     Please note that portions of this note were completed with a voice recognition program. Efforts were made to edit dictation, but occasionally words are mistranscribed.       This document signed by Duy Garcia, July 6, 2022, 17:11 EDT

## 2022-07-07 ENCOUNTER — DOCUMENTATION (OUTPATIENT)
Dept: PSYCHIATRY | Facility: HOSPITAL | Age: 42
End: 2022-07-07

## 2022-07-07 NOTE — PROGRESS NOTES
[facility]  [address] [facility]  [phone]    CHEMICAL DEPENDENCY   INTENSIVE OUTPATIENT PROGRAM    PHASE I  DISCHARGE SUMMARY        ATTENDING: PRISCILLA Wen  THERAPIST: Duy Garcia LCSW    DATE OF ADMISSION: 5/4/2022 (Date of first class attended).  DATE OF DISCHARGE: 7/7/2022        REASON FOR ADMISSION: Aroldo Cai was referred by self and admitted to IOP Phase I for Opioid use disorder, severe, dependence and cocaine use disorder, severe, in sustained remission.      SUMMARY OF CARE, TREATMENT, and SERVICES PROVIDED: Aroldo Cai was assessed and determined to meet [facility] level of care on 4/25/2022.  Pt began IOP on 5/4/2022 and attended 24 group therapy sessions. Pt had two unexcused absences.  Pt exhibited consistent motivation for sobriety as indicated by ASAM scores over the course of treatment, indicating increased confidence in the ability to stay sober.       Patient tested positive for Benzodiazepines on 5/25/2022, 5/31/2022, 6/7/2022, 6/15/2022.  Patient tested positive for Tricyclic Antidepressants on 5/3/2022, 5/10/2022, 5/25/2022, 5/31/2022, 6/7/2022, 6/15/2022, 6/20/2022, 6/27/2022, 7/5/2022.   Patient also tested positive for Buprenorphine on each screen however patient is seen by MAT provider.      Patient completed the following treatment goals:  identified high-risk people, places, and situations to avoid in order to prevent relapse; learned and utilized at least 5 health coping skills to use when faced with high-risk situations; ongoing motivation as evidenced by the ASAM; decreased VINICIUS-7 score; decreased PHQ-9 score; attendance of group therapy sessions as scheduled; participation during group by providing and receiving feedback; creating a relapse prevention plan.    AFTERCARE PLAN: Aroldo Cai completed [facility] Depend[facility] IOP Phase I on 7/6/2022.  Pt will be admitted to [facility] Phase  II and will attend Phase II on the next scheduled date and will participate in individual therapy as needed.       Current Outpatient Medications:   •  allopurinol (ZYLOPRIM) 100 MG tablet, Take 100 mg by mouth Daily., Disp: , Rfl:   •  buprenorphine-naloxone (SUBOXONE) 8-2 MG per SL tablet, Place 2 tablets under the tongue Daily for 14 days., Disp: 28 tablet, Rfl: 0  •  cloNIDine (Catapres) 0.2 MG tablet, Take 1 tablet by mouth Every Night., Disp: 30 tablet, Rfl: 2  •  colchicine 0.6 MG tablet, Take 0.6 mg by mouth Daily., Disp: , Rfl:   •  cyclobenzaprine (FLEXERIL) 10 MG tablet, Take 10 mg by mouth every night at bedtime., Disp: , Rfl:   •  ibuprofen (ADVIL,MOTRIN) 800 MG tablet, , Disp: , Rfl:   •  levETIRAcetam (KEPPRA) 750 MG tablet, Take 750 mg by mouth 2 (Two) Times a Day., Disp: , Rfl:   •  metoprolol succinate XL (TOPROL-XL) 25 MG 24 hr tablet, , Disp: , Rfl:   •  OLANZapine (zyPREXA) 5 MG tablet, Take 1 tablet by mouth Every Night., Disp: 30 tablet, Rfl: 2  •  omeprazole (priLOSEC) 40 MG capsule, , Disp: , Rfl:   •  ondansetron ODT (ZOFRAN-ODT) 8 MG disintegrating tablet, Every 8 (Eight) Hours As Needed., Disp: , Rfl:   •  promethazine-dextromethorphan (PROMETHAZINE-DM) 6.25-15 MG/5ML syrup, TAKE 10 MLS BY MOUTH EVERY SIX HOURS AS NEEDED FOR COUGH, Disp: , Rfl:   •  SUMAtriptan (IMITREX) 50 MG tablet, take 1 tablet by mouth at onset of MIGRAINE may repeat in 2 hours if needed max of 2 tablets in 24 hours, Disp: , Rfl:   •  traZODone (DESYREL) 100 MG tablet, , Disp: , Rfl:   •  venlafaxine (EFFEXOR) 100 MG tablet, Take 2.5 tablets by mouth Daily., Disp: 75 tablet, Rfl: 2     [unfilled]    PROGNOSIS: good with continued care.    -Duy Garcia LCSW, 7/7/2022.

## 2022-07-07 NOTE — PROGRESS NOTES
[facility]  [address], [address]  [place] [address]      Intensive Outpatient Program for Chemical Dependence (CD IOP)  Verbally Reviewed on 7/6/2022    Treatment Plan Reassessment (biweekly)       Long Term Goal:  Aroldo Cai will establish a sustained recovery and will maintain total abstinence from mind-altering substances other than what is prescribed and/or approved by the attending physician. Pt will learn, practice, and utilize behavioral and cognitive coping skills to help maintain sobriety.    Patient Care Needs:  Aroldo Cai presents with Opioid use disorder, severe, dependence and Cocaine use disorder, severe, in sustained remission and is at high risk for relapse without continued treatment.  Pt has a history of using drugs/alcohol until intoxicated or passed out and has been unable to stop or cut down use of alcohol/drugs once starting, despite verbalized desire to do so, and the negative consequences from continued use.  Pt's use has negatively impacted social, occupational, and/or physical functioning.     1.  Patient will participate in a medical evaluation to assess the effects of chemical dependence and will cooperate with an evaluation by the attending psychiatrist or psychiatric nurse practitioner for psychotropic medication if appropriate.      2.  Patient will adhere to the IOP group rules.        3.  Patient will attend group sessions as scheduled. Patient will notify therapist and support staff of any absences or tardies. Patient will provide a valid and verifiable excuse for absences to be considered excused.    4.  Patient will participate in random drug and alcohol screenings and will exhibit negative results on said screenings.    5.  Patient will identify high risk situations, people, and places to avoid or manage in order to maintain sobriety.    6.  Patient will participate in group discussions by giving and receiving  feedback appropriately.      7.  Patient will develop a positive network of support by attending a minimum of two recovery/spiritual support groups each week (two outside of IOP group) and by exploring, obtaining or maintaining a sponsor or sober support person.      8.  Patient will identify, practice, and implement at least 5 healthy coping strategies to manage difficult emotions while remaining abstinent.    9.   Patient will develop relapse prevention skills and be able to identify and share them with the group in the form of a relapse prevention plan.    10.  Patient will write a personal recovery plan and share it with the group prior to discharge.    11.  Patient will offer family participation in family education groups, if appropriate.    12.  Patient will exhibit increased motivation to change as assessed by his/her cooperation and behavior and the ASAM assessment tool.    13.  Patient will utilize healthy coping skills to manage depressive and anxious symptoms without using alcohol or other mind-altering substances and will exhibit decreased score on the PHQ-9 and VINICIUS-7.   1. Patient participated in an evaluation for medication management with PRISCILLA Wen. Patient is meeting with medical provider as scheduled for medication management and follow-up.          2.  Patient read and signed IOP Group Rules. Patient is adhering to group rules.      3.  Patient has two unexcused absences.                  4.  Patient has displayed the following results on drug and alcohol screenings thus far: Patient tested positive for Benzodiazepines on 5/25/2022, 5/31/2022, 6/7/2022, 6/15/2022.  Patient tested positive for Tricyclic Antidepressants on 5/3/2022, 5/10/2022, 5/25/2022, 5/31/2022, 6/7/2022, 6/15/2022, 6/20/2022, 6/27/2022, 7/5/2022.   Patient also tested positive for Buprenorphine on each screen however patient is seen by MAT provider.          5.  Patient has encountered the following high-risk  situations since beginning treatment: On 7/6/2022, Patient reported none since last meeting with clinician.     6.  Patient provides and receives feedback daily. When not actively participating, patient is attentive and appropriate.    7.  Patient is not attending recovery/spiritual support group meetings at least once times per week. Patient does not have a sponsor/sober support person and is not working the 12 steps.    8.  Patient has identified and implemented the following coping strategies during high risk situations: On 7/6/2022, patient reported step back, taking breath, counting, thinking about what's going on.        9.  Patient has identified high-risk people, places, and things as well as healthy coping strategies for avoiding relapse.       10.  Patient has not yet developed a personal recovery plan. Patient shared relapse prevention plan with group on 7/6/2022.      11. No family participation.          12.  Patient reported cravings were a 0 on a 1:10 scale (0-none) on check in form for 7/6/2022. Patient's behavior indicates ongoing motivation for sobriety.  ASAM indicates 05.      13. Pt is managing anxiety and or depression without using mind-altering substances. Patient scored a 11 on PHQ-9 completed on 7/6/2022. Patient scored a 15 on VINICIUS-7 completed on 7/6/2022.      1.  Partially completed.  Patient participated initial evaluation for medication management with PRISCILLA Wen.  Patient will continue participating in as scheduled medication management and follow-up appointments with psychiatric medical provider.    2.  Patient is making progress toward goal and will continue working toward objective.     3.  Patient will continue working toward objective.                   4.  Patient will continue working toward objective.          5.  Partially completed. Patient will continue working toward objective.      6.  Partially completed. Patient will continue working toward objective.        7.   Patient is not making progress toward goal.                   8.  Partially completed. Patient will continue working toward objective.            9.  Partially completed. Patient will continue working toward objective.           10.  Partially completed. Patient will continue working toward objective.       11.  Patient will continue to encourage family participation.      12.  Partially completed.  Patient will continue implementing behavioral changes to promote long-term sobriety and recovery.    13. Partially completed.  Anxiety and depression will continue to be monitored.             Team Members:  Patient - Aroldo Cai  Medical Provider - PRISCILLA Wen  [facility] Licensed Therapist - [facility]  [facility] Director - Marie Alexander, [facility]  Support Staff

## 2022-07-11 ENCOUNTER — TELEMEDICINE (OUTPATIENT)
Dept: PSYCHIATRY | Facility: CLINIC | Age: 42
End: 2022-07-11

## 2022-07-11 DIAGNOSIS — F14.21 COCAINE USE DISORDER, SEVERE, IN SUSTAINED REMISSION: Primary | ICD-10-CM

## 2022-07-11 DIAGNOSIS — F11.21 OPIOID USE DISORDER, SEVERE, IN EARLY REMISSION, ON MAINTENANCE THERAPY, DEPENDENCE (HCC): ICD-10-CM

## 2022-07-11 DIAGNOSIS — F31.81 BIPOLAR II DISORDER: ICD-10-CM

## 2022-07-11 PROCEDURE — 90785 PSYTX COMPLEX INTERACTIVE: CPT | Performed by: SOCIAL WORKER

## 2022-07-11 PROCEDURE — 90853 GROUP PSYCHOTHERAPY: CPT | Performed by: SOCIAL WORKER

## 2022-07-12 LAB
ACCEPTABLE CREAT UR QL: 196.5 MG/DL
NALOXONE UR CFM-MCNC: 76 NG/ML
NORBUP/BUP RATIO: 5.34 RATIO
NORBUPRENORPHINE UR CFM-MCNC: 379 NG/MG CREAT
NORBUPRENORPHINE/CREAT UR: 71 NG/MG CREAT
TRP-LINK: NORMAL

## 2022-07-13 ENCOUNTER — TELEMEDICINE (OUTPATIENT)
Dept: PSYCHIATRY | Facility: CLINIC | Age: 42
End: 2022-07-13

## 2022-07-13 DIAGNOSIS — F11.21 OPIOID USE DISORDER, SEVERE, IN EARLY REMISSION, ON MAINTENANCE THERAPY, DEPENDENCE (HCC): ICD-10-CM

## 2022-07-13 DIAGNOSIS — F14.21 COCAINE USE DISORDER, SEVERE, IN SUSTAINED REMISSION: Primary | ICD-10-CM

## 2022-07-13 DIAGNOSIS — F31.81 BIPOLAR II DISORDER: ICD-10-CM

## 2022-07-13 LAB
CODEINE/CREAT UR: NOT DETECTED NG/MG CREAT
DHC/CREAT UR: NOT DETECTED NG/MG CREAT
HYDROCODONE/CREAT UR: NOT DETECTED NG/MG CREAT
HYDROMORPHONE/CREAT UR: NOT DETECTED NG/MG CREAT
LEVEL OF DETECTION:: NORMAL
MORPHINE/CREAT UR: NOT DETECTED NG/MG CREAT
NORCODEINE/CREAT UR CFM: NOT DETECTED NG/MG CREAT
NORHYDROCODONE/CREAT UR: NOT DETECTED NG/MG CREAT
NORMORPHINE UR-MCNC: NOT DETECTED NG/MG CREAT
OPIATES UR QL CFM: NEGATIVE

## 2022-07-13 PROCEDURE — 90853 GROUP PSYCHOTHERAPY: CPT | Performed by: SOCIAL WORKER

## 2022-07-13 PROCEDURE — 90785 PSYTX COMPLEX INTERACTIVE: CPT | Performed by: SOCIAL WORKER

## 2022-07-13 NOTE — PROGRESS NOTES
7/6/2022  [id number]      Data: Group members discussed their recovery and what it means to them and to turn in their assignment by next week.     RESPONSE: Group members discussed  Some ideas of what recovery is to others to give them some examples for their assignment.     CLINICAL MANEUVERING/INTERVENTIONS: Assisted  member in processing above session content; acknowledged and normalized patient's thoughts, feelings and concerns.  Educated on Phase 2 IOP, relapse prevention and the importance of making goals in phase 2 that assist with individual and group Educated on the importance of having outside support/community support that is not family and being grateful for family support and not relying only on family for their recovery support, and the importance of having meetings, sponsorship and step work.  Continuing to work on assignment about recovery and how it is individualized for all and what it means tot them.     Allowed patient to freely discuss issues without interruption or judgment. Provided safe, confidential environment to facilitate the development of positive therapeutic relationship and encourage open, honest communication. Assisted patient in identifying risk factors which would indicate the need for higher level of care including thoughts to harm self or others and/or self-harming behavior and encouraged patient to contact this office, call 911, or present to the nearest emergency room should any of these events occur. Discussed crisis intervention services and means to access.  Patient adamantly and convincingly denies current suicidal or homicidal ideation or perceptual disturbance.  7  ASSESSMENT:  Engaged in activity/Process and self-disclosed: Yes or No  Affect ([place] all that apply) appropriate, blunted, flat, sad, angry, tearful, anxious, bright  Applies topic to self: Yes or No  Able to give and receive feedback: Yes or No  Degree of insightful thinking: Least 1  2  3  4  5  6  7 8  9  10   Most    Urinalysis:   No visits with results within 1 Day(s) from this visit.   Latest known visit with results is:   Lab on 07/05/2022   Component Date Value Ref Range Status   • Ethanol, Urine 07/05/2022 Negative  Cutoff=0.020 % Final   • THC, Screen, Urine 07/05/2022 Negative  Negative Final   • Phencyclidine (PCP), Urine 07/05/2022 Negative  Negative Final   • Cocaine Screen, Urine 07/05/2022 Negative  Negative Final   • Methamphetamine, Ur 07/05/2022 Negative  Negative Final   • Opiate Screen 07/05/2022 Negative  Negative Final   • Amphetamine Screen, Urine 07/05/2022 Negative  Negative Final   • Benzodiazepine Screen, Urine 07/05/2022 Negative  Negative Final   • Tricyclic Antidepressants Screen 07/05/2022 Positive (A) Negative Final   • Methadone Screen, Urine 07/05/2022 Negative  Negative Final   • Barbiturates Screen, Urine 07/05/2022 Negative  Negative Final   • Oxycodone Screen, Urine 07/05/2022 Negative  Negative Final   • Propoxyphene Screen 07/05/2022 Negative  Negative Final   • Buprenorphine, Screen, Urine 07/05/2022 Positive (A) Negative Final   • Creatinine, Urine 07/05/2022 196.5  >=20 mg/dL Final   • Buprenorphine/CR 07/05/2022 71  ng/mg creat Final   • NORBUPRENORPHINE/CR 07/05/2022 379  ng/mg creat Final   • NORBUP/BUP RATIO 07/05/2022 5.34  RATIO Final   • Naloxone 07/05/2022 76  Cutoff=25 ng/ml Final   • TRP-LINK 07/05/2022 Comment   Final   • Antidepressants 07/05/2022 Negative   Final   • AMITRIPTYLINE, UR 07/05/2022 Not Detected   Final   • Clomipramine, Ur 07/05/2022 Not Detected   Final   • Desemethylclomipramine 07/05/2022 Not Detected   Final   • Desipramine 07/05/2022 Not Detected   Final   • Doxepin 07/05/2022 Not Detected   Final   • Desmethyldoxepin, Ur 07/05/2022 Not Detected   Final   • Imipramine 07/05/2022 Not Detected   Final   • Nortriptyline 07/05/2022 Not Detected   Final   • Protriptyline 07/05/2022 Not Detected   Final   • Trimipramine 07/05/2022 Not Detected   Final   •  Level of Detection: 07/05/2022 Comment   Final       12-Step attendance: Frequency/Sponsor?     Identification of environmental and personal barriers to recovery:     Motivation for treatment:     Mental Status Exam  Hygiene:  good  Dress:  casual  Attitude:  Cooperative  Motor Activity:  Appropriate  Speech:  Normal  Mood:  anxious and depressed  Affect:  anxious  Thought Processes:  Goal directed  Thought Content:  normal  Suicidal Thoughts:  denies  Homicidal Thoughts:  denies  Crisis Safety Plan: yes, to come to the emergency room.  Hallucinations:  denies    Assessment     Diagnoses and all orders for this visit:    1. Cocaine use disorder, severe, in sustained remission (HCC) (Primary)    2. Opioid use disorder, severe, in early remission, on maintenance therapy, dependence (HCC)    3. Bipolar II disorder (HCC)               PLAN:   Patient will continue in bi-weekly group psychotherapy sessions and individual outpatient psychotherapy sessions every 3-4 weeks and will continue in self-help meetings and seek additional treatment if necessary following completion    Idalia Boles LCSW

## 2022-07-15 ENCOUNTER — LAB (OUTPATIENT)
Dept: LAB | Facility: HOSPITAL | Age: 42
End: 2022-07-15

## 2022-07-15 DIAGNOSIS — F11.20 OPIOID USE DISORDER, SEVERE, DEPENDENCE: ICD-10-CM

## 2022-07-15 PROCEDURE — G0480 DRUG TEST DEF 1-7 CLASSES: HCPCS

## 2022-07-15 PROCEDURE — 80307 DRUG TEST PRSMV CHEM ANLYZR: CPT

## 2022-07-16 LAB — ETHANOL UR-MCNC: NEGATIVE %

## 2022-07-19 ENCOUNTER — OFFICE VISIT (OUTPATIENT)
Dept: PSYCHIATRY | Facility: CLINIC | Age: 42
End: 2022-07-19

## 2022-07-19 ENCOUNTER — LAB (OUTPATIENT)
Dept: LAB | Facility: HOSPITAL | Age: 42
End: 2022-07-19

## 2022-07-19 VITALS
DIASTOLIC BLOOD PRESSURE: 88 MMHG | HEART RATE: 102 BPM | BODY MASS INDEX: 41.99 KG/M2 | HEIGHT: 65 IN | WEIGHT: 252 LBS | SYSTOLIC BLOOD PRESSURE: 136 MMHG

## 2022-07-19 DIAGNOSIS — Z51.81 ENCOUNTER FOR THERAPEUTIC DRUG MONITORING: ICD-10-CM

## 2022-07-19 DIAGNOSIS — F11.20 OPIOID USE DISORDER, SEVERE, DEPENDENCE: ICD-10-CM

## 2022-07-19 DIAGNOSIS — F51.04 PSYCHOPHYSIOLOGICAL INSOMNIA: ICD-10-CM

## 2022-07-19 DIAGNOSIS — F11.21 OPIOID USE DISORDER, SEVERE, IN EARLY REMISSION, ON MAINTENANCE THERAPY, DEPENDENCE (HCC): Primary | ICD-10-CM

## 2022-07-19 DIAGNOSIS — R63.5 WEIGHT GAIN: ICD-10-CM

## 2022-07-19 LAB
ACCEPTABLE CREAT UR QL: 226.4 MG/DL
NALOXONE UR CFM-MCNC: 3313 NG/ML
NORBUP/BUP RATIO: 1.94 RATIO
NORBUPRENORPHINE UR CFM-MCNC: 377 NG/MG CREAT
NORBUPRENORPHINE/CREAT UR: 194 NG/MG CREAT
TRP-LINK: NORMAL

## 2022-07-19 PROCEDURE — 99214 OFFICE O/P EST MOD 30 MIN: CPT | Performed by: NURSE PRACTITIONER

## 2022-07-19 RX ORDER — BUPRENORPHINE HYDROCHLORIDE AND NALOXONE HYDROCHLORIDE DIHYDRATE 8; 2 MG/1; MG/1
2 TABLET SUBLINGUAL DAILY
Qty: 42 TABLET | Refills: 0 | Status: SHIPPED | OUTPATIENT
Start: 2022-07-19 | End: 2022-08-09 | Stop reason: SDUPTHER

## 2022-07-19 NOTE — PROGRESS NOTES
"     Office  Follow Up Visit      Patient Name: Aroldo Cai  : 1980   MRN: [id number]     Referring Provider: Gerald Dobson MD    Chief Complaint: Substance use    History of Present Illness:   Aroldo Cai is a 42 y.o. 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  Continues taking buprenorphine/naloxone 16-4 mg daily and is tolerating well with no adverse effects.  Reports cravings are well controlled and is having \"0\".  Typically triggered by arguments with his dad, stress, exposure to heroin.  Denies recent significant triggering events.  No recurrence of illicit substance use since last follow-up.  Continues in phase 2 of IOP and is doing well.  Remote reports mood overall is good.  Still having trouble falling asleep.  Taking him 2 to 3 hours to fall asleep.  Able to stay asleep when he does.  Reports poor sleep hygiene.  Also drinking Red Bull and taking yellow jacket caffeine pills.  Has been prescribed trazodone 100 mg nightly by psych that has not been helpful in recent past.  Has had increase in stressors as the mother of his child recently overdosed.  Has reported her to the [employer] previously and has not heard back.  Requesting assistance with next steps.  Has concerns of weight gain and thinks it may be related to buprenorphine.  Typically eating twice a day and meals consist of sandwich or salad.  No formal exercise.  Drinks about 1 Gatorade or soda per day.  Has adequate water intake.   No other complaints today.    Triggers: No recent triggering events    Cravings: None this week    Relapse Prevention: IOP, peer support, MAT for OUD    Urine Drug Screen (today's visit) discussed: Unable to void in lab. Collected in office.    UDS Confirmation: 7- positive buprenorphine, TCA. Nor-buprenorphine pending.    QIANA (PDMP) Reviewed for Current/Active Medications: buprenorphine/naloxone as expected    Past Surgical History:  Past Surgical History:   Procedure Laterality Date "   • ABDOMINAL SURGERY     • BRAIN SURGERY  1994    craniotomy,  right frontal lobe AVM   • CHOLECYSTECTOMY WITH INTRAOPERATIVE CHOLANGIOGRAM N/A 07/27/2021    Procedure: CHOLECYSTECTOMY LAPAROSCOPIC INTRAOPERATIVE CHOLANGIOGRAPHY;  Surgeon: Hardik Blancas MD;  Location: [facility] OR;  Service: General;  Laterality: N/A;   • COLONOSCOPY     • ENDOSCOPY N/A 10/22/2021    Procedure: ESOPHAGOGASTRODUODENOSCOPY WITH BIOPSY;  Surgeon: Hardik Blancas MD;  Location: [facility] ENDOSCOPY;  Service: Gastroenterology;  Laterality: N/A;   • HERNIA REPAIR Right     inguinal   • HIP SURGERY Bilateral    • HIP SURGERY Left        Problem List:  Patient Active Problem List   Diagnosis   • Calculus of gallbladder without cholecystitis without obstruction   • Right upper quadrant abdominal pain   • Nausea and vomiting   • Opioid use disorder, severe, dependence (HCC)       Allergy:   Allergies   Allergen Reactions   • Dilantin [Phenytoin] Seizure   • Penicillins Anaphylaxis   • Phenobarbital Seizure   • Tegretol [Carbamazepine] Seizure   • Latex Rash        Current Medications:   Current Outpatient Medications   Medication Sig Dispense Refill   • allopurinol (ZYLOPRIM) 100 MG tablet Take 100 mg by mouth Daily.     • buprenorphine-naloxone (SUBOXONE) 8-2 MG per SL tablet Place 2 tablets under the tongue Daily for 21 days. 42 tablet 0   • cloNIDine (Catapres) 0.2 MG tablet Take 1 tablet by mouth Every Night. 30 tablet 2   • colchicine 0.6 MG tablet Take 0.6 mg by mouth Daily.     • cyclobenzaprine (FLEXERIL) 10 MG tablet Take 10 mg by mouth every night at bedtime.     • ibuprofen (ADVIL,MOTRIN) 800 MG tablet      • levETIRAcetam (KEPPRA) 750 MG tablet Take 750 mg by mouth 2 (Two) Times a Day.     • metoprolol succinate XL (TOPROL-XL) 25 MG 24 hr tablet      • OLANZapine (zyPREXA) 5 MG tablet Take 1 tablet by mouth Every Night. 30 tablet 2   • omeprazole (priLOSEC) 40 MG capsule      • ondansetron ODT (ZOFRAN-ODT) 8 MG disintegrating  tablet Every 8 (Eight) Hours As Needed.     • promethazine-dextromethorphan (PROMETHAZINE-DM) 6.25-15 MG/5ML syrup TAKE 10 MLS BY MOUTH EVERY SIX HOURS AS NEEDED FOR COUGH     • SUMAtriptan (IMITREX) 50 MG tablet take 1 tablet by mouth at onset of MIGRAINE may repeat in 2 hours if needed max of 2 tablets in 24 hours     • traZODone (DESYREL) 100 MG tablet      • venlafaxine (EFFEXOR) 100 MG tablet Take 2.5 tablets by mouth Daily. 75 tablet 2     No current facility-administered medications for this visit.       Past Medical History:  Past Medical History:   Diagnosis Date   • Anesthesia complication     pt reports he is hard to wake after anesthesia   • Anxiety    • Arthritis    • Bipolar 1 disorder (HCC)    • COVID-19 02/2021   • Depression    • Dysphagia     food and liquids.   • Extensive tattoos    • Gout    • Hepatitis C 2018    Patient took medication    • History of echocardiogram    • Kidney stones    • Pneumonia    • PTSD (post-traumatic stress disorder)    • Seizure (HCC)     last seizure years ago.   • Seizures (HCC)    • Short-term memory loss    • Wears contact lenses    • Wears glasses        Social History:  Social History     Socioeconomic History   • Marital status:    Tobacco Use   • Smoking status: Current Every Day Smoker     Packs/day: 0.50     Years: 30.00     Pack years: 15.00     Types: Cigarettes   • Smokeless tobacco: Never Used   Vaping Use   • Vaping Use: Never used   Substance and Sexual Activity   • Alcohol use: Not Currently   • Drug use: Not Currently     Types: Cocaine(coke)     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   • Sexual activity: Defer       Family History:  Family History   Problem Relation Age of Onset   • Diabetes Mother    • Diabetes insipidus Mother    • Heart disease Mother    • Cancer Father         skin   • Diabetes Father    • Hypertension Father    • Arthritis Father    • Diabetes insipidus Father          Subjective      Review of Systems:   Review of  Systems   Constitutional: Positive for fatigue. Negative for chills and fever.   Respiratory: Negative for shortness of breath.    Cardiovascular: Negative for chest pain.   Gastrointestinal: Negative for abdominal pain.   Musculoskeletal: Positive for arthralgias.   Skin: Negative for skin lesions.   Neurological: Negative for seizures and confusion.   Psychiatric/Behavioral: Positive for sleep disturbance and stress. Negative for hallucinations, suicidal ideas and depressed mood. The patient is nervous/anxious.        PHQ-9 Total Score: 12    VINICIUS-7 Score:   Feeling nervous, anxious or on edge: Nearly every day  Not being able to stop or control worrying: More than half the days  Worrying too much about different things: More than half the days  Trouble Relaxing: More than half the days  Being so restless that it is hard to sit still: More than half the days  Feeling afraid as if something awful might happen: Not at all  Becoming easily annoyed or irritable: More than half the days  VINICIUS 7 Total Score: 13  If you checked any problems, how difficult have these problems made it for you to do your work, take care of things at home, or get along with other people: Somewhat difficult    Patient History:   The following portions of the patient's history were reviewed and updated as appropriate: allergies, current medications, past family history, past medical history, past social history, past surgical history and problem list.     Social:  Social History     Socioeconomic History   • Marital status:    Tobacco Use   • Smoking status: Current Every Day Smoker     Packs/day: 0.50     Years: 30.00     Pack years: 15.00     Types: Cigarettes   • Smokeless tobacco: Never Used   Vaping Use   • Vaping Use: Never used   Substance and Sexual Activity   • Alcohol use: Not Currently   • Drug use: Not Currently     Types: Cocaine(coke)     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   • Sexual activity: Defer        Medications:     Current Outpatient Medications:   •  allopurinol (ZYLOPRIM) 100 MG tablet, Take 100 mg by mouth Daily., Disp: , Rfl:   •  buprenorphine-naloxone (SUBOXONE) 8-2 MG per SL tablet, Place 2 tablets under the tongue Daily for 21 days., Disp: 42 tablet, Rfl: 0  •  cloNIDine (Catapres) 0.2 MG tablet, Take 1 tablet by mouth Every Night., Disp: 30 tablet, Rfl: 2  •  colchicine 0.6 MG tablet, Take 0.6 mg by mouth Daily., Disp: , Rfl:   •  cyclobenzaprine (FLEXERIL) 10 MG tablet, Take 10 mg by mouth every night at bedtime., Disp: , Rfl:   •  ibuprofen (ADVIL,MOTRIN) 800 MG tablet, , Disp: , Rfl:   •  levETIRAcetam (KEPPRA) 750 MG tablet, Take 750 mg by mouth 2 (Two) Times a Day., Disp: , Rfl:   •  metoprolol succinate XL (TOPROL-XL) 25 MG 24 hr tablet, , Disp: , Rfl:   •  OLANZapine (zyPREXA) 5 MG tablet, Take 1 tablet by mouth Every Night., Disp: 30 tablet, Rfl: 2  •  omeprazole (priLOSEC) 40 MG capsule, , Disp: , Rfl:   •  ondansetron ODT (ZOFRAN-ODT) 8 MG disintegrating tablet, Every 8 (Eight) Hours As Needed., Disp: , Rfl:   •  promethazine-dextromethorphan (PROMETHAZINE-DM) 6.25-15 MG/5ML syrup, TAKE 10 MLS BY MOUTH EVERY SIX HOURS AS NEEDED FOR COUGH, Disp: , Rfl:   •  SUMAtriptan (IMITREX) 50 MG tablet, take 1 tablet by mouth at onset of MIGRAINE may repeat in 2 hours if needed max of 2 tablets in 24 hours, Disp: , Rfl:   •  traZODone (DESYREL) 100 MG tablet, , Disp: , Rfl:   •  venlafaxine (EFFEXOR) 100 MG tablet, Take 2.5 tablets by mouth Daily., Disp: 75 tablet, Rfl: 2    Objective     Physical Exam:  Physical Exam  Vitals reviewed.   Constitutional:       General: He is not in acute distress.     Appearance: He is well-developed. He is not ill-appearing.   Eyes:      General: No scleral icterus.  Pulmonary:      Effort: No respiratory distress.   Skin:     Coloration: Skin is not jaundiced.   Neurological:      Mental Status: He is alert and oriented to person, place, and time.      Gait: Gait  "abnormal.   Psychiatric:         Mood and Affect: Mood normal.         Speech: Speech normal.         Behavior: Behavior normal.         Thought Content: Thought content normal. Thought content does not include homicidal or suicidal ideation. Thought content does not include homicidal or suicidal plan.         Judgment: Judgment normal.         Vital Signs:   Vitals:    07/19/22 1116   BP: 136/88   Pulse: 102   Weight: 114 kg (252 lb)   Height: 165.1 cm (65\")     Body mass index is 41.93 kg/m².     Mental Status Exam:   Hygiene:   good  Cooperation:  Cooperative  Eye Contact:  Good  Psychomotor Behavior:  Appropriate  Affect:  Full range  Mood: normal  Speech:  Normal  Thought Process:  Goal directed  Thought Content:  Normal  Suicidal:  None  Homicidal:  None  Hallucinations:  None  Delusion:  None  Memory:  Intact  Orientation:  Person, Place, Time and Situation  Reliability:  good  Insight:  Good  Judgement:  Good  Impulse Control:  Good    Assessment / Plan      Assessment & Plan   -Continue buprenorphine/naloxone 16-4 mg daily.  -Will have RTC dose at next visit +0 remaining  -Advisement to take part in and remain active in 12 Step Recovery Meetings, IOP, and/or 1:1 therapy/counseling and to establish/maintain an active relationship with a recovery sponsor.  -Continued monitoring for illicit substances for patient safety, medication compliance and future care.  -Will meet with peer support after appointment today  -Continue IOP phase 2  -Encouraged patient to keep a food log and utilize at such as My [employer]itness Pal to track calories.  Encouraged at least 20 to 30 minutes of aerobic activity at least 5 days a week.  Declines nutritionist referral at this time.  -Encouraged patient to decrease caffeine intake and stop caffeine pills  -Discussed good sleep hygiene  -Encouraged patient to make appointment with PCP for annual wellness and fasting labs     Visit Diagnoses     Opioid use disorder, severe, in early " remission, on maintenance therapy, dependence (HCC)    -  Primary    Relevant Medications    buprenorphine-naloxone (SUBOXONE) 8-2 MG per SL tablet    Other Relevant Orders    Compliance Drug Analysis, Ur - Urine, Clean Catch    Encounter for therapeutic drug monitoring        Relevant Orders    Compliance Drug Analysis, Ur - Urine, Clean Catch    Psychophysiological insomnia        Weight gain          Diagnoses       Codes Comments    Opioid use disorder, severe, in early remission, on maintenance therapy, dependence (HCC)    -  Primary ICD-10-CM: F11.21  ICD-9-CM: 304.03     Encounter for therapeutic drug monitoring     ICD-10-CM: Z51.81  ICD-9-CM: V58.83     Psychophysiological insomnia     ICD-10-CM: F51.04  ICD-9-CM: 307.42     Weight gain     ICD-10-CM: R63.5  ICD-9-CM: 783.1             PLAN:  1. Safety: No acute safety concerns  2. Risk Assessment: Risk of self-harm acutely is low. Risk of self-harm chronically is also low, but could be further elevated in the event of treatment noncompliance and/or AODA.      TREATMENT PLAN/GOALS: Continue supportive psychotherapy efforts and medications as indicated. Treatment and medication options discussed during today's visit. Patient acknowledged and verbally consented to continue with current treatment plan and was educated on the importance of compliance with treatment and follow-up appointments.      MEDICATION ISSUES:  QIANA reviewed as expected.  Discussed medication options and treatment plan of prescribed medication as well as the risks, benefits, and side effects including potential falls, possible impaired driving and metabolic adversities among others. Patient is agreeable to call the office with any worsening of symptoms or onset of side effects. Patient is agreeable to call 911 or go to the nearest ER should he/she begin having SI/HI. No medication side effects or related complaints today.     MEDS ORDERED DURING VISIT:  New Medications Ordered This  Visit   Medications   • buprenorphine-naloxone (SUBOXONE) 8-2 MG per SL tablet     Sig: Place 2 tablets under the tongue Daily for 21 days.     Dispense:  42 tablet     Refill:  0     [id number]       Return in 3 weeks (on 8/9/2022) for Follow Up Medication.           This document has been electronically signed by PRISCILLA Wen  July 19, 2022 12:35 EDT      Part of this note may be an electronic transcription/translation of spoken language to printed text using the Dragon Dictation System.

## 2022-07-25 LAB
DRUGS UR: NORMAL
Lab: NORMAL

## 2022-07-25 NOTE — PROGRESS NOTES
7/13/2022  330-430      Data: Group members had a guest Crystal and was educated on the 12 steps    RESPONSE: Group members discussed their experience with the 12 steps,     CLINICAL MANEUVERING/INTERVENTIONS: Assisted  member in processing above session content; acknowledged and normalized patient's thoughts, feelings and concerns.  Educated on Phase 2 IOP, relapse prevention and the importance of making goals in phase 2 that assist with individual and group Educated on the importance of having outside support/community support that is not family and being grateful for family support and not relying only on family for their recovery support, and the importance of having meetings, sponsorship and step work.  Educated on the 12 steps.     Allowed patient to freely discuss issues without interruption or judgment. Provided safe, confidential environment to facilitate the development of positive therapeutic relationship and encourage open, honest communication. Assisted patient in identifying risk factors which would indicate the need for higher level of care including thoughts to harm self or others and/or self-harming behavior and encouraged patient to contact this office, call 911, or present to the nearest emergency room should any of these events occur. Discussed crisis intervention services and means to access.  Patient adamantly and convincingly denies current suicidal or homicidal ideation or perceptual disturbance.    7  ASSESSMENT:  Engaged in activity/Process and self-disclosed: Yes or No  Affect ([place] all that apply) appropriate, blunted, flat, sad, angry, tearful, anxious, bright  Applies topic to self: Yes or No  Able to give and receive feedback: Yes or No  Degree of insightful thinking: Least 1  2  3  4  5  6  7 8  9  10  Most    Urinalysis:   No visits with results within 1 Day(s) from this visit.   Latest known visit with results is:   Lab on 07/05/2022   Component Date Value Ref Range Status   •  Ethanol, Urine 07/05/2022 Negative  Cutoff=0.020 % Final   • THC, Screen, Urine 07/05/2022 Negative  Negative Final   • Phencyclidine (PCP), Urine 07/05/2022 Negative  Negative Final   • Cocaine Screen, Urine 07/05/2022 Negative  Negative Final   • Methamphetamine, Ur 07/05/2022 Negative  Negative Final   • Opiate Screen 07/05/2022 Negative  Negative Final   • Amphetamine Screen, Urine 07/05/2022 Negative  Negative Final   • Benzodiazepine Screen, Urine 07/05/2022 Negative  Negative Final   • Tricyclic Antidepressants Screen 07/05/2022 Positive (A) Negative Final   • Methadone Screen, Urine 07/05/2022 Negative  Negative Final   • Barbiturates Screen, Urine 07/05/2022 Negative  Negative Final   • Oxycodone Screen, Urine 07/05/2022 Negative  Negative Final   • Propoxyphene Screen 07/05/2022 Negative  Negative Final   • Buprenorphine, Screen, Urine 07/05/2022 Positive (A) Negative Final   • Creatinine, Urine 07/05/2022 196.5  >=20 mg/dL Final   • Buprenorphine/CR 07/05/2022 71  ng/mg creat Final   • NORBUPRENORPHINE/CR 07/05/2022 379  ng/mg creat Final   • NORBUP/BUP RATIO 07/05/2022 5.34  RATIO Final   • Naloxone 07/05/2022 76  Cutoff=25 ng/ml Final   • TRP-LINK 07/05/2022 Comment   Final   • Antidepressants 07/05/2022 Negative   Final   • AMITRIPTYLINE, UR 07/05/2022 Not Detected   Final   • Clomipramine, Ur 07/05/2022 Not Detected   Final   • Desemethylclomipramine 07/05/2022 Not Detected   Final   • Desipramine 07/05/2022 Not Detected   Final   • Doxepin 07/05/2022 Not Detected   Final   • Desmethyldoxepin, Ur 07/05/2022 Not Detected   Final   • Imipramine 07/05/2022 Not Detected   Final   • Nortriptyline 07/05/2022 Not Detected   Final   • Protriptyline 07/05/2022 Not Detected   Final   • Trimipramine 07/05/2022 Not Detected   Final   • Level of Detection: 07/05/2022 Comment   Final   • Opiate Class 07/05/2022 Negative   Final   • Codeine, Urine 07/05/2022 Not Detected  ng/mg creat Final   • Morphine, Urine  07/05/2022 Not Detected  ng/mg creat Final   • normorphine 07/05/2022 Not Detected  ng/mg creat Final   • Norcodeine Ur 07/05/2022 Not Detected  ng/mg creat Final   • Hydrocodone UR 07/05/2022 Not Detected  ng/mg creat Final   • Hydromorphone Urine 07/05/2022 Not Detected  ng/mg creat Final   • Dihydrocodeine, Urine 07/05/2022 Not Detected  ng/mg creat Final   • Opiates, Norhydrocodone, Urine 07/05/2022 Not Detected  ng/mg creat Final   • Level of Detection: 07/05/2022 Comment   Final       12-Step attendance: Frequency/Sponsor?     Identification of environmental and personal barriers to recovery:     Motivation for treatment:     Mental Status Exam  Hygiene:  good  Dress:  casual  Attitude:  Cooperative  Motor Activity:  Appropriate  Speech:  Normal  Mood:  anxious and depressed  Affect:  anxious  Thought Processes:  Goal directed  Thought Content:  normal  Suicidal Thoughts:  denies  Homicidal Thoughts:  denies  Crisis Safety Plan: yes, to come to the emergency room.  Hallucinations:  denies    Assessment     Diagnoses and all orders for this visit:    1. Cocaine use disorder, severe, in sustained remission (HCC) (Primary)    2. Opioid use disorder, severe, in early remission, on maintenance therapy, dependence (HCC)    3. Bipolar II disorder (HCC)               PLAN:   Patient will continue in bi-weekly group psychotherapy sessions and individual outpatient psychotherapy sessions every 3-4 weeks and will continue in self-help meetings and seek additional treatment if necessary following completion    Idalia Boles LCSW

## 2022-07-27 ENCOUNTER — TELEMEDICINE (OUTPATIENT)
Dept: PSYCHIATRY | Facility: CLINIC | Age: 42
End: 2022-07-27

## 2022-07-27 DIAGNOSIS — F11.21 OPIOID USE DISORDER, SEVERE, IN EARLY REMISSION, ON MAINTENANCE THERAPY, DEPENDENCE (HCC): Primary | ICD-10-CM

## 2022-07-27 DIAGNOSIS — F14.21 COCAINE USE DISORDER, SEVERE, IN SUSTAINED REMISSION: ICD-10-CM

## 2022-07-27 PROCEDURE — 90853 GROUP PSYCHOTHERAPY: CPT | Performed by: SOCIAL WORKER

## 2022-07-27 PROCEDURE — 90785 PSYTX COMPLEX INTERACTIVE: CPT | Performed by: SOCIAL WORKER

## 2022-07-27 NOTE — PROGRESS NOTES
Phase 2 IOP  7/27/2022  330-430      Data: Group members was able to pro ess with therapist about her resignation ans their phase 2 and 3 IOP therapist and encouraged them to think about what they want to work on to tell Duy on Monday.,    RESPONSE: Group members discussed their thoughts a d feelings about this therapist leaving [employer] and as their IOP therapist and they all discussed the transition and grateful that Duy gets to be there IOP therapist again.      CLINICAL MANEUVERING/INTERVENTIONS: Assisted  member in processing above session content; acknowledged and normalized patient's thoughts, feelings and concerns.  Educated on Phase 2 IOP, relapse prevention and the importance of making goals in phase 2 that assist with individual and group Educated on the importance of having outside support/community support and assisted processing the transitions as this therapist is resigning on 7/27.Duy Garcia will be the IOP coordinator going forward and gave them the meeting id and password     Allowed patient to freely discuss issues without interruption or judgment. Provided safe, confidential environment to facilitate the development of positive therapeutic relationship and encourage open, honest communication. Assisted patient in identifying risk factors which would indicate the need for higher level of care including thoughts to harm self or others and/or self-harming behavior and encouraged patient to contact this office, call 911, or present to the nearest emergency room should any of these events occur. Discussed crisis intervention services and means to access.  Patient adamantly and convincingly denies current suicidal or homicidal ideation or perceptual disturbance.    7  ASSESSMENT:  Engaged in activity/Process and self-disclosed: Yes or No  Affect ([place] all that apply) appropriate, blunted, flat, sad, angry, tearful, anxious, bright  Applies topic to self: Yes or No  Able to give and receive  feedback: Yes or No  Degree of insightful thinking: Least 1  2  3  4  5  6  7 8  9  10  Most    Urinalysis:   No visits with results within 1 Day(s) from this visit.   Latest known visit with results is:   Orders Only on 07/20/2022   Component Date Value Ref Range Status   • Report Summary 07/20/2022 FINAL   Final   • Please note 07/20/2022 Comment   Final       12-Step attendance: Frequency/Sponsor?     Identification of environmental and personal barriers to recovery:     Motivation for treatment:     Mental Status Exam  Hygiene:  good  Dress:  casual  Attitude:  Cooperative  Motor Activity:  Appropriate  Speech:  Normal  Mood:  anxious and depressed  Affect:  anxious  Thought Processes:  Goal directed  Thought Content:  normal  Suicidal Thoughts:  denies  Homicidal Thoughts:  denies  Crisis Safety Plan: yes, to come to the emergency room.  Hallucinations:  denies    Assessment     Diagnoses and all orders for this visit:    1. Opioid use disorder, severe, in early remission, on maintenance therapy, dependence (HCC) (Primary)    2. Cocaine use disorder, severe, in sustained remission (HCC)               PLAN:   Patient will continue in bi-weekly group psychotherapy sessions and individual outpatient psychotherapy sessions every 3-4 weeks and will continue in self-help meetings and seek additional treatment if necessary following completion    Idalia Boles LCSW

## 2022-08-01 ENCOUNTER — TELEMEDICINE (OUTPATIENT)
Dept: PSYCHIATRY | Facility: CLINIC | Age: 42
End: 2022-08-01

## 2022-08-01 ENCOUNTER — LAB (OUTPATIENT)
Dept: LAB | Facility: HOSPITAL | Age: 42
End: 2022-08-01

## 2022-08-01 DIAGNOSIS — F11.20 OPIOID USE DISORDER, SEVERE, DEPENDENCE: ICD-10-CM

## 2022-08-01 PROCEDURE — G0480 DRUG TEST DEF 1-7 CLASSES: HCPCS

## 2022-08-01 PROCEDURE — 80307 DRUG TEST PRSMV CHEM ANLYZR: CPT

## 2022-08-01 NOTE — PROGRESS NOTES
"CD IOP GROUP     Date: 08/01/2022  Name: Aroldo Cai    Time In: 1530   Time Out: 1630    This provider is located at [facility] located at [address] [place] [address], [address] [place] [id number].  The Patient is seen remotely at his/her home, using Zoom. Patient is being seen via telehealth and confirm that they are in a secure environment for this session. The patient's condition being diagnosed/treated is appropriate for telemedicine. The provider identified himself as well as his credentials. The patient gave consent to be seen remotely, and when consent is given they understand that the consent allows for patient identifiable information to be sent to a third party as needed. They may refuse to be seen remotely at any time. The electronic data is encrypted and password protected, and the patient has been advised of the potential risks to privacy not withstanding such measures.      Number of participants: 8    IOP GROUP NOTE     Data: 3 hour IOP group therapy session (Check-ins, Coping Skills, Relapse Prevention)     Check Ins: Therapist continued facilitation of rapport building strategies between group members. Therapist asked that each patient check in with home life and recovery efforts and identify triggers, cravings, and high risk situations that arise between group sessions. Therapist provided empathy and support during group session.     Session Content/Coping Skills: Check ins completed by group members. Introductions completed. Clinician began discussion of enabling behaviors with group members.     Response: Patient attended class via Zoom. Patient participated in verbal completion of check in form. Patient during check in answered no to question \"currently or since last group meeting have you had any suicidal thoughts or plan or intent to hurt yourself, and patient also answered no to question of \"currently or since your last group meeting, have you had any homicidal thoughts or plan or " "intent to hurt others\". Patient answered no to all other questions during check in. Patient participated in group discussions during session.     Personal Assessment 0-10 Scale (10 worst)    Anxiety:  4   Depression:  0   Cravings: 0     Assessment:     ..  Lab on 08/01/2022   Component Date Value Ref Range Status   • THC, Screen, Urine 08/01/2022 Negative  Negative Final   • Phencyclidine (PCP), Urine 08/01/2022 Negative  Negative Final   • Cocaine Screen, Urine 08/01/2022 Negative  Negative Final   • Methamphetamine, Ur 08/01/2022 Negative  Negative Final   • Opiate Screen 08/01/2022 Negative  Negative Final   • Amphetamine Screen, Urine 08/01/2022 Negative  Negative Final   • Benzodiazepine Screen, Urine 08/01/2022 Negative  Negative Final   • Tricyclic Antidepressants Screen 08/01/2022 Negative  Negative Final   • Methadone Screen, Urine 08/01/2022 Negative  Negative Final   • Barbiturates Screen, Urine 08/01/2022 Negative  Negative Final   • Oxycodone Screen, Urine 08/01/2022 Negative  Negative Final   • Propoxyphene Screen 08/01/2022 Negative  Negative Final   • Buprenorphine, Screen, Urine 08/01/2022 Positive (A) Negative Final   Orders Only on 07/20/2022   Component Date Value Ref Range Status   • Report Summary 07/20/2022 FINAL   Final    Comment: ====================================================================  [facility] COMP DRUG ANALYSIS,UR  ====================================================================  Test                             Result       Flag       Units  Drug Present    Buprenorphine                  206                     ng/mg creat    Norbuprenorphine               339                     ng/mg creat     Source of buprenorphine is a scheduled prescription medication.     Norbuprenorphine is an expected metabolite of buprenorphine.    Levetiracetam                  PRESENT    Cyclobenzaprine                PRESENT    Desmethylcyclobenzaprine       PRESENT     " Desmethylcyclobenzaprine is an expected metabolite of     cyclobenzaprine.    Trazodone                      PRESENT    1,3 chlorophenyl piperazine    PRESENT     1,3-chlorophenyl piperazine is an expected metabolite of     trazodone.    Venlafaxine                    PRESENT    Desmethylvenlafaxine           PRESENT     Desmethylvenlafaxine is                            an expected metabolite of venlafaxine.    Olanzapine                     PRESENT    Clonidine                      PRESENT  ====================================================================  Test                      Result    Flag   Units      Ref Range    Creatinine              121              mg/dL      >=20  ====================================================================  Declared Medications:   Medication list was not provided.  ====================================================================  For clinical consultation, please call [phone].  ====================================================================     • Please note 07/20/2022 Comment   Final    Comment: The date and/or time of collection was not indicated on the  requisition as required by state and federal law.  The date of  receipt of the specimen was used as the collection date if not  supplied.     Lab on 07/15/2022   Component Date Value Ref Range Status   • Ethanol, Urine 07/15/2022 Negative  Cutoff=0.020 % Final   • THC, Screen, Urine 07/15/2022 Negative  Negative Final   • Phencyclidine (PCP), Urine 07/15/2022 Negative  Negative Final   • Cocaine Screen, Urine 07/15/2022 Negative  Negative Final   • Methamphetamine, Ur 07/15/2022 Negative  Negative Final   • Opiate Screen 07/15/2022 Negative  Negative Final   • Amphetamine Screen, Urine 07/15/2022 Negative  Negative Final   • Benzodiazepine Screen, Urine 07/15/2022 Negative  Negative Final   • Tricyclic Antidepressants Screen 07/15/2022 Positive (A) Negative Final   • Methadone Screen, Urine 07/15/2022  Negative  Negative Final   • Barbiturates Screen, Urine 07/15/2022 Negative  Negative Final   • Oxycodone Screen, Urine 07/15/2022 Negative  Negative Final   • Propoxyphene Screen 07/15/2022 Negative  Negative Final   • Buprenorphine, Screen, Urine 07/15/2022 Positive (A) Negative Final   • Creatinine, Urine 07/15/2022 226.4  >=20 mg/dL Final   • Buprenorphine/CR 07/15/2022 194  ng/mg creat Final   • NORBUPRENORPHINE/CR 07/15/2022 377  ng/mg creat Final   • NORBUP/BUP RATIO 07/15/2022 1.94  RATIO Final    This test was developed and its performance characteristics  determined by [facility].  It has not been cleared or approved  by the Food and Drug Administration.   • Naloxone 07/15/2022 3313  Cutoff=25 ng/ml Final   • TRP-LINK 07/15/2022 Comment   Final    These test results were not transmitted to [facility].  If you would like your patient's urine drug  test results to transmit to [facility], please  contact your [employer]  to complete  a physician authorization form.   • Antidepressants 07/15/2022 Negative   Final   • AMITRIPTYLINE, UR 07/15/2022 Not Detected   Final   • Clomipramine, Ur 07/15/2022 Not Detected   Final   • Desemethylclomipramine 07/15/2022 Not Detected   Final   • Desipramine 07/15/2022 Not Detected   Final   • Doxepin 07/15/2022 Not Detected   Final   • Desmethyldoxepin, Ur 07/15/2022 Not Detected   Final   • Imipramine 07/15/2022 Not Detected   Final   • Nortriptyline 07/15/2022 Not Detected   Final   • Protriptyline 07/15/2022 Not Detected   Final   • Trimipramine 07/15/2022 Not Detected   Final   • Level of Detection: 07/15/2022 Comment   Final    Testing Threshold:  50 or 100 ng/mL                                                                       '  This test was developed and its performance characteristics  determined by [employer] [facility].  It has not been cleared or approved  by the Food and Drug Administration.       Mental Status Exam  Hygiene:   "good  Dress: casual  Attitude: cooperative and agreeable   Motor Activity: appropriate  Eye Contact:  good  Speech: regular rate and rhythm   Mood:  calm and cooperative  Affect:  Appropriate  Thought Processes:  Linear  Thought Content:  Normal  Suicidal Thoughts:  denies  Homicidal Thoughts:  denies  Crisis Safety Plan: Safety plan has been discussed.   Hallucinations: Unknown to clinician.   Reliability: fair  Insight: fair  Judgement: fair  Impulse Control: fair    Recovery/spiritual support group attendance: No     Sponsor: No       Progress toward goal: Not at goal    Prognosis: Fair with Ongoing Treatment     Self-reported number of days sober:  Patient reported \"May something\" during check in.     Patient agreeable to adhere to medication regimen as prescribed and report any side effects. Patient will contact this office, call 911 or present to the nearest emergency room should suicidal or homicidal ideations occur.    Impression/Formulation:  No diagnosis found.    Clinical Maneuvering/Interventions: Therapist utilized a person-centered approach to build rapport with group member. Therapist implemented motivational interviewing techniques to assist client with exploring and resolving ambivalence associated with commitment to change behaviors related to substance use and addiction. Therapist applied cognitive behavioral strategies to facilitate identification of maladaptive patterns of thinking and behavior that contribute to client's risk for continued substance use and relapse. Therapist employed group interaction activities to build rapport among group members, promote sobriety, and emphasize relapse prevention. Therapist promoted safe nonjudgmental environment by providing group members with unconditional positive regard and encouraging group members to comply with group rules and guidelines. Therapist assisted group member with identifying and implementing healthier coping strategies.      Plan:  " Continue [facility] IOP Phase I   Aftercare:  [facility] Phase II  Program Assignments:  Personal recovery plan, relapse prevention plan, attendance of recovery support group meetings, exploration of sponsorship, drug/alcohol screens.     Duy Garcia LCSW  8/1/2022  17:07 EDT

## 2022-08-02 LAB — ETHANOL UR-MCNC: NEGATIVE %

## 2022-08-03 ENCOUNTER — LAB (OUTPATIENT)
Dept: LAB | Facility: HOSPITAL | Age: 42
End: 2022-08-03

## 2022-08-03 DIAGNOSIS — F11.20 OPIOID USE DISORDER, SEVERE, DEPENDENCE: ICD-10-CM

## 2022-08-03 PROCEDURE — G0480 DRUG TEST DEF 1-7 CLASSES: HCPCS

## 2022-08-03 PROCEDURE — 80307 DRUG TEST PRSMV CHEM ANLYZR: CPT

## 2022-08-04 ENCOUNTER — TELEMEDICINE (OUTPATIENT)
Dept: PSYCHIATRY | Facility: CLINIC | Age: 42
End: 2022-08-04

## 2022-08-04 LAB — ETHANOL UR-MCNC: NEGATIVE %

## 2022-08-05 LAB
ACCEPTABLE CREAT UR QL: 178.5 MG/DL
NALOXONE UR CFM-MCNC: 2225 NG/ML
NORBUP/BUP RATIO: 2.77 RATIO
NORBUPRENORPHINE UR CFM-MCNC: 532 NG/MG CREAT
NORBUPRENORPHINE/CREAT UR: 192 NG/MG CREAT
TRP-LINK: NORMAL

## 2022-08-06 LAB
ACCEPTABLE CREAT UR QL: 146.4 MG/DL
NALOXONE UR CFM-MCNC: 1345 NG/ML
NORBUP/BUP RATIO: 1.64 RATIO
NORBUPRENORPHINE UR CFM-MCNC: 337 NG/MG CREAT
NORBUPRENORPHINE/CREAT UR: 206 NG/MG CREAT
TRP-LINK: NORMAL

## 2022-08-08 ENCOUNTER — TELEMEDICINE (OUTPATIENT)
Dept: PSYCHIATRY | Facility: CLINIC | Age: 42
End: 2022-08-08

## 2022-08-08 NOTE — PROGRESS NOTES
"CD IOP GROUP     Date: 08/04/2022  Name: Aroldo Cai    Time In: 1530   Time Out: 1630    Number of participants: 8    IOP GROUP NOTE     Data: 1 hour IOP group therapy session (Check-ins, Coping Skills, Relapse Prevention)     Check Ins: Therapist continued facilitation of rapport building strategies between group members. Therapist asked that each patient check in with home life and recovery efforts and identify triggers, cravings, and high risk situations that arise between group sessions. Therapist provided empathy and support during group session.     Session Content/Coping Skills: Check ins completed by group members. Clinician explained check in form to group members. Clinician discussed making progress in recovery. Clinician discussed with group members focusing on what they can control. Clinician processed stressors with group members.      Response: Patient attended class in person. Patient participated in completion of check in form. Patient on check in form answered no to question \"currently or since last group meeting have you had any suicidal thoughts or plan or intent to hurt yourself”, and patient also answered no to question of \"currently or since your last group meeting, have you had any homicidal thoughts or plan or intent to hurt others\". Patient participated in group discussions during session.     Personal Assessment 0-10 Scale (10 worst)    Anxiety:  2   Depression:  0   Cravings: 1     Assessment:     ..  Lab on 08/03/2022   Component Date Value Ref Range Status   • Ethanol, Urine 08/03/2022 Negative  Cutoff=0.020 % Final   • THC, Screen, Urine 08/03/2022 Negative  Negative Final   • Phencyclidine (PCP), Urine 08/03/2022 Negative  Negative Final   • Cocaine Screen, Urine 08/03/2022 Negative  Negative Final   • Methamphetamine, Ur 08/03/2022 Negative  Negative Final   • Opiate Screen 08/03/2022 Negative  Negative Final   • Amphetamine Screen, Urine 08/03/2022 Negative  Negative Final   • " Benzodiazepine Screen, Urine 08/03/2022 Negative  Negative Final   • Tricyclic Antidepressants Screen 08/03/2022 Negative  Negative Final   • Methadone Screen, Urine 08/03/2022 Negative  Negative Final   • Barbiturates Screen, Urine 08/03/2022 Negative  Negative Final   • Oxycodone Screen, Urine 08/03/2022 Negative  Negative Final   • Propoxyphene Screen 08/03/2022 Negative  Negative Final   • Buprenorphine, Screen, Urine 08/03/2022 Positive (A) Negative Final   • Creatinine, Urine 08/03/2022 146.4  >=20 mg/dL Final   • Buprenorphine/CR 08/03/2022 206  ng/mg creat Final   • NORBUPRENORPHINE/CR 08/03/2022 337  ng/mg creat Final   • NORBUP/BUP RATIO 08/03/2022 1.64  RATIO Final    This test was developed and its performance characteristics  determined by [email or web address].  It has not been cleared or approved  by the Food and Drug Administration.   • Naloxone 08/03/2022 1345  Cutoff=25 ng/ml Final   • TRP-LINK 08/03/2022 Comment   Final    These test results were not transmitted to [facility].  If you would like your patient's urine drug  test results to transmit to [facility], please  contact your [facility]  to complete  a physician authorization form.   Lab on 08/01/2022   Component Date Value Ref Range Status   • Ethanol, Urine 08/01/2022 Negative  Cutoff=0.020 % Final   • THC, Screen, Urine 08/01/2022 Negative  Negative Final   • Phencyclidine (PCP), Urine 08/01/2022 Negative  Negative Final   • Cocaine Screen, Urine 08/01/2022 Negative  Negative Final   • Methamphetamine, Ur 08/01/2022 Negative  Negative Final   • Opiate Screen 08/01/2022 Negative  Negative Final   • Amphetamine Screen, Urine 08/01/2022 Negative  Negative Final   • Benzodiazepine Screen, Urine 08/01/2022 Negative  Negative Final   • Tricyclic Antidepressants Screen 08/01/2022 Negative  Negative Final   • Methadone Screen, Urine 08/01/2022 Negative  Negative Final   • Barbiturates Screen, Urine 08/01/2022 Negative   Negative Final   • Oxycodone Screen, Urine 08/01/2022 Negative  Negative Final   • Propoxyphene Screen 08/01/2022 Negative  Negative Final   • Buprenorphine, Screen, Urine 08/01/2022 Positive (A) Negative Final   • Creatinine, Urine 08/01/2022 178.5  >=20 mg/dL Final   • Buprenorphine/CR 08/01/2022 192  ng/mg creat Final   • NORBUPRENORPHINE/CR 08/01/2022 532  ng/mg creat Final   • NORBUP/BUP RATIO 08/01/2022 2.77  RATIO Final    This test was developed and its performance characteristics  determined by [facility].  It has not been cleared or approved  by the Food and Drug Administration.   • Naloxone 08/01/2022 2225  Cutoff=25 ng/ml Final   • TRP-LINK 08/01/2022 Comment   Final    These test results were not transmitted to [facility].  If you would like your patient's urine drug  test results to transmit to [facility], please  contact your [employer]  to complete  a physician authorization form.   Orders Only on 07/20/2022   Component Date Value Ref Range Status   • Report Summary 07/20/2022 FINAL   Final    Comment: ====================================================================  [facility] COMP DRUG ANALYSIS,[facility]  ====================================================================  Test                             Result       Flag       Units  Drug Present    Buprenorphine                  206                     ng/mg creat    Norbuprenorphine               339                     ng/mg creat     Source of buprenorphine is a scheduled prescription medication.     Norbuprenorphine is an expected metabolite of buprenorphine.    Levetiracetam                  PRESENT    Cyclobenzaprine                PRESENT    Desmethylcyclobenzaprine       PRESENT     Desmethylcyclobenzaprine is an expected metabolite of     cyclobenzaprine.    Trazodone                      PRESENT    1,3 chlorophenyl piperazine    PRESENT     1,3-chlorophenyl piperazine is an expected metabolite of      trazodone.    Venlafaxine                    PRESENT    Desmethylvenlafaxine           PRESENT     Desmethylvenlafaxine is                            an expected metabolite of venlafaxine.    Olanzapine                     PRESENT    Clonidine                      PRESENT  ====================================================================  Test                      Result    Flag   Units      Ref Range    Creatinine              121              mg/dL      >=20  ====================================================================  Declared Medications:   Medication list was not provided.  ====================================================================  For clinical consultation, please call [phone].  ====================================================================     • Please note 07/20/2022 Comment   Final    Comment: The date and/or time of collection was not indicated on the  requisition as required by state and federal law.  The date of  receipt of the specimen was used as the collection date if not  supplied.     Lab on 07/15/2022   Component Date Value Ref Range Status   • Ethanol, Urine 07/15/2022 Negative  Cutoff=0.020 % Final   • THC, Screen, Urine 07/15/2022 Negative  Negative Final   • Phencyclidine (PCP), Urine 07/15/2022 Negative  Negative Final   • Cocaine Screen, Urine 07/15/2022 Negative  Negative Final   • Methamphetamine, Ur 07/15/2022 Negative  Negative Final   • Opiate Screen 07/15/2022 Negative  Negative Final   • Amphetamine Screen, Urine 07/15/2022 Negative  Negative Final   • Benzodiazepine Screen, Urine 07/15/2022 Negative  Negative Final   • Tricyclic Antidepressants Screen 07/15/2022 Positive (A) Negative Final   • Methadone Screen, Urine 07/15/2022 Negative  Negative Final   • Barbiturates Screen, Urine 07/15/2022 Negative  Negative Final   • Oxycodone Screen, Urine 07/15/2022 Negative  Negative Final   • Propoxyphene Screen 07/15/2022 Negative  Negative Final   •  Buprenorphine, Screen, Urine 07/15/2022 Positive (A) Negative Final   • Creatinine, Urine 07/15/2022 226.4  >=20 mg/dL Final   • Buprenorphine/CR 07/15/2022 194  ng/mg creat Final   • NORBUPRENORPHINE/CR 07/15/2022 377  ng/mg creat Final   • NORBUP/BUP RATIO 07/15/2022 1.94  RATIO Final    This test was developed and its performance characteristics  determined by [facility].  It has not been cleared or approved  by the Food and Drug Administration.   • Naloxone 07/15/2022 3313  Cutoff=25 ng/ml Final   • TRP-LINK 07/15/2022 Comment   Final    These test results were not transmitted to [facility].  If you would like your patient's urine drug  test results to transmit to [facility], please  contact your [employer]  to complete  a physician authorization form.   • Antidepressants 07/15/2022 Negative   Final   • AMITRIPTYLINE, UR 07/15/2022 Not Detected   Final   • Clomipramine, Ur 07/15/2022 Not Detected   Final   • Desemethylclomipramine 07/15/2022 Not Detected   Final   • Desipramine 07/15/2022 Not Detected   Final   • Doxepin 07/15/2022 Not Detected   Final   • Desmethyldoxepin, Ur 07/15/2022 Not Detected   Final   • Imipramine 07/15/2022 Not Detected   Final   • Nortriptyline 07/15/2022 Not Detected   Final   • Protriptyline 07/15/2022 Not Detected   Final   • Trimipramine 07/15/2022 Not Detected   Final   • Level of Detection: 07/15/2022 Comment   Final    Testing Threshold:  50 or 100 ng/mL                                                                       '  This test was developed and its performance characteristics  determined by [facility].  It has not been cleared or approved  by the Food and Drug Administration.       Mental Status Exam  Hygiene:  good  Dress: casual  Attitude: cooperative and agreeable   Motor Activity: appropriate  Eye Contact:  good  Speech: regular rate and rhythm   Mood:  calm and cooperative  Affect:  Appropriate  Thought Processes:  Linear  Thought  Content:  Normal  Suicidal Thoughts:  denies  Homicidal Thoughts:  denies  Crisis Safety Plan: Safety plan has been discussed.   Hallucinations: Unknown to clinician.   Reliability: fair  Insight: fair  Judgement: fair  Impulse Control: fair    Progress toward goal: Not at goal    Prognosis: Fair with Ongoing Treatment     Self-reported number of days sober: Patient reported May 26 on check in form.     Patient agreeable to adhere to medication regimen as prescribed and report any side effects. Patient will contact this office, call 911 or present to the nearest emergency room should suicidal or homicidal ideations occur.    Impression/Formulation:  No diagnosis found.    Clinical Maneuvering/Interventions: Therapist utilized a person-centered approach to build rapport with group member. Therapist implemented motivational interviewing techniques to assist client with exploring and resolving ambivalence associated with commitment to change behaviors related to substance use and addiction. Therapist applied cognitive behavioral strategies to facilitate identification of maladaptive patterns of thinking and behavior that contribute to client's risk for continued substance use and relapse. Therapist employed group interaction activities to build rapport among group members, promote sobriety, and emphasize relapse prevention. Therapist promoted safe nonjudgmental environment by providing group members with unconditional positive regard and encouraging group members to comply with group rules and guidelines. Therapist assisted group member with identifying and implementing healthier coping strategies.      Plan:  Continue [facility] IOP Phase I   Aftercare:  [facility] Phase II  Program Assignments:  Personal recovery plan, relapse prevention plan, attendance of recovery support group meetings, exploration of sponsorship, drug/alcohol screens.     Duy Garcia  LCSW  8/8/2022  11:34 EDT

## 2022-08-09 ENCOUNTER — LAB (OUTPATIENT)
Dept: LAB | Facility: HOSPITAL | Age: 42
End: 2022-08-09

## 2022-08-09 ENCOUNTER — OFFICE VISIT (OUTPATIENT)
Dept: PSYCHIATRY | Facility: CLINIC | Age: 42
End: 2022-08-09

## 2022-08-09 VITALS
HEIGHT: 65 IN | SYSTOLIC BLOOD PRESSURE: 132 MMHG | BODY MASS INDEX: 41.82 KG/M2 | DIASTOLIC BLOOD PRESSURE: 78 MMHG | WEIGHT: 251 LBS | HEART RATE: 91 BPM

## 2022-08-09 DIAGNOSIS — F11.21 OPIOID USE DISORDER, SEVERE, IN EARLY REMISSION, ON MAINTENANCE THERAPY, DEPENDENCE (HCC): ICD-10-CM

## 2022-08-09 DIAGNOSIS — F11.20 OPIOID USE DISORDER, SEVERE, DEPENDENCE: ICD-10-CM

## 2022-08-09 PROCEDURE — G0480 DRUG TEST DEF 1-7 CLASSES: HCPCS

## 2022-08-09 PROCEDURE — 99213 OFFICE O/P EST LOW 20 MIN: CPT | Performed by: NURSE PRACTITIONER

## 2022-08-09 PROCEDURE — 80307 DRUG TEST PRSMV CHEM ANLYZR: CPT

## 2022-08-09 RX ORDER — TIZANIDINE 4 MG/1
4 TABLET ORAL 2 TIMES DAILY
COMMUNITY
Start: 2022-07-19 | End: 2022-11-01

## 2022-08-09 RX ORDER — BUPRENORPHINE HYDROCHLORIDE AND NALOXONE HYDROCHLORIDE DIHYDRATE 8; 2 MG/1; MG/1
2 TABLET SUBLINGUAL DAILY
Qty: 56 TABLET | Refills: 0 | Status: SHIPPED | OUTPATIENT
Start: 2022-08-09 | End: 2022-09-06 | Stop reason: SDUPTHER

## 2022-08-09 NOTE — PROGRESS NOTES
Office  Follow Up Visit      Patient Name: Aroldo Cai  : 1980   MRN: [id number]     Referring Provider: Gerald Dobson MD    Chief Complaint: Substance use    History of Present Illness:   Aroldo Cai is a 42 y.o. 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  Taking buprenorphine/naloxone 16 at 4 mg daily.  Tolerating well with no AE. Denies recurrence of illicit substance use since last follow up.  Having cravings about 1-2 times per day but is able to use coping mechanisms to handle.  Triggered by stress and anxiety associated with upcoming appointment with neurology on 2022 for follow-up on abnormal MRI and possible brain tumor.  Also continues to be in disagreement with his father regarding his MARCELINO treatment plan.  Sleeping well at night.  Continues in IOP and has been meeting with peer support regularly that has been helpful.  No other complaints today.    Triggers: Stress, anxiety    Cravings: 1 to 2/day    Relapse Prevention: IOP, MOUD, peer support    Urine Drug Screen (today's visit) discussed: Positive buprenorphine    UDS Confirmation: 8-3-2022 positive buprenorphine/nor-buprenorphine    QIANA (PDMP) Reviewed for Current/Active Medications: buprenorphine/naloxone (QIANA [id number])    History:  Substance use started in  after bilateral hip surgery and was started on opioid pain medications.  Went to pain management for about a year post-op. Started buying pain pills off the street and did so for about a year.  Has had several periods of sobriety in the past with rehab, incarceration. Released from custodial 2021 and was introduced to heroin by a friend.  Daily use until OD 3/2022 which prompted him to seek treatment.     Past Surgical History:  Past Surgical History:   Procedure Laterality Date   • ABDOMINAL SURGERY     • BRAIN SURGERY      craniotomy,  right frontal lobe AVM   • CHOLECYSTECTOMY WITH INTRAOPERATIVE CHOLANGIOGRAM N/A 2021    Procedure:  CHOLECYSTECTOMY LAPAROSCOPIC INTRAOPERATIVE CHOLANGIOGRAPHY;  Surgeon: Hardik Blancas MD;  Location: [facility] OR;  Service: General;  Laterality: N/A;   • COLONOSCOPY     • ENDOSCOPY N/A 10/22/2021    Procedure: ESOPHAGOGASTRODUODENOSCOPY WITH BIOPSY;  Surgeon: Hardik Blancas MD;  Location: [facility] ENDOSCOPY;  Service: Gastroenterology;  Laterality: N/A;   • HERNIA REPAIR Right     inguinal   • HIP SURGERY Bilateral    • HIP SURGERY Left        Problem List:  Patient Active Problem List   Diagnosis   • Calculus of gallbladder without cholecystitis without obstruction   • Right upper quadrant abdominal pain   • Nausea and vomiting   • Opioid use disorder, severe, dependence (HCC)       Allergy:   Allergies   Allergen Reactions   • Dilantin [Phenytoin] Seizure   • Penicillins Anaphylaxis   • Phenobarbital Seizure   • Tegretol [Carbamazepine] Seizure   • Latex Rash        Current Medications:   Current Outpatient Medications   Medication Sig Dispense Refill   • allopurinol (ZYLOPRIM) 100 MG tablet Take 100 mg by mouth Daily.     • buprenorphine-naloxone (SUBOXONE) 8-2 MG per SL tablet Place 2 tablets under the tongue Daily for 28 days. 56 tablet 0   • cloNIDine (Catapres) 0.2 MG tablet Take 1 tablet by mouth Every Night. 30 tablet 2   • colchicine 0.6 MG tablet Take 0.6 mg by mouth Daily.     • cyclobenzaprine (FLEXERIL) 10 MG tablet Take 10 mg by mouth every night at bedtime.     • ibuprofen (ADVIL,MOTRIN) 800 MG tablet      • levETIRAcetam (KEPPRA) 750 MG tablet Take 750 mg by mouth 2 (Two) Times a Day.     • metoprolol succinate XL (TOPROL-XL) 25 MG 24 hr tablet      • OLANZapine (zyPREXA) 5 MG tablet Take 1 tablet by mouth Every Night. 30 tablet 2   • omeprazole (priLOSEC) 40 MG capsule      • ondansetron ODT (ZOFRAN-ODT) 8 MG disintegrating tablet Every 8 (Eight) Hours As Needed.     • promethazine-dextromethorphan (PROMETHAZINE-DM) 6.25-15 MG/5ML syrup TAKE 10 MLS BY MOUTH EVERY SIX HOURS AS NEEDED FOR COUGH      • SUMAtriptan (IMITREX) 50 MG tablet take 1 tablet by mouth at onset of MIGRAINE may repeat in 2 hours if needed max of 2 tablets in 24 hours     • traZODone (DESYREL) 100 MG tablet      • venlafaxine (EFFEXOR) 100 MG tablet Take 2.5 tablets by mouth Daily. 75 tablet 2   • tiZANidine (ZANAFLEX) 4 MG tablet Take 4 mg by mouth 2 (Two) Times a Day.       No current facility-administered medications for this visit.       Past Medical History:  Past Medical History:   Diagnosis Date   • Anesthesia complication     pt reports he is hard to wake after anesthesia   • Anxiety    • Arthritis    • Bipolar 1 disorder (HCC)    • COVID-19 02/2021   • Depression    • Dysphagia     food and liquids.   • Extensive tattoos    • Gout    • Hepatitis C 2018    Patient took medication    • History of echocardiogram    • Kidney stones    • Pneumonia    • PTSD (post-traumatic stress disorder)    • Seizure (HCC)     last seizure years ago.   • Seizures (HCC)    • Short-term memory loss    • Wears contact lenses    • Wears glasses        Social History:  Social History     Socioeconomic History   • Marital status:    Tobacco Use   • Smoking status: Current Every Day Smoker     Packs/day: 0.50     Years: 30.00     Pack years: 15.00     Types: Cigarettes   • Smokeless tobacco: Never Used   Vaping Use   • Vaping Use: Never used   Substance and Sexual Activity   • Alcohol use: Not Currently   • Drug use: Not Currently     Types: Cocaine(coke)     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   • Sexual activity: Defer       Family History:  Family History   Problem Relation Age of Onset   • Diabetes Mother    • Diabetes insipidus Mother    • Heart disease Mother    • Cancer Father         skin   • Diabetes Father    • Hypertension Father    • Arthritis Father    • Diabetes insipidus Father          Subjective      Review of Systems:   Review of Systems   Constitutional: Positive for fatigue. Negative for chills and fever.    Respiratory: Negative for shortness of breath.    Cardiovascular: Negative for chest pain.   Gastrointestinal: Negative for abdominal pain.   Musculoskeletal: Positive for arthralgias.   Skin: Negative for skin lesions.   Neurological: Positive for memory problem. Negative for seizures and confusion.   Psychiatric/Behavioral: Positive for decreased concentration and stress. Negative for hallucinations, sleep disturbance, suicidal ideas and depressed mood. The patient is nervous/anxious.        PHQ-9 Score:       VINICIUS-7 Score:   Feeling nervous, anxious or on edge: More than half the days  Not being able to stop or control worrying: More than half the days  Worrying too much about different things: More than half the days  Trouble Relaxing: More than half the days  Being so restless that it is hard to sit still: Several days  Feeling afraid as if something awful might happen: More than half the days  Becoming easily annoyed or irritable: Nearly every day  VINICIUS 7 Total Score: 14  If you checked any problems, how difficult have these problems made it for you to do your work, take care of things at home, or get along with other people: Not difficult at all    Patient History:   The following portions of the patient's history were reviewed and updated as appropriate: allergies, current medications, past family history, past medical history, past social history, past surgical history and problem list.     Social:  Social History     Socioeconomic History   • Marital status:    Tobacco Use   • Smoking status: Current Every Day Smoker     Packs/day: 0.50     Years: 30.00     Pack years: 15.00     Types: Cigarettes   • Smokeless tobacco: Never Used   Vaping Use   • Vaping Use: Never used   Substance and Sexual Activity   • Alcohol use: Not Currently   • Drug use: Not Currently     Types: Cocaine(coke)     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   • Sexual activity: Defer       Medications:     Current Outpatient  Medications:   •  allopurinol (ZYLOPRIM) 100 MG tablet, Take 100 mg by mouth Daily., Disp: , Rfl:   •  buprenorphine-naloxone (SUBOXONE) 8-2 MG per SL tablet, Place 2 tablets under the tongue Daily for 28 days., Disp: 56 tablet, Rfl: 0  •  cloNIDine (Catapres) 0.2 MG tablet, Take 1 tablet by mouth Every Night., Disp: 30 tablet, Rfl: 2  •  colchicine 0.6 MG tablet, Take 0.6 mg by mouth Daily., Disp: , Rfl:   •  cyclobenzaprine (FLEXERIL) 10 MG tablet, Take 10 mg by mouth every night at bedtime., Disp: , Rfl:   •  ibuprofen (ADVIL,MOTRIN) 800 MG tablet, , Disp: , Rfl:   •  levETIRAcetam (KEPPRA) 750 MG tablet, Take 750 mg by mouth 2 (Two) Times a Day., Disp: , Rfl:   •  metoprolol succinate XL (TOPROL-XL) 25 MG 24 hr tablet, , Disp: , Rfl:   •  OLANZapine (zyPREXA) 5 MG tablet, Take 1 tablet by mouth Every Night., Disp: 30 tablet, Rfl: 2  •  omeprazole (priLOSEC) 40 MG capsule, , Disp: , Rfl:   •  ondansetron ODT (ZOFRAN-ODT) 8 MG disintegrating tablet, Every 8 (Eight) Hours As Needed., Disp: , Rfl:   •  promethazine-dextromethorphan (PROMETHAZINE-DM) 6.25-15 MG/5ML syrup, TAKE 10 MLS BY MOUTH EVERY SIX HOURS AS NEEDED FOR COUGH, Disp: , Rfl:   •  SUMAtriptan (IMITREX) 50 MG tablet, take 1 tablet by mouth at onset of MIGRAINE may repeat in 2 hours if needed max of 2 tablets in 24 hours, Disp: , Rfl:   •  traZODone (DESYREL) 100 MG tablet, , Disp: , Rfl:   •  venlafaxine (EFFEXOR) 100 MG tablet, Take 2.5 tablets by mouth Daily., Disp: 75 tablet, Rfl: 2  •  tiZANidine (ZANAFLEX) 4 MG tablet, Take 4 mg by mouth 2 (Two) Times a Day., Disp: , Rfl:     Objective     Physical Exam:  Physical Exam  Vitals reviewed.   Constitutional:       General: He is not in acute distress.     Appearance: He is well-developed. He is not ill-appearing.   Eyes:      General: No scleral icterus.  Pulmonary:      Effort: No respiratory distress.   Neurological:      Mental Status: He is alert and oriented to person, place, and time.      Gait:  "Gait normal.   Psychiatric:         Speech: Speech normal.         Behavior: Behavior normal.         Thought Content: Thought content normal. Thought content does not include homicidal or suicidal ideation. Thought content does not include homicidal or suicidal plan.         Vital Signs:   Vitals:    08/09/22 1439   BP: 132/78   Pulse: 91   Weight: 114 kg (251 lb)   Height: 165.1 cm (65\")     Body mass index is 41.77 kg/m².     Mental Status Exam:   Hygiene:   good  Cooperation:  Cooperative  Eye Contact:  Good  Psychomotor Behavior:  Appropriate  Affect:  Full range  Mood: anxious  Speech:  Normal  Thought Process:  Goal directed  Thought Content:  Normal  Suicidal:  None  Homicidal:  None  Hallucinations:  None  Delusion:  None  Memory:  Intact  Orientation:  Person, Place, Time and Situation  Reliability:  good  Insight:  Good  Judgement:  Good  Impulse Control:  Good    Assessment / Plan      Assessment & Plan   -Continue buprenorphine/naloxone 16-4 mg daily.  -Will have RTC dose at next visit +0 remaining  -Advisement to take part in and remain active in 12 Step Recovery Meetings, IOP, and/or 1:1 therapy/counseling and to establish/maintain an active relationship with a recovery sponsor.   -Continued monitoring for illicit substances for patient safety, medication compliance and future care.  -Continue IOP  -Will meet with peer support today  -Labs due next visit     Visit Diagnoses     Opioid use disorder, severe, in early remission, on maintenance therapy, dependence (HCC)        Relevant Medications    buprenorphine-naloxone (SUBOXONE) 8-2 MG per SL tablet      Diagnoses       Codes Comments    Opioid use disorder, severe, in early remission, on maintenance therapy, dependence (HCC)     ICD-10-CM: F11.21  ICD-9-CM: 304.03             PLAN:  1. Safety: No acute safety concerns  2. Risk Assessment: Risk of self-harm acutely is low. Risk of self-harm chronically is also low, but could be further elevated in " the event of treatment noncompliance and/or AODA.      TREATMENT PLAN/GOALS: Continue supportive psychotherapy efforts and medications as indicated. Treatment and medication options discussed during today's visit. Patient acknowledged and verbally consented to continue with current treatment plan and was educated on the importance of compliance with treatment and follow-up appointments.      MEDICATION ISSUES:  QIANA reviewed as expected.  Discussed medication options and treatment plan of prescribed medication as well as the risks, benefits, and side effects including potential falls, possible impaired driving and metabolic adversities among others. Patient is agreeable to call the office with any worsening of symptoms or onset of side effects. Patient is agreeable to call 911 or go to the nearest ER should he/she begin having SI/HI. No medication side effects or related complaints today.     MEDS ORDERED DURING VISIT:  New Medications Ordered This Visit   Medications   • buprenorphine-naloxone (SUBOXONE) 8-2 MG per SL tablet     Sig: Place 2 tablets under the tongue Daily for 28 days.     Dispense:  56 tablet     Refill:  0     [id number]       Return in 4 weeks (on 9/6/2022) for Follow Up Medication.           This document has been electronically signed by PRISCILLA Wen  August 9, 2022 15:17 EDT      Part of this note may be an electronic transcription/translation of spoken language to printed text using the Dragon Dictation System.

## 2022-08-10 NOTE — PROGRESS NOTES
[facility] IOP GROUP     Date: 08/08/2022  Name: Aroldo Cai    Time In: 1530   Time Out: 1626    This provider is located at [facility] located at [address] [place] [address], [address] [place] [id number].  The Patient is seen remotely at his/her home, using Zoom. Patient is being seen via telehealth and confirm that they are in a secure environment for this session. The patient's condition being diagnosed/treated is appropriate for telemedicine. The provider identified himself as well as his credentials. The patient gave consent to be seen remotely, and when consent is given they understand that the consent allows for patient identifiable information to be sent to a third party as needed. They may refuse to be seen remotely at any time. The electronic data is encrypted and password protected, and the patient has been advised of the potential risks to privacy not withstanding such measures.      Number of participants: 8    IOP GROUP NOTE     Data: 1 hour IOP group therapy session (Check-ins, Coping Skills, Relapse Prevention)     Check Ins: Therapist continued facilitation of rapport building strategies between group members. Therapist asked that each patient check in with home life and recovery efforts and identify triggers, cravings, and high risk situations that arise between group sessions. Therapist provided empathy and support during group session.     Session Content/Coping Skills: Check ins completed by group members. The Cognitive [place] Therapist Aid psychoeducational material reviewed with group members. Clinician explained cognitive triangle. Clinician discussed with group members the importance of being aware of thoughts.     Response: Patient attended class via Zoom. Patient participated in verbal completion of check in form. Patient during check in denied suicidal or homicidal thoughts. Patient participated in review of psychoeducational material.     Personal Assessment 0-10 Scale (10  worst)    Anxiety:  0   Depression:  0   Cravings: 0     Assessment:     ..  Lab on 08/03/2022   Component Date Value Ref Range Status   • Ethanol, Urine 08/03/2022 Negative  Cutoff=0.020 % Final   • THC, Screen, Urine 08/03/2022 Negative  Negative Final   • Phencyclidine (PCP), Urine 08/03/2022 Negative  Negative Final   • Cocaine Screen, Urine 08/03/2022 Negative  Negative Final   • Methamphetamine, Ur 08/03/2022 Negative  Negative Final   • Opiate Screen 08/03/2022 Negative  Negative Final   • Amphetamine Screen, Urine 08/03/2022 Negative  Negative Final   • Benzodiazepine Screen, Urine 08/03/2022 Negative  Negative Final   • Tricyclic Antidepressants Screen 08/03/2022 Negative  Negative Final   • Methadone Screen, Urine 08/03/2022 Negative  Negative Final   • Barbiturates Screen, Urine 08/03/2022 Negative  Negative Final   • Oxycodone Screen, Urine 08/03/2022 Negative  Negative Final   • Propoxyphene Screen 08/03/2022 Negative  Negative Final   • Buprenorphine, Screen, Urine 08/03/2022 Positive (A) Negative Final   • Creatinine, Urine 08/03/2022 146.4  >=20 mg/dL Final   • Buprenorphine/CR 08/03/2022 206  ng/mg creat Final   • NORBUPRENORPHINE/CR 08/03/2022 337  ng/mg creat Final   • NORBUP/BUP RATIO 08/03/2022 1.64  RATIO Final    This test was developed and its performance characteristics  determined by [facility].  It has not been cleared or approved  by the Food and Drug Administration.   • Naloxone 08/03/2022 1345  Cutoff=25 ng/ml Final   • TRP-LINK 08/03/2022 Comment   Final    These test results were not transmitted to [facility].  If you would like your patient's urine drug  test results to transmit to [facility], please  contact your [email or web address]  to complete  a physician authorization form.   Lab on 08/01/2022   Component Date Value Ref Range Status   • Ethanol, Urine 08/01/2022 Negative  Cutoff=0.020 % Final   • THC, Screen, Urine 08/01/2022 Negative  Negative Final    • Phencyclidine (PCP), Urine 08/01/2022 Negative  Negative Final   • Cocaine Screen, Urine 08/01/2022 Negative  Negative Final   • Methamphetamine, Ur 08/01/2022 Negative  Negative Final   • Opiate Screen 08/01/2022 Negative  Negative Final   • Amphetamine Screen, Urine 08/01/2022 Negative  Negative Final   • Benzodiazepine Screen, Urine 08/01/2022 Negative  Negative Final   • Tricyclic Antidepressants Screen 08/01/2022 Negative  Negative Final   • Methadone Screen, Urine 08/01/2022 Negative  Negative Final   • Barbiturates Screen, Urine 08/01/2022 Negative  Negative Final   • Oxycodone Screen, Urine 08/01/2022 Negative  Negative Final   • Propoxyphene Screen 08/01/2022 Negative  Negative Final   • Buprenorphine, Screen, Urine 08/01/2022 Positive (A) Negative Final   • Creatinine, Urine 08/01/2022 178.5  >=20 mg/dL Final   • Buprenorphine/CR 08/01/2022 192  ng/mg creat Final   • NORBUPRENORPHINE/CR 08/01/2022 532  ng/mg creat Final   • NORBUP/BUP RATIO 08/01/2022 2.77  RATIO Final    This test was developed and its performance characteristics  determined by [employer].  It has not been cleared or approved  by the Food and Drug Administration.   • Naloxone 08/01/2022 2225  Cutoff=25 ng/ml Final   • TRP-LINK 08/01/2022 Comment   Final    These test results were not transmitted to [facility].  If you would like your patient's urine drug  test results to transmit to [facility], please  contact your [facility]  to complete  a physician authorization form.   Orders Only on 07/20/2022   Component Date Value Ref Range Status   • Report Summary 07/20/2022 FINAL   Final    Comment: ====================================================================  [facility] COMP DRUG ANALYSIS,[facility]  ====================================================================  Test                             Result       Flag       Units  Drug Present    Buprenorphine                  206                      ng/mg creat    Norbuprenorphine               339                     ng/mg creat     Source of buprenorphine is a scheduled prescription medication.     Norbuprenorphine is an expected metabolite of buprenorphine.    Levetiracetam                  PRESENT    Cyclobenzaprine                PRESENT    Desmethylcyclobenzaprine       PRESENT     Desmethylcyclobenzaprine is an expected metabolite of     cyclobenzaprine.    Trazodone                      PRESENT    1,3 chlorophenyl piperazine    PRESENT     1,3-chlorophenyl piperazine is an expected metabolite of     trazodone.    Venlafaxine                    PRESENT    Desmethylvenlafaxine           PRESENT     Desmethylvenlafaxine is                            an expected metabolite of venlafaxine.    Olanzapine                     PRESENT    Clonidine                      PRESENT  ====================================================================  Test                      Result    Flag   Units      Ref Range    Creatinine              121              mg/dL      >=20  ====================================================================  Declared Medications:   Medication list was not provided.  ====================================================================  For clinical consultation, please call [phone].  ====================================================================     • Please note 07/20/2022 Comment   Final    Comment: The date and/or time of collection was not indicated on the  requisition as required by state and federal law.  The date of  receipt of the specimen was used as the collection date if not  supplied.         Mental Status Exam  Hygiene:  good  Dress: casual  Attitude: cooperative and agreeable   Motor Activity: appropriate  Eye Contact:  good  Speech: regular rate and rhythm   Mood:  calm and cooperative  Affect:  Appropriate  Thought Processes:  Linear  Thought Content:  Normal  Suicidal Thoughts:  denies  Homicidal  Thoughts:  denies  Crisis Safety Plan: Safety plan has been discussed.   Hallucinations: Unknown to clinician.   Reliability: fair  Insight: fair  Judgement: fair  Impulse Control: fair    Recovery/spiritual support group attendance: No      Progress toward goal: Not at goal    Prognosis: Fair with Ongoing Treatment     Self-reported number of days sober: Patient reported May 26th during check in.     Patient agreeable to adhere to medication regimen as prescribed and report any side effects. Patient will contact this office, call 911 or present to the nearest emergency room should suicidal or homicidal ideations occur.    Impression/Formulation:  No diagnosis found.    Clinical Maneuvering/Interventions: Therapist utilized a person-centered approach to build rapport with group member. Therapist implemented motivational interviewing techniques to assist client with exploring and resolving ambivalence associated with commitment to change behaviors related to substance use and addiction. Therapist applied cognitive behavioral strategies to facilitate identification of maladaptive patterns of thinking and behavior that contribute to client's risk for continued substance use and relapse. Therapist employed group interaction activities to build rapport among group members, promote sobriety, and emphasize relapse prevention. Therapist promoted safe nonjudgmental environment by providing group members with unconditional positive regard and encouraging group members to comply with group rules and guidelines. Therapist assisted group member with identifying and implementing healthier coping strategies.      Plan:  Continue [facility] IOP Phase II   Aftercare:  [facility] Phase III  Program Assignments:  Personal recovery plan, relapse prevention plan, attendance of recovery support group meetings, exploration of sponsorship, drug/alcohol screens.     Duy Garcia,  NICOLEW  8/10/2022  08:24 EDT

## 2022-08-11 LAB — ETHANOL UR-MCNC: NEGATIVE %

## 2022-08-12 LAB
ACCEPTABLE CREAT UR QL: 264.7 MG/DL
NALOXONE UR CFM-MCNC: 3408 NG/ML
NORBUP/BUP RATIO: 1.72 RATIO
NORBUPRENORPHINE UR CFM-MCNC: 414 NG/MG CREAT
NORBUPRENORPHINE/CREAT UR: 241 NG/MG CREAT
TRP-LINK: NORMAL

## 2022-08-15 ENCOUNTER — HOSPITAL ENCOUNTER (EMERGENCY)
Facility: HOSPITAL | Age: 42
Discharge: HOME | End: 2022-08-15
Attending: EMERGENCY MEDICINE | Admitting: EMERGENCY MEDICINE

## 2022-08-15 ENCOUNTER — APPOINTMENT (OUTPATIENT)
Dept: GENERAL RADIOLOGY | Facility: HOSPITAL | Age: 42
End: 2022-08-15

## 2022-08-15 ENCOUNTER — OFFICE VISIT (OUTPATIENT)
Dept: PSYCHIATRY | Facility: CLINIC | Age: 42
End: 2022-08-15

## 2022-08-15 VITALS
HEART RATE: 94 BPM | RESPIRATION RATE: 18 BRPM | BODY MASS INDEX: 41.82 KG/M2 | TEMPERATURE: 98.1 F | WEIGHT: 251 LBS | HEIGHT: 65 IN | OXYGEN SATURATION: 97 % | SYSTOLIC BLOOD PRESSURE: 151 MMHG | DIASTOLIC BLOOD PRESSURE: 94 MMHG

## 2022-08-15 DIAGNOSIS — F11.20 OPIOID USE DISORDER, SEVERE, DEPENDENCE: Primary | ICD-10-CM

## 2022-08-15 DIAGNOSIS — M19.072 ANKLE INFLAMMATION, LEFT: Primary | ICD-10-CM

## 2022-08-15 DIAGNOSIS — M77.52 TENDONITIS OF ANKLE, LEFT: ICD-10-CM

## 2022-08-15 PROCEDURE — 25010000002 KETOROLAC TROMETHAMINE PER 15 MG: Performed by: PHYSICIAN ASSISTANT

## 2022-08-15 PROCEDURE — 99283 EMERGENCY DEPT VISIT LOW MDM: CPT

## 2022-08-15 PROCEDURE — 96372 THER/PROPH/DIAG INJ SC/IM: CPT

## 2022-08-15 PROCEDURE — 73630 X-RAY EXAM OF FOOT: CPT

## 2022-08-15 PROCEDURE — 73610 X-RAY EXAM OF ANKLE: CPT

## 2022-08-15 RX ORDER — PREDNISONE 10 MG/1
TABLET ORAL
Qty: 48 TABLET | Refills: 0 | Status: SHIPPED | OUTPATIENT
Start: 2022-08-15 | End: 2022-11-01

## 2022-08-15 RX ORDER — KETOROLAC TROMETHAMINE 30 MG/ML
30 INJECTION, SOLUTION INTRAMUSCULAR; INTRAVENOUS ONCE
Status: COMPLETED | OUTPATIENT
Start: 2022-08-15 | End: 2022-08-15

## 2022-08-15 RX ADMIN — KETOROLAC TROMETHAMINE 30 MG: 30 INJECTION, SOLUTION INTRAMUSCULAR; INTRAVENOUS at 12:57

## 2022-08-15 NOTE — ED PROVIDER NOTES
"Subjective   42-year-old male presents to the emergency department with left ankle and foot pain swelling and redness, he states that his left foot and ankle area have been bothering him since April, he has been to his physician several times intermittently.  He is on allopurinol daily as a maintenance dose for gout, and he also takes ibuprofen 800 regularly and increases that as needed for any inflammation or flareups.  He states he has had pain and swelling that has gotten worse over the last 2 weeks, few weeks ago he felt a \"pop\" on the top part of his ankle and during his last visit with his primary care physician they were worried about a tendon injury or tendinitis.  The allopurinol and ibuprofen are not helping, he has difficulty walking on the foot and ankle, and has pain when he bears weight.  No known specific injury to the foot and ankle otherwise.  No fever no chills      History provided by:  Patient   used: No        Review of Systems   Musculoskeletal:        Left ankle and foot pain, swelling, and redness   All other systems reviewed and are negative.      Past Medical History:   Diagnosis Date   • Anesthesia complication     pt reports he is hard to wake after anesthesia   • Anxiety    • Arthritis    • Bipolar 1 disorder (HCC)    • COVID-19 02/2021   • Depression    • Dysphagia     food and liquids.   • Extensive tattoos    • Gout    • Hepatitis C 2018    Patient took medication    • History of echocardiogram    • Kidney stones    • Pneumonia    • PTSD (post-traumatic stress disorder)    • Seizure (HCC)     last seizure years ago.   • Seizures (HCC)    • Short-term memory loss    • Wears contact lenses    • Wears glasses        Allergies   Allergen Reactions   • Dilantin [Phenytoin] Seizure   • Penicillins Anaphylaxis   • Phenobarbital Seizure   • Tegretol [Carbamazepine] Seizure   • Morphine Nausea And Vomiting   • Latex Rash       Past Surgical History:   Procedure Laterality " Date   • ABDOMINAL SURGERY     • BRAIN SURGERY  1994    craniotomy,  right frontal lobe AVM   • CHOLECYSTECTOMY WITH INTRAOPERATIVE CHOLANGIOGRAM N/A 07/27/2021    Procedure: CHOLECYSTECTOMY LAPAROSCOPIC INTRAOPERATIVE CHOLANGIOGRAPHY;  Surgeon: Hardik Blancas MD;  Location: [facility] OR;  Service: General;  Laterality: N/A;   • COLONOSCOPY     • ENDOSCOPY N/A 10/22/2021    Procedure: ESOPHAGOGASTRODUODENOSCOPY WITH BIOPSY;  Surgeon: Hardik Blancas MD;  Location: [facility] ENDOSCOPY;  Service: Gastroenterology;  Laterality: N/A;   • HERNIA REPAIR Right     inguinal   • HIP SURGERY Bilateral    • HIP SURGERY Left        Family History   Problem Relation Age of Onset   • Diabetes Mother    • Diabetes insipidus Mother    • Heart disease Mother    • Cancer Father         skin   • Diabetes Father    • Hypertension Father    • Arthritis Father    • Diabetes insipidus Father        Social History     Socioeconomic History   • Marital status:    Tobacco Use   • Smoking status: Current Every Day Smoker     Packs/day: 0.50     Years: 30.00     Pack years: 15.00     Types: Cigarettes   • Smokeless tobacco: Never Used   Vaping Use   • Vaping Use: Never used   Substance and Sexual Activity   • Alcohol use: Not Currently   • Drug use: Not Currently     Types: Cocaine(coke)     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   • Sexual activity: Defer           Objective   Physical Exam  Vitals and nursing note reviewed.   Constitutional:       Appearance: He is well-developed.   HENT:      Head: Normocephalic and atraumatic.      Mouth/Throat:      Mouth: Mucous membranes are moist.   Eyes:      Extraocular Movements: Extraocular movements intact.   Cardiovascular:      Rate and Rhythm: Normal rate and regular rhythm.   Pulmonary:      Effort: Pulmonary effort is normal.   Musculoskeletal:         General: Swelling and tenderness present.      Cervical back: Normal range of motion.        Legs:       Comments: Swelling,  tenderness to palpation, redness and warmth.  Decreased range of motion especially with flexing due to pain and swelling.   Skin:     General: Skin is warm and dry.   Neurological:      Mental Status: He is alert and oriented to person, place, and time.   Psychiatric:         Behavior: Behavior normal.         Thought Content: Thought content normal.         Judgment: Judgment normal.         Procedures           ED Course  ED Course as of 08/15/22 1355   Mon Aug 15, 2022   1205 Left ankle pain, redness, swelling and warmth.  Likely an inflammatory process, some type of arthritis possibly gout. [CS]      ED Course User Index  [CS] Philpi Bland Jr., PA-C                                           MDM  Number of Diagnoses or Management Options  Ankle inflammation, left: new and requires workup  Tendonitis of ankle, left: new and requires workup     Amount and/or Complexity of Data Reviewed  Tests in the radiology section of CPT®: reviewed    Risk of Complications, Morbidity, and/or Mortality  Presenting problems: low  Management options: low    Patient Progress  Patient progress: stable      Final diagnoses:   Ankle inflammation, left   Tendonitis of ankle, left       ED Disposition  ED Disposition     ED Disposition   Discharge    Condition   Stable    Comment   --             Jeramie George, RK  [address] [address] [phone]    Schedule an appointment as soon as possible for a visit       [facility] Emergency Department  [address] [address] [phone]    If symptoms worsen         Medication List      New Prescriptions    predniSONE 10 MG (48) dose pack  Commonly known as: DELTASONE  Take daily           Where to Get Your Medications      These medications were sent to [id number] - [place], [place] - [address] - [phone]  - [phone] FX  [address] [place] [address]    Phone: [phone]   · predniSONE 10 MG  (48) dose pack          Philip Bland Jr., PA-C  08/15/22 6933

## 2022-08-18 ENCOUNTER — TELEMEDICINE (OUTPATIENT)
Dept: PSYCHIATRY | Facility: CLINIC | Age: 42
End: 2022-08-18

## 2022-08-18 NOTE — PROGRESS NOTES
"CD IOP GROUP     Date: 08/15/2022  Name: Aroldo Cai    Time In: 1530   Time Out: 1630    Number of participants: 10    IOP GROUP NOTE     Data: 1 hour IOP group therapy session (Check-ins, Coping Skills, Relapse Prevention)     Check Ins: Therapist continued facilitation of rapport building strategies between group members. Therapist asked that each patient check in with home life and recovery efforts and identify triggers, cravings, and high risk situations that arise between group sessions. Therapist provided empathy and support during group session.     Session Content/Coping Skills: Check ins completed by group members.  joined meeting. Goodbye letters to drug of choice discussed and group members who had completed assignment shared letters.     Response: Patient attended class in person. Patient participated in completion of check in form. Patient on check in form answered no to question \"currently or since last group meeting have you had any suicidal thoughts or plan or intent to hurt yourself”, and patient also answered no to question of \"currently or since your last group meeting, have you had any homicidal thoughts or plan or intent to hurt others\". Patient participated in group discussions.     Personal Assessment 0-10 Scale (10 worst)    Anxiety:  2   Depression:  0   Cravings: 3     Assessment:     ..  Lab on 08/09/2022   Component Date Value Ref Range Status   • Ethanol, Urine 08/09/2022 Negative  Cutoff=0.020 % Final   • THC, Screen, Urine 08/09/2022 Negative  Negative Final   • Phencyclidine (PCP), Urine 08/09/2022 Negative  Negative Final   • Cocaine Screen, Urine 08/09/2022 Negative  Negative Final   • Methamphetamine, Ur 08/09/2022 Negative  Negative Final   • Opiate Screen 08/09/2022 Negative  Negative Final   • Amphetamine Screen, Urine 08/09/2022 Negative  Negative Final   • Benzodiazepine Screen, Urine 08/09/2022 Negative  Negative Final   • Tricyclic " Antidepressants Screen 08/09/2022 Negative  Negative Final   • Methadone Screen, Urine 08/09/2022 Negative  Negative Final   • Barbiturates Screen, Urine 08/09/2022 Negative  Negative Final   • Oxycodone Screen, Urine 08/09/2022 Negative  Negative Final   • Propoxyphene Screen 08/09/2022 Negative  Negative Final   • Buprenorphine, Screen, Urine 08/09/2022 Positive (A) Negative Final   • Creatinine, Urine 08/09/2022 264.7  >=20 mg/dL Final   • Buprenorphine/CR 08/09/2022 241  ng/mg creat Final   • NORBUPRENORPHINE/CR 08/09/2022 414  ng/mg creat Final   • NORBUP/BUP RATIO 08/09/2022 1.72  RATIO Final    This test was developed and its performance characteristics  determined by [employer].  It has not been cleared or approved  by the Food and Drug Administration.   • Naloxone 08/09/2022 3408  Cutoff=25 ng/ml Final   • TRP-LINK 08/09/2022 Comment   Final    These test results were not transmitted to [facility].  If you would like your patient's urine drug  test results to transmit to [facility], please  contact your [employer]  to complete  a physician authorization form.   Lab on 08/03/2022   Component Date Value Ref Range Status   • Ethanol, Urine 08/03/2022 Negative  Cutoff=0.020 % Final   • THC, Screen, Urine 08/03/2022 Negative  Negative Final   • Phencyclidine (PCP), Urine 08/03/2022 Negative  Negative Final   • Cocaine Screen, Urine 08/03/2022 Negative  Negative Final   • Methamphetamine, Ur 08/03/2022 Negative  Negative Final   • Opiate Screen 08/03/2022 Negative  Negative Final   • Amphetamine Screen, Urine 08/03/2022 Negative  Negative Final   • Benzodiazepine Screen, Urine 08/03/2022 Negative  Negative Final   • Tricyclic Antidepressants Screen 08/03/2022 Negative  Negative Final   • Methadone Screen, Urine 08/03/2022 Negative  Negative Final   • Barbiturates Screen, Urine 08/03/2022 Negative  Negative Final   • Oxycodone Screen, Urine 08/03/2022 Negative  Negative Final    • Propoxyphene Screen 08/03/2022 Negative  Negative Final   • Buprenorphine, Screen, Urine 08/03/2022 Positive (A) Negative Final   • Creatinine, Urine 08/03/2022 146.4  >=20 mg/dL Final   • Buprenorphine/CR 08/03/2022 206  ng/mg creat Final   • NORBUPRENORPHINE/CR 08/03/2022 337  ng/mg creat Final   • NORBUP/BUP RATIO 08/03/2022 1.64  RATIO Final    This test was developed and its performance characteristics  determined by [employer].  It has not been cleared or approved  by the Food and Drug Administration.   • Naloxone 08/03/2022 1345  Cutoff=25 ng/ml Final   • TRP-LINK 08/03/2022 Comment   Final    These test results were not transmitted to [facility].  If you would like your patient's urine drug  test results to transmit to [facility], please  contact your [employer]  to complete  a physician authorization form.   Lab on 08/01/2022   Component Date Value Ref Range Status   • Ethanol, Urine 08/01/2022 Negative  Cutoff=0.020 % Final   • THC, Screen, Urine 08/01/2022 Negative  Negative Final   • Phencyclidine (PCP), Urine 08/01/2022 Negative  Negative Final   • Cocaine Screen, Urine 08/01/2022 Negative  Negative Final   • Methamphetamine, Ur 08/01/2022 Negative  Negative Final   • Opiate Screen 08/01/2022 Negative  Negative Final   • Amphetamine Screen, Urine 08/01/2022 Negative  Negative Final   • Benzodiazepine Screen, Urine 08/01/2022 Negative  Negative Final   • Tricyclic Antidepressants Screen 08/01/2022 Negative  Negative Final   • Methadone Screen, Urine 08/01/2022 Negative  Negative Final   • Barbiturates Screen, Urine 08/01/2022 Negative  Negative Final   • Oxycodone Screen, Urine 08/01/2022 Negative  Negative Final   • Propoxyphene Screen 08/01/2022 Negative  Negative Final   • Buprenorphine, Screen, Urine 08/01/2022 Positive (A) Negative Final   • Creatinine, Urine 08/01/2022 178.5  >=20 mg/dL Final   • Buprenorphine/CR 08/01/2022 192  ng/mg creat Final   •  NORBUPRENORPHINE/CR 08/01/2022 532  ng/mg creat Final   • NORBUP/BUP RATIO 08/01/2022 2.77  RATIO Final    This test was developed and its performance characteristics  determined by [employer].  It has not been cleared or approved  by the Food and Drug Administration.   • Naloxone 08/01/2022 2225  Cutoff=25 ng/ml Final   • TRP-LINK 08/01/2022 Comment   Final    These test results were not transmitted to [facility].  If you would like your patient's urine drug  test results to transmit to [facility], please  contact your [employer]  to complete  a physician authorization form.       Mental Status Exam  Hygiene:  good  Dress: casual  Attitude: cooperative and agreeable   Motor Activity: appropriate  Eye Contact:  good  Speech: regular rate and rhythm   Mood:  calm and cooperative  Affect:  Appropriate  Thought Processes:  Linear  Thought Content:  Normal  Suicidal Thoughts:  denies  Homicidal Thoughts:  denies  Crisis Safety Plan: Safety plan has been discussed.   Hallucinations: Unknown to clinician.   Reliability: fair  Insight: fair  Judgement: fair  Impulse Control: fair    Recovery/spiritual support group attendance: No     Progress toward goal: Not at goal    Prognosis: Fair with Ongoing Treatment     Self-reported number of days sober: Patient reported May 29th on check in form.     Patient agreeable to adhere to medication regimen as prescribed and report any side effects. Patient will contact this office, call 911 or present to the nearest emergency room should suicidal or homicidal ideations occur.    Impression/Formulation:    ICD-10-CM ICD-9-CM   1. Opioid use disorder, severe, dependence (HCC)  F11.20 304.00       Clinical Maneuvering/Interventions: Therapist utilized a person-centered approach to build rapport with group member. Therapist implemented motivational interviewing techniques to assist client with exploring and resolving ambivalence associated with  commitment to change behaviors related to substance use and addiction. Therapist applied cognitive behavioral strategies to facilitate identification of maladaptive patterns of thinking and behavior that contribute to client's risk for continued substance use and relapse. Therapist employed group interaction activities to build rapport among group members, promote sobriety, and emphasize relapse prevention. Therapist promoted safe nonjudgmental environment by providing group members with unconditional positive regard and encouraging group members to comply with group rules and guidelines. Therapist assisted group member with identifying and implementing healthier coping strategies.      Plan:  Continue [facility] IOP Phase II   Aftercare:  [facility] Phase III  Program Assignments:  Personal recovery plan, relapse prevention plan, attendance of recovery support group meetings, exploration of sponsorship, drug/alcohol screens.     Duy Garcia LCSW  8/18/2022  11:18 EDT

## 2022-08-22 ENCOUNTER — LAB (OUTPATIENT)
Dept: LAB | Facility: HOSPITAL | Age: 42
End: 2022-08-22

## 2022-08-22 DIAGNOSIS — F11.20 OPIOID USE DISORDER, SEVERE, DEPENDENCE: ICD-10-CM

## 2022-08-22 PROCEDURE — G0480 DRUG TEST DEF 1-7 CLASSES: HCPCS

## 2022-08-22 PROCEDURE — 80307 DRUG TEST PRSMV CHEM ANLYZR: CPT

## 2022-08-22 NOTE — PROGRESS NOTES
"CD IOP GROUP     Date: 08/18/2022  Name: Aroldo Cai    Time In: 1540   Time Out: 1630    This provider is located at [facility] located at [address] [place] [address], [address] [place] [id number].  The Patient is seen remotely at his/her home, using Zoom. Patient is being seen via telehealth and confirm that they are in a secure environment for this session. The patient's condition being diagnosed/treated is appropriate for telemedicine. The provider identified himself as well as his credentials. The patient gave consent to be seen remotely, and when consent is given they understand that the consent allows for patient identifiable information to be sent to a third party as needed. They may refuse to be seen remotely at any time. The electronic data is encrypted and password protected, and the patient has been advised of the potential risks to privacy not withstanding such measures.      Number of participants: 9    IOP GROUP NOTE     Data: 1 hour IOP group therapy session (Check-ins, Coping Skills, Relapse Prevention)     Check Ins: Therapist continued facilitation of rapport building strategies between group members. Therapist asked that each patient check in with home life and recovery efforts and identify triggers, cravings, and high risk situations that arise between group sessions. Therapist provided empathy and support during group session.     Session Content/Coping Skills: Clinician discussed goodbye letters with group members and processed experience of writing letter. Check ins completed with group members. Clinician discussed with group members Overdose Awareness Day. PRISCILLA Desai and Keila,  joined meeting.     Response: Patient attended class via Zoom. Patient participated in verbal completion of check in form. Patient during check in answered no to question \"currently or since last group meeting have you had any suicidal thoughts or plan or intent to hurt yourself”, " "and patient also answered no to question of \"currently or since your last group meeting, have you had any homicidal thoughts or plan or intent to hurt others\". Patient answered no to all other questions during check in. Patient participated in group discussions.      Personal Assessment 0-10 Scale (10 worst)    Anxiety:  3   Depression:  0   Cravings: 1     Assessment:     ..  Lab on 08/09/2022   Component Date Value Ref Range Status   • Ethanol, Urine 08/09/2022 Negative  Cutoff=0.020 % Final   • THC, Screen, Urine 08/09/2022 Negative  Negative Final   • Phencyclidine (PCP), Urine 08/09/2022 Negative  Negative Final   • Cocaine Screen, Urine 08/09/2022 Negative  Negative Final   • Methamphetamine, Ur 08/09/2022 Negative  Negative Final   • Opiate Screen 08/09/2022 Negative  Negative Final   • Amphetamine Screen, Urine 08/09/2022 Negative  Negative Final   • Benzodiazepine Screen, Urine 08/09/2022 Negative  Negative Final   • Tricyclic Antidepressants Screen 08/09/2022 Negative  Negative Final   • Methadone Screen, Urine 08/09/2022 Negative  Negative Final   • Barbiturates Screen, Urine 08/09/2022 Negative  Negative Final   • Oxycodone Screen, Urine 08/09/2022 Negative  Negative Final   • Propoxyphene Screen 08/09/2022 Negative  Negative Final   • Buprenorphine, Screen, Urine 08/09/2022 Positive (A) Negative Final   • Creatinine, Urine 08/09/2022 264.7  >=20 mg/dL Final   • Buprenorphine/CR 08/09/2022 241  ng/mg creat Final   • NORBUPRENORPHINE/CR 08/09/2022 414  ng/mg creat Final   • NORBUP/BUP RATIO 08/09/2022 1.72  RATIO Final    This test was developed and its performance characteristics  determined by [facility].  It has not been cleared or approved  by the Food and Drug Administration.   • Naloxone 08/09/2022 3408  Cutoff=25 ng/ml Final   • TRP-LINK 08/09/2022 Comment   Final    These test results were not transmitted to [facility].  If you would like your patient's urine drug  test results to " transmit to [facility], please  contact your [employer]  to complete  a physician authorization form.   Lab on 08/03/2022   Component Date Value Ref Range Status   • Ethanol, Urine 08/03/2022 Negative  Cutoff=0.020 % Final   • THC, Screen, Urine 08/03/2022 Negative  Negative Final   • Phencyclidine (PCP), Urine 08/03/2022 Negative  Negative Final   • Cocaine Screen, Urine 08/03/2022 Negative  Negative Final   • Methamphetamine, Ur 08/03/2022 Negative  Negative Final   • Opiate Screen 08/03/2022 Negative  Negative Final   • Amphetamine Screen, Urine 08/03/2022 Negative  Negative Final   • Benzodiazepine Screen, Urine 08/03/2022 Negative  Negative Final   • Tricyclic Antidepressants Screen 08/03/2022 Negative  Negative Final   • Methadone Screen, Urine 08/03/2022 Negative  Negative Final   • Barbiturates Screen, Urine 08/03/2022 Negative  Negative Final   • Oxycodone Screen, Urine 08/03/2022 Negative  Negative Final   • Propoxyphene Screen 08/03/2022 Negative  Negative Final   • Buprenorphine, Screen, Urine 08/03/2022 Positive (A) Negative Final   • Creatinine, Urine 08/03/2022 146.4  >=20 mg/dL Final   • Buprenorphine/CR 08/03/2022 206  ng/mg creat Final   • NORBUPRENORPHINE/CR 08/03/2022 337  ng/mg creat Final   • NORBUP/BUP RATIO 08/03/2022 1.64  RATIO Final    This test was developed and its performance characteristics  determined by [facility].  It has not been cleared or approved  by the Food and Drug Administration.   • Naloxone 08/03/2022 1345  Cutoff=25 ng/ml Final   • TRP-LINK 08/03/2022 Comment   Final    These test results were not transmitted to [facility].  If you would like your patient's urine drug  test results to transmit to [facility], please  contact your [employer]  to complete  a physician authorization form.   Lab on 08/01/2022   Component Date Value Ref Range Status   • Ethanol, Urine 08/01/2022 Negative  Cutoff=0.020 % Final   •  THC, Screen, Urine 08/01/2022 Negative  Negative Final   • Phencyclidine (PCP), Urine 08/01/2022 Negative  Negative Final   • Cocaine Screen, Urine 08/01/2022 Negative  Negative Final   • Methamphetamine, Ur 08/01/2022 Negative  Negative Final   • Opiate Screen 08/01/2022 Negative  Negative Final   • Amphetamine Screen, Urine 08/01/2022 Negative  Negative Final   • Benzodiazepine Screen, Urine 08/01/2022 Negative  Negative Final   • Tricyclic Antidepressants Screen 08/01/2022 Negative  Negative Final   • Methadone Screen, Urine 08/01/2022 Negative  Negative Final   • Barbiturates Screen, Urine 08/01/2022 Negative  Negative Final   • Oxycodone Screen, Urine 08/01/2022 Negative  Negative Final   • Propoxyphene Screen 08/01/2022 Negative  Negative Final   • Buprenorphine, Screen, Urine 08/01/2022 Positive (A) Negative Final   • Creatinine, Urine 08/01/2022 178.5  >=20 mg/dL Final   • Buprenorphine/CR 08/01/2022 192  ng/mg creat Final   • NORBUPRENORPHINE/CR 08/01/2022 532  ng/mg creat Final   • NORBUP/BUP RATIO 08/01/2022 2.77  RATIO Final    This test was developed and its performance characteristics  determined by [facility].  It has not been cleared or approved  by the Food and Drug Administration.   • Naloxone 08/01/2022 2225  Cutoff=25 ng/ml Final   • TRP-LINK 08/01/2022 Comment   Final    These test results were not transmitted to [facility].  If you would like your patient's urine drug  test results to transmit to [facility], please  contact your [employer]  to complete  a physician authorization form.       Mental Status Exam  Hygiene:  good  Dress: casual  Attitude: cooperative and agreeable   Motor Activity: appropriate  Eye Contact:  good  Speech: regular rate and rhythm   Mood:  calm and cooperative  Affect:  Appropriate  Thought Processes:  Linear  Thought Content:  Normal  Suicidal Thoughts:  denies  Homicidal Thoughts:  denies  Crisis Safety Plan: Safety plan has been  discussed.   Hallucinations: Unknown to clinician.   Reliability: fair  Insight: fair  Judgement: fair  Impulse Control: fair    Recovery/spiritual support group attendance: No     Progress toward goal: Not at goal    Prognosis: Fair with Ongoing Treatment     Self-reported number of days sober: Patient reported May 26th during check in.     Patient agreeable to adhere to medication regimen as prescribed and report any side effects. Patient will contact this office, call 911 or present to the nearest emergency room should suicidal or homicidal ideations occur.    Impression/Formulation:  No diagnosis found.    Clinical Maneuvering/Interventions: Therapist utilized a person-centered approach to build rapport with group member. Therapist implemented motivational interviewing techniques to assist client with exploring and resolving ambivalence associated with commitment to change behaviors related to substance use and addiction. Therapist applied cognitive behavioral strategies to facilitate identification of maladaptive patterns of thinking and behavior that contribute to client's risk for continued substance use and relapse. Therapist employed group interaction activities to build rapport among group members, promote sobriety, and emphasize relapse prevention. Therapist promoted safe nonjudgmental environment by providing group members with unconditional positive regard and encouraging group members to comply with group rules and guidelines. Therapist assisted group member with identifying and implementing healthier coping strategies.      Plan:  Continue [facility] IOP Phase II  Aftercare:  [facility] Phase III  Program Assignments:  Personal recovery plan, relapse prevention plan, attendance of recovery support group meetings, exploration of sponsorship, drug/alcohol screens.     Duy Garcia LCSW  8/22/2022  10:26 EDT

## 2022-08-23 LAB — ETHANOL UR-MCNC: NEGATIVE %

## 2022-08-25 ENCOUNTER — TELEMEDICINE (OUTPATIENT)
Dept: PSYCHIATRY | Facility: CLINIC | Age: 42
End: 2022-08-25

## 2022-08-26 LAB
ACCEPTABLE CREAT UR QL: 132.2 MG/DL
NALOXONE UR CFM-MCNC: 734 NG/ML
NORBUP/BUP RATIO: 2.79 RATIO
NORBUPRENORPHINE UR CFM-MCNC: 556 NG/MG CREAT
NORBUPRENORPHINE/CREAT UR: 199 NG/MG CREAT
TRP-LINK: NORMAL

## 2022-08-29 ENCOUNTER — TELEMEDICINE (OUTPATIENT)
Dept: PSYCHIATRY | Facility: CLINIC | Age: 42
End: 2022-08-29

## 2022-08-29 ENCOUNTER — OFFICE VISIT (OUTPATIENT)
Dept: PSYCHIATRY | Facility: CLINIC | Age: 42
End: 2022-08-29

## 2022-08-29 VITALS
DIASTOLIC BLOOD PRESSURE: 100 MMHG | SYSTOLIC BLOOD PRESSURE: 140 MMHG | HEART RATE: 88 BPM | BODY MASS INDEX: 42.15 KG/M2 | HEIGHT: 65 IN | WEIGHT: 253 LBS

## 2022-08-29 DIAGNOSIS — F31.81 BIPOLAR II DISORDER: Chronic | ICD-10-CM

## 2022-08-29 DIAGNOSIS — G47.09 OTHER INSOMNIA: Chronic | ICD-10-CM

## 2022-08-29 DIAGNOSIS — F41.1 GENERALIZED ANXIETY DISORDER: Chronic | ICD-10-CM

## 2022-08-29 PROCEDURE — 90792 PSYCH DIAG EVAL W/MED SRVCS: CPT | Performed by: NURSE PRACTITIONER

## 2022-08-29 RX ORDER — VENLAFAXINE 100 MG/1
250 TABLET ORAL DAILY
Qty: 75 TABLET | Refills: 2 | Status: SHIPPED | OUTPATIENT
Start: 2022-08-29

## 2022-08-29 RX ORDER — CLONIDINE HYDROCHLORIDE 0.2 MG/1
0.2 TABLET ORAL NIGHTLY
Qty: 30 TABLET | Refills: 2 | Status: SHIPPED | OUTPATIENT
Start: 2022-08-29

## 2022-08-29 RX ORDER — LEVETIRACETAM 500 MG/1
TABLET ORAL
COMMUNITY
Start: 2022-08-24 | End: 2022-11-01 | Stop reason: DRUGHIGH

## 2022-08-29 RX ORDER — TRAZODONE HYDROCHLORIDE 100 MG/1
100 TABLET ORAL NIGHTLY
Qty: 30 TABLET | Refills: 2 | Status: SHIPPED | OUTPATIENT
Start: 2022-08-29 | End: 2022-11-27

## 2022-08-29 RX ORDER — OLANZAPINE 5 MG/1
5 TABLET ORAL NIGHTLY
Qty: 30 TABLET | Refills: 2 | Status: SHIPPED | OUTPATIENT
Start: 2022-08-29 | End: 2022-11-01 | Stop reason: SDUPTHER

## 2022-08-29 NOTE — PROGRESS NOTES
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                     norbuprenorphine, 5 ng/mL                                                                       '  This test was developed and its performance characteristics  determined by [employer].  It has not been cleared or approved  by the Food and Drug Administration.   • Antidepressants 06/27/2022 Negative   Final   • AMITRIPTYLINE, UR 06/27/2022 Not Detected   Final   • Clomipramine, Ur 06/27/2022 Not Detected   Final   • Desemethylclomipramine 06/27/2022 Not Detected   Final   • Desipramine 06/27/2022 Not Detected   Final   • Doxepin 06/27/2022 Not Detected   Final   • Desmethyldoxepin, Ur 06/27/2022 Not Detected   Final   • Imipramine 06/27/2022 Not Detected   Final   • Nortriptyline 06/27/2022 Not Detected   Final   • Protriptyline 06/27/2022 Not Detected   Final   • Trimipramine 06/27/2022 Not Detected   Final   • Level of Detection: 06/27/2022 Comment   Final    Testing Threshold:  50 or 100 ng/mL                                                                       '  This test was developed and its performance characteristics  determined by [employer].  It has not been cleared or approved  by the Food and  Drug Administration.   Lab on 06/20/2022   Component Date Value Ref Range Status   • THC, Screen, Urine 06/20/2022 Negative  Negative Final   • Phencyclidine (PCP), Urine 06/20/2022 Negative  Negative Final   • Cocaine Screen, Urine 06/20/2022 Negative  Negative Final   • Methamphetamine, Ur 06/20/2022 Negative  Negative Final   • Opiate Screen 06/20/2022 Negative  Negative Final   • Amphetamine Screen, Urine 06/20/2022 Negative  Negative Final   • Benzodiazepine Screen, Urine 06/20/2022 Negative  Negative Final   • Tricyclic Antidepressants Screen 06/20/2022 Positive (A) Negative Final   • Methadone Screen, Urine 06/20/2022 Negative  Negative Final   • Barbiturates Screen, Urine 06/20/2022 Negative  Negative Final   • Oxycodone Screen, Urine 06/20/2022 Negative  Negative Final   • Propoxyphene Screen 06/20/2022 Negative  Negative Final   • Buprenorphine, Screen, Urine 06/20/2022 Positive (A) Negative Final   • Ethanol, Urine 06/20/2022 Negative  Cutoff=0.020 % Final   • Creatinine, Urine 06/20/2022 195.8  >=20 mg/dL Final   • Buprenorphine/CR 06/20/2022 73  ng/mg creat Final   • NORBUPRENORPHINE/CR 06/20/2022 339  ng/mg creat Final   • NORBUP/BUP RATIO 06/20/2022 4.64  RATIO Final    This test was developed and its performance characteristics  determined by [employer].  It has not been cleared or approved  by the Food and Drug Administration.   • Naloxone 06/20/2022 2041  Cutoff=25 ng/ml Final   • TRP-LINK 06/20/2022 Comment   Final    These test results were not transmitted to [facility].  If you would like your patient's urine drug  test results to transmit to [facility], please  contact your [employer]  to complete  a physician authorization form.   • Antidepressants 06/20/2022 Negative   Final   • AMITRIPTYLINE, UR 06/20/2022 Not Detected   Final   • Clomipramine, Ur 06/20/2022 Not Detected   Final   • Desemethylclomipramine 06/20/2022 Not Detected   Final   • Desipramine  06/20/2022 Not Detected   Final   • Doxepin 06/20/2022 Not Detected   Final   • Desmethyldoxepin, Ur 06/20/2022 Not Detected   Final   • Imipramine 06/20/2022 Not Detected   Final   • Nortriptyline 06/20/2022 Not Detected   Final   • Protriptyline 06/20/2022 Not Detected   Final   • Trimipramine 06/20/2022 Not Detected   Final   • Level of Detection: 06/20/2022 Comment   Final    Testing Threshold:  50 or 100 ng/mL                                                                       '  This test was developed and its performance characteristics  determined by [facility].  It has not been cleared or approved  by the Food and Drug Administration.   Lab on 06/15/2022   Component Date Value Ref Range Status   • Ethanol, Urine 06/15/2022 Negative  Cutoff=0.020 % Final   • THC, Screen, Urine 06/15/2022 Negative  Negative Final   • Phencyclidine (PCP), Urine 06/15/2022 Negative  Negative Final   • Cocaine Screen, Urine 06/15/2022 Negative  Negative Final   • Methamphetamine, Ur 06/15/2022 Negative  Negative Final   • Opiate Screen 06/15/2022 Negative  Negative Final   • Amphetamine Screen, Urine 06/15/2022 Negative  Negative Final   • Benzodiazepine Screen, Urine 06/15/2022 Positive (A) Negative Final   • Tricyclic Antidepressants Screen 06/15/2022 Positive (A) Negative Final   • Methadone Screen, Urine 06/15/2022 Negative  Negative Final   • Barbiturates Screen, Urine 06/15/2022 Negative  Negative Final   • Oxycodone Screen, Urine 06/15/2022 Negative  Negative Final   • Propoxyphene Screen 06/15/2022 Negative  Negative Final   • Buprenorphine, Screen, Urine 06/15/2022 Positive (A) Negative Final   • Creatinine, Urine 06/15/2022 251.4  >=20 mg/dL Final   • Buprenorphine/CR 06/15/2022 57  ng/mg creat Final   • NORBUPRENORPHINE/CR 06/15/2022 187  ng/mg creat Final   • NORBUP/BUP RATIO 06/15/2022 3.28  RATIO Final    This test was developed and its performance characteristics  determined by [facility].  It has not been cleared or  approved  by the Food and Drug Administration.   • Naloxone 06/15/2022 265  Cutoff=25 ng/ml Final   • TRP-LINK 06/15/2022 Comment   Final    These test results were not transmitted to [facility].  If you would like your patient's urine drug  test results to transmit to [facility], please  contact your [employer]  to complete  a physician authorization form.   • Antidepressants 06/15/2022 Negative   Final   • AMITRIPTYLINE, UR 06/15/2022 Not Detected   Final   • Clomipramine, Ur 06/15/2022 Not Detected   Final   • Desemethylclomipramine 06/15/2022 Not Detected   Final   • Desipramine 06/15/2022 Not Detected   Final   • Doxepin 06/15/2022 Not Detected   Final   • Desmethyldoxepin, Ur 06/15/2022 Not Detected   Final   • Imipramine 06/15/2022 Not Detected   Final   • Nortriptyline 06/15/2022 Not Detected   Final   • Protriptyline 06/15/2022 Not Detected   Final   • Trimipramine 06/15/2022 Not Detected   Final   • Level of Detection: 06/15/2022 Comment   Final    Testing Threshold:  50 or 100 ng/mL                                                                       '  This test was developed and its performance characteristics  determined by [employer].  It has not been cleared or approved  by the Food and Drug Administration.   • Benzodiazepine Screen, Urine 06/15/2022 +POSITIVE+   Final   • DIAZEPAM URINE QUANT (REF) 06/15/2022 Not Detected  ng/mg creat Final   • Desmethyldiazepam 06/15/2022 Not Detected  ng/mg creat Final   • Oxazepam, urine 06/15/2022 21  ng/mg creat Final   • Temazepam, urine 06/15/2022 Not Detected  ng/mg creat Final    Expected metabolism of benzodiazepine class drugs:   Parent Drug       Detected Metabolites   -----------       --------------------   Diazepam:         Desmethyldiazepam, Temazepam, Oxazepam   Chlordiazepoxide: Desmethyldiazepam, Oxazepam   Clorazepate:      Desmethyldiazepam, Oxazepam   Halazepam:        Desmethyldiazepam, Oxazepam    Temazepam:        Oxazepam   Oxazepam:         None   • ALPRAZOLAM 06/15/2022 Not Detected  ng/mg creat Final   • ALPHA-HYDROXYALPRAZOLAM UR, QT (RE* 06/15/2022 Not Detected  ng/mg creat Final   • DESALKLFLURAZEPAM UR QUANT (REF) 06/15/2022 Not Detected  ng/mg creat Final   • LORAZEPAM URINE QUANT (REF) 06/15/2022 Not Detected  ng/mg creat Final   • Alpha-hydroxytriazolam, Urine 06/15/2022 Not Detected  ng/mg creat Final   • Clonazepam Urine 06/15/2022 Not Detected  ng/mg creat Final   • 7-Aminoclonazepam Urine 06/15/2022 Not Detected  ng/mg creat Final   • MIDAZOLAM UR, QUANT (REF) 06/15/2022 Not Detected  ng/mg creat Final   • Alpha-hydroxymidazolam, Urine 06/15/2022 Not Detected  ng/mg creat Final   • Flunitrazepam 06/15/2022 Not Detected  ng/mg creat Final   • DESMETHYLFLUNITRAZEPAM 06/15/2022 Not Detected  ng/mg creat Final   • Level of Detection: 06/15/2022 Comment   Final    Testing Threshold:  20 ng/mL                                                                       '  This test was developed and its performance characteristics  determined by [facility].  It has not been cleared or approved  by the Food and Drug Administration.   There may be more visits with results that are not included.       EKG Results:  No orders to display       Assessment & Plan   Problems Addressed this Visit    None     Visit Diagnoses     Bipolar II disorder (HCC)  (Chronic)       Relevant Medications    OLANZapine (zyPREXA) 5 MG tablet    traZODone (DESYREL) 100 MG tablet    cloNIDine (Catapres) 0.2 MG tablet    venlafaxine (EFFEXOR) 100 MG tablet    Generalized anxiety disorder  (Chronic)       Relevant Medications    OLANZapine (zyPREXA) 5 MG tablet    traZODone (DESYREL) 100 MG tablet    cloNIDine (Catapres) 0.2 MG tablet    venlafaxine (EFFEXOR) 100 MG tablet    Other insomnia  (Chronic)       Relevant Medications    OLANZapine (zyPREXA) 5 MG tablet    traZODone (DESYREL) 100 MG tablet    cloNIDine (Catapres) 0.2 MG tablet       Diagnoses       Codes Comments    Bipolar II disorder (HCC)     ICD-10-CM: F31.81  ICD-9-CM: 296.89     Generalized anxiety disorder     ICD-10-CM: F41.1  ICD-9-CM: 300.02     Other insomnia     ICD-10-CM: G47.09  ICD-9-CM: 780.52           Visit Diagnoses:    ICD-10-CM ICD-9-CM   1. Bipolar II disorder (HCC)  F31.81 296.89   2. Generalized anxiety disorder  F41.1 300.02   3. Other insomnia  G47.09 780.52       -Reviewed previous available documentation and most recent available labs [facility] reviewed and is appropriate. Also reviewed/discussed results from [facility] testing.     -Discussed importance of counseling to decrease anxiety like symptoms. Discussed coping mechanisms to decrease stress and anxiety: relaxation techniques, guided imagery, music therapy, staying active, support groups, diversional activities and avoid aggravating factors.  Discussed different coping mechanisms to better control depression.    Encouraged patient to practice good sleep hygiene.  Discussed going to bed at the same time and getting up at the same time every day. Consider a quiet activity, such as reading, part of your nighttime routine. Make your bedroom a dark, comfortable place where it is easy to fall asleep. Avoid or limit caffeine consumption. Limit screen use, especially two hours prior to bed (this includes watching TV, using smartphone, tablet or computer).     Will continue medications as previously prescribed. He reports that symptoms are adequately controlled with current medication regimen.   -Continue Zyprexa 5 mg daily for mood  -Continue Effexor 125 mg twice daily for anxiety and depression, reports taking 250 mg at once as he has trouble remembering second dose.   -Continue Trazodone 100 mg at night for sleep and depression.   -Continue Clonidine 0.2 mg at night for anxiety/sleep    GOALS:  Short Term Goals: Patient will be compliant with medication, and patient will have no significant medication related side  effects.  Patient will be engaged in psychotherapy as indicated.  Patient will report subjective improvement of symptoms.  Long term goals: To stabilize mood and treat/improve subjective symptoms, the patient will stay out of the hospital, the patient will be at an optimal level of functioning, and the patient will take all medications as prescribed.  The patient/guardian verbalized understanding and agreement with goals that were mutually set.    TREATMENT PLAN: Continue supportive psychotherapy efforts and medications as indicated for patient's diagnosis.  Pharmacological and Non-Pharmacological treatment options discussed during today's visit. Patient/Guardian acknowledged and verbally consented with current treatment plan and was educated on the importance of compliance with treatment and follow-up appointments.      MEDICATION ISSUES:  Discussed medication options and treatment plan of prescribed medication as well as the risks, benefits, any black box warnings, and side effects including potential falls, possible impaired driving, and metabolic adversities among others. Patient is agreeable to call the office with any worsening of symptoms or onset of side effects, or if any concerns or questions arise.  The contact information for the office is made available to the patient. Patient is agreeable to call 911 or go to the nearest ER should they begin having any SI/HI, or if any urgent concerns arise. No medication side effects or related complaints today.     MEDS ORDERED DURING VISIT:  New Medications Ordered This Visit   Medications   • OLANZapine (zyPREXA) 5 MG tablet     Sig: Take 1 tablet by mouth Every Night.     Dispense:  30 tablet     Refill:  2   • traZODone (DESYREL) 100 MG tablet     Sig: Take 1 tablet by mouth Every Night for 90 days.     Dispense:  30 tablet     Refill:  2   • cloNIDine (Catapres) 0.2 MG tablet     Sig: Take 1 tablet by mouth Every Night.     Dispense:  30 tablet     Refill:  2    • venlafaxine (EFFEXOR) 100 MG tablet     Sig: Take 2.5 tablets by mouth Daily.     Dispense:  75 tablet     Refill:  2       FOLLOW UP:  Return in about 8 weeks (around 10/24/2022).             This document has been electronically signed by PRISCILLA Ellison  August 29, 2022 12:25 EDT    Please note that portions of this note were completed with a voice recognition program. Efforts were made to edit dictation, but occasionally words are mistranscribed.

## 2022-08-30 ENCOUNTER — TRANSCRIBE ORDERS (OUTPATIENT)
Dept: ADMINISTRATIVE | Facility: HOSPITAL | Age: 42
End: 2022-08-30

## 2022-08-30 DIAGNOSIS — M25.572 PAIN IN LEFT ANKLE AND JOINTS OF LEFT FOOT: Primary | ICD-10-CM

## 2022-09-01 ENCOUNTER — TELEMEDICINE (OUTPATIENT)
Dept: PSYCHIATRY | Facility: CLINIC | Age: 42
End: 2022-09-01

## 2022-09-02 ENCOUNTER — TELEPHONE (OUTPATIENT)
Dept: PSYCHIATRY | Facility: CLINIC | Age: 42
End: 2022-09-02

## 2022-09-02 NOTE — TELEPHONE ENCOUNTER
Patient asked me last night if his new medication, propranolol, would cause any issues with MAT medications and I told him no. I reviewed his other medications on his list today for interactions. Please call and see if he is still taking metoprolol and if neuro knew he was taking this when they prescribed the propranolol. They are the same drug class and are not usually taken together. He needs to verify with neuro that they want him on both the propranolol and metoprolol if he is indeed taking both.

## 2022-09-05 NOTE — PROGRESS NOTES
CD IOP GROUP     Date: 08/29/2022  Name: Aroldo Cai    Time In: 1530   Time Out: 1630    This provider is located at [facility] located at [address] [place] [address], [address] [place] [id number].  The Patient is seen remotely at his/her home, using Zoom. Patient is being seen via telehealth and confirm that they are in a secure environment for this session. The patient's condition being diagnosed/treated is appropriate for telemedicine. The provider identified himself as well as his credentials. The patient gave consent to be seen remotely, and when consent is given they understand that the consent allows for patient identifiable information to be sent to a third party as needed. They may refuse to be seen remotely at any time. The electronic data is encrypted and password protected, and the patient has been advised of the potential risks to privacy not withstanding such measures.      Number of participants: 10    IOP GROUP NOTE     Data: 1 hour IOP group therapy session (Check-ins, Coping Skills, Relapse Prevention)     Check Ins: Therapist continued facilitation of rapport building strategies between group members. Therapist asked that each patient check in with home life and recovery efforts and identify triggers, cravings, and high risk situations that arise between group sessions. Therapist provided empathy and support during group session.     Session Content/Coping Skills: Clinician processed group stressors with group members. Check in’s completed by group members. Clinician discussed with group members being aware of thoughts and not disqualifying the positives. Clinician invited group members to attend Overdose Awareness Day.     Response: Patient attended class via Zoom. Patient participated in verbal completion of check in form.   Patient during check in denied suicidal or homicidal thoughts. Patient participated in group discussions.      Personal Assessment 0-10 Scale (10 worst)    Anxiety:  Patient reported 8 or 9.    Depression: Patient denied.    Cravings: Patient reported 5.      Assessment:     ..  Lab on 08/22/2022   Component Date Value Ref Range Status   • Ethanol, Urine 08/22/2022 Negative  Cutoff=0.020 % Final   • THC, Screen, Urine 08/22/2022 Negative  Negative Final   • Phencyclidine (PCP), Urine 08/22/2022 Negative  Negative Final   • Cocaine Screen, Urine 08/22/2022 Negative  Negative Final   • Methamphetamine, Ur 08/22/2022 Negative  Negative Final   • Opiate Screen 08/22/2022 Negative  Negative Final   • Amphetamine Screen, Urine 08/22/2022 Negative  Negative Final   • Benzodiazepine Screen, Urine 08/22/2022 Negative  Negative Final   • Tricyclic Antidepressants Screen 08/22/2022 Negative  Negative Final   • Methadone Screen, Urine 08/22/2022 Negative  Negative Final   • Barbiturates Screen, Urine 08/22/2022 Negative  Negative Final   • Oxycodone Screen, Urine 08/22/2022 Negative  Negative Final   • Propoxyphene Screen 08/22/2022 Negative  Negative Final   • Buprenorphine, Screen, Urine 08/22/2022 Positive (A) Negative Final   • Creatinine, Urine 08/22/2022 132.2  >=20 mg/dL Final   • Buprenorphine/CR 08/22/2022 199  ng/mg creat Final   • NORBUPRENORPHINE/CR 08/22/2022 556  ng/mg creat Final   • NORBUP/BUP RATIO 08/22/2022 2.79  RATIO Final    This test was developed and its performance characteristics  determined by [employer].  It has not been cleared or approved  by the Food and Drug Administration.   • Naloxone 08/22/2022 734  Cutoff=25 ng/ml Final   • TRP-LINK 08/22/2022 Comment   Final    These test results were not transmitted to [facility].  If you would like your patient's urine drug  test results to transmit to [facility], please  contact your [employer]  to complete  a physician authorization form.   Lab on 08/09/2022   Component Date Value Ref Range Status   • Ethanol, Urine 08/09/2022 Negative  Cutoff=0.020 % Final   • THC, Screen, Urine  08/09/2022 Negative  Negative Final   • Phencyclidine (PCP), Urine 08/09/2022 Negative  Negative Final   • Cocaine Screen, Urine 08/09/2022 Negative  Negative Final   • Methamphetamine, Ur 08/09/2022 Negative  Negative Final   • Opiate Screen 08/09/2022 Negative  Negative Final   • Amphetamine Screen, Urine 08/09/2022 Negative  Negative Final   • Benzodiazepine Screen, Urine 08/09/2022 Negative  Negative Final   • Tricyclic Antidepressants Screen 08/09/2022 Negative  Negative Final   • Methadone Screen, Urine 08/09/2022 Negative  Negative Final   • Barbiturates Screen, Urine 08/09/2022 Negative  Negative Final   • Oxycodone Screen, Urine 08/09/2022 Negative  Negative Final   • Propoxyphene Screen 08/09/2022 Negative  Negative Final   • Buprenorphine, Screen, Urine 08/09/2022 Positive (A) Negative Final   • Creatinine, Urine 08/09/2022 264.7  >=20 mg/dL Final   • Buprenorphine/CR 08/09/2022 241  ng/mg creat Final   • NORBUPRENORPHINE/CR 08/09/2022 414  ng/mg creat Final   • NORBUP/BUP RATIO 08/09/2022 1.72  RATIO Final    This test was developed and its performance characteristics  determined by [employer].  It has not been cleared or approved  by the Food and Drug Administration.   • Naloxone 08/09/2022 3408  Cutoff=25 ng/ml Final   • TRP-LINK 08/09/2022 Comment   Final    These test results were not transmitted to [facility].  If you would like your patient's urine drug  test results to transmit to [facility], please  contact your [employer]  to complete  a physician authorization form.       Mental Status Exam  Hygiene:  good  Dress: casual  Attitude: cooperative and agreeable   Motor Activity: appropriate  Eye Contact:  good  Speech: regular rate and rhythm   Mood:  calm and cooperative  Affect:  Appropriate  Thought Processes:  Linear  Thought Content:  Normal  Suicidal Thoughts:  denies  Homicidal Thoughts:  denies  Crisis Safety Plan: Safety plan has been discussed.    Hallucinations: Unknown to clinician.   Reliability: fair  Insight: fair  Judgement: fair  Impulse Control: fair    Recovery/spiritual support group attendance: No     Progress toward goal: Not at goal    Prognosis: Fair with Ongoing Treatment     Self-reported number of days sober: Patient reported May 26th during check in.     Patient will contact this office, call 911 or present to the nearest emergency room should suicidal or homicidal ideations occur.    Impression/Formulation:  No diagnosis found.    Clinical Maneuvering/Interventions: Therapist utilized a person-centered approach to build rapport with group member. Therapist implemented motivational interviewing techniques to assist client with exploring and resolving ambivalence associated with commitment to change behaviors related to substance use and addiction. Therapist applied cognitive behavioral strategies to facilitate identification of maladaptive patterns of thinking and behavior that contribute to client's risk for continued substance use and relapse. Therapist employed group interaction activities to build rapport among group members, promote sobriety, and emphasize relapse prevention. Therapist promoted safe nonjudgmental environment by providing group members with unconditional positive regard and encouraging group members to comply with group rules and guidelines. Therapist assisted group member with identifying and implementing healthier coping strategies.      Plan:  Continue [facility] IOP Phase II   Aftercare:  [facility] Phase III  Program Assignments:  Personal recovery plan, relapse prevention plan, attendance of recovery support group meetings, exploration of sponsorship, drug/alcohol screens.     Duy Garcia LCSW  9/5/2022  14:42 EDT

## 2022-09-05 NOTE — PROGRESS NOTES
CD IOP GROUP     Date: 08/25/2022  Name: Aroldo Cai    Time In: 1530   Time Out: 1630    This provider is located at [facility] located at [address] [place] [address], [address] [place] [id number].  The Patient is seen remotely at his/her home, using Zoom. Patient is being seen via telehealth and confirm that they are in a secure environment for this session. The patient's condition being diagnosed/treated is appropriate for telemedicine. The provider identified himself as well as his credentials. The patient gave consent to be seen remotely, and when consent is given they understand that the consent allows for patient identifiable information to be sent to a third party as needed. They may refuse to be seen remotely at any time. The electronic data is encrypted and password protected, and the patient has been advised of the potential risks to privacy not withstanding such measures.      Number of participants: 9    IOP GROUP NOTE     Data: 1 hour IOP group therapy session (Check-ins, Coping Skills, Relapse Prevention)     Check Ins: Therapist continued facilitation of rapport building strategies between group members. Therapist asked that each patient check in with home life and recovery efforts and identify triggers, cravings, and high risk situations that arise between group sessions. Therapist provided empathy and support during group session.     Session Content/Coping Skills: Check ins completed by group members. Clinician processed stressors with group members. Clinician discussed relapse drift with group members and importance of being aware of relapse drift. Clinician assigned relapse prevention plans as homework for group members.      Response: Patient attended class via Zoom. Patient participated in verbal completion of check in form.   Patient during check in denied suicidal or homicidal thoughts. Patient participated in group discussions.      Personal Assessment 0-10 Scale (10  worst)    Anxiety:  8   Depression:  0   Cravings: 0     Assessment:     ..  Lab on 08/22/2022   Component Date Value Ref Range Status   • Ethanol, Urine 08/22/2022 Negative  Cutoff=0.020 % Final   • THC, Screen, Urine 08/22/2022 Negative  Negative Final   • Phencyclidine (PCP), Urine 08/22/2022 Negative  Negative Final   • Cocaine Screen, Urine 08/22/2022 Negative  Negative Final   • Methamphetamine, Ur 08/22/2022 Negative  Negative Final   • Opiate Screen 08/22/2022 Negative  Negative Final   • Amphetamine Screen, Urine 08/22/2022 Negative  Negative Final   • Benzodiazepine Screen, Urine 08/22/2022 Negative  Negative Final   • Tricyclic Antidepressants Screen 08/22/2022 Negative  Negative Final   • Methadone Screen, Urine 08/22/2022 Negative  Negative Final   • Barbiturates Screen, Urine 08/22/2022 Negative  Negative Final   • Oxycodone Screen, Urine 08/22/2022 Negative  Negative Final   • Propoxyphene Screen 08/22/2022 Negative  Negative Final   • Buprenorphine, Screen, Urine 08/22/2022 Positive (A) Negative Final   • Creatinine, Urine 08/22/2022 132.2  >=20 mg/dL Final   • Buprenorphine/CR 08/22/2022 199  ng/mg creat Final   • NORBUPRENORPHINE/CR 08/22/2022 556  ng/mg creat Final   • NORBUP/BUP RATIO 08/22/2022 2.79  RATIO Final    This test was developed and its performance characteristics  determined by [employer].  It has not been cleared or approved  by the Food and Drug Administration.   • Naloxone 08/22/2022 734  Cutoff=25 ng/ml Final   • TRP-LINK 08/22/2022 Comment   Final    These test results were not transmitted to [facility].  If you would like your patient's urine drug  test results to transmit to [facility], please  contact your [facility]  to complete  a physician authorization form.   Lab on 08/09/2022   Component Date Value Ref Range Status   • Ethanol, Urine 08/09/2022 Negative  Cutoff=0.020 % Final   • THC, Screen, Urine 08/09/2022 Negative  Negative Final    • Phencyclidine (PCP), Urine 08/09/2022 Negative  Negative Final   • Cocaine Screen, Urine 08/09/2022 Negative  Negative Final   • Methamphetamine, Ur 08/09/2022 Negative  Negative Final   • Opiate Screen 08/09/2022 Negative  Negative Final   • Amphetamine Screen, Urine 08/09/2022 Negative  Negative Final   • Benzodiazepine Screen, Urine 08/09/2022 Negative  Negative Final   • Tricyclic Antidepressants Screen 08/09/2022 Negative  Negative Final   • Methadone Screen, Urine 08/09/2022 Negative  Negative Final   • Barbiturates Screen, Urine 08/09/2022 Negative  Negative Final   • Oxycodone Screen, Urine 08/09/2022 Negative  Negative Final   • Propoxyphene Screen 08/09/2022 Negative  Negative Final   • Buprenorphine, Screen, Urine 08/09/2022 Positive (A) Negative Final   • Creatinine, Urine 08/09/2022 264.7  >=20 mg/dL Final   • Buprenorphine/CR 08/09/2022 241  ng/mg creat Final   • NORBUPRENORPHINE/CR 08/09/2022 414  ng/mg creat Final   • NORBUP/BUP RATIO 08/09/2022 1.72  RATIO Final    This test was developed and its performance characteristics  determined by [employer].  It has not been cleared or approved  by the Food and Drug Administration.   • Naloxone 08/09/2022 3408  Cutoff=25 ng/ml Final   • TRP-LINK 08/09/2022 Comment   Final    These test results were not transmitted to [facility].  If you would like your patient's urine drug  test results to transmit to [facility], please  contact your [facility]  to complete  a physician authorization form.       Mental Status Exam  Hygiene:  good  Dress: casual  Attitude: cooperative and agreeable   Motor Activity: appropriate  Eye Contact:  good  Speech: regular rate and rhythm   Mood:  calm and cooperative  Affect:  Appropriate  Thought Processes:  Linear  Thought Content:  Normal  Suicidal Thoughts:  denies  Homicidal Thoughts:  denies  Crisis Safety Plan: Safety plan has been discussed.   Hallucinations: Unknown to clinician.    Reliability: fair  Insight: fair  Judgement: fair  Impulse Control: fair    Recovery/spiritual support group attendance: No     Progress toward goal: Not at goal    Prognosis: Fair with Ongoing Treatment     Self-reported number of days sober: Patient reported May 26th on check in form.     Patient agreeable to adhere to medication regimen as prescribed and report any side effects. Patient will contact this office, call 911 or present to the nearest emergency room should suicidal or homicidal ideations occur.    Impression/Formulation:  No diagnosis found.    Clinical Maneuvering/Interventions: Therapist utilized a person-centered approach to build rapport with group member. Therapist implemented motivational interviewing techniques to assist client with exploring and resolving ambivalence associated with commitment to change behaviors related to substance use and addiction. Therapist applied cognitive behavioral strategies to facilitate identification of maladaptive patterns of thinking and behavior that contribute to client's risk for continued substance use and relapse. Therapist employed group interaction activities to build rapport among group members, promote sobriety, and emphasize relapse prevention. Therapist promoted safe nonjudgmental environment by providing group members with unconditional positive regard and encouraging group members to comply with group rules and guidelines. Therapist assisted group member with identifying and implementing healthier coping strategies.      Plan:  Continue [facility] IOP Phase II  Aftercare:  [facility] Phase III  Program Assignments:  Personal recovery plan, relapse prevention plan, attendance of recovery support group meetings, exploration of sponsorship, drug/alcohol screens.     Duy Garcia LCSW  9/5/2022  11:24 EDT

## 2022-09-06 ENCOUNTER — LAB (OUTPATIENT)
Dept: LAB | Facility: HOSPITAL | Age: 42
End: 2022-09-06

## 2022-09-06 ENCOUNTER — OFFICE VISIT (OUTPATIENT)
Dept: PSYCHIATRY | Facility: CLINIC | Age: 42
End: 2022-09-06

## 2022-09-06 VITALS
WEIGHT: 258 LBS | SYSTOLIC BLOOD PRESSURE: 116 MMHG | HEART RATE: 89 BPM | DIASTOLIC BLOOD PRESSURE: 80 MMHG | BODY MASS INDEX: 42.99 KG/M2 | HEIGHT: 65 IN

## 2022-09-06 DIAGNOSIS — F11.20 OPIOID USE DISORDER, SEVERE, DEPENDENCE: ICD-10-CM

## 2022-09-06 DIAGNOSIS — F11.21 OPIOID USE DISORDER, SEVERE, IN EARLY REMISSION, ON MAINTENANCE THERAPY, DEPENDENCE (HCC): ICD-10-CM

## 2022-09-06 PROCEDURE — 80307 DRUG TEST PRSMV CHEM ANLYZR: CPT

## 2022-09-06 PROCEDURE — G0480 DRUG TEST DEF 1-7 CLASSES: HCPCS

## 2022-09-06 PROCEDURE — 99213 OFFICE O/P EST LOW 20 MIN: CPT | Performed by: NURSE PRACTITIONER

## 2022-09-06 RX ORDER — BUPRENORPHINE HYDROCHLORIDE AND NALOXONE HYDROCHLORIDE DIHYDRATE 8; 2 MG/1; MG/1
2 TABLET SUBLINGUAL DAILY
Qty: 56 TABLET | Refills: 0 | Status: SHIPPED | OUTPATIENT
Start: 2022-09-06 | End: 2022-10-04 | Stop reason: SDUPTHER

## 2022-09-06 NOTE — PROGRESS NOTES
Office  Follow Up Visit      Patient Name: Aroldo Cai  : 1980   MRN: [id number]     Referring Provider: Gerald Dobson MD    Chief Complaint: Substance use    History of Present Illness:   Aroldo Cai is a 42 y.o. male who is here today for follow up related to MAT for OUD.  Continues taking buprenorphine/naloxone 16-4 mg daily and is tolerating well with no AE or recurrence of illicit substance use since last follow-up.  Continues to report cravings about 1-2 times per day but is able to use coping mechanisms to handle.  Triggered by stress related to current living situation. Would like to move out of his parents home as they have a difficult relationship. He worries about who will care for them as they are elderly. Also needs to find employment. Had neuro appt recently and was told abnormal MRI was due to fluid and not tumor. Started on propranolol and sumatriptan. Was told by neuro it was ok to take propranolol and to stop metoprolol. Will follow up with PCP on this. Continues in IOP and has been meeting with peer support regularly that has been helpful. Will continue with individual CBT and intends to make appt with Idalia Boles. No other complaints today.    Triggers: Stress, anxiety    Cravings: 1 to 2/day    Relapse Prevention: IOP, MIRIAM, peer support     Urine Drug Screen (today's visit) discussed: Positive buprenorphine, TCA    UDS Confirmation: 2022 Positive buprenorphine/nor-buprenorphine    QIANA (PDMP) Reviewed for Current/Active Medications: buprenorphine/naloxone as expected    Past Surgical History:  Past Surgical History:   Procedure Laterality Date   • ABDOMINAL SURGERY     • BRAIN SURGERY      craniotomy,  right frontal lobe AVM   • CHOLECYSTECTOMY WITH INTRAOPERATIVE CHOLANGIOGRAM N/A 2021    Procedure: CHOLECYSTECTOMY LAPAROSCOPIC INTRAOPERATIVE CHOLANGIOGRAPHY;  Surgeon: Hardik Blancas MD;  Location: [facility];  Service: General;  Laterality: N/A;    • COLONOSCOPY     • ENDOSCOPY N/A 10/22/2021    Procedure: ESOPHAGOGASTRODUODENOSCOPY WITH BIOPSY;  Surgeon: Hardik Blancas MD;  Location: [facility] ENDOSCOPY;  Service: Gastroenterology;  Laterality: N/A;   • HERNIA REPAIR Right     inguinal   • HIP SURGERY Bilateral    • HIP SURGERY Left        Problem List:  Patient Active Problem List   Diagnosis   • Calculus of gallbladder without cholecystitis without obstruction   • Right upper quadrant abdominal pain   • Nausea and vomiting   • Opioid use disorder, severe, dependence (HCC)       Allergy:   Allergies   Allergen Reactions   • Dilantin [Phenytoin] Seizure   • Penicillins Anaphylaxis   • Phenobarbital Seizure   • Tegretol [Carbamazepine] Seizure   • Morphine Nausea And Vomiting   • Latex Rash        Current Medications:   Current Outpatient Medications   Medication Sig Dispense Refill   • allopurinol (ZYLOPRIM) 100 MG tablet Take 100 mg by mouth Daily.     • buprenorphine-naloxone (SUBOXONE) 8-2 MG per SL tablet Place 2 tablets under the tongue Daily for 28 days. 56 tablet 0   • cloNIDine (Catapres) 0.2 MG tablet Take 1 tablet by mouth Every Night. 30 tablet 2   • colchicine 0.6 MG tablet Take 0.6 mg by mouth Daily.     • cyclobenzaprine (FLEXERIL) 10 MG tablet Take 10 mg by mouth every night at bedtime.     • ibuprofen (ADVIL,MOTRIN) 800 MG tablet      • levETIRAcetam (KEPPRA) 500 MG tablet      • levETIRAcetam (KEPPRA) 750 MG tablet Take 750 mg by mouth 2 (Two) Times a Day.     • metoprolol succinate XL (TOPROL-XL) 25 MG 24 hr tablet      • OLANZapine (zyPREXA) 5 MG tablet Take 1 tablet by mouth Every Night. 30 tablet 2   • omeprazole (priLOSEC) 40 MG capsule      • ondansetron ODT (ZOFRAN-ODT) 8 MG disintegrating tablet Every 8 (Eight) Hours As Needed.     • predniSONE (DELTASONE) 10 MG (48) dose pack Take daily 48 tablet 0   • promethazine-dextromethorphan (PROMETHAZINE-DM) 6.25-15 MG/5ML syrup TAKE 10 MLS BY MOUTH EVERY SIX HOURS AS NEEDED FOR COUGH      • SUMAtriptan (IMITREX) 50 MG tablet take 1 tablet by mouth at onset of MIGRAINE may repeat in 2 hours if needed max of 2 tablets in 24 hours     • tiZANidine (ZANAFLEX) 4 MG tablet Take 4 mg by mouth 2 (Two) Times a Day.     • traZODone (DESYREL) 100 MG tablet Take 1 tablet by mouth Every Night for 90 days. 30 tablet 2   • venlafaxine (EFFEXOR) 100 MG tablet Take 2.5 tablets by mouth Daily. 75 tablet 2     No current facility-administered medications for this visit.       Past Medical History:  Past Medical History:   Diagnosis Date   • Anesthesia complication     pt reports he is hard to wake after anesthesia   • Anxiety    • Arthritis    • Bipolar 1 disorder (HCC)    • COVID-19 02/2021   • Depression    • Dysphagia     food and liquids.   • Extensive tattoos    • Gout    • Hepatitis C 2018    Patient took medication    • History of echocardiogram    • Kidney stones    • Pneumonia    • PTSD (post-traumatic stress disorder)    • Seizure (HCC)     last seizure years ago.   • Seizures (HCC)    • Short-term memory loss    • Wears contact lenses    • Wears glasses        Social History:  Social History     Socioeconomic History   • Marital status:    Tobacco Use   • Smoking status: Current Every Day Smoker     Packs/day: 0.50     Years: 30.00     Pack years: 15.00     Types: Cigarettes   • Smokeless tobacco: Never Used   Vaping Use   • Vaping Use: Never used   Substance and Sexual Activity   • Alcohol use: Not Currently   • Drug use: Not Currently     Types: Cocaine(coke)     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   • Sexual activity: Defer       Family History:  Family History   Problem Relation Age of Onset   • Diabetes Mother    • Diabetes insipidus Mother    • Heart disease Mother    • Cancer Father         skin   • Diabetes Father    • Hypertension Father    • Arthritis Father    • Diabetes insipidus Father          Subjective      Review of Systems:   Review of Systems   Constitutional: Positive  for fatigue. Negative for chills and fever.   Respiratory: Negative for shortness of breath.    Cardiovascular: Negative for chest pain.   Gastrointestinal: Negative for abdominal pain.   Musculoskeletal: Positive for arthralgias.   Skin: Negative for skin lesions.   Neurological: Negative for seizures and confusion.   Psychiatric/Behavioral: Positive for stress. Negative for hallucinations, sleep disturbance, suicidal ideas and depressed mood. The patient is nervous/anxious.        PHQ-9 Total Score: 15    VINICIUS-7 Score:   Feeling nervous, anxious or on edge: More than half the days  Not being able to stop or control worrying: More than half the days  Worrying too much about different things: More than half the days  Trouble Relaxing: Nearly every day  Being so restless that it is hard to sit still: More than half the days  Feeling afraid as if something awful might happen: Not at all  Becoming easily annoyed or irritable: More than half the days  VINICIUS 7 Total Score: 13  If you checked any problems, how difficult have these problems made it for you to do your work, take care of things at home, or get along with other people: Not difficult at all    Patient History:   The following portions of the patient's history were reviewed and updated as appropriate: allergies, current medications, past family history, past medical history, past social history, past surgical history and problem list.     Social:  Social History     Socioeconomic History   • Marital status:    Tobacco Use   • Smoking status: Current Every Day Smoker     Packs/day: 0.50     Years: 30.00     Pack years: 15.00     Types: Cigarettes   • Smokeless tobacco: Never Used   Vaping Use   • Vaping Use: Never used   Substance and Sexual Activity   • Alcohol use: Not Currently   • Drug use: Not Currently     Types: Cocaine(coke)     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   • Sexual activity: Defer       Medications:     Current Outpatient  Medications:   •  allopurinol (ZYLOPRIM) 100 MG tablet, Take 100 mg by mouth Daily., Disp: , Rfl:   •  buprenorphine-naloxone (SUBOXONE) 8-2 MG per SL tablet, Place 2 tablets under the tongue Daily for 28 days., Disp: 56 tablet, Rfl: 0  •  cloNIDine (Catapres) 0.2 MG tablet, Take 1 tablet by mouth Every Night., Disp: 30 tablet, Rfl: 2  •  colchicine 0.6 MG tablet, Take 0.6 mg by mouth Daily., Disp: , Rfl:   •  cyclobenzaprine (FLEXERIL) 10 MG tablet, Take 10 mg by mouth every night at bedtime., Disp: , Rfl:   •  ibuprofen (ADVIL,MOTRIN) 800 MG tablet, , Disp: , Rfl:   •  levETIRAcetam (KEPPRA) 500 MG tablet, , Disp: , Rfl:   •  levETIRAcetam (KEPPRA) 750 MG tablet, Take 750 mg by mouth 2 (Two) Times a Day., Disp: , Rfl:   •  metoprolol succinate XL (TOPROL-XL) 25 MG 24 hr tablet, , Disp: , Rfl:   •  OLANZapine (zyPREXA) 5 MG tablet, Take 1 tablet by mouth Every Night., Disp: 30 tablet, Rfl: 2  •  omeprazole (priLOSEC) 40 MG capsule, , Disp: , Rfl:   •  ondansetron ODT (ZOFRAN-ODT) 8 MG disintegrating tablet, Every 8 (Eight) Hours As Needed., Disp: , Rfl:   •  predniSONE (DELTASONE) 10 MG (48) dose pack, Take daily, Disp: 48 tablet, Rfl: 0  •  promethazine-dextromethorphan (PROMETHAZINE-DM) 6.25-15 MG/5ML syrup, TAKE 10 MLS BY MOUTH EVERY SIX HOURS AS NEEDED FOR COUGH, Disp: , Rfl:   •  SUMAtriptan (IMITREX) 50 MG tablet, take 1 tablet by mouth at onset of MIGRAINE may repeat in 2 hours if needed max of 2 tablets in 24 hours, Disp: , Rfl:   •  tiZANidine (ZANAFLEX) 4 MG tablet, Take 4 mg by mouth 2 (Two) Times a Day., Disp: , Rfl:   •  traZODone (DESYREL) 100 MG tablet, Take 1 tablet by mouth Every Night for 90 days., Disp: 30 tablet, Rfl: 2  •  venlafaxine (EFFEXOR) 100 MG tablet, Take 2.5 tablets by mouth Daily., Disp: 75 tablet, Rfl: 2    Objective     Physical Exam:  Physical Exam  Vitals reviewed.   Constitutional:       General: He is not in acute distress.     Appearance: He is well-developed. He is not  "ill-appearing.   Eyes:      General: No scleral icterus.  Pulmonary:      Effort: No respiratory distress.   Skin:     Coloration: Skin is not jaundiced.   Neurological:      Mental Status: He is alert and oriented to person, place, and time.      Gait: Gait abnormal.   Psychiatric:         Speech: Speech normal.         Behavior: Behavior normal.         Thought Content: Thought content normal. Thought content does not include homicidal or suicidal ideation. Thought content does not include homicidal or suicidal plan.         Vital Signs:   Vitals:    09/06/22 1238   BP: 116/80   Pulse: 89   Weight: 117 kg (258 lb)   Height: 165.1 cm (65\")     Body mass index is 42.93 kg/m².     Mental Status Exam:   Hygiene:   good  Cooperation:  Cooperative  Eye Contact:  Good  Psychomotor Behavior:  Appropriate  Affect:  Full range  Mood: normal  Speech:  Normal  Thought Process:  Goal directed  Thought Content:  Normal  Suicidal:  None  Homicidal:  None  Hallucinations:  None  Delusion:  None  Memory:  Intact  Orientation:  Person, Place, Time and Situation  Reliability:  good  Insight:  Fair  Judgement:  Good  Impulse Control:  Good    Assessment / Plan      Assessment & Plan   -Continue buprenorphine/naloxone 16/4 mg daily.  -Will have RTC dose at next visit +0 remaining  -Advisement to take part in and remain active in 12 Step Recovery Meetings, IOP, and/or 1:1 therapy/counseling and to establish/maintain an active relationship with a recovery sponsor.   -Continued monitoring for illicit substances for patient safety, medication compliance and future care.  -Continue IOP. Make appt with Idalia Boles. Reach out to peer support PRN.  -Will have TCM reach out to him to discuss housing options and employment     Visit Diagnoses     Opioid use disorder, severe, in early remission, on maintenance therapy, dependence (HCC)        Relevant Medications    buprenorphine-naloxone (SUBOXONE) 8-2 MG per SL tablet      Diagnoses       " Codes Comments    Opioid use disorder, severe, in early remission, on maintenance therapy, dependence (HCC)     ICD-10-CM: F11.21  ICD-9-CM: 304.03             PLAN:  1. Safety: No acute safety concerns  2. Risk Assessment: Risk of self-harm acutely is low. Risk of self-harm chronically is also low, but could be further elevated in the event of treatment noncompliance and/or AODA.      TREATMENT PLAN/GOALS: Continue supportive psychotherapy efforts and medications as indicated. Treatment and medication options discussed during today's visit. Patient acknowledged and verbally consented to continue with current treatment plan and was educated on the importance of compliance with treatment and follow-up appointments.      MEDICATION ISSUES:  QIANA reviewed as expected.  Discussed medication options and treatment plan of prescribed medication as well as the risks, benefits, and side effects including potential falls, possible impaired driving and metabolic adversities among others. Patient is agreeable to call the office with any worsening of symptoms or onset of side effects. Patient is agreeable to call 911 or go to the nearest ER should he/she begin having SI/HI. No medication side effects or related complaints today.     MEDS ORDERED DURING VISIT:  New Medications Ordered This Visit   Medications   • buprenorphine-naloxone (SUBOXONE) 8-2 MG per SL tablet     Sig: Place 2 tablets under the tongue Daily for 28 days.     Dispense:  56 tablet     Refill:  0     [id number]       Return in 4 weeks (on 10/4/2022) for Follow Up Medication.           This document has been electronically signed by PRISCILLA Wen  September 6, 2022 14:07 EDT      Part of this note may be an electronic transcription/translation of spoken language to printed text using the Dragon Dictation System.

## 2022-09-07 ENCOUNTER — HOSPITAL ENCOUNTER (OUTPATIENT)
Dept: MRI IMAGING | Facility: HOSPITAL | Age: 42
Discharge: HOME | End: 2022-09-07
Admitting: PODIATRIST

## 2022-09-07 DIAGNOSIS — M25.572 PAIN IN LEFT ANKLE AND JOINTS OF LEFT FOOT: ICD-10-CM

## 2022-09-07 LAB — ETHANOL UR-MCNC: NEGATIVE %

## 2022-09-07 PROCEDURE — 73721 MRI JNT OF LWR EXTRE W/O DYE: CPT

## 2022-09-08 ENCOUNTER — TELEMEDICINE (OUTPATIENT)
Dept: PSYCHIATRY | Facility: CLINIC | Age: 42
End: 2022-09-08

## 2022-09-09 LAB
ACCEPTABLE CREAT UR QL: 162 MG/DL
NALOXONE UR CFM-MCNC: 365 NG/ML
NORBUP/BUP RATIO: 3 RATIO
NORBUPRENORPHINE UR CFM-MCNC: 255 NG/MG CREAT
NORBUPRENORPHINE/CREAT UR: 85 NG/MG CREAT
TRP-LINK: NORMAL

## 2022-09-11 LAB
AMITRIP UR QL CFM: NOT DETECTED
ANTIDEPRESSANTS UR QL: NEGATIVE
BUPRENORPHINE UR QL CFM: NORMAL
BUPRENORPHINE/CREAT UR: 87 NG/MG CREAT
CLOMIPRAMINE UR QL: NOT DETECTED
DESIPRAMINE UR QL CFM: NOT DETECTED
DOXEPIN UR QL CFM: NOT DETECTED
IMIPRAMINE UR QL CFM: NOT DETECTED
LEVEL OF DETECTION:: NORMAL
LEVEL OF DETECTION:: NORMAL
NORBUPRENORPHINE/CREAT UR: 322 NG/MG CREAT
NORCLOMIPRAMINE UR QL: NOT DETECTED
NORDOXEPIN UR QL: NOT DETECTED
NORTRIP UR QL CFM: NOT DETECTED
PROTRIP UR QL: NOT DETECTED
TRIMIPRAMINE UR QL: NOT DETECTED

## 2022-09-12 ENCOUNTER — TELEMEDICINE (OUTPATIENT)
Dept: PSYCHIATRY | Facility: CLINIC | Age: 42
End: 2022-09-12

## 2022-09-12 NOTE — PROGRESS NOTES
CD IOP GROUP     Date: 09/01/2022  Name: Aroldo Cai    Time In: 1530  Time Out: 1630    This provider is located at [facility] located at [address] [place] [address], [address] [place] [id number].  The Patient is seen remotely at his/her home, using Zoom. Patient is being seen via telehealth and confirm that they are in a secure environment for this session. The patient's condition being diagnosed/treated is appropriate for telemedicine. The provider identified himself as well as his credentials. The patient gave consent to be seen remotely, and when consent is given they understand that the consent allows for patient identifiable information to be sent to a third party as needed. They may refuse to be seen remotely at any time. The electronic data is encrypted and password protected, and the patient has been advised of the potential risks to privacy not withstanding such measures.      Number of participants: 9    IOP GROUP NOTE     Data: 1 hour IOP group therapy session (Check-ins, Coping Skills, Relapse Prevention)     Check Ins: Therapist continued facilitation of rapport building strategies between group members. Therapist asked that each patient check in with home life and recovery efforts and identify triggers, cravings, and high risk situations that arise between group sessions. Therapist provided empathy and support during group session.     Session Content/Coping Skills: Check ins completed by group members. Clinician explored aftercare options with group members. Clinician discussed therapy options with group members and clinician answered questions group members had regarding therapy. MAT provider, Giselle, attended group and answered MAT questions.     Response: Patient attended class via Zoom. Patient participated in verbal completion of check in form.   Patient during check in denied suicidal or homicidal thoughts. Patient participated in group discussions.  Patient discussed medication  "questions that he had.     Personal Assessment 0-10 Scale (10 worst)    Anxiety:  5   Depression:  0   Cravings: Patient stated \"high\" and rated this as a 4-5.      Assessment:     ..  Lab on 08/22/2022   Component Date Value Ref Range Status   • Ethanol, Urine 08/22/2022 Negative  Cutoff=0.020 % Final   • THC, Screen, Urine 08/22/2022 Negative  Negative Final   • Phencyclidine (PCP), Urine 08/22/2022 Negative  Negative Final   • Cocaine Screen, Urine 08/22/2022 Negative  Negative Final   • Methamphetamine, Ur 08/22/2022 Negative  Negative Final   • Opiate Screen 08/22/2022 Negative  Negative Final   • Amphetamine Screen, Urine 08/22/2022 Negative  Negative Final   • Benzodiazepine Screen, Urine 08/22/2022 Negative  Negative Final   • Tricyclic Antidepressants Screen 08/22/2022 Negative  Negative Final   • Methadone Screen, Urine 08/22/2022 Negative  Negative Final   • Barbiturates Screen, Urine 08/22/2022 Negative  Negative Final   • Oxycodone Screen, Urine 08/22/2022 Negative  Negative Final   • Propoxyphene Screen 08/22/2022 Negative  Negative Final   • Buprenorphine, Screen, Urine 08/22/2022 Positive (A) Negative Final   • Creatinine, Urine 08/22/2022 132.2  >=20 mg/dL Final   • Buprenorphine/CR 08/22/2022 199  ng/mg creat Final   • NORBUPRENORPHINE/CR 08/22/2022 556  ng/mg creat Final   • NORBUP/BUP RATIO 08/22/2022 2.79  RATIO Final    This test was developed and its performance characteristics  determined by [facility].  It has not been cleared or approved  by the Food and Drug Administration.   • Naloxone 08/22/2022 734  Cutoff=25 ng/ml Final   • TRP-LINK 08/22/2022 Comment   Final    These test results were not transmitted to [facility].  If you would like your patient's urine drug  test results to transmit to [facility], please  contact your [employer]  to complete  a physician authorization form.       Mental Status Exam  Hygiene:  good  Dress: casual  Attitude: " cooperative and agreeable   Motor Activity: appropriate  Eye Contact:  good  Speech: regular rate and rhythm   Mood:  calm and cooperative  Affect:  Appropriate  Thought Processes:  Linear  Thought Content:  Normal  Suicidal Thoughts:  denies  Homicidal Thoughts:  denies  Crisis Safety Plan: Safety plan has been discussed.   Hallucinations: Unknown to clinician.   Reliability: fair  Insight: fair  Judgement: fair  Impulse Control: fair    Recovery/spiritual support group attendance: No     Progress toward goal: Not at goal    Prognosis: Fair with Ongoing Treatment     Self-reported number of days sober: Patient reported May 26 during check in.     Patient will contact this office, call 911 or present to the nearest emergency room should suicidal or homicidal ideations occur.    Impression/Formulation:  No diagnosis found.    Clinical Maneuvering/Interventions: Therapist utilized a person-centered approach to build rapport with group member. Therapist implemented motivational interviewing techniques to assist client with exploring and resolving ambivalence associated with commitment to change behaviors related to substance use and addiction. Therapist applied cognitive behavioral strategies to facilitate identification of maladaptive patterns of thinking and behavior that contribute to client's risk for continued substance use and relapse. Therapist employed group interaction activities to build rapport among group members, promote sobriety, and emphasize relapse prevention. Therapist promoted safe nonjudgmental environment by providing group members with unconditional positive regard and encouraging group members to comply with group rules and guidelines. Therapist assisted group member with identifying and implementing healthier coping strategies.      Plan:  Continue [facility] IOP Phase II  Aftercare:  [facility] Phase III  Program Assignments:  Personal  recovery plan, relapse prevention plan, attendance of recovery support group meetings, exploration of sponsorship, drug/alcohol screens.     Duy Garcia LCSW  9/12/2022  14:51 EDT

## 2022-09-14 ENCOUNTER — DOCUMENTATION (OUTPATIENT)
Dept: PSYCHIATRY | Facility: HOSPITAL | Age: 42
End: 2022-09-14

## 2022-09-14 ENCOUNTER — DOCUMENTATION (OUTPATIENT)
Dept: EMERGENCY DEPT | Facility: HOSPITAL | Age: 42
End: 2022-09-14

## 2022-09-14 NOTE — PROGRESS NOTES
Patient had requested a letter to verify his status in CD-IOP. Clinician has placed the below letter on letterhead, signed, and will leave letter with front office staff for patient to .     9/14/2022  To Whom It May Concern,   This letter is to verify that Aroldo Cai is currently participating in CD-IOP at [facility]. As of the date of this letter, Mr. Cai has completed 40 CD-IOP classes. Further information can be obtained with appropriate releases.     Sincerely,     Duy Garcia LCSW    -Duy Garcia LCSW  9/14/2022

## 2022-09-15 ENCOUNTER — TELEMEDICINE (OUTPATIENT)
Dept: PSYCHIATRY | Facility: CLINIC | Age: 42
End: 2022-09-15

## 2022-09-19 ENCOUNTER — TELEMEDICINE (OUTPATIENT)
Dept: PSYCHIATRY | Facility: CLINIC | Age: 42
End: 2022-09-19

## 2022-09-20 NOTE — PROGRESS NOTES
"CD IOP GROUP     Date: 09/19/2022  Name: Aroldo Cai    Time In: 1530   Time Out: 1625    This provider is located at [facility] located at [address] [place] [address], [address] [place] [id number].  The Patient is seen remotely at his/her home, using Zoom. Patient is being seen via telehealth and confirm that they are in a secure environment for this session. The patient's condition being diagnosed/treated is appropriate for telemedicine. The provider identified himself as well as his credentials. The patient gave consent to be seen remotely, and when consent is given they understand that the consent allows for patient identifiable information to be sent to a third party as needed. They may refuse to be seen remotely at any time. The electronic data is encrypted and password protected, and the patient has been advised of the potential risks to privacy not withstanding such measures.      Number of participants: 12    IOP GROUP NOTE     Data: 1 hour IOP group therapy session (Check-ins, Coping Skills, Relapse Prevention)     Check Ins: Therapist continued facilitation of rapport building strategies between group members. Therapist asked that each patient check in with home life and recovery efforts and identify triggers, cravings, and high risk situations that arise between group sessions. Therapist provided empathy and support during group session.     Session Content/Coping Skills: Check ins completed by group members. Clinician explored with group members motivations for treatment and clinician processed group member’s discussions of finding motivation for treatment and challenges at their stage of recovery.     Response: Patient attended class via Zoom. Patient participated in verbal completion of check in form. Patient during check in answered no to question \"currently or since last group meeting have you had any suicidal thoughts or plan or intent to hurt yourself”, and patient also answered no to " "question of \"currently or since your last group meeting, have you had any homicidal thoughts or plan or intent to hurt others\". Patient answered no to all other questions during check in. Patient participated in group discussions.      Personal Assessment 0-10 Scale (10 worst)    Anxiety:  5   Depression:  0   Cravings: 6     Assessment:     ..  Lab on 09/06/2022   Component Date Value Ref Range Status   • Ethanol, Urine 09/06/2022 Negative  Cutoff=0.020 % Final   • THC, Screen, Urine 09/06/2022 Negative  Negative Final   • Phencyclidine (PCP), Urine 09/06/2022 Negative  Negative Final   • Cocaine Screen, Urine 09/06/2022 Negative  Negative Final   • Methamphetamine, Ur 09/06/2022 Negative  Negative Final   • Opiate Screen 09/06/2022 Negative  Negative Final   • Amphetamine Screen, Urine 09/06/2022 Negative  Negative Final   • Benzodiazepine Screen, Urine 09/06/2022 Negative  Negative Final   • Tricyclic Antidepressants Screen 09/06/2022 Positive (A) Negative Final   • Methadone Screen, Urine 09/06/2022 Negative  Negative Final   • Barbiturates Screen, Urine 09/06/2022 Negative  Negative Final   • Oxycodone Screen, Urine 09/06/2022 Negative  Negative Final   • Propoxyphene Screen 09/06/2022 Negative  Negative Final   • Buprenorphine, Screen, Urine 09/06/2022 Positive (A) Negative Final   • Creatinine, Urine 09/06/2022 162.0  >=20 mg/dL Final   • Buprenorphine/CR 09/06/2022 85  ng/mg creat Final   • NORBUPRENORPHINE/CR 09/06/2022 255  ng/mg creat Final   • NORBUP/BUP RATIO 09/06/2022 3.00  RATIO Final    This test was developed and its performance characteristics  determined by [facility].  It has not been cleared or approved  by the Food and Drug Administration.   • Naloxone 09/06/2022 365  Cutoff=25 ng/ml Final   • TRP-LINK 09/06/2022 Comment   Final    These test results were not transmitted to [facility].  If you would like your patient's urine drug  test results to transmit to [facility], " please  contact your [employer]  to complete  a physician authorization form.   • Antidepressants 09/06/2022 Negative   Final   • AMITRIPTYLINE, UR 09/06/2022 Not Detected   Final   • Clomipramine, Ur 09/06/2022 Not Detected   Final   • Desemethylclomipramine 09/06/2022 Not Detected   Final   • Desipramine 09/06/2022 Not Detected   Final   • Doxepin 09/06/2022 Not Detected   Final   • Desmethyldoxepin, Ur 09/06/2022 Not Detected   Final   • Imipramine 09/06/2022 Not Detected   Final   • Nortriptyline 09/06/2022 Not Detected   Final   • Protriptyline 09/06/2022 Not Detected   Final   • Trimipramine 09/06/2022 Not Detected   Final   • Level of Detection: 09/06/2022 Comment   Final    Testing Threshold:  50 or 100 ng/mL                                                                       '  This test was developed and its performance characteristics  determined by [facility].  It has not been cleared or approved  by the Food and Drug Administration.   • BUPRENORPHINE 09/06/2022 +POSITIVE+   Final   • Buprenorphine, Urine 09/06/2022 87  ng/mg creat Final   • Norbuprenorphine 09/06/2022 322  ng/mg creat Final   • Level of Detection: 09/06/2022 Comment   Final    Testing Threshold: buprenorphine, 1.0 ng/mL                     norbuprenorphine, 5 ng/mL                                                                       '  This test was developed and its performance characteristics  determined by [facility].  It has not been cleared or approved  by the Food and Drug Administration.       Mental Status Exam  Hygiene:  good  Dress: casual  Attitude: cooperative and agreeable   Motor Activity: appropriate  Eye Contact:  good  Speech: regular rate and rhythm   Mood:  calm and cooperative  Affect:  Appropriate  Thought Processes:  Linear  Thought Content:  Normal  Suicidal Thoughts:  denies  Homicidal Thoughts:  denies  Crisis Safety Plan: Safety plan has been discussed.   Hallucinations: Unknown to clinician.    Reliability: fair  Insight: fair  Judgement: fair  Impulse Control: fair    Recovery/spiritual support group attendance: No     Progress toward goal: Not at goal    Prognosis: Fair with Ongoing Treatment     Self-reported number of days sober: Patient reported May 26 during check in.     Patient will contact this office, call 911 or present to the nearest emergency room should suicidal or homicidal ideations occur.    Impression/Formulation:  No diagnosis found.    Clinical Maneuvering/Interventions: Therapist utilized a person-centered approach to build rapport with group member. Therapist implemented motivational interviewing techniques to assist client with exploring and resolving ambivalence associated with commitment to change behaviors related to substance use and addiction. Therapist applied cognitive behavioral strategies to facilitate identification of maladaptive patterns of thinking and behavior that contribute to client's risk for continued substance use and relapse. Therapist employed group interaction activities to build rapport among group members, promote sobriety, and emphasize relapse prevention. Therapist promoted safe nonjudgmental environment by providing group members with unconditional positive regard and encouraging group members to comply with group rules and guidelines. Therapist assisted group member with identifying and implementing healthier coping strategies.      Plan:  Continue [facility] IOP Phase II  Aftercare:  [facility] Phase III  Program Assignments:  Personal recovery plan, relapse prevention plan, attendance of recovery support group meetings, exploration of sponsorship, drug/alcohol screens.     Duy Garcia LCSW  9/20/2022  15:02 EDT

## 2022-09-20 NOTE — PROGRESS NOTES
"CD IOP GROUP     Date: 09/15/2022  Name: Aroldo Cai    Time In: 1530   Time Out: 1630    This provider is located at [facility] located at [address], [address] [place] [address].  The Patient is seen remotely at his/her home, using Zoom. Patient is being seen via telehealth and confirm that they are in a secure environment for this session. The patient's condition being diagnosed/treated is appropriate for telemedicine. The provider identified himself as well as his credentials. The patient gave consent to be seen remotely, and when consent is given they understand that the consent allows for patient identifiable information to be sent to a third party as needed. They may refuse to be seen remotely at any time. The electronic data is encrypted and password protected, and the patient has been advised of the potential risks to privacy not withstanding such measures.      Number of participants: 12    IOP GROUP NOTE     Data: 1 hour IOP group therapy session (Check-ins, Coping Skills, Relapse Prevention)     Check Ins: Therapist continued facilitation of rapport building strategies between group members. Therapist asked that each patient check in with home life and recovery efforts and identify triggers, cravings, and high risk situations that arise between group sessions. Therapist provided empathy and support during group session.     Session Content/Coping Skills: Student intern joined session. Check ins completed by group members. Clinician invited group members to share their story. Student intern shared resource located in [place], [place]. , [place], also joined meeting and shared [employer] reading.     Response: Patient attended class via Zoom. Patient participated in verbal completion of check in form. Patient during check in answered no to question \"currently or since last group meeting have you had any suicidal thoughts or plan or intent to hurt yourself”, " "and patient also answered no to question of \"currently or since your last group meeting, have you had any homicidal thoughts or plan or intent to hurt others\". Patient answered no to all other questions during check in. Patient participated in group discussions.      Personal Assessment 0-10 Scale (10 worst)    Anxiety:  10   Depression:  0   Cravings: 10     Assessment:     ..  Lab on 09/06/2022   Component Date Value Ref Range Status   • Ethanol, Urine 09/06/2022 Negative  Cutoff=0.020 % Final   • THC, Screen, Urine 09/06/2022 Negative  Negative Final   • Phencyclidine (PCP), Urine 09/06/2022 Negative  Negative Final   • Cocaine Screen, Urine 09/06/2022 Negative  Negative Final   • Methamphetamine, Ur 09/06/2022 Negative  Negative Final   • Opiate Screen 09/06/2022 Negative  Negative Final   • Amphetamine Screen, Urine 09/06/2022 Negative  Negative Final   • Benzodiazepine Screen, Urine 09/06/2022 Negative  Negative Final   • Tricyclic Antidepressants Screen 09/06/2022 Positive (A) Negative Final   • Methadone Screen, Urine 09/06/2022 Negative  Negative Final   • Barbiturates Screen, Urine 09/06/2022 Negative  Negative Final   • Oxycodone Screen, Urine 09/06/2022 Negative  Negative Final   • Propoxyphene Screen 09/06/2022 Negative  Negative Final   • Buprenorphine, Screen, Urine 09/06/2022 Positive (A) Negative Final   • Creatinine, Urine 09/06/2022 162.0  >=20 mg/dL Final   • Buprenorphine/CR 09/06/2022 85  ng/mg creat Final   • NORBUPRENORPHINE/CR 09/06/2022 255  ng/mg creat Final   • NORBUP/BUP RATIO 09/06/2022 3.00  RATIO Final    This test was developed and its performance characteristics  determined by [facility].  It has not been cleared or approved  by the Food and Drug Administration.   • Naloxone 09/06/2022 365  Cutoff=25 ng/ml Final   • TRP-LINK 09/06/2022 Comment   Final    These test results were not transmitted to [facility].  If you would like your patient's urine drug  test results to " transmit to [facility], please  contact your [employer]  to complete  a physician authorization form.   • Antidepressants 09/06/2022 Negative   Final   • AMITRIPTYLINE, UR 09/06/2022 Not Detected   Final   • Clomipramine, Ur 09/06/2022 Not Detected   Final   • Desemethylclomipramine 09/06/2022 Not Detected   Final   • Desipramine 09/06/2022 Not Detected   Final   • Doxepin 09/06/2022 Not Detected   Final   • Desmethyldoxepin, Ur 09/06/2022 Not Detected   Final   • Imipramine 09/06/2022 Not Detected   Final   • Nortriptyline 09/06/2022 Not Detected   Final   • Protriptyline 09/06/2022 Not Detected   Final   • Trimipramine 09/06/2022 Not Detected   Final   • Level of Detection: 09/06/2022 Comment   Final    Testing Threshold:  50 or 100 ng/mL                                                                       '  This test was developed and its performance characteristics  determined by [employer].  It has not been cleared or approved  by the Food and Drug Administration.   • BUPRENORPHINE 09/06/2022 +POSITIVE+   Final   • Buprenorphine, Urine 09/06/2022 87  ng/mg creat Final   • Norbuprenorphine 09/06/2022 322  ng/mg creat Final   • Level of Detection: 09/06/2022 Comment   Final    Testing Threshold: buprenorphine, 1.0 ng/mL                     norbuprenorphine, 5 ng/mL                                                                       '  This test was developed and its performance characteristics  determined by [employer].  It has not been cleared or approved  by the Food and Drug Administration.       Mental Status Exam  Hygiene:  good  Dress: casual  Attitude: cooperative and agreeable   Motor Activity: appropriate  Eye Contact:  good  Speech: regular rate and rhythm   Mood:  calm and cooperative  Affect:  Appropriate  Thought Processes:  Linear  Thought Content:  Normal  Suicidal Thoughts:  denies  Homicidal Thoughts:  denies  Crisis Safety Plan: Safety plan has been discussed.    Hallucinations: Unknown to clinician.   Reliability: fair  Insight: fair  Judgement: fair  Impulse Control: fair    Recovery/spiritual support group attendance: No     Progress toward goal: Not at goal    Prognosis: Fair with Ongoing Treatment     Self-reported number of days sober: Patient reported May 26 on check in form.     Patient will contact this office, call 911 or present to the nearest emergency room should suicidal or homicidal ideations occur.    Impression/Formulation:  No diagnosis found.    Clinical Maneuvering/Interventions: Therapist utilized a person-centered approach to build rapport with group member. Therapist implemented motivational interviewing techniques to assist client with exploring and resolving ambivalence associated with commitment to change behaviors related to substance use and addiction. Therapist applied cognitive behavioral strategies to facilitate identification of maladaptive patterns of thinking and behavior that contribute to client's risk for continued substance use and relapse. Therapist employed group interaction activities to build rapport among group members, promote sobriety, and emphasize relapse prevention. Therapist promoted safe nonjudgmental environment by providing group members with unconditional positive regard and encouraging group members to comply with group rules and guidelines. Therapist assisted group member with identifying and implementing healthier coping strategies.      Plan:  Continue [facility] IOP Phase II  Aftercare:  [facility] Phase III  Program Assignments:  Personal recovery plan, relapse prevention plan, attendance of recovery support group meetings, exploration of sponsorship, drug/alcohol screens.     Duy Garcia LCSW  9/20/2022  14:04 EDT

## 2022-09-20 NOTE — PROGRESS NOTES
CD IOP GROUP     Date: 09/08/2022  Name: Aroldo Cai    Time In: 1530   Time Out: 1630    This provider is located at [facility] located at [address] [place] [address], [address] [place] [id number].  The Patient is seen remotely at his/her home, using Zoom. Patient is being seen via telehealth and confirm that they are in a secure environment for this session. The patient's condition being diagnosed/treated is appropriate for telemedicine. The provider identified himself as well as his credentials. The patient gave consent to be seen remotely, and when consent is given they understand that the consent allows for patient identifiable information to be sent to a third party as needed. They may refuse to be seen remotely at any time. The electronic data is encrypted and password protected, and the patient has been advised of the potential risks to privacy not withstanding such measures.      Number of participants: 10    IOP GROUP NOTE     Data: 1 hour IOP group therapy session (Check-ins, Coping Skills, Relapse Prevention)     Check Ins: Therapist continued facilitation of rapport building strategies between group members. Therapist asked that each patient check in with home life and recovery efforts and identify triggers, cravings, and high risk situations that arise between group sessions. Therapist provided empathy and support during group session.     Session Content/Coping Skills: Check ins completed by group members. Clinician processed stressors presented by group members. MAT provider attended session and provided medication storage bags. Clinician shared [facility] resources with group members as well as [facility] resources. Clinician processed with group members having “fun” in recovery and re-conceptualizing the idea of fun. Group members discussed resources that they know of to assist with rebuilding credit.     Response: Patient attended class via Zoom. Patient participated in verbal  "completion of check in form. Patient during check in answered no to question \"currently or since last group meeting have you had any suicidal thoughts or plan or intent to hurt yourself”, and patient also answered no to question of \"currently or since your last group meeting, have you had any homicidal thoughts or plan or intent to hurt others\". Patient answered no to all other questions during check in. Patient participated in group discussions.      Personal Assessment 0-10 Scale (10 worst)    Anxiety:  4   Depression:  2   Cravings: 3     Assessment:     ..  Lab on 09/06/2022   Component Date Value Ref Range Status   • Ethanol, Urine 09/06/2022 Negative  Cutoff=0.020 % Final   • THC, Screen, Urine 09/06/2022 Negative  Negative Final   • Phencyclidine (PCP), Urine 09/06/2022 Negative  Negative Final   • Cocaine Screen, Urine 09/06/2022 Negative  Negative Final   • Methamphetamine, Ur 09/06/2022 Negative  Negative Final   • Opiate Screen 09/06/2022 Negative  Negative Final   • Amphetamine Screen, Urine 09/06/2022 Negative  Negative Final   • Benzodiazepine Screen, Urine 09/06/2022 Negative  Negative Final   • Tricyclic Antidepressants Screen 09/06/2022 Positive (A) Negative Final   • Methadone Screen, Urine 09/06/2022 Negative  Negative Final   • Barbiturates Screen, Urine 09/06/2022 Negative  Negative Final   • Oxycodone Screen, Urine 09/06/2022 Negative  Negative Final   • Propoxyphene Screen 09/06/2022 Negative  Negative Final   • Buprenorphine, Screen, Urine 09/06/2022 Positive (A) Negative Final   • Creatinine, Urine 09/06/2022 162.0  >=20 mg/dL Final   • Buprenorphine/CR 09/06/2022 85  ng/mg creat Final   • NORBUPRENORPHINE/CR 09/06/2022 255  ng/mg creat Final   • NORBUP/BUP RATIO 09/06/2022 3.00  RATIO Final    This test was developed and its performance characteristics  determined by [facility].  It has not been cleared or approved  by the Food and Drug Administration.   • Naloxone 09/06/2022 365  Cutoff=25 " ng/ml Final   • TRP-LINK 09/06/2022 Comment   Final    These test results were not transmitted to [facility].  If you would like your patient's urine drug  test results to transmit to [facility], please  contact your [employer]  to complete  a physician authorization form.   • Antidepressants 09/06/2022 Negative   Final   • AMITRIPTYLINE, UR 09/06/2022 Not Detected   Final   • Clomipramine, Ur 09/06/2022 Not Detected   Final   • Desemethylclomipramine 09/06/2022 Not Detected   Final   • Desipramine 09/06/2022 Not Detected   Final   • Doxepin 09/06/2022 Not Detected   Final   • Desmethyldoxepin, Ur 09/06/2022 Not Detected   Final   • Imipramine 09/06/2022 Not Detected   Final   • Nortriptyline 09/06/2022 Not Detected   Final   • Protriptyline 09/06/2022 Not Detected   Final   • Trimipramine 09/06/2022 Not Detected   Final   • Level of Detection: 09/06/2022 Comment   Final    Testing Threshold:  50 or 100 ng/mL                                                                       '  This test was developed and its performance characteristics  determined by [employer].  It has not been cleared or approved  by the Food and Drug Administration.   • BUPRENORPHINE 09/06/2022 +POSITIVE+   Final   • Buprenorphine, Urine 09/06/2022 87  ng/mg creat Final   • Norbuprenorphine 09/06/2022 322  ng/mg creat Final   • Level of Detection: 09/06/2022 Comment   Final    Testing Threshold: buprenorphine, 1.0 ng/mL                     norbuprenorphine, 5 ng/mL                                                                       '  This test was developed and its performance characteristics  determined by [employer].  It has not been cleared or approved  by the Food and Drug Administration.   Lab on 08/22/2022   Component Date Value Ref Range Status   • Ethanol, Urine 08/22/2022 Negative  Cutoff=0.020 % Final   • THC, Screen, Urine 08/22/2022 Negative  Negative Final   • Phencyclidine (PCP), Urine  08/22/2022 Negative  Negative Final   • Cocaine Screen, Urine 08/22/2022 Negative  Negative Final   • Methamphetamine, Ur 08/22/2022 Negative  Negative Final   • Opiate Screen 08/22/2022 Negative  Negative Final   • Amphetamine Screen, Urine 08/22/2022 Negative  Negative Final   • Benzodiazepine Screen, Urine 08/22/2022 Negative  Negative Final   • Tricyclic Antidepressants Screen 08/22/2022 Negative  Negative Final   • Methadone Screen, Urine 08/22/2022 Negative  Negative Final   • Barbiturates Screen, Urine 08/22/2022 Negative  Negative Final   • Oxycodone Screen, Urine 08/22/2022 Negative  Negative Final   • Propoxyphene Screen 08/22/2022 Negative  Negative Final   • Buprenorphine, Screen, Urine 08/22/2022 Positive (A) Negative Final   • Creatinine, Urine 08/22/2022 132.2  >=20 mg/dL Final   • Buprenorphine/CR 08/22/2022 199  ng/mg creat Final   • NORBUPRENORPHINE/CR 08/22/2022 556  ng/mg creat Final   • NORBUP/BUP RATIO 08/22/2022 2.79  RATIO Final    This test was developed and its performance characteristics  determined by [employer].  It has not been cleared or approved  by the Food and Drug Administration.   • Naloxone 08/22/2022 734  Cutoff=25 ng/ml Final   • TRP-LINK 08/22/2022 Comment   Final    These test results were not transmitted to [facility].  If you would like your patient's urine drug  test results to transmit to [facility], please  contact your [employer]  to complete  a physician authorization form.       Mental Status Exam  Hygiene:  good  Dress: casual  Attitude: cooperative and agreeable   Motor Activity: appropriate  Eye Contact:  good  Speech: regular rate and rhythm   Mood:  calm and cooperative  Affect:  Appropriate  Thought Processes:  Linear  Thought Content:  Normal  Suicidal Thoughts:  denies  Homicidal Thoughts:  denies  Crisis Safety Plan: Safety plan has been discussed.   Hallucinations: Unknown to clinician.   Reliability: fair  Insight:  fair  Judgement: fair  Impulse Control: fair    Recovery/spiritual support group attendance: No     Progress toward goal: Not at goal    Prognosis: Fair with Ongoing Treatment     Self-reported number of days sober: Patient reported May 26 on check in form.     Patient will contact this office, call 911 or present to the nearest emergency room should suicidal or homicidal ideations occur.    Impression/Formulation:  No diagnosis found.    Clinical Maneuvering/Interventions: Therapist utilized a person-centered approach to build rapport with group member. Therapist implemented motivational interviewing techniques to assist client with exploring and resolving ambivalence associated with commitment to change behaviors related to substance use and addiction. Therapist applied cognitive behavioral strategies to facilitate identification of maladaptive patterns of thinking and behavior that contribute to client's risk for continued substance use and relapse. Therapist employed group interaction activities to build rapport among group members, promote sobriety, and emphasize relapse prevention. Therapist promoted safe nonjudgmental environment by providing group members with unconditional positive regard and encouraging group members to comply with group rules and guidelines. Therapist assisted group member with identifying and implementing healthier coping strategies.      Plan:  Continue [facility] IOP Phase II   Aftercare:  [facility] Phase III  Program Assignments:  Personal recovery plan, relapse prevention plan, attendance of recovery support group meetings, exploration of sponsorship, drug/alcohol screens.     Duy Garcia LCSW  9/20/2022  12:34 EDT

## 2022-09-20 NOTE — PROGRESS NOTES
"CD IOP GROUP     Date: 09/12/2022  Name: Aroldo Cai    Time In: 1530   Time Out: 1630    This provider is located at [facility] located at [address] [place] [address], [address] [place] [id number].  The Patient is seen remotely at his/her home, using Zoom. Patient is being seen via telehealth and confirm that they are in a secure environment for this session. The patient's condition being diagnosed/treated is appropriate for telemedicine. The provider identified himself as well as his credentials. The patient gave consent to be seen remotely, and when consent is given they understand that the consent allows for patient identifiable information to be sent to a third party as needed. They may refuse to be seen remotely at any time. The electronic data is encrypted and password protected, and the patient has been advised of the potential risks to privacy not withstanding such measures.      Number of participants: 13    IOP GROUP NOTE     Data: 1 hour IOP group therapy session (Check-ins, Coping Skills, Relapse Prevention)     Check Ins: Therapist continued facilitation of rapport building strategies between group members. Therapist asked that each patient check in with home life and recovery efforts and identify triggers, cravings, and high risk situations that arise between group sessions. Therapist provided empathy and support during group session.     Session Content/Coping Skills: Check ins completed by group members. Clinician processed stressors presented by group members. , Keila, joined meeting and shared and discussed just for today reading. Clinician discussed with group members focusing on what you are gaining in recovery.     Response: Patient attended class via Zoom. Patient participated in verbal completion of check in form. Patient during check in answered no to question \"currently or since last group meeting have you had any suicidal thoughts or plan or intent to hurt " "yourself”, and patient also answered no to question of \"currently or since your last group meeting, have you had any homicidal thoughts or plan or intent to hurt others\". Patient answered no to all other questions during check in. Patient participated in group discussions.      Personal Assessment 0-10 Scale (10 worst)    Anxiety:  5   Depression:  7 or 8   Cravings: 5     Assessment:     ..  Lab on 09/06/2022   Component Date Value Ref Range Status   • Ethanol, Urine 09/06/2022 Negative  Cutoff=0.020 % Final   • THC, Screen, Urine 09/06/2022 Negative  Negative Final   • Phencyclidine (PCP), Urine 09/06/2022 Negative  Negative Final   • Cocaine Screen, Urine 09/06/2022 Negative  Negative Final   • Methamphetamine, Ur 09/06/2022 Negative  Negative Final   • Opiate Screen 09/06/2022 Negative  Negative Final   • Amphetamine Screen, Urine 09/06/2022 Negative  Negative Final   • Benzodiazepine Screen, Urine 09/06/2022 Negative  Negative Final   • Tricyclic Antidepressants Screen 09/06/2022 Positive (A) Negative Final   • Methadone Screen, Urine 09/06/2022 Negative  Negative Final   • Barbiturates Screen, Urine 09/06/2022 Negative  Negative Final   • Oxycodone Screen, Urine 09/06/2022 Negative  Negative Final   • Propoxyphene Screen 09/06/2022 Negative  Negative Final   • Buprenorphine, Screen, Urine 09/06/2022 Positive (A) Negative Final   • Creatinine, Urine 09/06/2022 162.0  >=20 mg/dL Final   • Buprenorphine/CR 09/06/2022 85  ng/mg creat Final   • NORBUPRENORPHINE/CR 09/06/2022 255  ng/mg creat Final   • NORBUP/BUP RATIO 09/06/2022 3.00  RATIO Final    This test was developed and its performance characteristics  determined by [facility].  It has not been cleared or approved  by the Food and Drug Administration.   • Naloxone 09/06/2022 365  Cutoff=25 ng/ml Final   • TRP-LINK 09/06/2022 Comment   Final    These test results were not transmitted to [facility].  If you would like your patient's urine drug  test " results to transmit to [facility], please  contact your [employer]  to complete  a physician authorization form.   • Antidepressants 09/06/2022 Negative   Final   • AMITRIPTYLINE, UR 09/06/2022 Not Detected   Final   • Clomipramine, Ur 09/06/2022 Not Detected   Final   • Desemethylclomipramine 09/06/2022 Not Detected   Final   • Desipramine 09/06/2022 Not Detected   Final   • Doxepin 09/06/2022 Not Detected   Final   • Desmethyldoxepin, Ur 09/06/2022 Not Detected   Final   • Imipramine 09/06/2022 Not Detected   Final   • Nortriptyline 09/06/2022 Not Detected   Final   • Protriptyline 09/06/2022 Not Detected   Final   • Trimipramine 09/06/2022 Not Detected   Final   • Level of Detection: 09/06/2022 Comment   Final    Testing Threshold:  50 or 100 ng/mL                                                                       '  This test was developed and its performance characteristics  determined by [employer].  It has not been cleared or approved  by the Food and Drug Administration.   • BUPRENORPHINE 09/06/2022 +POSITIVE+   Final   • Buprenorphine, Urine 09/06/2022 87  ng/mg creat Final   • Norbuprenorphine 09/06/2022 322  ng/mg creat Final   • Level of Detection: 09/06/2022 Comment   Final    Testing Threshold: buprenorphine, 1.0 ng/mL                     norbuprenorphine, 5 ng/mL                                                                       '  This test was developed and its performance characteristics  determined by [employer].  It has not been cleared or approved  by the Food and Drug Administration.       Mental Status Exam  Hygiene:  good  Dress: casual  Attitude: cooperative and agreeable   Motor Activity: appropriate  Eye Contact:  good  Speech: regular rate and rhythm   Mood:  calm and cooperative  Affect:  Appropriate  Thought Processes:  Linear  Thought Content:  Normal  Suicidal Thoughts:  denies  Homicidal Thoughts:  denies  Crisis Safety Plan: Safety plan has been  discussed.   Hallucinations: Unknown to clinician.   Reliability: fair  Insight: fair  Judgement: fair  Impulse Control: fair    Recovery/spiritual support group attendance: No     Progress toward goal: Not at goal    Prognosis: Fair with Ongoing Treatment     Self-reported number of days sober: Patient reported May during check in.     Patient will contact this office, call 911 or present to the nearest emergency room should suicidal or homicidal ideations occur.    Impression/Formulation:  No diagnosis found.    Clinical Maneuvering/Interventions: Therapist utilized a person-centered approach to build rapport with group member. Therapist implemented motivational interviewing techniques to assist client with exploring and resolving ambivalence associated with commitment to change behaviors related to substance use and addiction. Therapist applied cognitive behavioral strategies to facilitate identification of maladaptive patterns of thinking and behavior that contribute to client's risk for continued substance use and relapse. Therapist employed group interaction activities to build rapport among group members, promote sobriety, and emphasize relapse prevention. Therapist promoted safe nonjudgmental environment by providing group members with unconditional positive regard and encouraging group members to comply with group rules and guidelines. Therapist assisted group member with identifying and implementing healthier coping strategies.      Plan:  Continue [facility] IOP Phase II  Aftercare:  [facility] Phase III  Program Assignments:  Personal recovery plan, relapse prevention plan, attendance of recovery support group meetings, exploration of sponsorship, drug/alcohol screens.     Duy Garcia LCSW  9/20/2022  13:23 EDT

## 2022-09-22 ENCOUNTER — LAB (OUTPATIENT)
Dept: LAB | Facility: HOSPITAL | Age: 42
End: 2022-09-22

## 2022-09-22 ENCOUNTER — TELEMEDICINE (OUTPATIENT)
Dept: PSYCHIATRY | Facility: CLINIC | Age: 42
End: 2022-09-22

## 2022-09-22 DIAGNOSIS — F11.20 OPIOID USE DISORDER, SEVERE, DEPENDENCE: ICD-10-CM

## 2022-09-22 PROCEDURE — G0480 DRUG TEST DEF 1-7 CLASSES: HCPCS

## 2022-09-22 PROCEDURE — 80307 DRUG TEST PRSMV CHEM ANLYZR: CPT

## 2022-09-23 LAB — ETHANOL UR-MCNC: NEGATIVE %

## 2022-09-25 LAB
ACCEPTABLE CREAT UR QL: 143 MG/DL
NALOXONE UR CFM-MCNC: 109 NG/ML
NORBUP/BUP RATIO: 4.51 RATIO
NORBUPRENORPHINE UR CFM-MCNC: 293 NG/MG CREAT
NORBUPRENORPHINE/CREAT UR: 65 NG/MG CREAT
TRP-LINK: NORMAL

## 2022-09-26 ENCOUNTER — TELEMEDICINE (OUTPATIENT)
Dept: PSYCHIATRY | Facility: CLINIC | Age: 42
End: 2022-09-26

## 2022-09-28 ENCOUNTER — LAB (OUTPATIENT)
Dept: LAB | Facility: HOSPITAL | Age: 42
End: 2022-09-28

## 2022-09-28 DIAGNOSIS — F11.20 OPIOID USE DISORDER, SEVERE, DEPENDENCE: ICD-10-CM

## 2022-09-28 PROCEDURE — G0480 DRUG TEST DEF 1-7 CLASSES: HCPCS

## 2022-09-28 PROCEDURE — 80307 DRUG TEST PRSMV CHEM ANLYZR: CPT

## 2022-09-29 ENCOUNTER — TELEMEDICINE (OUTPATIENT)
Dept: PSYCHIATRY | Facility: CLINIC | Age: 42
End: 2022-09-29

## 2022-09-29 LAB — ETHANOL UR-MCNC: NEGATIVE %

## 2022-10-01 LAB
ACCEPTABLE CREAT UR QL: 178 MG/DL
NALOXONE UR CFM-MCNC: 269 NG/ML
NORBUP/BUP RATIO: 4.99 RATIO
NORBUPRENORPHINE UR CFM-MCNC: 431 NG/MG CREAT
NORBUPRENORPHINE/CREAT UR: 87 NG/MG CREAT
TRP-LINK: NORMAL

## 2022-10-04 ENCOUNTER — LAB (OUTPATIENT)
Dept: LAB | Facility: HOSPITAL | Age: 42
End: 2022-10-04

## 2022-10-04 ENCOUNTER — OFFICE VISIT (OUTPATIENT)
Dept: PSYCHIATRY | Facility: CLINIC | Age: 42
End: 2022-10-04

## 2022-10-04 VITALS
HEIGHT: 65 IN | HEART RATE: 86 BPM | BODY MASS INDEX: 42.24 KG/M2 | DIASTOLIC BLOOD PRESSURE: 80 MMHG | WEIGHT: 253.5 LBS | SYSTOLIC BLOOD PRESSURE: 118 MMHG

## 2022-10-04 DIAGNOSIS — F11.20 OPIOID USE DISORDER, SEVERE, DEPENDENCE: ICD-10-CM

## 2022-10-04 DIAGNOSIS — F11.20 OPIOID USE DISORDER, SEVERE, ON MAINTENANCE THERAPY, DEPENDENCE: ICD-10-CM

## 2022-10-04 LAB
ALBUMIN SERPL-MCNC: 4.2 G/DL (ref 3.5–5.2)
ALBUMIN/GLOB SERPL: 1.4 G/DL
ALP SERPL-CCNC: 114 U/L (ref 39–117)
ALT SERPL W P-5'-P-CCNC: 33 U/L (ref 1–41)
AMPHET+METHAMPHET UR QL: NEGATIVE
AMPHETAMINES UR QL: NEGATIVE
ANION GAP SERPL CALCULATED.3IONS-SCNC: 10.1 MMOL/L (ref 5–15)
AST SERPL-CCNC: 29 U/L (ref 1–40)
BARBITURATES UR QL SCN: NEGATIVE
BENZODIAZ UR QL SCN: NEGATIVE
BILIRUB SERPL-MCNC: 0.5 MG/DL (ref 0–1.2)
BUN SERPL-MCNC: 8 MG/DL (ref 6–20)
BUN/CREAT SERPL: 7.5 (ref 7–25)
BUPRENORPHINE SERPL-MCNC: POSITIVE NG/ML
CALCIUM SPEC-SCNC: 9.6 MG/DL (ref 8.6–10.5)
CANNABINOIDS SERPL QL: NEGATIVE
CHLORIDE SERPL-SCNC: 104 MMOL/L (ref 98–107)
CO2 SERPL-SCNC: 27.9 MMOL/L (ref 22–29)
COCAINE UR QL: NEGATIVE
CREAT SERPL-MCNC: 1.06 MG/DL (ref 0.76–1.27)
EGFRCR SERPLBLD CKD-EPI 2021: 89.3 ML/MIN/1.73
GLOBULIN UR ELPH-MCNC: 3 GM/DL
GLUCOSE SERPL-MCNC: 106 MG/DL (ref 65–99)
METHADONE UR QL SCN: NEGATIVE
OPIATES UR QL: NEGATIVE
OXYCODONE UR QL SCN: NEGATIVE
PCP UR QL SCN: NEGATIVE
POTASSIUM SERPL-SCNC: 3.9 MMOL/L (ref 3.5–5.2)
PROPOXYPH UR QL: NEGATIVE
PROT SERPL-MCNC: 7.2 G/DL (ref 6–8.5)
SODIUM SERPL-SCNC: 142 MMOL/L (ref 136–145)
TRICYCLICS UR QL SCN: NEGATIVE

## 2022-10-04 PROCEDURE — 80307 DRUG TEST PRSMV CHEM ANLYZR: CPT

## 2022-10-04 PROCEDURE — 80053 COMPREHEN METABOLIC PANEL: CPT | Performed by: NURSE PRACTITIONER

## 2022-10-04 PROCEDURE — G0480 DRUG TEST DEF 1-7 CLASSES: HCPCS

## 2022-10-04 PROCEDURE — 36415 COLL VENOUS BLD VENIPUNCTURE: CPT | Performed by: NURSE PRACTITIONER

## 2022-10-04 PROCEDURE — 99213 OFFICE O/P EST LOW 20 MIN: CPT | Performed by: NURSE PRACTITIONER

## 2022-10-04 RX ORDER — BUPRENORPHINE HYDROCHLORIDE AND NALOXONE HYDROCHLORIDE DIHYDRATE 8; 2 MG/1; MG/1
2 TABLET SUBLINGUAL DAILY
Qty: 56 TABLET | Refills: 0 | Status: SHIPPED | OUTPATIENT
Start: 2022-10-04 | End: 2022-11-01 | Stop reason: SDUPTHER

## 2022-10-04 RX ORDER — RIZATRIPTAN BENZOATE 10 MG/1
TABLET ORAL
COMMUNITY
Start: 2022-08-30 | End: 2022-11-01

## 2022-10-04 NOTE — PROGRESS NOTES
Office  Follow Up Visit      Patient Name: Aroldo Cai  : 1980   MRN: [id number]     Referring Provider: Gerald Dobson MD    Chief Complaint: Substance use    History of Present Illness:   Aroldo Cai is a 42 y.o. 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 Taking buprenorphine/naltrexone 16/4mg daily and is tolerating well with no adverse effects. Denies any recurrence of illicit substance use since last follow up. Mother of his youngest son recently took her life and this has been caused increase in cravings but he is handling well. Continues in IOP and will start Phase 3 soon. Intends to follow up with individual CBT with Idalia Boles. No other complaints today.    Triggers: Stress, anxiety    Cravings: 2-3 daily    Relapse Prevention: IOP, MOUD, peer support     Urine Drug Screen (today's visit) discussed: Positive buprenorphine    UDS Confirmation: 2022 Positive buprenorphine, nor-buprenorphine, TCA    QIANA (PDMP) Reviewed for Current/Active Medications: buprenorphine/naloxone as expected    Past Surgical History:  Past Surgical History:   Procedure Laterality Date   • ABDOMINAL SURGERY     • BRAIN SURGERY      craniotomy,  right frontal lobe AVM   • CHOLECYSTECTOMY WITH INTRAOPERATIVE CHOLANGIOGRAM N/A 2021    Procedure: CHOLECYSTECTOMY LAPAROSCOPIC INTRAOPERATIVE CHOLANGIOGRAPHY;  Surgeon: Hardik Blancas MD;  Location: [facility] OR;  Service: General;  Laterality: N/A;   • COLONOSCOPY     • ENDOSCOPY N/A 10/22/2021    Procedure: ESOPHAGOGASTRODUODENOSCOPY WITH BIOPSY;  Surgeon: Hardik Blancas MD;  Location: [facility] ENDOSCOPY;  Service: Gastroenterology;  Laterality: N/A;   • HERNIA REPAIR Right     inguinal   • HIP SURGERY Bilateral    • HIP SURGERY Left        Problem List:  Patient Active Problem List   Diagnosis   • Calculus of gallbladder without cholecystitis without obstruction   • Right upper quadrant abdominal pain   • Nausea and vomiting   •  Opioid use disorder, severe, dependence (HCC)       Allergy:   Allergies   Allergen Reactions   • Dilantin [Phenytoin] Seizure   • Penicillins Anaphylaxis   • Phenobarbital Seizure   • Tegretol [Carbamazepine] Seizure   • Morphine Nausea And Vomiting   • Latex Rash        Current Medications:   Current Outpatient Medications   Medication Sig Dispense Refill   • allopurinol (ZYLOPRIM) 100 MG tablet Take 100 mg by mouth Daily.     • buprenorphine-naloxone (SUBOXONE) 8-2 MG per SL tablet Place 2 tablets under the tongue Daily for 28 days. 56 tablet 0   • cloNIDine (Catapres) 0.2 MG tablet Take 1 tablet by mouth Every Night. 30 tablet 2   • colchicine 0.6 MG tablet Take 0.6 mg by mouth Daily.     • cyclobenzaprine (FLEXERIL) 10 MG tablet Take 10 mg by mouth every night at bedtime.     • ibuprofen (ADVIL,MOTRIN) 800 MG tablet      • levETIRAcetam (KEPPRA) 500 MG tablet      • levETIRAcetam (KEPPRA) 750 MG tablet Take 750 mg by mouth 2 (Two) Times a Day.     • metoprolol succinate XL (TOPROL-XL) 25 MG 24 hr tablet      • OLANZapine (zyPREXA) 5 MG tablet Take 1 tablet by mouth Every Night. 30 tablet 2   • omeprazole (priLOSEC) 40 MG capsule      • ondansetron ODT (ZOFRAN-ODT) 8 MG disintegrating tablet Every 8 (Eight) Hours As Needed.     • predniSONE (DELTASONE) 10 MG (48) dose pack Take daily 48 tablet 0   • promethazine-dextromethorphan (PROMETHAZINE-DM) 6.25-15 MG/5ML syrup TAKE 10 MLS BY MOUTH EVERY SIX HOURS AS NEEDED FOR COUGH     • SUMAtriptan (IMITREX) 50 MG tablet take 1 tablet by mouth at onset of MIGRAINE may repeat in 2 hours if needed max of 2 tablets in 24 hours     • tiZANidine (ZANAFLEX) 4 MG tablet Take 4 mg by mouth 2 (Two) Times a Day.     • traZODone (DESYREL) 100 MG tablet Take 1 tablet by mouth Every Night for 90 days. 30 tablet 2   • venlafaxine (EFFEXOR) 100 MG tablet Take 2.5 tablets by mouth Daily. 75 tablet 2   • rizatriptan (MAXALT) 10 MG tablet TAKE 1 TABLET BY MOUTH 1 TIME IF NEEDED FOR  MIGRAINE FOR UP TO 9 DOSES. MAY REPEAT IN 2 HOURS IF UNRESOLVED. DO NOT EXCEED 30 MG IN 24 HOURS       No current facility-administered medications for this visit.       Past Medical History:  Past Medical History:   Diagnosis Date   • Anesthesia complication     pt reports he is hard to wake after anesthesia   • Anxiety    • Arthritis    • Bipolar 1 disorder (HCC)    • COVID-19 02/2021   • Depression    • Dysphagia     food and liquids.   • Extensive tattoos    • Gout    • Hepatitis C 2018    Patient took medication    • History of echocardiogram    • Kidney stones    • Pneumonia    • PTSD (post-traumatic stress disorder)    • Seizure (HCC)     last seizure years ago.   • Seizures (HCC)    • Short-term memory loss    • Wears contact lenses    • Wears glasses        Social History:  Social History     Socioeconomic History   • Marital status:    Tobacco Use   • Smoking status: Current Every Day Smoker     Packs/day: 0.50     Years: 30.00     Pack years: 15.00     Types: Cigarettes   • Smokeless tobacco: Never Used   Vaping Use   • Vaping Use: Never used   Substance and Sexual Activity   • Alcohol use: Not Currently   • Drug use: Not Currently     Types: Cocaine(coke)     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   • Sexual activity: Defer       Family History:  Family History   Problem Relation Age of Onset   • Diabetes Mother    • Diabetes insipidus Mother    • Heart disease Mother    • Cancer Father         skin   • Diabetes Father    • Hypertension Father    • Arthritis Father    • Diabetes insipidus Father          Subjective      Review of Systems:   Review of Systems   Constitutional: Positive for fatigue. Negative for chills and fever.   Respiratory: Negative for shortness of breath.    Cardiovascular: Negative for chest pain.   Gastrointestinal: Negative for abdominal pain.   Skin: Negative for skin lesions.   Neurological: Negative for seizures and confusion.   Psychiatric/Behavioral: Positive for  depressed mood and stress. Negative for hallucinations, sleep disturbance and suicidal ideas. The patient is not nervous/anxious.        PHQ-9 Total Score: 15    VINICIUS-7 Score:   Feeling nervous, anxious or on edge: Nearly every day  Not being able to stop or control worrying: Nearly every day  Worrying too much about different things: Nearly every day  Trouble Relaxing: Nearly every day  Being so restless that it is hard to sit still: More than half the days  Feeling afraid as if something awful might happen: Not at all  Becoming easily annoyed or irritable: Nearly every day  VINICIUS 7 Total Score: 17  If you checked any problems, how difficult have these problems made it for you to do your work, take care of things at home, or get along with other people: Not difficult at all    Patient History:   The following portions of the patient's history were reviewed and updated as appropriate: allergies, current medications, past family history, past medical history, past social history, past surgical history and problem list.     Social:  Social History     Socioeconomic History   • Marital status:    Tobacco Use   • Smoking status: Current Every Day Smoker     Packs/day: 0.50     Years: 30.00     Pack years: 15.00     Types: Cigarettes   • Smokeless tobacco: Never Used   Vaping Use   • Vaping Use: Never used   Substance and Sexual Activity   • Alcohol use: Not Currently   • Drug use: Not Currently     Types: Cocaine(coke)     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   • Sexual activity: Defer       Medications:     Current Outpatient Medications:   •  allopurinol (ZYLOPRIM) 100 MG tablet, Take 100 mg by mouth Daily., Disp: , Rfl:   •  buprenorphine-naloxone (SUBOXONE) 8-2 MG per SL tablet, Place 2 tablets under the tongue Daily for 28 days., Disp: 56 tablet, Rfl: 0  •  cloNIDine (Catapres) 0.2 MG tablet, Take 1 tablet by mouth Every Night., Disp: 30 tablet, Rfl: 2  •  colchicine 0.6 MG tablet, Take 0.6 mg by mouth  Daily., Disp: , Rfl:   •  cyclobenzaprine (FLEXERIL) 10 MG tablet, Take 10 mg by mouth every night at bedtime., Disp: , Rfl:   •  ibuprofen (ADVIL,MOTRIN) 800 MG tablet, , Disp: , Rfl:   •  levETIRAcetam (KEPPRA) 500 MG tablet, , Disp: , Rfl:   •  levETIRAcetam (KEPPRA) 750 MG tablet, Take 750 mg by mouth 2 (Two) Times a Day., Disp: , Rfl:   •  metoprolol succinate XL (TOPROL-XL) 25 MG 24 hr tablet, , Disp: , Rfl:   •  OLANZapine (zyPREXA) 5 MG tablet, Take 1 tablet by mouth Every Night., Disp: 30 tablet, Rfl: 2  •  omeprazole (priLOSEC) 40 MG capsule, , Disp: , Rfl:   •  ondansetron ODT (ZOFRAN-ODT) 8 MG disintegrating tablet, Every 8 (Eight) Hours As Needed., Disp: , Rfl:   •  predniSONE (DELTASONE) 10 MG (48) dose pack, Take daily, Disp: 48 tablet, Rfl: 0  •  promethazine-dextromethorphan (PROMETHAZINE-DM) 6.25-15 MG/5ML syrup, TAKE 10 MLS BY MOUTH EVERY SIX HOURS AS NEEDED FOR COUGH, Disp: , Rfl:   •  SUMAtriptan (IMITREX) 50 MG tablet, take 1 tablet by mouth at onset of MIGRAINE may repeat in 2 hours if needed max of 2 tablets in 24 hours, Disp: , Rfl:   •  tiZANidine (ZANAFLEX) 4 MG tablet, Take 4 mg by mouth 2 (Two) Times a Day., Disp: , Rfl:   •  traZODone (DESYREL) 100 MG tablet, Take 1 tablet by mouth Every Night for 90 days., Disp: 30 tablet, Rfl: 2  •  venlafaxine (EFFEXOR) 100 MG tablet, Take 2.5 tablets by mouth Daily., Disp: 75 tablet, Rfl: 2  •  rizatriptan (MAXALT) 10 MG tablet, TAKE 1 TABLET BY MOUTH 1 TIME IF NEEDED FOR MIGRAINE FOR UP TO 9 DOSES. MAY REPEAT IN 2 HOURS IF UNRESOLVED. DO NOT EXCEED 30 MG IN 24 HOURS, Disp: , Rfl:     Objective     Physical Exam:  Physical Exam  Vitals reviewed.   Constitutional:       General: He is not in acute distress.     Appearance: He is well-developed. He is not ill-appearing.   Pulmonary:      Effort: No respiratory distress.   Skin:     Coloration: Skin is not jaundiced.   Neurological:      Mental Status: He is alert and oriented to person, place, and  "time.      Gait: Gait normal.   Psychiatric:         Speech: Speech normal.         Behavior: Behavior normal.         Thought Content: Thought content normal. Thought content does not include homicidal or suicidal ideation. Thought content does not include homicidal or suicidal plan.         Vital Signs:   Vitals:    10/04/22 1334   BP: 118/80   Pulse: 86   Weight: 115 kg (253 lb 8 oz)   Height: 165.1 cm (65\")     Body mass index is 42.18 kg/m².     Mental Status Exam:   Hygiene:   good  Cooperation:  Cooperative  Eye Contact:  Good  Psychomotor Behavior:  Appropriate  Affect:  Full range  Mood: normal  Speech:  Normal  Thought Process:  Goal directed  Thought Content:  Normal  Suicidal:  None  Homicidal:  None  Hallucinations:  None  Delusion:  None  Memory:  Intact  Orientation:  Person, Place, Time and Situation  Reliability:  good  Insight:  Good  Judgement:  Good  Impulse Control:  Good    Assessment / Plan      Assessment & Plan   -Continue buprenorphine/naloxone 16-4 mg daily.  -Will have RTC dose at next visit +0 remaining  -Advisement to take part in and remain active in 12 Step Recovery Meetings, IOP, and/or 1:1 therapy/counseling and to establish/maintain an active relationship with a recovery sponsor.   -Continued monitoring for illicit substances for patient safety, medication compliance and future care.  -Continue IOP     Visit Diagnoses     Opioid use disorder, severe, on maintenance therapy, dependence (HCC)        Relevant Medications    buprenorphine-naloxone (SUBOXONE) 8-2 MG per SL tablet    Other Relevant Orders    Comprehensive Metabolic Panel (Completed)      Diagnoses       Codes Comments    Opioid use disorder, severe, on maintenance therapy, dependence (HCC)     ICD-10-CM: F11.20  ICD-9-CM: 304.00             PLAN:  1. Safety: No acute safety concerns  2. Risk Assessment: Risk of self-harm acutely is low. Risk of self-harm chronically is also low, but could be further elevated in the event " of treatment noncompliance and/or AODA.      TREATMENT PLAN/GOALS: Continue supportive psychotherapy efforts and medications as indicated. Treatment and medication options discussed during today's visit. Patient acknowledged and verbally consented to continue with current treatment plan and was educated on the importance of compliance with treatment and follow-up appointments.      MEDICATION ISSUES:  QIANA reviewed as expected.  Discussed medication options and treatment plan of prescribed medication as well as the risks, benefits, and side effects including potential falls, possible impaired driving and metabolic adversities among others. Patient is agreeable to call the office with any worsening of symptoms or onset of side effects. Patient is agreeable to call 911 or go to the nearest ER should he/she begin having SI/HI. No medication side effects or related complaints today.     MEDS ORDERED DURING VISIT:  New Medications Ordered This Visit   Medications   • buprenorphine-naloxone (SUBOXONE) 8-2 MG per SL tablet     Sig: Place 2 tablets under the tongue Daily for 28 days.     Dispense:  56 tablet     Refill:  0     [id number]       Return in 4 weeks (on 11/1/2022) for Follow Up Medication.           This document has been electronically signed by PRISCILLA Wen  October 5, 2022 13:57 EDT      Part of this note may be an electronic transcription/translation of spoken language to printed text using the Dragon Dictation System.

## 2022-10-06 LAB — ETHANOL UR-MCNC: NEGATIVE %

## 2022-10-08 LAB
ACCEPTABLE CREAT UR QL: 137 MG/DL
NALOXONE UR CFM-MCNC: 118 NG/ML
NORBUP/BUP RATIO: 4.96 RATIO
NORBUPRENORPHINE UR CFM-MCNC: 170 NG/MG CREAT
NORBUPRENORPHINE/CREAT UR: 34 NG/MG CREAT
TRP-LINK: NORMAL

## 2022-10-10 NOTE — PROGRESS NOTES
[facility] IOP GROUP     Date: 09/22/2022  Name: Aroldo Cai    Time In: 1530   Time Out: 1630    This provider is located at [facility] located at [address] [place] [address], [address] [place] [id number].  The Patient is seen remotely at his/her home, using Zoom. Patient is being seen via telehealth and confirm that they are in a secure environment for this session. The patient's condition being diagnosed/treated is appropriate for telemedicine. The provider identified himself as well as his credentials. The patient gave consent to be seen remotely, and when consent is given they understand that the consent allows for patient identifiable information to be sent to a third party as needed. They may refuse to be seen remotely at any time. The electronic data is encrypted and password protected, and the patient has been advised of the potential risks to privacy not withstanding such measures.      Number of participants: 12    IOP GROUP NOTE     Data: 1 hour IOP group therapy session (Check-ins, Coping Skills, Relapse Prevention)     Check Ins: Therapist continued facilitation of rapport building strategies between group members. Therapist asked that each patient check in with home life and recovery efforts and identify triggers, cravings, and high risk situations that arise between group sessions. Therapist provided empathy and support during group session.     Session Content/Coping Skills: Check ins completed by group members. Clinician explored with group members what they felt would be helpful topics to explore during upcoming sessions. Clinician discussed aftercare with group members. Clinician explored with group members rewarding yourself in recovery.     Response: Patient attended class via Zoom. Patient participated in verbal completion of check in form.   Patient during check in denied suicidal or homicidal thoughts. Patient participated in group discussions.      Personal Assessment 0-10 Scale (10  worst)    Anxiety:  3   Depression:  0   Cravings: 5     Assessment:     ..  Lab on 09/22/2022   Component Date Value Ref Range Status   • Ethanol, Urine 09/22/2022 Negative  Cutoff=0.020 % Final   • THC, Screen, Urine 09/22/2022 Negative  Negative Final   • Phencyclidine (PCP), Urine 09/22/2022 Negative  Negative Final   • Cocaine Screen, Urine 09/22/2022 Negative  Negative Final   • Methamphetamine, Ur 09/22/2022 Negative  Negative Final   • Opiate Screen 09/22/2022 Negative  Negative Final   • Amphetamine Screen, Urine 09/22/2022 Negative  Negative Final   • Benzodiazepine Screen, Urine 09/22/2022 Negative  Negative Final   • Tricyclic Antidepressants Screen 09/22/2022 Negative  Negative Final   • Methadone Screen, Urine 09/22/2022 Negative  Negative Final   • Barbiturates Screen, Urine 09/22/2022 Negative  Negative Final   • Oxycodone Screen, Urine 09/22/2022 Negative  Negative Final   • Propoxyphene Screen 09/22/2022 Negative  Negative Final   • Buprenorphine, Screen, Urine 09/22/2022 Positive (A)  Negative Final   • Creatinine, Urine 09/22/2022 143  mg/dL Final    Testing Threshold: buprenorphine, 1.0 ng/mL                     norbuprenorphine, 5.0 ng/mL                     naloxone, 10 ng/mL  This test was developed and its performance characteristics  determined by [employer].  It has not been cleared or approved  by the Food and Drug Administration.  REFERENCE RANGE: Ref Range>=20   • Buprenorphine/CR 09/22/2022 65  ng/mg creat Final   • NORBUPRENORPHINE/CR 09/22/2022 293  ng/mg creat Final   • NORBUP/BUP RATIO 09/22/2022 4.51  >=0.3 RATIO Final   • Naloxone 09/22/2022 109  ng/mL Final   • TRP-LINK 09/22/2022 Comment   Final    These test results were not transmitted to [facility].  If you would like your patient's urine drug  test results to transmit to [facility], please  contact your [employer]  to complete  a physician authorization form.   Lab on 09/06/2022    Component Date Value Ref Range Status   • Ethanol, Urine 09/06/2022 Negative  Cutoff=0.020 % Final   • THC, Screen, Urine 09/06/2022 Negative  Negative Final   • Phencyclidine (PCP), Urine 09/06/2022 Negative  Negative Final   • Cocaine Screen, Urine 09/06/2022 Negative  Negative Final   • Methamphetamine, Ur 09/06/2022 Negative  Negative Final   • Opiate Screen 09/06/2022 Negative  Negative Final   • Amphetamine Screen, Urine 09/06/2022 Negative  Negative Final   • Benzodiazepine Screen, Urine 09/06/2022 Negative  Negative Final   • Tricyclic Antidepressants Screen 09/06/2022 Positive (A)  Negative Final   • Methadone Screen, Urine 09/06/2022 Negative  Negative Final   • Barbiturates Screen, Urine 09/06/2022 Negative  Negative Final   • Oxycodone Screen, Urine 09/06/2022 Negative  Negative Final   • Propoxyphene Screen 09/06/2022 Negative  Negative Final   • Buprenorphine, Screen, Urine 09/06/2022 Positive (A)  Negative Final   • Creatinine, Urine 09/06/2022 162.0  >=20 mg/dL Final   • Buprenorphine/CR 09/06/2022 85  ng/mg creat Final   • NORBUPRENORPHINE/CR 09/06/2022 255  ng/mg creat Final   • NORBUP/BUP RATIO 09/06/2022 3.00  RATIO Final    This test was developed and its performance characteristics  determined by [employer].  It has not been cleared or approved  by the Food and Drug Administration.   • Naloxone 09/06/2022 365  Cutoff=25 ng/ml Final   • TRP-LINK 09/06/2022 Comment   Final    These test results were not transmitted to [facility].  If you would like your patient's urine drug  test results to transmit to [facility], please  contact your [employer]  to complete  a physician authorization form.   • Antidepressants 09/06/2022 Negative   Final   • AMITRIPTYLINE, UR 09/06/2022 Not Detected   Final   • Clomipramine, Ur 09/06/2022 Not Detected   Final   • Desemethylclomipramine 09/06/2022 Not Detected   Final   • Desipramine 09/06/2022 Not Detected   Final   •  Doxepin 09/06/2022 Not Detected   Final   • Desmethyldoxepin, Ur 09/06/2022 Not Detected   Final   • Imipramine 09/06/2022 Not Detected   Final   • Nortriptyline 09/06/2022 Not Detected   Final   • Protriptyline 09/06/2022 Not Detected   Final   • Trimipramine 09/06/2022 Not Detected   Final   • Level of Detection: 09/06/2022 Comment   Final    Testing Threshold:  50 or 100 ng/mL                                                                       '  This test was developed and its performance characteristics  determined by [employer].  It has not been cleared or approved  by the Food and Drug Administration.   • BUPRENORPHINE 09/06/2022 +POSITIVE+   Final   • Buprenorphine, Urine 09/06/2022 87  ng/mg creat Final   • Norbuprenorphine 09/06/2022 322  ng/mg creat Final   • Level of Detection: 09/06/2022 Comment   Final    Testing Threshold: buprenorphine, 1.0 ng/mL                     norbuprenorphine, 5 ng/mL                                                                       '  This test was developed and its performance characteristics  determined by [employer].  It has not been cleared or approved  by the Food and Drug Administration.       Mental Status Exam  Hygiene:  good  Dress: casual  Attitude: cooperative and agreeable   Motor Activity: appropriate  Eye Contact:  good  Speech: regular rate and rhythm   Mood:  calm and cooperative  Affect:  Appropriate  Thought Processes:  Linear  Thought Content:  Normal  Suicidal Thoughts:  denies  Homicidal Thoughts:  denies  Crisis Safety Plan: Safety plan has been discussed.   Hallucinations: Unknown to clinician.   Reliability: fair  Insight: fair  Judgement: fair  Impulse Control: fair    Recovery/spiritual support group attendance: No     Progress toward goal: Not at goal    Prognosis: Fair with Ongoing Treatment     Self-reported number of days sober: Patient reported May 26th during check in.     Patient will contact this office, call 911 or present to the nearest  emergency room should suicidal or homicidal ideations occur.    Impression/Formulation:  No diagnosis found.    Clinical Maneuvering/Interventions: Therapist utilized a person-centered approach to build rapport with group member. Therapist implemented motivational interviewing techniques to assist client with exploring and resolving ambivalence associated with commitment to change behaviors related to substance use and addiction. Therapist applied cognitive behavioral strategies to facilitate identification of maladaptive patterns of thinking and behavior that contribute to client's risk for continued substance use and relapse. Therapist employed group interaction activities to build rapport among group members, promote sobriety, and emphasize relapse prevention. Therapist promoted safe nonjudgmental environment by providing group members with unconditional positive regard and encouraging group members to comply with group rules and guidelines. Therapist assisted group member with identifying and implementing healthier coping strategies.      Plan:  Continue [facility] IOP Phase II  Aftercare:  [facility] Phase III  Program Assignments:  Personal recovery plan, relapse prevention plan, attendance of recovery support group meetings, exploration of sponsorship, drug/alcohol screens.     Duy Garcia LCSW  10/10/2022  14:08 EDT

## 2022-10-12 LAB
BUPRENORPHINE UR QL CFM: NORMAL
BUPRENORPHINE/CREAT UR: 28 NG/MG CREAT
LEVEL OF DETECTION:: NORMAL
NORBUPRENORPHINE/CREAT UR: 130 NG/MG CREAT

## 2022-10-12 NOTE — PROGRESS NOTES
CD IOP GROUP     Date: 09/29/2022  Name: Aroldo Cai    Time In: 1530   Time Out: 1630    This provider is located at [facility] located at [address] [place] [address], [address] [place] [id number].  The Patient is seen remotely at his/her home, using Zoom. Patient is being seen via telehealth and confirm that they are in a secure environment for this session. The patient's condition being diagnosed/treated is appropriate for telemedicine. The provider identified himself as well as his credentials. The patient gave consent to be seen remotely, and when consent is given they understand that the consent allows for patient identifiable information to be sent to a third party as needed. They may refuse to be seen remotely at any time. The electronic data is encrypted and password protected, and the patient has been advised of the potential risks to privacy not withstanding such measures.      Number of participants: 12    IOP GROUP NOTE     Data: 1 hour IOP group therapy session (Check-ins, Coping Skills, Relapse Prevention)     Check Ins: Therapist continued facilitation of rapport building strategies between group members. Therapist asked that each patient check in with home life and recovery efforts and identify triggers, cravings, and high risk situations that arise between group sessions. Therapist provided empathy and support during group session.     Session Content/Coping Skills: Check ins completed by group members. Breaking the Cycle of Broken Promises psychoeducational material reviewed and discussed ([email or web address]).      Response: Patient attended class via Zoom. Patient participated in verbal completion of check in form.   Patient during check in denied suicidal or homicidal thoughts. Patient reported trouble sleeping during check in. Patient participated in group discussions.      Personal Assessment 0-10 Scale (10 worst)    Anxiety:  5   Depression:  3   Cravings: 6     Assessment:      ..  Lab on 09/28/2022   Component Date Value Ref Range Status   • Ethanol, Urine 09/28/2022 Negative  Cutoff=0.020 % Final   • THC, Screen, Urine 09/28/2022 Negative  Negative Final   • Phencyclidine (PCP), Urine 09/28/2022 Negative  Negative Final   • Cocaine Screen, Urine 09/28/2022 Negative  Negative Final   • Methamphetamine, Ur 09/28/2022 Negative  Negative Final   • Opiate Screen 09/28/2022 Negative  Negative Final   • Amphetamine Screen, Urine 09/28/2022 Negative  Negative Final   • Benzodiazepine Screen, Urine 09/28/2022 Negative  Negative Final   • Tricyclic Antidepressants Screen 09/28/2022 Positive (A)  Negative Final   • Methadone Screen, Urine 09/28/2022 Negative  Negative Final   • Barbiturates Screen, Urine 09/28/2022 Negative  Negative Final   • Oxycodone Screen, Urine 09/28/2022 Negative  Negative Final   • Propoxyphene Screen 09/28/2022 Negative  Negative Final   • Buprenorphine, Screen, Urine 09/28/2022 Positive (A)  Negative Final   • Creatinine, Urine 09/28/2022 178  mg/dL Final    Testing Threshold: buprenorphine, 1.0 ng/mL                     norbuprenorphine, 5.0 ng/mL                     naloxone, 10 ng/mL  This test was developed and its performance characteristics  determined by [employer].  It has not been cleared or approved  by the Food and Drug Administration.  REFERENCE RANGE: Ref Range>=20   • Buprenorphine/CR 09/28/2022 87  ng/mg creat Final   • NORBUPRENORPHINE/CR 09/28/2022 431  ng/mg creat Final   • NORBUP/BUP RATIO 09/28/2022 4.99  >=0.3 RATIO Final   • Naloxone 09/28/2022 269  ng/mL Final   • TRP-LINK 09/28/2022 Comment   Final    These test results were not transmitted to [facility].  If you would like your patient's urine drug  test results to transmit to [facility], please  contact your [employer]  to complete  a physician authorization form.   • Antidepressants 09/28/2022 Negative   Final   • AMITRIPTYLINE, UR 09/28/2022 Not Detected    Final   • Clomipramine, Ur 09/28/2022 Not Detected   Final   • Desemethylclomipramine 09/28/2022 Not Detected   Final   • Desipramine 09/28/2022 Not Detected   Final   • Doxepin 09/28/2022 Not Detected   Final   • Desmethyldoxepin, Ur 09/28/2022 Not Detected   Final   • Imipramine 09/28/2022 Not Detected   Final   • Nortriptyline 09/28/2022 Not Detected   Final   • Protriptyline 09/28/2022 Not Detected   Final   • Trimipramine 09/28/2022 Not Detected   Final   • Level of Detection: 09/28/2022 Comment   Final    Testing Threshold:  50 or 100 ng/mL                                                                       '  This test was developed and its performance characteristics  determined by [employer].  It has not been cleared or approved  by the Food and Drug Administration.   Lab on 09/22/2022   Component Date Value Ref Range Status   • Ethanol, Urine 09/22/2022 Negative  Cutoff=0.020 % Final   • THC, Screen, Urine 09/22/2022 Negative  Negative Final   • Phencyclidine (PCP), Urine 09/22/2022 Negative  Negative Final   • Cocaine Screen, Urine 09/22/2022 Negative  Negative Final   • Methamphetamine, Ur 09/22/2022 Negative  Negative Final   • Opiate Screen 09/22/2022 Negative  Negative Final   • Amphetamine Screen, Urine 09/22/2022 Negative  Negative Final   • Benzodiazepine Screen, Urine 09/22/2022 Negative  Negative Final   • Tricyclic Antidepressants Screen 09/22/2022 Negative  Negative Final   • Methadone Screen, Urine 09/22/2022 Negative  Negative Final   • Barbiturates Screen, Urine 09/22/2022 Negative  Negative Final   • Oxycodone Screen, Urine 09/22/2022 Negative  Negative Final   • Propoxyphene Screen 09/22/2022 Negative  Negative Final   • Buprenorphine, Screen, Urine 09/22/2022 Positive (A)  Negative Final   • Creatinine, Urine 09/22/2022 143  mg/dL Final    Testing Threshold: buprenorphine, 1.0 ng/mL                     norbuprenorphine, 5.0 ng/mL                     naloxone, 10 ng/mL  This test was  developed and its performance characteristics  determined by [employer].  It has not been cleared or approved  by the Food and Drug Administration.  REFERENCE RANGE: Ref Range>=20   • Buprenorphine/CR 09/22/2022 65  ng/mg creat Final   • NORBUPRENORPHINE/CR 09/22/2022 293  ng/mg creat Final   • NORBUP/BUP RATIO 09/22/2022 4.51  >=0.3 RATIO Final   • Naloxone 09/22/2022 109  ng/mL Final   • TRP-LINK 09/22/2022 Comment   Final    These test results were not transmitted to [facility].  If you would like your patient's urine drug  test results to transmit to [facility], please  contact your [employer]  to complete  a physician authorization form.       Mental Status Exam  Hygiene:  good  Dress: casual  Attitude: cooperative and agreeable   Motor Activity: appropriate  Eye Contact:  good  Speech: regular rate and rhythm   Mood:  calm and cooperative  Affect:  Appropriate  Thought Processes:  Linear  Thought Content:  Normal  Suicidal Thoughts:  denies  Homicidal Thoughts:  denies  Crisis Safety Plan: Safety plan has been discussed.   Hallucinations: Unknown to clinician.   Reliability: fair  Insight: fair  Judgement: fair  Impulse Control: fair    Recovery/spiritual support group attendance: No     Progress toward goal: Not at goal    Prognosis: Fair with Ongoing Treatment     Self-reported number of days sober: Patient reported May 26 during check in.     Patient will contact this office, call 911 or present to the nearest emergency room should suicidal or homicidal ideations occur.    Impression/Formulation:  No diagnosis found.    Clinical Maneuvering/Interventions: Therapist utilized a person-centered approach to build rapport with group member. Therapist implemented motivational interviewing techniques to assist client with exploring and resolving ambivalence associated with commitment to change behaviors related to substance use and addiction. Therapist applied cognitive  behavioral strategies to facilitate identification of maladaptive patterns of thinking and behavior that contribute to client's risk for continued substance use and relapse. Therapist employed group interaction activities to build rapport among group members, promote sobriety, and emphasize relapse prevention. Therapist promoted safe nonjudgmental environment by providing group members with unconditional positive regard and encouraging group members to comply with group rules and guidelines. Therapist assisted group member with identifying and implementing healthier coping strategies.      Plan:  Continue [facility] IOP Phase II  Aftercare:  [facility] Phase III  Program Assignments:  Personal recovery plan, relapse prevention plan, attendance of recovery support group meetings, exploration of sponsorship, drug/alcohol screens.     Duy Garcia LCSW  10/12/2022  10:20 EDT

## 2022-10-12 NOTE — PROGRESS NOTES
CD IOP GROUP     Date: 09/26/2022  Name: Aroldo Cai    Time In: 1530   Time Out: 1630    This provider is located at [facility] located at [address] [place] [address], [address] [place] [id number].  The Patient is seen remotely at his/her home, using Zoom. Patient is being seen via telehealth and confirm that they are in a secure environment for this session. The patient's condition being diagnosed/treated is appropriate for telemedicine. The provider identified himself as well as his credentials. The patient gave consent to be seen remotely, and when consent is given they understand that the consent allows for patient identifiable information to be sent to a third party as needed. They may refuse to be seen remotely at any time. The electronic data is encrypted and password protected, and the patient has been advised of the potential risks to privacy not withstanding such measures.      Number of participants: 12    IOP GROUP NOTE     Data: 1 hour IOP group therapy session (Check-ins, Coping Skills, Relapse Prevention)     Check Ins: Therapist continued facilitation of rapport building strategies between group members. Therapist asked that each patient check in with home life and recovery efforts and identify triggers, cravings, and high risk situations that arise between group sessions. Therapist provided empathy and support during group session.     Session Content/Coping Skills: Check ins completed by group members. Clinician processed stressors presented by group members. What are Personal Boundaries Therapist Aid psychoeducational material (Page 1) reviewed and discussed. Clinician discussed setting boundaries with group members.      Response: Patient attended class via Zoom. Patient participated in verbal completion of check in form.   Patient during check in denied suicidal or homicidal thoughts. Patient participated in group discussions.      Personal Assessment 0-10 Scale (10 worst)    Anxiety:   4   Depression:  2   Cravings: 8     Assessment:     ..  Lab on 09/22/2022   Component Date Value Ref Range Status   • Ethanol, Urine 09/22/2022 Negative  Cutoff=0.020 % Final   • THC, Screen, Urine 09/22/2022 Negative  Negative Final   • Phencyclidine (PCP), Urine 09/22/2022 Negative  Negative Final   • Cocaine Screen, Urine 09/22/2022 Negative  Negative Final   • Methamphetamine, Ur 09/22/2022 Negative  Negative Final   • Opiate Screen 09/22/2022 Negative  Negative Final   • Amphetamine Screen, Urine 09/22/2022 Negative  Negative Final   • Benzodiazepine Screen, Urine 09/22/2022 Negative  Negative Final   • Tricyclic Antidepressants Screen 09/22/2022 Negative  Negative Final   • Methadone Screen, Urine 09/22/2022 Negative  Negative Final   • Barbiturates Screen, Urine 09/22/2022 Negative  Negative Final   • Oxycodone Screen, Urine 09/22/2022 Negative  Negative Final   • Propoxyphene Screen 09/22/2022 Negative  Negative Final   • Buprenorphine, Screen, Urine 09/22/2022 Positive (A)  Negative Final   • Creatinine, Urine 09/22/2022 143  mg/dL Final    Testing Threshold: buprenorphine, 1.0 ng/mL                     norbuprenorphine, 5.0 ng/mL                     naloxone, 10 ng/mL  This test was developed and its performance characteristics  determined by [employer].  It has not been cleared or approved  by the Food and Drug Administration.  REFERENCE RANGE: Ref Range>=20   • Buprenorphine/CR 09/22/2022 65  ng/mg creat Final   • NORBUPRENORPHINE/CR 09/22/2022 293  ng/mg creat Final   • NORBUP/BUP RATIO 09/22/2022 4.51  >=0.3 RATIO Final   • Naloxone 09/22/2022 109  ng/mL Final   • TRP-LINK 09/22/2022 Comment   Final    These test results were not transmitted to [facility].  If you would like your patient's urine drug  test results to transmit to [facility], please  contact your [employer]  to complete  a physician authorization form.   Lab on 09/06/2022   Component Date Value Ref  Range Status   • Ethanol, Urine 09/06/2022 Negative  Cutoff=0.020 % Final   • THC, Screen, Urine 09/06/2022 Negative  Negative Final   • Phencyclidine (PCP), Urine 09/06/2022 Negative  Negative Final   • Cocaine Screen, Urine 09/06/2022 Negative  Negative Final   • Methamphetamine, Ur 09/06/2022 Negative  Negative Final   • Opiate Screen 09/06/2022 Negative  Negative Final   • Amphetamine Screen, Urine 09/06/2022 Negative  Negative Final   • Benzodiazepine Screen, Urine 09/06/2022 Negative  Negative Final   • Tricyclic Antidepressants Screen 09/06/2022 Positive (A)  Negative Final   • Methadone Screen, Urine 09/06/2022 Negative  Negative Final   • Barbiturates Screen, Urine 09/06/2022 Negative  Negative Final   • Oxycodone Screen, Urine 09/06/2022 Negative  Negative Final   • Propoxyphene Screen 09/06/2022 Negative  Negative Final   • Buprenorphine, Screen, Urine 09/06/2022 Positive (A)  Negative Final   • Creatinine, Urine 09/06/2022 162.0  >=20 mg/dL Final   • Buprenorphine/CR 09/06/2022 85  ng/mg creat Final   • NORBUPRENORPHINE/CR 09/06/2022 255  ng/mg creat Final   • NORBUP/BUP RATIO 09/06/2022 3.00  RATIO Final    This test was developed and its performance characteristics  determined by [employer].  It has not been cleared or approved  by the Food and Drug Administration.   • Naloxone 09/06/2022 365  Cutoff=25 ng/ml Final   • TRP-LINK 09/06/2022 Comment   Final    These test results were not transmitted to [facility].  If you would like your patient's urine drug  test results to transmit to [facility], please  contact your [employer]  to complete  a physician authorization form.   • Antidepressants 09/06/2022 Negative   Final   • AMITRIPTYLINE, UR 09/06/2022 Not Detected   Final   • Clomipramine, Ur 09/06/2022 Not Detected   Final   • Desemethylclomipramine 09/06/2022 Not Detected   Final   • Desipramine 09/06/2022 Not Detected   Final   • Doxepin 09/06/2022 Not Detected    Final   • Desmethyldoxepin, Ur 09/06/2022 Not Detected   Final   • Imipramine 09/06/2022 Not Detected   Final   • Nortriptyline 09/06/2022 Not Detected   Final   • Protriptyline 09/06/2022 Not Detected   Final   • Trimipramine 09/06/2022 Not Detected   Final   • Level of Detection: 09/06/2022 Comment   Final    Testing Threshold:  50 or 100 ng/mL                                                                       '  This test was developed and its performance characteristics  determined by [employer].  It has not been cleared or approved  by the Food and Drug Administration.   • BUPRENORPHINE 09/06/2022 +POSITIVE+   Final   • Buprenorphine, Urine 09/06/2022 87  ng/mg creat Final   • Norbuprenorphine 09/06/2022 322  ng/mg creat Final   • Level of Detection: 09/06/2022 Comment   Final    Testing Threshold: buprenorphine, 1.0 ng/mL                     norbuprenorphine, 5 ng/mL                                                                       '  This test was developed and its performance characteristics  determined by [employer].  It has not been cleared or approved  by the Food and Drug Administration.       Mental Status Exam  Hygiene:  good  Dress: casual  Attitude: cooperative and agreeable   Motor Activity: appropriate  Eye Contact:  good  Speech: regular rate and rhythm   Mood:  calm and cooperative  Affect:  Appropriate  Thought Processes:  Linear  Thought Content:  Normal  Suicidal Thoughts:  denies  Homicidal Thoughts:  denies  Crisis Safety Plan: Safety plan has been discussed.   Hallucinations: Unknown to clinician.   Reliability: fair  Insight: fair  Judgement: fair  Impulse Control: fair    Recovery/spiritual support group attendance: Unknown to clinician.      Progress toward goal: Not at goal    Prognosis: Fair with Ongoing Treatment     Self-reported number of days sober: Patient reported May 26 during check in.     Patient will contact this office, call 911 or present to the nearest emergency room  should suicidal or homicidal ideations occur.    Impression/Formulation:  No diagnosis found.    Clinical Maneuvering/Interventions: Therapist utilized a person-centered approach to build rapport with group member. Therapist implemented motivational interviewing techniques to assist client with exploring and resolving ambivalence associated with commitment to change behaviors related to substance use and addiction. Therapist applied cognitive behavioral strategies to facilitate identification of maladaptive patterns of thinking and behavior that contribute to client's risk for continued substance use and relapse. Therapist employed group interaction activities to build rapport among group members, promote sobriety, and emphasize relapse prevention. Therapist promoted safe nonjudgmental environment by providing group members with unconditional positive regard and encouraging group members to comply with group rules and guidelines. Therapist assisted group member with identifying and implementing healthier coping strategies.      Plan:  Continue [facility] IOP Phase II  Aftercare:  [facility] Phase III  Program Assignments:  Personal recovery plan, relapse prevention plan, attendance of recovery support group meetings, exploration of sponsorship, drug/alcohol screens.     Duy Garcia LCSW  10/12/2022  08:54 EDT

## 2022-10-13 ENCOUNTER — TELEMEDICINE (OUTPATIENT)
Dept: PSYCHIATRY | Facility: CLINIC | Age: 42
End: 2022-10-13

## 2022-10-13 DIAGNOSIS — F11.20 OPIOID USE DISORDER, SEVERE, ON MAINTENANCE THERAPY, DEPENDENCE: Primary | ICD-10-CM

## 2022-10-17 ENCOUNTER — LAB (OUTPATIENT)
Dept: LAB | Facility: HOSPITAL | Age: 42
End: 2022-10-17

## 2022-10-17 ENCOUNTER — TELEMEDICINE (OUTPATIENT)
Dept: PSYCHIATRY | Facility: CLINIC | Age: 42
End: 2022-10-17

## 2022-10-17 DIAGNOSIS — F11.20 OPIOID USE DISORDER, SEVERE, ON MAINTENANCE THERAPY, DEPENDENCE: Primary | ICD-10-CM

## 2022-10-17 DIAGNOSIS — F11.20 OPIOID USE DISORDER, SEVERE, DEPENDENCE: ICD-10-CM

## 2022-10-17 PROCEDURE — G0480 DRUG TEST DEF 1-7 CLASSES: HCPCS

## 2022-10-17 PROCEDURE — 80307 DRUG TEST PRSMV CHEM ANLYZR: CPT

## 2022-10-17 PROCEDURE — 90853 GROUP PSYCHOTHERAPY: CPT | Performed by: SOCIAL WORKER

## 2022-10-18 LAB — ETHANOL UR-MCNC: NEGATIVE %

## 2022-10-19 NOTE — PROGRESS NOTES
IOP PHASE II / Phase III GROUP NOTE    Name: Aroldo Cai  Date: October 19, 2022     This provider is located at [facility] located at [address] [place] [address], [address] [place] [address].  The Patient is seen remotely at his/her home, using Zoom. Patient is being seen via telehealth and confirm that they are in a secure environment for this session. The patient's condition being diagnosed/treated is appropriate for telemedicine. The provider identified himself as well as his credentials. The patient gave consent to be seen remotely, and when consent is given they understand that the consent allows for patient identifiable information to be sent to a third party as needed. They may refuse to be seen remotely at any time. The electronic data is encrypted and password protected, and the patient has been advised of the potential risks to privacy not withstanding such measures.    Time in: 1530  Time out: 1630    Data: 1 hour group therapy session (Topics Addressed)    Response: Patient attended class via Zoom. Patient participated in verbal completion of check in form.   Patient during check in denied suicidal or homicidal thoughts. Patient participated in group discussions.      Clinical Maneuvering/Interventions: Check ins completed. What’s on your phone CD-IOP icebreaker activity.     Assessment     There are no diagnoses linked to this encounter.       Progress toward goal: Not at goal    Functional Status: No impairment    Prognosis: Fair with Ongoing Treatment     Assessment:  Engaged in activity/Process and self-disclosed: Yes  Affect:Appropriate   Applies topic to self: Yes  Able to give and receive feedback: Yes    Plan:   Patient will continue in weekly group psychotherapy sessions and individual outpatient psychotherapy sessions every 3-4 weeks and will continue in self-help meetings and seek additional treatment if necessary following completion.    Patient will continue in IOP Phase two and  three group.      Duy Garcia LCSW  10/19/2022  08:47 EDT

## 2022-10-20 LAB
ACCEPTABLE CREAT UR QL: 216 MG/DL
NALOXONE UR CFM-MCNC: 345 NG/ML
NORBUP/BUP RATIO: 4.84 RATIO
NORBUPRENORPHINE UR CFM-MCNC: 313 NG/MG CREAT
NORBUPRENORPHINE/CREAT UR: 65 NG/MG CREAT
TRP-LINK: NORMAL

## 2022-10-24 ENCOUNTER — APPOINTMENT (OUTPATIENT)
Dept: GENERAL RADIOLOGY | Facility: HOSPITAL | Age: 42
End: 2022-10-24

## 2022-10-24 ENCOUNTER — LAB (OUTPATIENT)
Dept: LAB | Facility: HOSPITAL | Age: 42
End: 2022-10-24

## 2022-10-24 ENCOUNTER — OFFICE VISIT (OUTPATIENT)
Dept: PSYCHIATRY | Facility: CLINIC | Age: 42
End: 2022-10-24

## 2022-10-24 DIAGNOSIS — F11.20 OPIOID USE DISORDER, SEVERE, DEPENDENCE: ICD-10-CM

## 2022-10-24 DIAGNOSIS — F11.20 OPIOID USE DISORDER, SEVERE, ON MAINTENANCE THERAPY, DEPENDENCE: Primary | ICD-10-CM

## 2022-10-24 PROCEDURE — 90853 GROUP PSYCHOTHERAPY: CPT | Performed by: SOCIAL WORKER

## 2022-10-24 PROCEDURE — 80307 DRUG TEST PRSMV CHEM ANLYZR: CPT

## 2022-10-24 PROCEDURE — G0480 DRUG TEST DEF 1-7 CLASSES: HCPCS

## 2022-10-25 LAB — ETHANOL UR-MCNC: NEGATIVE %

## 2022-10-26 ENCOUNTER — APPOINTMENT (OUTPATIENT)
Dept: LAB | Facility: HOSPITAL | Age: 42
End: 2022-10-26

## 2022-10-26 ENCOUNTER — TRANSCRIBE ORDERS (OUTPATIENT)
Dept: LAB | Facility: HOSPITAL | Age: 42
End: 2022-10-26

## 2022-10-26 ENCOUNTER — LAB (OUTPATIENT)
Dept: LAB | Facility: HOSPITAL | Age: 42
End: 2022-10-26

## 2022-10-26 DIAGNOSIS — U09.9 COVID-19 LONG HAULER MANIFESTING CHRONIC NEUROLOGIC SYMPTOMS: ICD-10-CM

## 2022-10-26 DIAGNOSIS — R68.89 FLU-LIKE SYMPTOMS: ICD-10-CM

## 2022-10-26 DIAGNOSIS — R29.90 COVID-19 LONG HAULER MANIFESTING CHRONIC NEUROLOGIC SYMPTOMS: ICD-10-CM

## 2022-10-26 DIAGNOSIS — R68.89 FLU-LIKE SYMPTOMS: Primary | ICD-10-CM

## 2022-10-26 LAB
ACCEPTABLE CREAT UR QL: 209 MG/DL
FLUAV AG NPH QL: POSITIVE
FLUBV AG NPH QL IA: NEGATIVE
NALOXONE UR CFM-MCNC: >1000 NG/ML
NORBUP/BUP RATIO: 2 RATIO
NORBUPRENORPHINE UR CFM-MCNC: 449 NG/MG CREAT
NORBUPRENORPHINE/CREAT UR: 224 NG/MG CREAT
TRP-LINK: NORMAL

## 2022-10-26 PROCEDURE — 87804 INFLUENZA ASSAY W/OPTIC: CPT

## 2022-10-26 PROCEDURE — U0005 INFEC AGEN DETEC AMPLI PROBE: HCPCS

## 2022-10-26 PROCEDURE — U0004 COV-19 TEST NON-CDC HGH THRU: HCPCS

## 2022-10-26 PROCEDURE — C9803 HOPD COVID-19 SPEC COLLECT: HCPCS

## 2022-10-27 LAB — SARS-COV-2 RNA PNL SPEC NAA+PROBE: NOT DETECTED

## 2022-11-01 ENCOUNTER — LAB (OUTPATIENT)
Dept: LAB | Facility: HOSPITAL | Age: 42
End: 2022-11-01

## 2022-11-01 ENCOUNTER — OFFICE VISIT (OUTPATIENT)
Dept: PSYCHIATRY | Facility: CLINIC | Age: 42
End: 2022-11-01

## 2022-11-01 VITALS
SYSTOLIC BLOOD PRESSURE: 114 MMHG | HEART RATE: 87 BPM | BODY MASS INDEX: 41.82 KG/M2 | HEIGHT: 65 IN | WEIGHT: 251 LBS | DIASTOLIC BLOOD PRESSURE: 80 MMHG

## 2022-11-01 DIAGNOSIS — F11.20 OPIOID USE DISORDER, SEVERE, ON MAINTENANCE THERAPY, DEPENDENCE: ICD-10-CM

## 2022-11-01 DIAGNOSIS — G47.09 OTHER INSOMNIA: Chronic | ICD-10-CM

## 2022-11-01 DIAGNOSIS — F11.20 OPIOID USE DISORDER, SEVERE, DEPENDENCE: ICD-10-CM

## 2022-11-01 DIAGNOSIS — F41.1 GENERALIZED ANXIETY DISORDER: Chronic | ICD-10-CM

## 2022-11-01 DIAGNOSIS — F31.81 BIPOLAR II DISORDER: Chronic | ICD-10-CM

## 2022-11-01 LAB
AMPHET+METHAMPHET UR QL: NEGATIVE
AMPHETAMINES UR QL: NEGATIVE
BARBITURATES UR QL SCN: NEGATIVE
BENZODIAZ UR QL SCN: NEGATIVE
BUPRENORPHINE SERPL-MCNC: POSITIVE NG/ML
CANNABINOIDS SERPL QL: NEGATIVE
COCAINE UR QL: NEGATIVE
METHADONE UR QL SCN: NEGATIVE
OPIATES UR QL: POSITIVE
OXYCODONE UR QL SCN: NEGATIVE
PCP UR QL SCN: NEGATIVE
PROPOXYPH UR QL: NEGATIVE
TRICYCLICS UR QL SCN: POSITIVE

## 2022-11-01 PROCEDURE — 99214 OFFICE O/P EST MOD 30 MIN: CPT | Performed by: NURSE PRACTITIONER

## 2022-11-01 PROCEDURE — G0480 DRUG TEST DEF 1-7 CLASSES: HCPCS

## 2022-11-01 PROCEDURE — 80307 DRUG TEST PRSMV CHEM ANLYZR: CPT

## 2022-11-01 RX ORDER — PROMETHAZINE HYDROCHLORIDE AND CODEINE PHOSPHATE 6.25; 1 MG/5ML; MG/5ML
5 SYRUP ORAL EVERY 4 HOURS PRN
COMMUNITY
End: 2022-11-01

## 2022-11-01 RX ORDER — OLANZAPINE 5 MG/1
5 TABLET ORAL NIGHTLY
Qty: 30 TABLET | Refills: 0 | Status: SHIPPED | OUTPATIENT
Start: 2022-11-01

## 2022-11-01 RX ORDER — BUPRENORPHINE HYDROCHLORIDE AND NALOXONE HYDROCHLORIDE DIHYDRATE 8; 2 MG/1; MG/1
2 TABLET SUBLINGUAL DAILY
Qty: 56 TABLET | Refills: 0 | Status: SHIPPED | OUTPATIENT
Start: 2022-11-01 | End: 2022-11-29 | Stop reason: SDUPTHER

## 2022-11-01 NOTE — PROGRESS NOTES
Office  Follow Up Visit      Patient Name: Aroldo Cai  : 1980   MRN: [id number]     Referring Provider: Gerald Dobson MD    Chief Complaint: Substance use    History of Present Illness:   Aroldo Cai is a 42 y.o. 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 Taking buprenorphine/naloxone 16-4mg daily and is tolerating well with no adverse effects. Denies any recurrence of illicit substance use. Continues having mild cravings daily as he deals with the stress of the death of his sons mother and is now full time parent. Also looking for a job. Continues in IOP and has started individual counseling with Idalia Boles. Requesting refill on olanzapine until he can reschedule with psych. Feels mood overall is good. Sleeping well most nights. No other complaints today.    Triggers: Stress, anxiety    Cravings: 2-3 daily    Relapse Prevention: IOP, MOUD, peer support     Urine Drug Screen (today's visit) discussed: Pending at time of appt    UDS Confirmation: 10/24/2022 Positive buprenorphine, nor-buprenorphine, TCA    QIANA (PDMP) Reviewed for Current/Active Medications: buprenorphine/naloxone as expected    Past Surgical History:  Past Surgical History:   Procedure Laterality Date   • ABDOMINAL SURGERY     • BRAIN SURGERY      craniotomy,  right frontal lobe AVM   • CHOLECYSTECTOMY WITH INTRAOPERATIVE CHOLANGIOGRAM N/A 2021    Procedure: CHOLECYSTECTOMY LAPAROSCOPIC INTRAOPERATIVE CHOLANGIOGRAPHY;  Surgeon: Hardik Blancas MD;  Location: [facility] OR;  Service: General;  Laterality: N/A;   • COLONOSCOPY     • ENDOSCOPY N/A 10/22/2021    Procedure: ESOPHAGOGASTRODUODENOSCOPY WITH BIOPSY;  Surgeon: Hardik Blancas MD;  Location: [facility] ENDOSCOPY;  Service: Gastroenterology;  Laterality: N/A;   • HERNIA REPAIR Right     inguinal   • HIP SURGERY Bilateral    • HIP SURGERY Left        Problem List:  Patient Active Problem List   Diagnosis   • Calculus of gallbladder without  cholecystitis without obstruction   • Right upper quadrant abdominal pain   • Nausea and vomiting   • Opioid use disorder, severe, dependence (HCC)       Allergy:   Allergies   Allergen Reactions   • Dilantin [Phenytoin] Seizure   • Penicillins Anaphylaxis   • Phenobarbital Seizure   • Tegretol [Carbamazepine] Seizure   • Morphine Nausea And Vomiting   • Latex Rash        Current Medications:   Current Outpatient Medications   Medication Sig Dispense Refill   • allopurinol (ZYLOPRIM) 100 MG tablet Take 100 mg by mouth Daily.     • buprenorphine-naloxone (SUBOXONE) 8-2 MG per SL tablet Place 2 tablets under the tongue Daily for 28 days. 56 tablet 0   • cloNIDine (Catapres) 0.2 MG tablet Take 1 tablet by mouth Every Night. 30 tablet 2   • colchicine 0.6 MG tablet Take 0.6 mg by mouth Daily.     • cyclobenzaprine (FLEXERIL) 10 MG tablet Take 10 mg by mouth every night at bedtime.     • ibuprofen (ADVIL,MOTRIN) 800 MG tablet      • levETIRAcetam (KEPPRA) 750 MG tablet Take 750 mg by mouth 2 (Two) Times a Day.     • metoprolol succinate XL (TOPROL-XL) 25 MG 24 hr tablet      • OLANZapine (zyPREXA) 5 MG tablet Take 1 tablet by mouth Every Night. 30 tablet 0   • omeprazole (priLOSEC) 40 MG capsule      • ondansetron ODT (ZOFRAN-ODT) 8 MG disintegrating tablet Every 8 (Eight) Hours As Needed.     • traZODone (DESYREL) 100 MG tablet Take 1 tablet by mouth Every Night for 90 days. 30 tablet 2   • venlafaxine (EFFEXOR) 100 MG tablet Take 2.5 tablets by mouth Daily. 75 tablet 2     No current facility-administered medications for this visit.       Past Medical History:  Past Medical History:   Diagnosis Date   • Anesthesia complication     pt reports he is hard to wake after anesthesia   • Anxiety    • Arthritis    • Bipolar 1 disorder (HCC)    • COVID-19 02/2021   • Depression    • Dysphagia     food and liquids.   • Extensive tattoos    • Gout    • Hepatitis C 2018    Patient took medication    • History of echocardiogram     • Kidney stones    • Pneumonia    • PTSD (post-traumatic stress disorder)    • Seizure (HCC)     last seizure years ago.   • Seizures (HCC)    • Short-term memory loss    • Wears contact lenses    • Wears glasses        Social History:  Social History     Socioeconomic History   • Marital status:    Tobacco Use   • Smoking status: Every Day     Packs/day: 0.50     Years: 30.00     Pack years: 15.00     Types: Cigarettes   • Smokeless tobacco: Never   Vaping Use   • Vaping Use: Never used   Substance and Sexual Activity   • Alcohol use: Not Currently   • Drug use: Not Currently     Types: Cocaine(coke)     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   • Sexual activity: Defer       Family History:  Family History   Problem Relation Age of Onset   • Diabetes Mother    • Diabetes insipidus Mother    • Heart disease Mother    • Cancer Father         skin   • Diabetes Father    • Hypertension Father    • Arthritis Father    • Diabetes insipidus Father          Subjective      Review of Systems:   Review of Systems   Constitutional: Positive for fatigue. Negative for chills and fever.   Respiratory: Negative for shortness of breath.    Cardiovascular: Negative for chest pain.   Gastrointestinal: Negative for abdominal pain.   Skin: Negative for skin lesions.   Neurological: Negative for seizures and confusion.   Psychiatric/Behavioral: Positive for stress. Negative for hallucinations, sleep disturbance, suicidal ideas and depressed mood. The patient is nervous/anxious.      PHQ-9 Total Score: 11    VINICIUS-7 Score:   Feeling nervous, anxious or on edge: More than half the days  Not being able to stop or control worrying: More than half the days  Worrying too much about different things: More than half the days  Trouble Relaxing: More than half the days  Being so restless that it is hard to sit still: More than half the days  Feeling afraid as if something awful might happen: More than half the days  Becoming easily  annoyed or irritable: More than half the days  VINICIUS 7 Total Score: 14  If you checked any problems, how difficult have these problems made it for you to do your work, take care of things at home, or get along with other people: Somewhat difficult    Patient History:   The following portions of the patient's history were reviewed and updated as appropriate: allergies, current medications, past family history, past medical history, past social history, past surgical history and problem list.     Social:  Social History     Socioeconomic History   • Marital status:    Tobacco Use   • Smoking status: Every Day     Packs/day: 0.50     Years: 30.00     Pack years: 15.00     Types: Cigarettes   • Smokeless tobacco: Never   Vaping Use   • Vaping Use: Never used   Substance and Sexual Activity   • Alcohol use: Not Currently   • Drug use: Not Currently     Types: Cocaine(coke)     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   • Sexual activity: Defer       Medications:     Current Outpatient Medications:   •  allopurinol (ZYLOPRIM) 100 MG tablet, Take 100 mg by mouth Daily., Disp: , Rfl:   •  buprenorphine-naloxone (SUBOXONE) 8-2 MG per SL tablet, Place 2 tablets under the tongue Daily for 28 days., Disp: 56 tablet, Rfl: 0  •  cloNIDine (Catapres) 0.2 MG tablet, Take 1 tablet by mouth Every Night., Disp: 30 tablet, Rfl: 2  •  colchicine 0.6 MG tablet, Take 0.6 mg by mouth Daily., Disp: , Rfl:   •  cyclobenzaprine (FLEXERIL) 10 MG tablet, Take 10 mg by mouth every night at bedtime., Disp: , Rfl:   •  ibuprofen (ADVIL,MOTRIN) 800 MG tablet, , Disp: , Rfl:   •  levETIRAcetam (KEPPRA) 750 MG tablet, Take 750 mg by mouth 2 (Two) Times a Day., Disp: , Rfl:   •  metoprolol succinate XL (TOPROL-XL) 25 MG 24 hr tablet, , Disp: , Rfl:   •  OLANZapine (zyPREXA) 5 MG tablet, Take 1 tablet by mouth Every Night., Disp: 30 tablet, Rfl: 0  •  omeprazole (priLOSEC) 40 MG capsule, , Disp: , Rfl:   •  ondansetron ODT (ZOFRAN-ODT) 8 MG  "disintegrating tablet, Every 8 (Eight) Hours As Needed., Disp: , Rfl:   •  traZODone (DESYREL) 100 MG tablet, Take 1 tablet by mouth Every Night for 90 days., Disp: 30 tablet, Rfl: 2  •  venlafaxine (EFFEXOR) 100 MG tablet, Take 2.5 tablets by mouth Daily., Disp: 75 tablet, Rfl: 2    Objective     Physical Exam:  Physical Exam  Vitals reviewed.   Constitutional:       General: He is not in acute distress.     Appearance: He is well-developed. He is not ill-appearing.   Pulmonary:      Effort: No respiratory distress.   Skin:     Coloration: Skin is not jaundiced.   Neurological:      Mental Status: He is alert and oriented to person, place, and time.      Gait: Gait normal.   Psychiatric:         Speech: Speech normal.         Behavior: Behavior normal.         Thought Content: Thought content normal. Thought content does not include homicidal or suicidal ideation. Thought content does not include homicidal or suicidal plan.         Vital Signs:   Vitals:    11/01/22 1319   BP: 114/80   Pulse: 87   Weight: 114 kg (251 lb)   Height: 165.1 cm (65\")     Body mass index is 41.77 kg/m².     Mental Status Exam: reviewed 11/1/2022  Hygiene:   good  Cooperation:  Cooperative  Eye Contact:  Good  Psychomotor Behavior:  Appropriate  Affect:  Full range  Mood: normal  Speech:  Normal  Thought Process:  Goal directed  Thought Content:  Normal  Suicidal:  None  Homicidal:  None  Hallucinations:  None  Delusion:  None  Memory:  Intact  Orientation:  Person, Place, Time and Situation  Reliability:  good  Insight:  Good  Judgement:  Good  Impulse Control:  Good    Assessment / Plan      Assessment & Plan   -Continue buprenorphine/naloxone 16-8 mg daily.  -Will have RTC dose at next visit +0 remaining  -Advisement to take part in and remain active in 12 Step Recovery Meetings, IOP, and/or 1:1 therapy/counseling and to establish/maintain an active relationship with a recovery sponsor.   -Continued monitoring for illicit substances " for patient safety, medication compliance and future care.  -Continue IOP and CBT  -Keep follow up with psych    Visit Diagnoses     Bipolar II disorder (HCC)  (Chronic)       Relevant Medications    OLANZapine (zyPREXA) 5 MG tablet    Generalized anxiety disorder  (Chronic)       Relevant Medications    OLANZapine (zyPREXA) 5 MG tablet    Other insomnia  (Chronic)       Relevant Medications    OLANZapine (zyPREXA) 5 MG tablet    Opioid use disorder, severe, on maintenance therapy, dependence (HCC)        Relevant Medications    buprenorphine-naloxone (SUBOXONE) 8-2 MG per SL tablet      Diagnoses       Codes Comments    Bipolar II disorder (HCC)     ICD-10-CM: F31.81  ICD-9-CM: 296.89     Generalized anxiety disorder     ICD-10-CM: F41.1  ICD-9-CM: 300.02     Other insomnia     ICD-10-CM: G47.09  ICD-9-CM: 780.52     Opioid use disorder, severe, on maintenance therapy, dependence (HCC)     ICD-10-CM: F11.20  ICD-9-CM: 304.00             PLAN:  1. Safety: No acute safety concerns  2. Risk Assessment: Risk of self-harm acutely is low. Risk of self-harm chronically is also low, but could be further elevated in the event of treatment noncompliance and/or AODA.      TREATMENT PLAN/GOALS: Continue supportive psychotherapy efforts and medications as indicated. Treatment and medication options discussed during today's visit. Patient acknowledged and verbally consented to continue with current treatment plan and was educated on the importance of compliance with treatment and follow-up appointments.      MEDICATION ISSUES:  QIANA reviewed as expected.  Discussed medication options and treatment plan of prescribed medication as well as the risks, benefits, and side effects including potential falls, possible impaired driving and metabolic adversities among others. Patient is agreeable to call the office with any worsening of symptoms or onset of side effects. Patient is agreeable to call 911 or go to the nearest ER should  he/she begin having SI/HI. No medication side effects or related complaints today.     MEDS ORDERED DURING VISIT:  New Medications Ordered This Visit   Medications   • OLANZapine (zyPREXA) 5 MG tablet     Sig: Take 1 tablet by mouth Every Night.     Dispense:  30 tablet     Refill:  0   • buprenorphine-naloxone (SUBOXONE) 8-2 MG per SL tablet     Sig: Place 2 tablets under the tongue Daily for 28 days.     Dispense:  56 tablet     Refill:  0     [id number]       Return in 4 weeks (on 11/29/2022) for Follow Up Medication.           This document has been electronically signed by PRISCILLA Wen  November 2, 2022 08:02 EDT      Part of this note may be an electronic transcription/translation of spoken language to printed text using the Dragon Dictation System.

## 2022-11-02 NOTE — PROGRESS NOTES
CD IOP GROUP     Date: 10/17/2022  Name: Aroldo Cai    Time In: 1530   Time Out: 1625    This provider is located at [facility] located at [address] [place] [address].  The Patient is seen remotely at his/her home, using Zoom. Patient is being seen via telehealth and confirm that they are in a secure environment for this session. The patient's condition being diagnosed/treated is appropriate for telemedicine. The provider identified himself as well as his credentials. The patient gave consent to be seen remotely, and when consent is given they understand that the consent allows for patient identifiable information to be sent to a third party as needed. They may refuse to be seen remotely at any time. The electronic data is encrypted and password protected, and the patient has been advised of the potential risks to privacy not withstanding such measures.      Number of participants: 9    IOP GROUP NOTE     Data: 1 hour IOP group therapy session (Check-ins, Coping Skills, Relapse Prevention)     Check Ins: Therapist continued facilitation of rapport building strategies between group members. Therapist asked that each patient check in with home life and recovery efforts and identify triggers, cravings, and high risk situations that arise between group sessions. Therapist provided empathy and support during group session.     Session Content/Coping Skills: Check ins completed. Clinician processed stressors presented by group members. Introductions completed. Clinician discussed Maslow’s Hierarchy of Needs with group members. Clinician explored with group members how their hierarchy of needs was impacted during active addiction. Clinician also explored with group members how each level can be strengthened in early recovery.     Response: Patient attended class via Zoom. Patient participated in verbal completion of check in form. Patient during check in denied suicidal or homicidal thoughts.  "Patient participated in group discussions.      Personal Assessment 0-10 Scale (0-none, 10-high)    Anxiety:  6   Depression:  2   Cravings: Patient reported \"elevated\".      Assessment:     ..  Lab on 10/17/2022   Component Date Value Ref Range Status   • Ethanol, Urine 10/17/2022 Negative  Cutoff=0.020 % Final   • THC, Screen, Urine 10/17/2022 Negative  Negative Final   • Phencyclidine (PCP), Urine 10/17/2022 Negative  Negative Final   • Cocaine Screen, Urine 10/17/2022 Negative  Negative Final   • Methamphetamine, Ur 10/17/2022 Negative  Negative Final   • Opiate Screen 10/17/2022 Negative  Negative Final   • Amphetamine Screen, Urine 10/17/2022 Negative  Negative Final   • Benzodiazepine Screen, Urine 10/17/2022 Negative  Negative Final   • Tricyclic Antidepressants Screen 10/17/2022 Positive (A)  Negative Final   • Methadone Screen, Urine 10/17/2022 Negative  Negative Final   • Barbiturates Screen, Urine 10/17/2022 Negative  Negative Final   • Oxycodone Screen, Urine 10/17/2022 Negative  Negative Final   • Propoxyphene Screen 10/17/2022 Negative  Negative Final   • Buprenorphine, Screen, Urine 10/17/2022 Positive (A)  Negative Final   • Creatinine, Urine 10/17/2022 216  mg/dL Final    Testing Threshold: buprenorphine, 1.0 ng/mL                     norbuprenorphine, 5.0 ng/mL                     naloxone, 10 ng/mL  This test was developed and its performance characteristics  determined by [facility].  It has not been cleared or approved  by the Food and Drug Administration.  REFERENCE RANGE: Ref Range>=20   • Buprenorphine/CR 10/17/2022 65  ng/mg creat Final   • NORBUPRENORPHINE/CR 10/17/2022 313  ng/mg creat Final   • NORBUP/BUP RATIO 10/17/2022 4.84  >=0.3 RATIO Final   • Naloxone 10/17/2022 345  ng/mL Final   • TRP-LINK 10/17/2022 Comment   Final    These test results were not transmitted to [facility].  If you would like your patient's urine drug  test results to transmit to [facility], " please  contact your [employer]  to complete  a physician authorization form.   Lab on 10/04/2022   Component Date Value Ref Range Status   • Ethanol, Urine 10/04/2022 Negative  Cutoff=0.020 % Final   • THC, Screen, Urine 10/04/2022 Negative  Negative Final   • Phencyclidine (PCP), Urine 10/04/2022 Negative  Negative Final   • Cocaine Screen, Urine 10/04/2022 Negative  Negative Final   • Methamphetamine, Ur 10/04/2022 Negative  Negative Final   • Opiate Screen 10/04/2022 Negative  Negative Final   • Amphetamine Screen, Urine 10/04/2022 Negative  Negative Final   • Benzodiazepine Screen, Urine 10/04/2022 Negative  Negative Final   • Tricyclic Antidepressants Screen 10/04/2022 Negative  Negative Final   • Methadone Screen, Urine 10/04/2022 Negative  Negative Final   • Barbiturates Screen, Urine 10/04/2022 Negative  Negative Final   • Oxycodone Screen, Urine 10/04/2022 Negative  Negative Final   • Propoxyphene Screen 10/04/2022 Negative  Negative Final   • Buprenorphine, Screen, Urine 10/04/2022 Positive (A)  Negative Final   • Creatinine, Urine 10/04/2022 137  mg/dL Final    Testing Threshold: buprenorphine, 1.0 ng/mL                     norbuprenorphine, 5.0 ng/mL                     naloxone, 10 ng/mL  This test was developed and its performance characteristics  determined by [employer].  It has not been cleared or approved  by the Food and Drug Administration.  REFERENCE RANGE: Ref Range>=20   • Buprenorphine/CR 10/04/2022 34  ng/mg creat Final   • NORBUPRENORPHINE/CR 10/04/2022 170  ng/mg creat Final   • NORBUP/BUP RATIO 10/04/2022 4.96  >=0.3 RATIO Final   • Naloxone 10/04/2022 118  ng/mL Final   • TRP-LINK 10/04/2022 Comment   Final    These test results were not transmitted to [facility].  If you would like your patient's urine drug  test results to transmit to [facility], please  contact your [employer]  to complete  a physician authorization form.   Office  Visit on 10/04/2022   Component Date Value Ref Range Status   • Glucose 10/04/2022 106 (H)  65 - 99 mg/dL Final   • BUN 10/04/2022 8  6 - 20 mg/dL Final   • Creatinine 10/04/2022 1.06  0.76 - 1.27 mg/dL Final   • Sodium 10/04/2022 142  136 - 145 mmol/L Final   • Potassium 10/04/2022 3.9  3.5 - 5.2 mmol/L Final   • Chloride 10/04/2022 104  98 - 107 mmol/L Final   • CO2 10/04/2022 27.9  22.0 - 29.0 mmol/L Final   • Calcium 10/04/2022 9.6  8.6 - 10.5 mg/dL Final   • Total Protein 10/04/2022 7.2  6.0 - 8.5 g/dL Final   • Albumin 10/04/2022 4.20  3.50 - 5.20 g/dL Final   • ALT (SGPT) 10/04/2022 33  1 - 41 U/L Final   • AST (SGOT) 10/04/2022 29  1 - 40 U/L Final   • Alkaline Phosphatase 10/04/2022 114  39 - 117 U/L Final   • Total Bilirubin 10/04/2022 0.5  0.0 - 1.2 mg/dL Final   • Globulin 10/04/2022 3.0  gm/dL Final   • A/G Ratio 10/04/2022 1.4  g/dL Final   • BUN/Creatinine Ratio 10/04/2022 7.5  7.0 - 25.0 Final   • Anion Gap 10/04/2022 10.1  5.0 - 15.0 mmol/L Final   • eGFR 10/04/2022 89.3  >60.0 mL/min/1.73 Final    National Kidney Foundation and American Society of Nephrology (ASN) Task Force recommended calculation based on the Chronic Kidney Disease Epidemiology Collaboration (CKD-EPI) equation refit without adjustment for race.   Lab on 09/28/2022   Component Date Value Ref Range Status   • Ethanol, Urine 09/28/2022 Negative  Cutoff=0.020 % Final   • THC, Screen, Urine 09/28/2022 Negative  Negative Final   • Phencyclidine (PCP), Urine 09/28/2022 Negative  Negative Final   • Cocaine Screen, Urine 09/28/2022 Negative  Negative Final   • Methamphetamine, Ur 09/28/2022 Negative  Negative Final   • Opiate Screen 09/28/2022 Negative  Negative Final   • Amphetamine Screen, Urine 09/28/2022 Negative  Negative Final   • Benzodiazepine Screen, Urine 09/28/2022 Negative  Negative Final   • Tricyclic Antidepressants Screen 09/28/2022 Positive (A)  Negative Final   • Methadone Screen, Urine 09/28/2022 Negative  Negative Final    • Barbiturates Screen, Urine 09/28/2022 Negative  Negative Final   • Oxycodone Screen, Urine 09/28/2022 Negative  Negative Final   • Propoxyphene Screen 09/28/2022 Negative  Negative Final   • Buprenorphine, Screen, Urine 09/28/2022 Positive (A)  Negative Final   • Creatinine, Urine 09/28/2022 178  mg/dL Final    Testing Threshold: buprenorphine, 1.0 ng/mL                     norbuprenorphine, 5.0 ng/mL                     naloxone, 10 ng/mL  This test was developed and its performance characteristics  determined by [employer].  It has not been cleared or approved  by the Food and Drug Administration.  REFERENCE RANGE: Ref Range>=20   • Buprenorphine/CR 09/28/2022 87  ng/mg creat Final   • NORBUPRENORPHINE/CR 09/28/2022 431  ng/mg creat Final   • NORBUP/BUP RATIO 09/28/2022 4.99  >=0.3 RATIO Final   • Naloxone 09/28/2022 269  ng/mL Final   • TRP-LINK 09/28/2022 Comment   Final    These test results were not transmitted to [facility].  If you would like your patient's urine drug  test results to transmit to [facility], please  contact your [employer]  to complete  a physician authorization form.   • Antidepressants 09/28/2022 Negative   Final   • AMITRIPTYLINE, UR 09/28/2022 Not Detected   Final   • Clomipramine, Ur 09/28/2022 Not Detected   Final   • Desemethylclomipramine 09/28/2022 Not Detected   Final   • Desipramine 09/28/2022 Not Detected   Final   • Doxepin 09/28/2022 Not Detected   Final   • Desmethyldoxepin, Ur 09/28/2022 Not Detected   Final   • Imipramine 09/28/2022 Not Detected   Final   • Nortriptyline 09/28/2022 Not Detected   Final   • Protriptyline 09/28/2022 Not Detected   Final   • Trimipramine 09/28/2022 Not Detected   Final   • Level of Detection: 09/28/2022 Comment   Final    Testing Threshold:  50 or 100 ng/mL                                                                       '  This test was developed and its performance characteristics  determined by  [facility].  It has not been cleared or approved  by the Food and Drug Administration.   • BUPRENORPHINE 10/04/2022 +POSITIVE+   Final   • Buprenorphine, Urine 10/04/2022 28  ng/mg creat Final   • Norbuprenorphine 10/04/2022 130  ng/mg creat Final   • Level of Detection: 10/04/2022 Comment   Final    Testing Threshold: buprenorphine, 1.0 ng/mL                     norbuprenorphine, 5 ng/mL                                                                       '  This test was developed and its performance characteristics  determined by [employer].  It has not been cleared or approved  by the Food and Drug Administration.       Mental Status Exam  Hygiene:  good  Dress: casual  Attitude: cooperative and agreeable   Motor Activity: appropriate  Eye Contact:  good  Speech: regular rate and rhythm   Mood:  calm and cooperative  Affect:  Appropriate  Thought Processes:  Linear  Thought Content:  Normal  Suicidal Thoughts:  denies  Homicidal Thoughts:  denies  Crisis Safety Plan: Safety plan has been discussed.   Hallucinations:  Unknown to clinician.   Reliability: fair  Insight: fair  Judgement: fair  Impulse Control: fair    Recovery/spiritual support group attendance: No     Progress toward goal: Not at goal    Prognosis: Fair with Ongoing Treatment     Self-reported number of days sober:  Patient reported May 26th during check in.     Patient will contact this office, call 911 or present to the nearest emergency room should suicidal or homicidal ideations occur.    Impression/Formulation:    ICD-10-CM ICD-9-CM   1. Opioid use disorder, severe, on maintenance therapy, dependence ([facility])  F11.20 304.00       Clinical Maneuvering/Interventions: Therapist utilized a person-centered approach to build rapport with group member. Therapist implemented motivational interviewing techniques to assist client with exploring and resolving ambivalence associated with commitment to change behaviors related to substance use and addiction.  Therapist applied cognitive behavioral strategies to facilitate identification of maladaptive patterns of thinking and behavior that contribute to client's risk for continued substance use and relapse. Therapist employed group interaction activities to build rapport among group members, promote sobriety, and emphasize relapse prevention. Therapist promoted safe nonjudgmental environment by providing group members with unconditional positive regard and encouraging group members to comply with group rules and guidelines. Therapist assisted group member with identifying and implementing healthier coping strategies.      Plan:  Continue [facility] IOP Phase II  Aftercare:  [facility] Phase III  Program Assignments:  Personal recovery plan, relapse prevention plan, attendance of recovery support group meetings, exploration of sponsorship, drug/alcohol screens.     Duy Garcia LCSW  11/2/2022  16:44 EDT

## 2022-11-02 NOTE — PROGRESS NOTES
"CD IOP GROUP     Date: 10/24/2022  Name: Aroldo Cai    Time In: 1545   Time Out: 1628  Patient arrived late for group meeting.     Number of participants: 7    IOP GROUP NOTE     Data: 1 hour IOP group therapy session (Check-ins, Coping Skills, Relapse Prevention)     Check Ins: Therapist continued facilitation of rapport building strategies between group members. Therapist asked that each patient check in with home life and recovery efforts and identify triggers, cravings, and high risk situations that arise between group sessions. Therapist provided empathy and support during group session.     Session Content/Coping Skills: Check ins completed by group members. Self-Care Wheel reviewed and discussed. Therapist Aid Self Care Assessment completed by group members. Group members identified which area of self-care was the highest and which is the lowest. Clinician discussed importance of balance in each area of self-care. Therapist Aid Self-Care Tips psychoeducational material reviewed. Clinician discussed setting self-care goals with group members.     Response: Patient attended class in person. Patient participated in completion of check in form. Patient on check in form answered no to question \"currently or since last group meeting have you had any suicidal thoughts or plan or intent to hurt yourself”, and patient also answered no to question of \"currently or since your last group meeting, have you had any homicidal thoughts or plan or intent to hurt others\".     Personal Assessment 0-10 Scale (0-none, 10-high)    Anxiety:  2   Depression:  2   Cravings: 5     Assessment:     ..  Lab on 10/24/2022   Component Date Value Ref Range Status   • Ethanol, Urine 10/24/2022 Negative  Cutoff=0.020 % Final   • THC, Screen, Urine 10/24/2022 Negative  Negative Final   • Phencyclidine (PCP), Urine 10/24/2022 Negative  Negative Final   • Cocaine Screen, Urine 10/24/2022 Negative  Negative Final   • Methamphetamine, Ur " 10/24/2022 Negative  Negative Final   • Opiate Screen 10/24/2022 Negative  Negative Final   • Amphetamine Screen, Urine 10/24/2022 Negative  Negative Final   • Benzodiazepine Screen, Urine 10/24/2022 Negative  Negative Final   • Tricyclic Antidepressants Screen 10/24/2022 Positive (A)  Negative Final   • Methadone Screen, Urine 10/24/2022 Negative  Negative Final   • Barbiturates Screen, Urine 10/24/2022 Negative  Negative Final   • Oxycodone Screen, Urine 10/24/2022 Negative  Negative Final   • Propoxyphene Screen 10/24/2022 Negative  Negative Final   • Buprenorphine, Screen, Urine 10/24/2022 Positive (A)  Negative Final   • Creatinine, Urine 10/24/2022 209  mg/dL Final    Testing Threshold: buprenorphine, 1.0 ng/mL                     norbuprenorphine, 5.0 ng/mL                     naloxone, 10 ng/mL  This test was developed and its performance characteristics  determined by [employer].  It has not been cleared or approved  by the Food and Drug Administration.  REFERENCE RANGE: Ref Range>=20   • Buprenorphine/CR 10/24/2022 224  ng/mg creat Final   • NORBUPRENORPHINE/CR 10/24/2022 449  ng/mg creat Final   • NORBUP/BUP RATIO 10/24/2022 2.00  >=0.3 RATIO Final   • Naloxone 10/24/2022 >1000  ng/mL Final   • TRP-LINK 10/24/2022 Comment   Final    These test results were not transmitted to [facility].  If you would like your patient's urine drug  test results to transmit to [facility], please  contact your [employer]  to complete  a physician authorization form.   Lab on 10/17/2022   Component Date Value Ref Range Status   • Ethanol, Urine 10/17/2022 Negative  Cutoff=0.020 % Final   • THC, Screen, Urine 10/17/2022 Negative  Negative Final   • Phencyclidine (PCP), Urine 10/17/2022 Negative  Negative Final   • Cocaine Screen, Urine 10/17/2022 Negative  Negative Final   • Methamphetamine, Ur 10/17/2022 Negative  Negative Final   • Opiate Screen 10/17/2022 Negative  Negative Final   •  Amphetamine Screen, Urine 10/17/2022 Negative  Negative Final   • Benzodiazepine Screen, Urine 10/17/2022 Negative  Negative Final   • Tricyclic Antidepressants Screen 10/17/2022 Positive (A)  Negative Final   • Methadone Screen, Urine 10/17/2022 Negative  Negative Final   • Barbiturates Screen, Urine 10/17/2022 Negative  Negative Final   • Oxycodone Screen, Urine 10/17/2022 Negative  Negative Final   • Propoxyphene Screen 10/17/2022 Negative  Negative Final   • Buprenorphine, Screen, Urine 10/17/2022 Positive (A)  Negative Final   • Creatinine, Urine 10/17/2022 216  mg/dL Final    Testing Threshold: buprenorphine, 1.0 ng/mL                     norbuprenorphine, 5.0 ng/mL                     naloxone, 10 ng/mL  This test was developed and its performance characteristics  determined by "[employer]".  It has not been cleared or approved  by the Food and Drug Administration.  REFERENCE RANGE: Ref Range>=20   • Buprenorphine/CR 10/17/2022 65  ng/mg creat Final   • NORBUPRENORPHINE/CR 10/17/2022 313  ng/mg creat Final   • NORBUP/BUP RATIO 10/17/2022 4.84  >=0.3 RATIO Final   • Naloxone 10/17/2022 345  ng/mL Final   • TRP-LINK 10/17/2022 Comment   Final    These test results were not transmitted to [facility].  If you would like your patient's urine drug  test results to transmit to [facility], please  contact your [employer]  to complete  a physician authorization form.   Lab on 10/04/2022   Component Date Value Ref Range Status   • Ethanol, Urine 10/04/2022 Negative  Cutoff=0.020 % Final   • THC, Screen, Urine 10/04/2022 Negative  Negative Final   • Phencyclidine (PCP), Urine 10/04/2022 Negative  Negative Final   • Cocaine Screen, Urine 10/04/2022 Negative  Negative Final   • Methamphetamine, Ur 10/04/2022 Negative  Negative Final   • Opiate Screen 10/04/2022 Negative  Negative Final   • Amphetamine Screen, Urine 10/04/2022 Negative  Negative Final   • Benzodiazepine Screen, Urine  10/04/2022 Negative  Negative Final   • Tricyclic Antidepressants Screen 10/04/2022 Negative  Negative Final   • Methadone Screen, Urine 10/04/2022 Negative  Negative Final   • Barbiturates Screen, Urine 10/04/2022 Negative  Negative Final   • Oxycodone Screen, Urine 10/04/2022 Negative  Negative Final   • Propoxyphene Screen 10/04/2022 Negative  Negative Final   • Buprenorphine, Screen, Urine 10/04/2022 Positive (A)  Negative Final   • Creatinine, Urine 10/04/2022 137  mg/dL Final    Testing Threshold: buprenorphine, 1.0 ng/mL                     norbuprenorphine, 5.0 ng/mL                     naloxone, 10 ng/mL  This test was developed and its performance characteristics  determined by [employer].  It has not been cleared or approved  by the Food and Drug Administration.  REFERENCE RANGE: Ref Range>=20   • Buprenorphine/CR 10/04/2022 34  ng/mg creat Final   • NORBUPRENORPHINE/CR 10/04/2022 170  ng/mg creat Final   • NORBUP/BUP RATIO 10/04/2022 4.96  >=0.3 RATIO Final   • Naloxone 10/04/2022 118  ng/mL Final   • TRP-LINK 10/04/2022 Comment   Final    These test results were not transmitted to [facility].  If you would like your patient's urine drug  test results to transmit to [facility], please  contact your [employer]  to complete  a physician authorization form.   Office Visit on 10/04/2022   Component Date Value Ref Range Status   • Glucose 10/04/2022 106 (H)  65 - 99 mg/dL Final   • BUN 10/04/2022 8  6 - 20 mg/dL Final   • Creatinine 10/04/2022 1.06  0.76 - 1.27 mg/dL Final   • Sodium 10/04/2022 142  136 - 145 mmol/L Final   • Potassium 10/04/2022 3.9  3.5 - 5.2 mmol/L Final   • Chloride 10/04/2022 104  98 - 107 mmol/L Final   • CO2 10/04/2022 27.9  22.0 - 29.0 mmol/L Final   • Calcium 10/04/2022 9.6  8.6 - 10.5 mg/dL Final   • Total Protein 10/04/2022 7.2  6.0 - 8.5 g/dL Final   • Albumin 10/04/2022 4.20  3.50 - 5.20 g/dL Final   • ALT (SGPT) 10/04/2022 33  1 - 41 U/L  Final   • AST (SGOT) 10/04/2022 29  1 - 40 U/L Final   • Alkaline Phosphatase 10/04/2022 114  39 - 117 U/L Final   • Total Bilirubin 10/04/2022 0.5  0.0 - 1.2 mg/dL Final   • Globulin 10/04/2022 3.0  gm/dL Final   • A/G Ratio 10/04/2022 1.4  g/dL Final   • BUN/Creatinine Ratio 10/04/2022 7.5  7.0 - 25.0 Final   • Anion Gap 10/04/2022 10.1  5.0 - 15.0 mmol/L Final   • eGFR 10/04/2022 89.3  >60.0 mL/min/1.73 Final    National Kidney Foundation and American Society of Nephrology (ASN) Task Force recommended calculation based on the Chronic Kidney Disease Epidemiology Collaboration (CKD-EPI) equation refit without adjustment for race.       Mental Status Exam  Hygiene:  good  Dress: casual  Attitude: cooperative and agreeable   Motor Activity: appropriate  Eye Contact:  good  Speech: regular rate and rhythm   Mood:  calm and cooperative  Affect:  Appropriate  Thought Processes:  Linear  Thought Content:  Normal  Suicidal Thoughts:  denies  Homicidal Thoughts:  denies  Crisis Safety Plan: Safety plan has been discussed.   Hallucinations:  Unknown to clinician.   Reliability: fair  Insight: fair  Judgement: fair  Impulse Control: fair    Recovery/spiritual support group attendance: No     Progress toward goal: Not at goal    Prognosis: Fair with Ongoing Treatment     Self-reported number of days sober: Patient reported April 26th on check in form.     Patient will contact this office, call 911 or present to the nearest emergency room should suicidal or homicidal ideations occur.    Impression/Formulation:    ICD-10-CM ICD-9-CM   1. Opioid use disorder, severe, on maintenance therapy, dependence ([facility])  F11.20 304.00       Clinical Maneuvering/Interventions: Therapist utilized a person-centered approach to build rapport with group member. Therapist implemented motivational interviewing techniques to assist client with exploring and resolving ambivalence associated with commitment to change behaviors related to substance use  and addiction. Therapist applied cognitive behavioral strategies to facilitate identification of maladaptive patterns of thinking and behavior that contribute to client's risk for continued substance use and relapse. Therapist employed group interaction activities to build rapport among group members, promote sobriety, and emphasize relapse prevention. Therapist promoted safe nonjudgmental environment by providing group members with unconditional positive regard and encouraging group members to comply with group rules and guidelines. Therapist assisted group member with identifying and implementing healthier coping strategies.      Plan:  Continue [facility] IOP Phase II  Aftercare:  [facility] Phase III  Program Assignments:  Personal recovery plan, relapse prevention plan, attendance of recovery support group meetings, exploration of sponsorship, drug/alcohol screens.     Duy Garcia LCSW  11/2/2022  16:41 EDT

## 2022-11-03 LAB — ETHANOL UR-MCNC: NEGATIVE %

## 2022-11-04 LAB
ACCEPTABLE CREAT UR QL: 185 MG/DL
NALOXONE UR CFM-MCNC: 356 NG/ML
NORBUP/BUP RATIO: 3.41 RATIO
NORBUPRENORPHINE UR CFM-MCNC: 339 NG/MG CREAT
NORBUPRENORPHINE/CREAT UR: 99 NG/MG CREAT
TRP-LINK: NORMAL

## 2022-11-10 LAB
CODEINE/CREAT UR: 49 NG/MG CREAT
DHC/CREAT UR: NOT DETECTED NG/MG CREAT
HYDROCODONE/CREAT UR: 34 NG/MG CREAT
HYDROMORPHONE/CREAT UR: NOT DETECTED NG/MG CREAT
LEVEL OF DETECTION:: NORMAL
MORPHINE/CREAT UR: 124 NG/MG CREAT
NORCODEINE/CREAT UR CFM: NOT DETECTED NG/MG CREAT
NORHYDROCODONE/CREAT UR: 51 NG/MG CREAT
NORMORPHINE UR-MCNC: 70 NG/MG CREAT
OPIATES UR QL CFM: NORMAL

## 2022-11-16 ENCOUNTER — DOCUMENTATION (OUTPATIENT)
Dept: PSYCHIATRY | Facility: CLINIC | Age: 42
End: 2022-11-16

## 2022-11-16 NOTE — PROGRESS NOTES
[facility] [address], [address]  [phone]    CHEMICAL DEPENDENCY   INTENSIVE OUTPATIENT PROGRAM  DISCHARGE SUMMARY        ATTENDING: PRISCILLA Wen  THERAPIST: Duy Garcia LCSW    DATE OF DISCHARGE: 10/24/2022    Patient completed his 49th class of CD-IOP on 10/24/2022. Patient is self-referred for CD-IOP. Patient has discussed with clinician that he wants to continue on in individual therapy with his established therapist. Therefore, patient will be discharged from CD-IOP and clinician will consider this as a successful completion.     Current Outpatient Medications:   •  allopurinol (ZYLOPRIM) 100 MG tablet, Take 100 mg by mouth Daily., Disp: , Rfl:   •  buprenorphine-naloxone (SUBOXONE) 8-2 MG per SL tablet, Place 2 tablets under the tongue Daily for 28 days., Disp: 56 tablet, Rfl: 0  •  cloNIDine (Catapres) 0.2 MG tablet, Take 1 tablet by mouth Every Night., Disp: 30 tablet, Rfl: 2  •  colchicine 0.6 MG tablet, Take 0.6 mg by mouth Daily., Disp: , Rfl:   •  cyclobenzaprine (FLEXERIL) 10 MG tablet, Take 10 mg by mouth every night at bedtime., Disp: , Rfl:   •  ibuprofen (ADVIL,MOTRIN) 800 MG tablet, , Disp: , Rfl:   •  levETIRAcetam (KEPPRA) 750 MG tablet, Take 750 mg by mouth 2 (Two) Times a Day., Disp: , Rfl:   •  metoprolol succinate XL (TOPROL-XL) 25 MG 24 hr tablet, , Disp: , Rfl:   •  OLANZapine (zyPREXA) 5 MG tablet, Take 1 tablet by mouth Every Night., Disp: 30 tablet, Rfl: 0  •  omeprazole (priLOSEC) 40 MG capsule, , Disp: , Rfl:   •  ondansetron ODT (ZOFRAN-ODT) 8 MG disintegrating tablet, Every 8 (Eight) Hours As Needed., Disp: , Rfl:   •  traZODone (DESYREL) 100 MG tablet, Take 1 tablet by mouth Every Night for 90 days., Disp: 30 tablet, Rfl: 2  •  venlafaxine (EFFEXOR) 100 MG tablet, Take 2.5 tablets by mouth Daily., Disp: 75 tablet, Rfl: 2     PROGNOSIS: good with continued care.    -Duy Garcia LCSW  11/16/2022  12:19 PM.

## 2022-11-29 ENCOUNTER — TELEMEDICINE (OUTPATIENT)
Dept: PSYCHIATRY | Facility: CLINIC | Age: 42
End: 2022-11-29

## 2022-11-29 DIAGNOSIS — F11.20 OPIOID USE DISORDER, SEVERE, ON MAINTENANCE THERAPY, DEPENDENCE: ICD-10-CM

## 2022-11-29 PROCEDURE — 99213 OFFICE O/P EST LOW 20 MIN: CPT | Performed by: NURSE PRACTITIONER

## 2022-11-29 RX ORDER — BUPRENORPHINE HYDROCHLORIDE AND NALOXONE HYDROCHLORIDE DIHYDRATE 8; 2 MG/1; MG/1
2 TABLET SUBLINGUAL DAILY
Qty: 56 TABLET | Refills: 0 | Status: SHIPPED | OUTPATIENT
Start: 2022-11-29 | End: 2022-12-27

## 2022-11-29 RX ORDER — TIZANIDINE 4 MG/1
4 TABLET ORAL 2 TIMES DAILY
COMMUNITY
Start: 2022-11-24

## 2022-11-29 NOTE — PROGRESS NOTES
Telehealth Visit      This provider is located at [facility], [address] [place] in [place], [place],using a secure [employer] Video Visit through [employer]. Patient is being seen remotely via telehealth at their home address in [place], and stated they are in a secure environment for this session. The patient's condition being diagnosed/treated is appropriate for telemedicine. The provider identified herself as well as her credentials. The patient, and/or patients guardian, consent to be seen remotely, and when consent is given they understand that the consent allows for patient identifiable information to be sent to a third party as needed. They may refuse to be seen remotely at any time. The electronic data is encrypted and password protected, and the patient and/or guardian has been advised of the potential risks to privacy not withstanding such measures. Patient unable to connect to video. After repeated attempts, appointment was conducted over the phone.     Patient Name: Aroldo Cai  : 1980   MRN: [id number]     Referring Provider: Gerald Dobson MD    Chief Complaint: Follow up on substance use    History of Present Illness:   Aroldo Cai is a 42 y.o. 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 Taking buprenorphine/naloxone 16-4mg daily and is tolerating well with no adverse effects. Denies any recurrence of illicit substance use. 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 Continues to process the death of his sons mother well and is helping his son cope. Has upcoming indivudual therapy with Idalia Boles LCSW since completeing IOP. No other complaints today.    Triggers: Stress, anxiety    Cravings: 2-3 weekly    Relapse Prevention: Individual therapy, MOUD, peer support, AA/NA meetings 3-4 days a week encouraged     Urine Drug  Screen (today's visit) discussed: will come in Friday    UDS Confirmation: 11/1/2022 Positive buprenorphine, nor-buprenorphine, TCA, and opiates.     QIANA (PDMP) Reviewed for Current/Active Medications: buprenorphine/naloxone and codeine containing cough syrup as expected    Past Surgical History:  Past Surgical History:   Procedure Laterality Date   • ABDOMINAL SURGERY     • BRAIN SURGERY  1994    craniotomy,  right frontal lobe AVM   • CHOLECYSTECTOMY WITH INTRAOPERATIVE CHOLANGIOGRAM N/A 07/27/2021    Procedure: CHOLECYSTECTOMY LAPAROSCOPIC INTRAOPERATIVE CHOLANGIOGRAPHY;  Surgeon: Hardik Blancas MD;  Location: [facility] OR;  Service: General;  Laterality: N/A;   • COLONOSCOPY     • ENDOSCOPY N/A 10/22/2021    Procedure: ESOPHAGOGASTRODUODENOSCOPY WITH BIOPSY;  Surgeon: Hardik Blancas MD;  Location: [facility] ENDOSCOPY;  Service: Gastroenterology;  Laterality: N/A;   • HERNIA REPAIR Right     inguinal   • HIP SURGERY Bilateral    • HIP SURGERY Left        Problem List:  Patient Active Problem List   Diagnosis   • Calculus of gallbladder without cholecystitis without obstruction   • Right upper quadrant abdominal pain   • Nausea and vomiting   • Opioid use disorder, severe, dependence (HCC)       Allergy:   Allergies   Allergen Reactions   • Dilantin [Phenytoin] Seizure   • Penicillins Anaphylaxis   • Phenobarbital Seizure   • Tegretol [Carbamazepine] Seizure   • Morphine Nausea And Vomiting   • Latex Rash        Current Medications:   Current Outpatient Medications   Medication Sig Dispense Refill   • buprenorphine-naloxone (SUBOXONE) 8-2 MG per SL tablet Place 2 tablets under the tongue Daily for 28 days. 56 tablet 0   • allopurinol (ZYLOPRIM) 100 MG tablet Take 100 mg by mouth Daily.     • cloNIDine (Catapres) 0.2 MG tablet Take 1 tablet by mouth Every Night. 30 tablet 2   • colchicine 0.6 MG tablet Take 0.6 mg by mouth Daily.     • cyclobenzaprine (FLEXERIL) 10 MG tablet Take 10 mg by mouth every night at  bedtime.     • ibuprofen (ADVIL,MOTRIN) 800 MG tablet      • levETIRAcetam (KEPPRA) 750 MG tablet Take 750 mg by mouth 2 (Two) Times a Day.     • metoprolol succinate XL (TOPROL-XL) 25 MG 24 hr tablet      • OLANZapine (zyPREXA) 5 MG tablet Take 1 tablet by mouth Every Night. 30 tablet 0   • omeprazole (priLOSEC) 40 MG capsule      • ondansetron ODT (ZOFRAN-ODT) 8 MG disintegrating tablet Every 8 (Eight) Hours As Needed.     • traZODone (DESYREL) 100 MG tablet Take 1 tablet by mouth Every Night for 90 days. 30 tablet 2   • venlafaxine (EFFEXOR) 100 MG tablet Take 2.5 tablets by mouth Daily. 75 tablet 2     No current facility-administered medications for this visit.       Past Medical History:  Past Medical History:   Diagnosis Date   • Anesthesia complication     pt reports he is hard to wake after anesthesia   • Anxiety    • Arthritis    • Bipolar 1 disorder (HCC)    • COVID-19 02/2021   • Depression    • Dysphagia     food and liquids.   • Extensive tattoos    • Gout    • Hepatitis C 2018    Patient took medication    • History of echocardiogram    • Kidney stones    • Pneumonia    • PTSD (post-traumatic stress disorder)    • Seizure (HCC)     last seizure years ago.   • Seizures (HCC)    • Short-term memory loss    • Wears contact lenses    • Wears glasses        Social History:  Social History     Socioeconomic History   • Marital status:    Tobacco Use   • Smoking status: Every Day     Packs/day: 0.50     Years: 30.00     Pack years: 15.00     Types: Cigarettes   • Smokeless tobacco: Never   Vaping Use   • Vaping Use: Never used   Substance and Sexual Activity   • Alcohol use: Not Currently   • Drug use: Not Currently     Types: Cocaine(coke)     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   • Sexual activity: Defer       Family History:  Family History   Problem Relation Age of Onset   • Diabetes Mother    • Diabetes insipidus Mother    • Heart disease Mother    • Cancer Father         skin   •  Diabetes Father    • Hypertension Father    • Arthritis Father    • Diabetes insipidus Father          Subjective      Review of Systems:   Review of Systems   Constitutional: Negative for chills, fatigue and fever.   Respiratory: Negative for shortness of breath.    Cardiovascular: Negative for chest pain.   Gastrointestinal: Negative for abdominal pain.   Skin: Negative for skin lesions.   Neurological: Negative for seizures and confusion.   Psychiatric/Behavioral: Positive for stress. Negative for hallucinations, sleep disturbance, suicidal ideas and depressed mood. The patient is not nervous/anxious.      Patient History:   The following portions of the patient's history were reviewed and updated as appropriate: allergies, current medications, past family history, past medical history, past social history, past surgical history and problem list.     Social:  Social History     Socioeconomic History   • Marital status:    Tobacco Use   • Smoking status: Every Day     Packs/day: 0.50     Years: 30.00     Pack years: 15.00     Types: Cigarettes   • Smokeless tobacco: Never   Vaping Use   • Vaping Use: Never used   Substance and Sexual Activity   • Alcohol use: Not Currently   • Drug use: Not Currently     Types: Cocaine(coke)     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   • Sexual activity: Defer       Medications:     Current Outpatient Medications:   •  buprenorphine-naloxone (SUBOXONE) 8-2 MG per SL tablet, Place 2 tablets under the tongue Daily for 28 days., Disp: 56 tablet, Rfl: 0  •  allopurinol (ZYLOPRIM) 100 MG tablet, Take 100 mg by mouth Daily., Disp: , Rfl:   •  cloNIDine (Catapres) 0.2 MG tablet, Take 1 tablet by mouth Every Night., Disp: 30 tablet, Rfl: 2  •  colchicine 0.6 MG tablet, Take 0.6 mg by mouth Daily., Disp: , Rfl:   •  cyclobenzaprine (FLEXERIL) 10 MG tablet, Take 10 mg by mouth every night at bedtime., Disp: , Rfl:   •  ibuprofen (ADVIL,MOTRIN) 800 MG tablet, , Disp: , Rfl:   •   levETIRAcetam (KEPPRA) 750 MG tablet, Take 750 mg by mouth 2 (Two) Times a Day., Disp: , Rfl:   •  metoprolol succinate XL (TOPROL-XL) 25 MG 24 hr tablet, , Disp: , Rfl:   •  OLANZapine (zyPREXA) 5 MG tablet, Take 1 tablet by mouth Every Night., Disp: 30 tablet, Rfl: 0  •  omeprazole (priLOSEC) 40 MG capsule, , Disp: , Rfl:   •  ondansetron ODT (ZOFRAN-ODT) 8 MG disintegrating tablet, Every 8 (Eight) Hours As Needed., Disp: , Rfl:   •  traZODone (DESYREL) 100 MG tablet, Take 1 tablet by mouth Every Night for 90 days., Disp: 30 tablet, Rfl: 2  •  venlafaxine (EFFEXOR) 100 MG tablet, Take 2.5 tablets by mouth Daily., Disp: 75 tablet, Rfl: 2    Objective     Physical Exam:  Physical Exam  Vitals reviewed.   Constitutional:       Appearance: He is well-developed.   Pulmonary:      Effort: No respiratory distress.   Neurological:      Mental Status: He is alert and oriented to person, place, and time.   Psychiatric:         Speech: Speech normal.         Behavior: Behavior normal.         Thought Content: Thought content normal. Thought content does not include homicidal or suicidal ideation. Thought content does not include homicidal or suicidal plan.         Vital Signs: There were no vitals filed for this visit.  There is no height or weight on file to calculate BMI.     Mental Status Exam:   Hygiene:   good  Cooperation:  Cooperative  Eye Contact:  KEITH  Psychomotor Behavior:  KEITH  Affect:  Full range  Mood: normal  Speech:  Normal  Thought Process:  Goal directed  Thought Content:  Normal  Suicidal:  None  Homicidal:  None  Hallucinations:  None  Delusion:  None  Memory:  Intact  Orientation:  Grossly intact  Reliability:  good  Insight:  Good  Judgement:  Good  Impulse Control:  Good    Assessment / Plan      Assessment & Plan   -Continue buprenorphine/naloxone 16-4 mg daily.  -Will have RTC dose at next visit +0 remaining  -Advisement to take part in and remain active in 12 Step Recovery Meetings, IOP, and/or 1:1  therapy/counseling and to establish/maintain an active relationship with a recovery sponsor.   -Continued monitoring for illicit substances for patient safety, medication compliance and future care.  -Will come in for UDS Friday    Visit Diagnoses     Opioid use disorder, severe, on maintenance therapy, dependence (HCC)        Relevant Medications    buprenorphine-naloxone (SUBOXONE) 8-2 MG per SL tablet      Diagnoses       Codes Comments    Opioid use disorder, severe, on maintenance therapy, dependence (HCC)     ICD-10-CM: F11.20  ICD-9-CM: 304.00           Visit Diagnoses:    ICD-10-CM ICD-9-CM   1. Opioid use disorder, severe, on maintenance therapy, dependence (HCC)  F11.20 304.00       PLAN:  1. Safety: No acute safety concerns  2. Risk Assessment: Risk of self-harm acutely is low. Risk of self-harm chronically is also low, but could be further elevated in the event of treatment noncompliance and/or AODA.      TREATMENT PLAN: Continue supportive psychotherapy efforts and medications as indicated. Treatment and medication options discussed during today's visit. Patient acknowledged and verbally consented to continue with current treatment plan and was educated on the importance of compliance with treatment and follow-up appointments.    GOALS:  Short Term Goals: Patient will be compliant with medication, and patient will have no significant medication related side effects.  Patient will be engaged in psychotherapy as indicated.  Patient will report subjective improvement of symptoms.  Long term goals: To stabilize mood and treat/improve subjective symptoms, the patient will stay out of the hospital, the patient will be at an optimal level of functioning, and the patient will take all medications as prescribed.  The patient/guardian verbalized understanding and agreement with goals that were mutually set.      MEDICATION ISSUES:  QIANA reviewed as expected.  Discussed medication options and treatment plan of  prescribed medication as well as the risks, benefits, and side effects including potential falls, possible impaired driving and metabolic adversities among others. Patient is agreeable to call the office with any worsening of symptoms or onset of side effects. Patient is agreeable to call 911 or go to the nearest ER should he/she begin having SI/HI. No medication side effects or related complaints today.     MEDS ORDERED DURING VISIT:  New Medications Ordered This Visit   Medications   • buprenorphine-naloxone (SUBOXONE) 8-2 MG per SL tablet     Sig: Place 2 tablets under the tongue Daily for 28 days.     Dispense:  56 tablet     Refill:  0     [id number]       Return in 4 weeks (on 12/27/2022) for Follow Up Medication, Telehealth Visit.           This document has been electronically signed by PRISCILLA Wen  November 29, 2022 13:37 EST      Part of this note may be an electronic transcription/translation of spoken language to printed text using the Dragon Dictation System.

## (undated) DEVICE — KT CATH CHOLANGIOGRA PERC W/BALLO

## (undated) DEVICE — ENDOPATH XCEL UNIVERSAL TROCAR STABLILITY SLEEVES: Brand: ENDOPATH XCEL

## (undated) DEVICE — DISPOSABLE MONOPOLAR ENDOSCOPIC CORD 10 FT. (3M): Brand: KIRWAN

## (undated) DEVICE — SUCTION CANISTER, 1500CC, RIGID: Brand: DEROYAL

## (undated) DEVICE — MONOPOLAR METZENBAUM SCISSOR, MINI BLADE TIP, DISPOSABLE: Brand: MONOPOLAR METZENBAUM SCISSOR, MINI BLADE TIP, DISPOSABLE

## (undated) DEVICE — CLAVICLE STRAP: Brand: DEROYAL

## (undated) DEVICE — RICH GENERAL LAPAROSCOPY: Brand: MEDLINE INDUSTRIES, INC.

## (undated) DEVICE — HYBRID TUBING/CAP SET FOR OLYMPUS® SCOPES: Brand: ERBE

## (undated) DEVICE — SOL NACL 0.9PCT 1000ML

## (undated) DEVICE — MEDI-VAC NON-CONDUCTIVE SUCTION TUBING: Brand: CARDINAL HEALTH

## (undated) DEVICE — LUBE JELLY PK/2.75GM STRL BX/144

## (undated) DEVICE — SLV SCD CALF HEMOFORCE DVT THERP REPROC MD

## (undated) DEVICE — TP ELECTRD LAP L WR SPLIT33CM

## (undated) DEVICE — 2, DISPOSABLE SUCTION/IRRIGATOR WITHOUT DISPOSABLE TIP: Brand: STRYKEFLOW

## (undated) DEVICE — CONMED SCOPE SAVER BITE BLOCK, 20X27 MM: Brand: SCOPE SAVER

## (undated) DEVICE — ENDOPOUCH RETRIEVER SPECIMEN RETRIEVAL BAGS: Brand: ENDOPOUCH RETRIEVER

## (undated) DEVICE — GLV SURG SENSICARE W/ALOE PF LF 7.5 STRL

## (undated) DEVICE — UNDYED MONOFILAMENT (POLYDIOXANONE), ABSORBABLE SYNTHETIC SURGICAL SUTURE: Brand: PDS

## (undated) DEVICE — SOL IRR NACL 0.9PCT BT 1000ML

## (undated) DEVICE — VLV SXN AIR/H2O ORCAPOD3 1P/U STRL

## (undated) DEVICE — ENDOPATH XCEL BLADELESS TROCARS WITH STABILITY SLEEVES: Brand: ENDOPATH XCEL

## (undated) DEVICE — ENDOSCOPY PORT CONNECTOR FOR OLYMPUS® SCOPES: Brand: ERBE

## (undated) DEVICE — SYR LUER SLPTP 50ML